# Patient Record
Sex: FEMALE | Race: OTHER | NOT HISPANIC OR LATINO | URBAN - METROPOLITAN AREA
[De-identification: names, ages, dates, MRNs, and addresses within clinical notes are randomized per-mention and may not be internally consistent; named-entity substitution may affect disease eponyms.]

---

## 2017-03-08 ENCOUNTER — OUTPATIENT (OUTPATIENT)
Dept: OUTPATIENT SERVICES | Facility: HOSPITAL | Age: 72
LOS: 1 days | End: 2017-03-08
Payer: MEDICARE

## 2017-03-08 VITALS
DIASTOLIC BLOOD PRESSURE: 80 MMHG | WEIGHT: 179.9 LBS | HEIGHT: 60 IN | RESPIRATION RATE: 16 BRPM | HEART RATE: 72 BPM | SYSTOLIC BLOOD PRESSURE: 140 MMHG

## 2017-03-08 DIAGNOSIS — M17.11 UNILATERAL PRIMARY OSTEOARTHRITIS, RIGHT KNEE: ICD-10-CM

## 2017-03-08 DIAGNOSIS — Z01.818 ENCOUNTER FOR OTHER PREPROCEDURAL EXAMINATION: ICD-10-CM

## 2017-03-08 DIAGNOSIS — M19.90 UNSPECIFIED OSTEOARTHRITIS, UNSPECIFIED SITE: ICD-10-CM

## 2017-03-08 DIAGNOSIS — Z12.11 ENCOUNTER FOR SCREENING FOR MALIGNANT NEOPLASM OF COLON: Chronic | ICD-10-CM

## 2017-03-08 DIAGNOSIS — D17.9 BENIGN LIPOMATOUS NEOPLASM, UNSPECIFIED: Chronic | ICD-10-CM

## 2017-03-08 LAB
ANION GAP SERPL CALC-SCNC: 11 MMOL/L — SIGNIFICANT CHANGE UP (ref 5–17)
APTT BLD: 33.2 SEC — SIGNIFICANT CHANGE UP (ref 27.5–37.4)
BUN SERPL-MCNC: 31 MG/DL — HIGH (ref 7–18)
CALCIUM SERPL-MCNC: 9.1 MG/DL — SIGNIFICANT CHANGE UP (ref 8.4–10.5)
CHLORIDE SERPL-SCNC: 104 MMOL/L — SIGNIFICANT CHANGE UP (ref 96–108)
CO2 SERPL-SCNC: 24 MMOL/L — SIGNIFICANT CHANGE UP (ref 22–31)
CREAT SERPL-MCNC: 1.28 MG/DL — SIGNIFICANT CHANGE UP (ref 0.5–1.3)
GLUCOSE SERPL-MCNC: 122 MG/DL — HIGH (ref 70–99)
HCT VFR BLD CALC: 35.9 % — SIGNIFICANT CHANGE UP (ref 34.5–45)
HGB BLD-MCNC: 11.6 G/DL — SIGNIFICANT CHANGE UP (ref 11.5–15.5)
INR BLD: 1.17 RATIO — HIGH (ref 0.88–1.16)
MCHC RBC-ENTMCNC: 26.8 PG — LOW (ref 27–34)
MCHC RBC-ENTMCNC: 32.4 GM/DL — SIGNIFICANT CHANGE UP (ref 32–36)
MCV RBC AUTO: 82.6 FL — SIGNIFICANT CHANGE UP (ref 80–100)
PLATELET # BLD AUTO: 335 K/UL — SIGNIFICANT CHANGE UP (ref 150–400)
POTASSIUM SERPL-MCNC: 4.3 MMOL/L — SIGNIFICANT CHANGE UP (ref 3.5–5.3)
POTASSIUM SERPL-SCNC: 4.3 MMOL/L — SIGNIFICANT CHANGE UP (ref 3.5–5.3)
PROTHROM AB SERPL-ACNC: 13.1 SEC — SIGNIFICANT CHANGE UP (ref 10–13.1)
RBC # BLD: 4.35 M/UL — SIGNIFICANT CHANGE UP (ref 3.8–5.2)
RBC # FLD: 12.6 % — SIGNIFICANT CHANGE UP (ref 10.3–14.5)
SODIUM SERPL-SCNC: 139 MMOL/L — SIGNIFICANT CHANGE UP (ref 135–145)
WBC # BLD: 12 K/UL — HIGH (ref 3.8–10.5)
WBC # FLD AUTO: 12 K/UL — HIGH (ref 3.8–10.5)

## 2017-03-08 PROCEDURE — 85027 COMPLETE CBC AUTOMATED: CPT

## 2017-03-08 PROCEDURE — 86901 BLOOD TYPING SEROLOGIC RH(D): CPT

## 2017-03-08 PROCEDURE — 87641 MR-STAPH DNA AMP PROBE: CPT

## 2017-03-08 PROCEDURE — 85610 PROTHROMBIN TIME: CPT

## 2017-03-08 PROCEDURE — 85730 THROMBOPLASTIN TIME PARTIAL: CPT

## 2017-03-08 PROCEDURE — 87640 STAPH A DNA AMP PROBE: CPT

## 2017-03-08 PROCEDURE — G0463: CPT

## 2017-03-08 PROCEDURE — 80048 BASIC METABOLIC PNL TOTAL CA: CPT

## 2017-03-08 PROCEDURE — 86900 BLOOD TYPING SEROLOGIC ABO: CPT

## 2017-03-08 PROCEDURE — 86850 RBC ANTIBODY SCREEN: CPT

## 2017-03-08 NOTE — H&P PST ADULT - NSANTHOSAYNRD_GEN_A_CORE
No. GINNA screening performed.  STOP BANG Legend: 0-2 = LOW Risk; 3-4 = INTERMEDIATE Risk; 5-8 = HIGH Risk

## 2017-03-08 NOTE — H&P PST ADULT - FAMILY HISTORY
Mother  Still living? Unknown  Family history of diabetes mellitus, Age at diagnosis: Age Unknown  Family history of hypertension, Age at diagnosis: Age Unknown     Sibling  Still living? Unknown  Family history of diabetes mellitus, Age at diagnosis: Age Unknown  Family history of hypertension, Age at diagnosis: Age Unknown     Grandparent  Still living? Unknown  Family history of blindness, Age at diagnosis: Age Unknown

## 2017-03-08 NOTE — H&P PST ADULT - PMH
Arthritis    DM (diabetes mellitus)    GERD (gastroesophageal reflux disease)    HTN (hypertension)    Hyperlipemia    Hypothyroid

## 2017-03-09 LAB
MRSA PCR RESULT.: SIGNIFICANT CHANGE UP
S AUREUS DNA NOSE QL NAA+PROBE: SIGNIFICANT CHANGE UP

## 2017-03-17 RX ORDER — ASPIRIN/CALCIUM CARB/MAGNESIUM 324 MG
1 TABLET ORAL
Qty: 0 | Refills: 0 | COMMUNITY

## 2017-03-17 RX ORDER — SODIUM CHLORIDE 9 MG/ML
3 INJECTION INTRAMUSCULAR; INTRAVENOUS; SUBCUTANEOUS EVERY 8 HOURS
Qty: 0 | Refills: 0 | Status: DISCONTINUED | OUTPATIENT
Start: 2017-03-20 | End: 2017-03-20

## 2017-03-17 RX ORDER — ACETAMINOPHEN 500 MG
0 TABLET ORAL
Qty: 0 | Refills: 0 | COMMUNITY

## 2017-03-19 ENCOUNTER — TRANSCRIPTION ENCOUNTER (OUTPATIENT)
Age: 72
End: 2017-03-19

## 2017-03-20 ENCOUNTER — INPATIENT (INPATIENT)
Facility: HOSPITAL | Age: 72
LOS: 3 days | Discharge: NURSING, REHAB OR LONG-TERM CARE | DRG: 470 | End: 2017-03-24
Attending: ORTHOPAEDIC SURGERY | Admitting: ORTHOPAEDIC SURGERY
Payer: MEDICARE

## 2017-03-20 VITALS
RESPIRATION RATE: 14 BRPM | SYSTOLIC BLOOD PRESSURE: 123 MMHG | DIASTOLIC BLOOD PRESSURE: 59 MMHG | HEART RATE: 83 BPM | TEMPERATURE: 98 F | WEIGHT: 179.9 LBS | OXYGEN SATURATION: 97 %

## 2017-03-20 DIAGNOSIS — D17.9 BENIGN LIPOMATOUS NEOPLASM, UNSPECIFIED: Chronic | ICD-10-CM

## 2017-03-20 DIAGNOSIS — Z12.11 ENCOUNTER FOR SCREENING FOR MALIGNANT NEOPLASM OF COLON: Chronic | ICD-10-CM

## 2017-03-20 DIAGNOSIS — M17.11 UNILATERAL PRIMARY OSTEOARTHRITIS, RIGHT KNEE: ICD-10-CM

## 2017-03-20 LAB
ANION GAP SERPL CALC-SCNC: 10 MMOL/L — SIGNIFICANT CHANGE UP (ref 5–17)
BUN SERPL-MCNC: 25 MG/DL — HIGH (ref 7–18)
CALCIUM SERPL-MCNC: 8 MG/DL — LOW (ref 8.4–10.5)
CHLORIDE SERPL-SCNC: 107 MMOL/L — SIGNIFICANT CHANGE UP (ref 96–108)
CO2 SERPL-SCNC: 23 MMOL/L — SIGNIFICANT CHANGE UP (ref 22–31)
CREAT SERPL-MCNC: 1.01 MG/DL — SIGNIFICANT CHANGE UP (ref 0.5–1.3)
GLUCOSE SERPL-MCNC: 145 MG/DL — HIGH (ref 70–99)
HCT VFR BLD CALC: 28.1 % — LOW (ref 34.5–45)
HGB BLD-MCNC: 9.2 G/DL — LOW (ref 11.5–15.5)
MCHC RBC-ENTMCNC: 26.8 PG — LOW (ref 27–34)
MCHC RBC-ENTMCNC: 32.8 GM/DL — SIGNIFICANT CHANGE UP (ref 32–36)
MCV RBC AUTO: 82 FL — SIGNIFICANT CHANGE UP (ref 80–100)
PLATELET # BLD AUTO: 268 K/UL — SIGNIFICANT CHANGE UP (ref 150–400)
POTASSIUM SERPL-MCNC: 4.4 MMOL/L — SIGNIFICANT CHANGE UP (ref 3.5–5.3)
POTASSIUM SERPL-SCNC: 4.4 MMOL/L — SIGNIFICANT CHANGE UP (ref 3.5–5.3)
RBC # BLD: 3.42 M/UL — LOW (ref 3.8–5.2)
RBC # FLD: 12.5 % — SIGNIFICANT CHANGE UP (ref 10.3–14.5)
SODIUM SERPL-SCNC: 140 MMOL/L — SIGNIFICANT CHANGE UP (ref 135–145)
WBC # BLD: 13.2 K/UL — HIGH (ref 3.8–10.5)
WBC # FLD AUTO: 13.2 K/UL — HIGH (ref 3.8–10.5)

## 2017-03-20 PROCEDURE — 88311 DECALCIFY TISSUE: CPT | Mod: 26

## 2017-03-20 PROCEDURE — 73560 X-RAY EXAM OF KNEE 1 OR 2: CPT | Mod: 26,RT

## 2017-03-20 PROCEDURE — 73562 X-RAY EXAM OF KNEE 3: CPT | Mod: 26,RT

## 2017-03-20 PROCEDURE — 27447 TOTAL KNEE ARTHROPLASTY: CPT | Mod: AS,RT

## 2017-03-20 PROCEDURE — 88305 TISSUE EXAM BY PATHOLOGIST: CPT | Mod: 26

## 2017-03-20 RX ORDER — DEXTROSE 50 % IN WATER 50 %
25 SYRINGE (ML) INTRAVENOUS ONCE
Qty: 0 | Refills: 0 | Status: DISCONTINUED | OUTPATIENT
Start: 2017-03-20 | End: 2017-03-24

## 2017-03-20 RX ORDER — HYDROMORPHONE HYDROCHLORIDE 2 MG/ML
1 INJECTION INTRAMUSCULAR; INTRAVENOUS; SUBCUTANEOUS
Qty: 0 | Refills: 0 | Status: DISCONTINUED | OUTPATIENT
Start: 2017-03-20 | End: 2017-03-20

## 2017-03-20 RX ORDER — FERROUS SULFATE 325(65) MG
325 TABLET ORAL
Qty: 0 | Refills: 0 | Status: DISCONTINUED | OUTPATIENT
Start: 2017-03-20 | End: 2017-03-24

## 2017-03-20 RX ORDER — ENOXAPARIN SODIUM 100 MG/ML
30 INJECTION SUBCUTANEOUS
Qty: 0 | Refills: 0 | Status: DISCONTINUED | OUTPATIENT
Start: 2017-03-20 | End: 2017-03-24

## 2017-03-20 RX ORDER — GLUCAGON INJECTION, SOLUTION 0.5 MG/.1ML
1 INJECTION, SOLUTION SUBCUTANEOUS ONCE
Qty: 0 | Refills: 0 | Status: DISCONTINUED | OUTPATIENT
Start: 2017-03-20 | End: 2017-03-24

## 2017-03-20 RX ORDER — DOCUSATE SODIUM 100 MG
100 CAPSULE ORAL THREE TIMES A DAY
Qty: 0 | Refills: 0 | Status: DISCONTINUED | OUTPATIENT
Start: 2017-03-20 | End: 2017-03-24

## 2017-03-20 RX ORDER — ACETAMINOPHEN 500 MG
650 TABLET ORAL EVERY 6 HOURS
Qty: 0 | Refills: 0 | Status: DISCONTINUED | OUTPATIENT
Start: 2017-03-20 | End: 2017-03-24

## 2017-03-20 RX ORDER — DEXTROSE 50 % IN WATER 50 %
1 SYRINGE (ML) INTRAVENOUS ONCE
Qty: 0 | Refills: 0 | Status: DISCONTINUED | OUTPATIENT
Start: 2017-03-20 | End: 2017-03-24

## 2017-03-20 RX ORDER — SENNA PLUS 8.6 MG/1
2 TABLET ORAL AT BEDTIME
Qty: 0 | Refills: 0 | Status: DISCONTINUED | OUTPATIENT
Start: 2017-03-20 | End: 2017-03-23

## 2017-03-20 RX ORDER — LEVOTHYROXINE SODIUM 125 MCG
75 TABLET ORAL DAILY
Qty: 0 | Refills: 0 | Status: DISCONTINUED | OUTPATIENT
Start: 2017-03-20 | End: 2017-03-24

## 2017-03-20 RX ORDER — DEXTROSE 50 % IN WATER 50 %
12.5 SYRINGE (ML) INTRAVENOUS ONCE
Qty: 0 | Refills: 0 | Status: DISCONTINUED | OUTPATIENT
Start: 2017-03-20 | End: 2017-03-24

## 2017-03-20 RX ORDER — FENTANYL CITRATE 50 UG/ML
50 INJECTION INTRAVENOUS
Qty: 0 | Refills: 0 | Status: DISCONTINUED | OUTPATIENT
Start: 2017-03-20 | End: 2017-03-21

## 2017-03-20 RX ORDER — SODIUM CHLORIDE 9 MG/ML
1000 INJECTION, SOLUTION INTRAVENOUS
Qty: 0 | Refills: 0 | Status: DISCONTINUED | OUTPATIENT
Start: 2017-03-20 | End: 2017-03-20

## 2017-03-20 RX ORDER — MAGNESIUM HYDROXIDE 400 MG/1
30 TABLET, CHEWABLE ORAL DAILY
Qty: 0 | Refills: 0 | Status: DISCONTINUED | OUTPATIENT
Start: 2017-03-20 | End: 2017-03-24

## 2017-03-20 RX ORDER — CEFAZOLIN SODIUM 1 G
2000 VIAL (EA) INJECTION EVERY 8 HOURS
Qty: 0 | Refills: 0 | Status: COMPLETED | OUTPATIENT
Start: 2017-03-20 | End: 2017-03-21

## 2017-03-20 RX ORDER — FOLIC ACID 0.8 MG
1 TABLET ORAL DAILY
Qty: 0 | Refills: 0 | Status: DISCONTINUED | OUTPATIENT
Start: 2017-03-20 | End: 2017-03-24

## 2017-03-20 RX ORDER — LISINOPRIL 2.5 MG/1
10 TABLET ORAL DAILY
Qty: 0 | Refills: 0 | Status: DISCONTINUED | OUTPATIENT
Start: 2017-03-20 | End: 2017-03-24

## 2017-03-20 RX ORDER — INSULIN LISPRO 100/ML
VIAL (ML) SUBCUTANEOUS
Qty: 0 | Refills: 0 | Status: DISCONTINUED | OUTPATIENT
Start: 2017-03-20 | End: 2017-03-24

## 2017-03-20 RX ORDER — ONDANSETRON 8 MG/1
4 TABLET, FILM COATED ORAL EVERY 6 HOURS
Qty: 0 | Refills: 0 | Status: DISCONTINUED | OUTPATIENT
Start: 2017-03-20 | End: 2017-03-24

## 2017-03-20 RX ORDER — AMLODIPINE BESYLATE 2.5 MG/1
10 TABLET ORAL DAILY
Qty: 0 | Refills: 0 | Status: DISCONTINUED | OUTPATIENT
Start: 2017-03-20 | End: 2017-03-24

## 2017-03-20 RX ORDER — BUPIVACAINE 13.3 MG/ML
20 INJECTION, SUSPENSION, LIPOSOMAL INFILTRATION ONCE
Qty: 0 | Refills: 0 | Status: DISCONTINUED | OUTPATIENT
Start: 2017-03-20 | End: 2017-03-20

## 2017-03-20 RX ORDER — SIMVASTATIN 20 MG/1
20 TABLET, FILM COATED ORAL AT BEDTIME
Qty: 0 | Refills: 0 | Status: DISCONTINUED | OUTPATIENT
Start: 2017-03-20 | End: 2017-03-24

## 2017-03-20 RX ORDER — ASCORBIC ACID 60 MG
500 TABLET,CHEWABLE ORAL
Qty: 0 | Refills: 0 | Status: DISCONTINUED | OUTPATIENT
Start: 2017-03-20 | End: 2017-03-24

## 2017-03-20 RX ORDER — SODIUM CHLORIDE 9 MG/ML
1000 INJECTION, SOLUTION INTRAVENOUS
Qty: 0 | Refills: 0 | Status: DISCONTINUED | OUTPATIENT
Start: 2017-03-20 | End: 2017-03-24

## 2017-03-20 RX ORDER — POLYETHYLENE GLYCOL 3350 17 G/17G
17 POWDER, FOR SOLUTION ORAL DAILY
Qty: 0 | Refills: 0 | Status: DISCONTINUED | OUTPATIENT
Start: 2017-03-20 | End: 2017-03-24

## 2017-03-20 RX ORDER — ASPIRIN/CALCIUM CARB/MAGNESIUM 324 MG
325 TABLET ORAL
Qty: 0 | Refills: 0 | Status: DISCONTINUED | OUTPATIENT
Start: 2017-03-20 | End: 2017-03-20

## 2017-03-20 RX ADMIN — Medication 100 MILLIGRAM(S): at 19:31

## 2017-03-20 RX ADMIN — HYDROMORPHONE HYDROCHLORIDE 1 MILLIGRAM(S): 2 INJECTION INTRAMUSCULAR; INTRAVENOUS; SUBCUTANEOUS at 15:50

## 2017-03-20 RX ADMIN — SODIUM CHLORIDE 3 MILLILITER(S): 9 INJECTION INTRAMUSCULAR; INTRAVENOUS; SUBCUTANEOUS at 09:47

## 2017-03-20 RX ADMIN — HYDROMORPHONE HYDROCHLORIDE 1 MILLIGRAM(S): 2 INJECTION INTRAMUSCULAR; INTRAVENOUS; SUBCUTANEOUS at 15:05

## 2017-03-20 RX ADMIN — SODIUM CHLORIDE 125 MILLILITER(S): 9 INJECTION, SOLUTION INTRAVENOUS at 16:12

## 2017-03-20 RX ADMIN — HYDROMORPHONE HYDROCHLORIDE 1 MILLIGRAM(S): 2 INJECTION INTRAMUSCULAR; INTRAVENOUS; SUBCUTANEOUS at 15:20

## 2017-03-20 RX ADMIN — HYDROMORPHONE HYDROCHLORIDE 1 MILLIGRAM(S): 2 INJECTION INTRAMUSCULAR; INTRAVENOUS; SUBCUTANEOUS at 15:35

## 2017-03-20 RX ADMIN — SODIUM CHLORIDE 125 MILLILITER(S): 9 INJECTION, SOLUTION INTRAVENOUS at 19:28

## 2017-03-20 NOTE — PATIENT PROFILE ADULT. - REASON FOR ADMISSION
pain in my right knee which goes down leg into foot and then I can't walk right total knee replacement

## 2017-03-21 PROCEDURE — 99231 SBSQ HOSP IP/OBS SF/LOW 25: CPT

## 2017-03-21 RX ORDER — OXYCODONE HYDROCHLORIDE 5 MG/1
5 TABLET ORAL EVERY 4 HOURS
Qty: 0 | Refills: 0 | Status: DISCONTINUED | OUTPATIENT
Start: 2017-03-21 | End: 2017-03-24

## 2017-03-21 RX ORDER — OXYCODONE HYDROCHLORIDE 5 MG/1
10 TABLET ORAL EVERY 4 HOURS
Qty: 0 | Refills: 0 | Status: DISCONTINUED | OUTPATIENT
Start: 2017-03-21 | End: 2017-03-24

## 2017-03-21 RX ORDER — ACETAMINOPHEN 500 MG
1000 TABLET ORAL ONCE
Qty: 0 | Refills: 0 | Status: COMPLETED | OUTPATIENT
Start: 2017-03-21 | End: 2017-03-21

## 2017-03-21 RX ADMIN — Medication 1 MILLIGRAM(S): at 11:48

## 2017-03-21 RX ADMIN — Medication 500 MILLIGRAM(S): at 17:08

## 2017-03-21 RX ADMIN — SIMVASTATIN 20 MILLIGRAM(S): 20 TABLET, FILM COATED ORAL at 21:23

## 2017-03-21 RX ADMIN — Medication 500 MILLIGRAM(S): at 06:21

## 2017-03-21 RX ADMIN — Medication 100 MILLIGRAM(S): at 06:22

## 2017-03-21 RX ADMIN — Medication 100 MILLIGRAM(S): at 02:35

## 2017-03-21 RX ADMIN — Medication 1: at 17:08

## 2017-03-21 RX ADMIN — ENOXAPARIN SODIUM 30 MILLIGRAM(S): 100 INJECTION SUBCUTANEOUS at 17:08

## 2017-03-21 RX ADMIN — Medication 325 MILLIGRAM(S): at 11:48

## 2017-03-21 RX ADMIN — Medication 1 TABLET(S): at 11:48

## 2017-03-21 RX ADMIN — LISINOPRIL 10 MILLIGRAM(S): 2.5 TABLET ORAL at 06:21

## 2017-03-21 RX ADMIN — ENOXAPARIN SODIUM 30 MILLIGRAM(S): 100 INJECTION SUBCUTANEOUS at 06:22

## 2017-03-21 RX ADMIN — Medication 2: at 11:48

## 2017-03-21 RX ADMIN — Medication 1000 MILLIGRAM(S): at 12:28

## 2017-03-21 RX ADMIN — Medication 75 MICROGRAM(S): at 06:21

## 2017-03-21 RX ADMIN — OXYCODONE HYDROCHLORIDE 10 MILLIGRAM(S): 5 TABLET ORAL at 20:10

## 2017-03-21 RX ADMIN — OXYCODONE HYDROCHLORIDE 10 MILLIGRAM(S): 5 TABLET ORAL at 17:41

## 2017-03-21 RX ADMIN — AMLODIPINE BESYLATE 10 MILLIGRAM(S): 2.5 TABLET ORAL at 06:22

## 2017-03-21 RX ADMIN — POLYETHYLENE GLYCOL 3350 17 GRAM(S): 17 POWDER, FOR SOLUTION ORAL at 11:48

## 2017-03-21 RX ADMIN — Medication 325 MILLIGRAM(S): at 17:08

## 2017-03-21 RX ADMIN — Medication 400 MILLIGRAM(S): at 11:48

## 2017-03-21 RX ADMIN — Medication 325 MILLIGRAM(S): at 07:51

## 2017-03-21 NOTE — PHYSICAL THERAPY INITIAL EVALUATION ADULT - ACTIVE RANGE OF MOTION EXAMINATION, REHAB EVAL
Left LE Active ROM was WFL (within functional limits)/Right ankle-WFL/bilateral upper extremity Active ROM was WFL (within functional limits)

## 2017-03-22 LAB — SURGICAL PATHOLOGY FINAL REPORT - CH: SIGNIFICANT CHANGE UP

## 2017-03-22 RX ADMIN — Medication 100 MILLIGRAM(S): at 06:13

## 2017-03-22 RX ADMIN — Medication 75 MICROGRAM(S): at 06:12

## 2017-03-22 RX ADMIN — Medication 2: at 08:32

## 2017-03-22 RX ADMIN — Medication 1 TABLET(S): at 11:55

## 2017-03-22 RX ADMIN — Medication 1 MILLIGRAM(S): at 11:55

## 2017-03-22 RX ADMIN — OXYCODONE HYDROCHLORIDE 10 MILLIGRAM(S): 5 TABLET ORAL at 21:47

## 2017-03-22 RX ADMIN — ENOXAPARIN SODIUM 30 MILLIGRAM(S): 100 INJECTION SUBCUTANEOUS at 06:12

## 2017-03-22 RX ADMIN — OXYCODONE HYDROCHLORIDE 10 MILLIGRAM(S): 5 TABLET ORAL at 22:20

## 2017-03-22 RX ADMIN — SIMVASTATIN 20 MILLIGRAM(S): 20 TABLET, FILM COATED ORAL at 21:46

## 2017-03-22 RX ADMIN — Medication 100 MILLIGRAM(S): at 21:46

## 2017-03-22 RX ADMIN — Medication 500 MILLIGRAM(S): at 06:13

## 2017-03-22 RX ADMIN — ENOXAPARIN SODIUM 30 MILLIGRAM(S): 100 INJECTION SUBCUTANEOUS at 17:37

## 2017-03-22 RX ADMIN — Medication 1: at 11:55

## 2017-03-22 RX ADMIN — Medication 650 MILLIGRAM(S): at 22:10

## 2017-03-22 RX ADMIN — Medication 325 MILLIGRAM(S): at 08:31

## 2017-03-22 RX ADMIN — ONDANSETRON 4 MILLIGRAM(S): 8 TABLET, FILM COATED ORAL at 14:08

## 2017-03-22 RX ADMIN — Medication 325 MILLIGRAM(S): at 11:55

## 2017-03-22 RX ADMIN — AMLODIPINE BESYLATE 10 MILLIGRAM(S): 2.5 TABLET ORAL at 06:12

## 2017-03-22 RX ADMIN — LISINOPRIL 10 MILLIGRAM(S): 2.5 TABLET ORAL at 06:13

## 2017-03-22 RX ADMIN — Medication 500 MILLIGRAM(S): at 17:36

## 2017-03-22 RX ADMIN — Medication 325 MILLIGRAM(S): at 17:36

## 2017-03-23 RX ORDER — LACTULOSE 10 G/15ML
20 SOLUTION ORAL ONCE
Qty: 0 | Refills: 0 | Status: COMPLETED | OUTPATIENT
Start: 2017-03-23 | End: 2017-03-23

## 2017-03-23 RX ORDER — PANTOPRAZOLE SODIUM 20 MG/1
40 TABLET, DELAYED RELEASE ORAL
Qty: 0 | Refills: 0 | Status: DISCONTINUED | OUTPATIENT
Start: 2017-03-23 | End: 2017-03-24

## 2017-03-23 RX ORDER — MAGNESIUM HYDROXIDE 400 MG/1
30 TABLET, CHEWABLE ORAL ONCE
Qty: 0 | Refills: 0 | Status: COMPLETED | OUTPATIENT
Start: 2017-03-23 | End: 2017-03-23

## 2017-03-23 RX ORDER — SENNA PLUS 8.6 MG/1
2 TABLET ORAL AT BEDTIME
Qty: 0 | Refills: 0 | Status: DISCONTINUED | OUTPATIENT
Start: 2017-03-23 | End: 2017-03-24

## 2017-03-23 RX ADMIN — OXYCODONE HYDROCHLORIDE 10 MILLIGRAM(S): 5 TABLET ORAL at 09:28

## 2017-03-23 RX ADMIN — POLYETHYLENE GLYCOL 3350 17 GRAM(S): 17 POWDER, FOR SOLUTION ORAL at 11:21

## 2017-03-23 RX ADMIN — Medication 2: at 11:21

## 2017-03-23 RX ADMIN — Medication 100 MILLIGRAM(S): at 05:58

## 2017-03-23 RX ADMIN — Medication 325 MILLIGRAM(S): at 11:20

## 2017-03-23 RX ADMIN — Medication 75 MICROGRAM(S): at 05:58

## 2017-03-23 RX ADMIN — Medication 100 MILLIGRAM(S): at 14:43

## 2017-03-23 RX ADMIN — Medication 500 MILLIGRAM(S): at 05:58

## 2017-03-23 RX ADMIN — MAGNESIUM HYDROXIDE 30 MILLILITER(S): 400 TABLET, CHEWABLE ORAL at 09:29

## 2017-03-23 RX ADMIN — SENNA PLUS 2 TABLET(S): 8.6 TABLET ORAL at 23:01

## 2017-03-23 RX ADMIN — Medication 500 MILLIGRAM(S): at 16:45

## 2017-03-23 RX ADMIN — Medication 1: at 16:44

## 2017-03-23 RX ADMIN — ONDANSETRON 4 MILLIGRAM(S): 8 TABLET, FILM COATED ORAL at 07:51

## 2017-03-23 RX ADMIN — OXYCODONE HYDROCHLORIDE 10 MILLIGRAM(S): 5 TABLET ORAL at 14:42

## 2017-03-23 RX ADMIN — Medication 1 TABLET(S): at 11:20

## 2017-03-23 RX ADMIN — Medication 100 MILLIGRAM(S): at 22:10

## 2017-03-23 RX ADMIN — SIMVASTATIN 20 MILLIGRAM(S): 20 TABLET, FILM COATED ORAL at 23:01

## 2017-03-23 RX ADMIN — LISINOPRIL 10 MILLIGRAM(S): 2.5 TABLET ORAL at 05:58

## 2017-03-23 RX ADMIN — Medication 1 MILLIGRAM(S): at 11:20

## 2017-03-23 RX ADMIN — Medication 325 MILLIGRAM(S): at 16:45

## 2017-03-23 RX ADMIN — OXYCODONE HYDROCHLORIDE 10 MILLIGRAM(S): 5 TABLET ORAL at 15:33

## 2017-03-23 RX ADMIN — AMLODIPINE BESYLATE 10 MILLIGRAM(S): 2.5 TABLET ORAL at 05:59

## 2017-03-23 RX ADMIN — OXYCODONE HYDROCHLORIDE 10 MILLIGRAM(S): 5 TABLET ORAL at 10:28

## 2017-03-23 RX ADMIN — Medication 10 MILLIGRAM(S): at 11:19

## 2017-03-23 RX ADMIN — ENOXAPARIN SODIUM 30 MILLIGRAM(S): 100 INJECTION SUBCUTANEOUS at 16:45

## 2017-03-23 RX ADMIN — ENOXAPARIN SODIUM 30 MILLIGRAM(S): 100 INJECTION SUBCUTANEOUS at 05:59

## 2017-03-24 VITALS
DIASTOLIC BLOOD PRESSURE: 47 MMHG | SYSTOLIC BLOOD PRESSURE: 124 MMHG | HEART RATE: 85 BPM | OXYGEN SATURATION: 97 % | TEMPERATURE: 98 F | RESPIRATION RATE: 16 BRPM

## 2017-03-24 PROCEDURE — 88311 DECALCIFY TISSUE: CPT

## 2017-03-24 PROCEDURE — 86900 BLOOD TYPING SEROLOGIC ABO: CPT

## 2017-03-24 PROCEDURE — C1889: CPT

## 2017-03-24 PROCEDURE — C1713: CPT

## 2017-03-24 PROCEDURE — G8980: CPT

## 2017-03-24 PROCEDURE — 97116 GAIT TRAINING THERAPY: CPT

## 2017-03-24 PROCEDURE — 83036 HEMOGLOBIN GLYCOSYLATED A1C: CPT

## 2017-03-24 PROCEDURE — 73562 X-RAY EXAM OF KNEE 3: CPT

## 2017-03-24 PROCEDURE — P9040: CPT

## 2017-03-24 PROCEDURE — 85027 COMPLETE CBC AUTOMATED: CPT

## 2017-03-24 PROCEDURE — 80048 BASIC METABOLIC PNL TOTAL CA: CPT

## 2017-03-24 PROCEDURE — G8978: CPT

## 2017-03-24 PROCEDURE — 97110 THERAPEUTIC EXERCISES: CPT

## 2017-03-24 PROCEDURE — 86901 BLOOD TYPING SEROLOGIC RH(D): CPT

## 2017-03-24 PROCEDURE — C1776: CPT

## 2017-03-24 PROCEDURE — 36430 TRANSFUSION BLD/BLD COMPNT: CPT

## 2017-03-24 PROCEDURE — 97530 THERAPEUTIC ACTIVITIES: CPT

## 2017-03-24 PROCEDURE — 86850 RBC ANTIBODY SCREEN: CPT

## 2017-03-24 PROCEDURE — 97162 PT EVAL MOD COMPLEX 30 MIN: CPT

## 2017-03-24 PROCEDURE — 86920 COMPATIBILITY TEST SPIN: CPT

## 2017-03-24 PROCEDURE — 88305 TISSUE EXAM BY PATHOLOGIST: CPT

## 2017-03-24 PROCEDURE — G8979: CPT

## 2017-03-24 RX ORDER — ENOXAPARIN SODIUM 100 MG/ML
40 INJECTION SUBCUTANEOUS
Qty: 10 | Refills: 0 | OUTPATIENT
Start: 2017-03-24 | End: 2017-04-03

## 2017-03-24 RX ORDER — ACETAMINOPHEN WITH CODEINE 300MG-30MG
1 TABLET ORAL
Qty: 0 | Refills: 0 | COMMUNITY

## 2017-03-24 RX ORDER — OXYCODONE HYDROCHLORIDE 5 MG/1
1 TABLET ORAL
Qty: 30 | Refills: 0 | OUTPATIENT
Start: 2017-03-24

## 2017-03-24 RX ORDER — DOCUSATE SODIUM 100 MG
1 CAPSULE ORAL
Qty: 30 | Refills: 0 | OUTPATIENT
Start: 2017-03-24

## 2017-03-24 RX ORDER — ASPIRIN/CALCIUM CARB/MAGNESIUM 324 MG
1 TABLET ORAL
Qty: 0 | Refills: 0 | COMMUNITY

## 2017-03-24 RX ORDER — FOLIC ACID 0.8 MG
1 TABLET ORAL
Qty: 30 | Refills: 0 | OUTPATIENT
Start: 2017-03-24

## 2017-03-24 RX ADMIN — AMLODIPINE BESYLATE 10 MILLIGRAM(S): 2.5 TABLET ORAL at 06:29

## 2017-03-24 RX ADMIN — Medication 100 MILLIGRAM(S): at 06:29

## 2017-03-24 RX ADMIN — Medication 1: at 11:48

## 2017-03-24 RX ADMIN — LISINOPRIL 10 MILLIGRAM(S): 2.5 TABLET ORAL at 06:29

## 2017-03-24 RX ADMIN — ENOXAPARIN SODIUM 30 MILLIGRAM(S): 100 INJECTION SUBCUTANEOUS at 06:30

## 2017-03-24 RX ADMIN — PANTOPRAZOLE SODIUM 40 MILLIGRAM(S): 20 TABLET, DELAYED RELEASE ORAL at 06:29

## 2017-03-24 RX ADMIN — Medication 75 MICROGRAM(S): at 06:29

## 2017-03-24 RX ADMIN — Medication 325 MILLIGRAM(S): at 08:06

## 2017-03-24 RX ADMIN — Medication 1: at 08:07

## 2017-03-24 RX ADMIN — Medication 500 MILLIGRAM(S): at 06:29

## 2017-03-24 NOTE — DISCHARGE NOTE ADULT - PATIENT PORTAL LINK FT
“You can access the Follow[facility] Patient Portal, offered by [facility], by registering with the following website: [email or web address]”

## 2017-03-24 NOTE — DISCHARGE NOTE ADULT - MEDICATION SUMMARY - MEDICATIONS TO STOP TAKING
I will STOP taking the medications listed below when I get home from the hospital:    Tylenol with Codeine #3 oral tablet  -- 1 tab(s) by mouth every 6 hours, As Needed

## 2017-03-24 NOTE — DISCHARGE NOTE ADULT - MEDICATION SUMMARY - MEDICATIONS TO TAKE
I will START or STAY ON the medications listed below when I get home from the hospital:    acetaminophen-oxyCODONE 325 mg-5 mg oral tablet  -- 1 tab(s) by mouth every 6 hours MDD:6  -- Caution federal law prohibits the transfer of this drug to any person other  than the person for whom it was prescribed.  May cause drowsiness.  Alcohol may intensify this effect.  Use care when operating dangerous machinery.  This prescription cannot be refilled.  This product contains acetaminophen.  Do not use  with any other product containing acetaminophen to prevent possible liver damage.  Using more of this medication than prescribed may cause serious breathing problems.    -- Indication: For Primary osteoarthritis of right knee    lisinopril 10 mg oral tablet  -- 1 tab(s) by mouth once a day  -- Indication: For Primary osteoarthritis of right knee    Lovenox 40 mg/0.4 mL injectable solution  -- 40 milligram(s) injectable once a day  -- It is very important that you take or use this exactly as directed.  Do not skip doses or discontinue unless directed by your doctor.    -- Indication: For Primary osteoarthritis of right knee    gabapentin 300 mg oral capsule  --  by mouth   -- Indication: For Primary osteoarthritis of right knee    metFORMIN 500 mg oral tablet  -- 1 tab(s) by mouth 2 times a day  -- Indication: For Primary osteoarthritis of right knee    simvastatin 20 mg oral tablet  -- 1 tab(s) by mouth once a day (at bedtime)  -- Indication: For Primary osteoarthritis of right knee    amLODIPine 10 mg oral tablet  -- 1 tab(s) by mouth once a day  -- Indication: For Primary osteoarthritis of right knee    hydroCHLOROthiazide 25 mg oral tablet  -- 1 tab(s) by mouth once a day  -- Indication: For Primary osteoarthritis of right knee    docusate sodium 100 mg oral capsule  -- 1 cap(s) by mouth 3 times a day  -- Indication: For Primary osteoarthritis of right knee    omeprazole 40 mg oral delayed release capsule  -- 1 cap(s) by mouth once a day  -- Indication: For Primary osteoarthritis of right knee    levothyroxine 75 mcg (0.075 mg) oral capsule  -- 1 cap(s) by mouth once a day  -- Indication: For Primary osteoarthritis of right knee    folic acid 1 mg oral tablet  -- 1 tab(s) by mouth once a day  -- Indication: For Primary osteoarthritis of right knee

## 2017-03-28 DIAGNOSIS — Z79.82 LONG TERM (CURRENT) USE OF ASPIRIN: ICD-10-CM

## 2017-03-28 DIAGNOSIS — Z82.49 FAMILY HISTORY OF ISCHEMIC HEART DISEASE AND OTHER DISEASES OF THE CIRCULATORY SYSTEM: ICD-10-CM

## 2017-03-28 DIAGNOSIS — Z83.3 FAMILY HISTORY OF DIABETES MELLITUS: ICD-10-CM

## 2017-03-28 DIAGNOSIS — I10 ESSENTIAL (PRIMARY) HYPERTENSION: ICD-10-CM

## 2017-03-28 DIAGNOSIS — K21.9 GASTRO-ESOPHAGEAL REFLUX DISEASE WITHOUT ESOPHAGITIS: ICD-10-CM

## 2017-03-28 DIAGNOSIS — E11.9 TYPE 2 DIABETES MELLITUS WITHOUT COMPLICATIONS: ICD-10-CM

## 2017-03-28 DIAGNOSIS — M21.161 VARUS DEFORMITY, NOT ELSEWHERE CLASSIFIED, RIGHT KNEE: ICD-10-CM

## 2017-03-28 DIAGNOSIS — E78.5 HYPERLIPIDEMIA, UNSPECIFIED: ICD-10-CM

## 2017-03-28 DIAGNOSIS — M17.11 UNILATERAL PRIMARY OSTEOARTHRITIS, RIGHT KNEE: ICD-10-CM

## 2017-03-28 DIAGNOSIS — D64.9 ANEMIA, UNSPECIFIED: ICD-10-CM

## 2017-03-28 DIAGNOSIS — Z79.84 LONG TERM (CURRENT) USE OF ORAL HYPOGLYCEMIC DRUGS: ICD-10-CM

## 2017-03-28 DIAGNOSIS — E03.9 HYPOTHYROIDISM, UNSPECIFIED: ICD-10-CM

## 2017-04-02 DIAGNOSIS — D62 ACUTE POSTHEMORRHAGIC ANEMIA: ICD-10-CM

## 2017-07-05 ENCOUNTER — OUTPATIENT (OUTPATIENT)
Dept: OUTPATIENT SERVICES | Facility: HOSPITAL | Age: 72
LOS: 1 days | End: 2017-07-05
Payer: MEDICARE

## 2017-07-05 VITALS
OXYGEN SATURATION: 98 % | RESPIRATION RATE: 16 BRPM | DIASTOLIC BLOOD PRESSURE: 80 MMHG | WEIGHT: 188.05 LBS | TEMPERATURE: 98 F | HEIGHT: 60 IN | SYSTOLIC BLOOD PRESSURE: 120 MMHG | HEART RATE: 66 BPM

## 2017-07-05 DIAGNOSIS — M17.12 UNILATERAL PRIMARY OSTEOARTHRITIS, LEFT KNEE: ICD-10-CM

## 2017-07-05 DIAGNOSIS — D17.9 BENIGN LIPOMATOUS NEOPLASM, UNSPECIFIED: Chronic | ICD-10-CM

## 2017-07-05 DIAGNOSIS — Z12.11 ENCOUNTER FOR SCREENING FOR MALIGNANT NEOPLASM OF COLON: Chronic | ICD-10-CM

## 2017-07-05 DIAGNOSIS — Z01.818 ENCOUNTER FOR OTHER PREPROCEDURAL EXAMINATION: ICD-10-CM

## 2017-07-05 DIAGNOSIS — Z96.651 PRESENCE OF RIGHT ARTIFICIAL KNEE JOINT: Chronic | ICD-10-CM

## 2017-07-05 LAB
APPEARANCE UR: CLEAR — SIGNIFICANT CHANGE UP
BILIRUB UR-MCNC: NEGATIVE — SIGNIFICANT CHANGE UP
COLOR SPEC: YELLOW — SIGNIFICANT CHANGE UP
DIFF PNL FLD: NEGATIVE — SIGNIFICANT CHANGE UP
GLUCOSE UR QL: NEGATIVE — SIGNIFICANT CHANGE UP
KETONES UR-MCNC: NEGATIVE — SIGNIFICANT CHANGE UP
LEUKOCYTE ESTERASE UR-ACNC: ABNORMAL
NITRITE UR-MCNC: NEGATIVE — SIGNIFICANT CHANGE UP
PH UR: 5 — SIGNIFICANT CHANGE UP (ref 5–8)
PROT UR-MCNC: NEGATIVE — SIGNIFICANT CHANGE UP
SP GR SPEC: 1.02 — SIGNIFICANT CHANGE UP (ref 1.01–1.02)
UROBILINOGEN FLD QL: NEGATIVE — SIGNIFICANT CHANGE UP

## 2017-07-05 PROCEDURE — 81001 URINALYSIS AUTO W/SCOPE: CPT

## 2017-07-05 PROCEDURE — 87640 STAPH A DNA AMP PROBE: CPT

## 2017-07-05 PROCEDURE — G0463: CPT

## 2017-07-05 RX ORDER — GABAPENTIN 400 MG/1
0 CAPSULE ORAL
Qty: 0 | Refills: 0 | COMMUNITY

## 2017-07-05 RX ORDER — SODIUM CHLORIDE 9 MG/ML
3 INJECTION INTRAMUSCULAR; INTRAVENOUS; SUBCUTANEOUS EVERY 8 HOURS
Qty: 0 | Refills: 0 | Status: DISCONTINUED | OUTPATIENT
Start: 2017-07-17 | End: 2017-07-20

## 2017-07-05 RX ORDER — LISINOPRIL 2.5 MG/1
1 TABLET ORAL
Qty: 0 | Refills: 0 | COMMUNITY

## 2017-07-05 RX ORDER — SIMVASTATIN 20 MG/1
1 TABLET, FILM COATED ORAL
Qty: 0 | Refills: 0 | COMMUNITY

## 2017-07-05 NOTE — H&P PST ADULT - PSH
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

## 2017-07-05 NOTE — H&P PST ADULT - MUSCULOSKELETAL
details… No joint pain, swelling or deformity; no limitation of movement decreased ROM due to pain/no joint warmth/no joint swelling/no joint erythema/left knee/no calf tenderness detailed exam

## 2017-07-05 NOTE — H&P PST ADULT - PMH
Arthritis    DM (diabetes mellitus)    GERD (gastroesophageal reflux disease)    HTN (hypertension)    Hyperlipemia    Hypothyroid DM (diabetes mellitus)    GERD (gastroesophageal reflux disease)    Glaucoma  bilateral eyes  HTN (hypertension)    Hyperlipemia    Hypothyroid    Obesity (BMI 30-39.9)    Primary osteoarthritis of left knee

## 2017-07-05 NOTE — H&P PST ADULT - HISTORY OF PRESENT ILLNESS
OA with pain and trouble walking.  Now for right total knee replacement 71 years old female presented with left knee pain , limited ROM  x 5 years, getting worse. Diagnosed with left knee unilateral primary OA and is scheduled for left total knee replacement on 7/17/17.

## 2017-07-05 NOTE — H&P PST ADULT - NSANTHOSAYNRD_GEN_A_CORE
No. GINNA screening performed.  STOP BANG Legend: 0-2 = LOW Risk; 3-4 = INTERMEDIATE Risk; 5-8 = HIGH Risk pt advised to f/u with pulmonology to asses for GINNA/No. GINNA screening performed.  STOP BANG Legend: 0-2 = LOW Risk; 3-4 = INTERMEDIATE Risk; 5-8 = HIGH Risk

## 2017-07-05 NOTE — H&P PST ADULT - PROBLEM SELECTOR PLAN 1
for right total knee replacement Patient  is scheduled for left total knee replacement on 7/17/17. Patient is at moderate risk for this surgery.

## 2017-07-05 NOTE — H&P PST ADULT - ASSESSMENT
72 y/o female with arthritis of right knee and now for right total knee replacement 71 years old female presented  left knee unilateral primary OA .

## 2017-07-06 LAB
MRSA PCR RESULT.: SIGNIFICANT CHANGE UP
S AUREUS DNA NOSE QL NAA+PROBE: SIGNIFICANT CHANGE UP

## 2017-07-16 ENCOUNTER — TRANSCRIPTION ENCOUNTER (OUTPATIENT)
Age: 72
End: 2017-07-16

## 2017-07-17 ENCOUNTER — INPATIENT (INPATIENT)
Facility: HOSPITAL | Age: 72
LOS: 2 days | Discharge: HOME | DRG: 470 | End: 2017-07-20
Attending: ORTHOPAEDIC SURGERY | Admitting: ORTHOPAEDIC SURGERY
Payer: MEDICARE

## 2017-07-17 VITALS
SYSTOLIC BLOOD PRESSURE: 134 MMHG | DIASTOLIC BLOOD PRESSURE: 56 MMHG | HEIGHT: 60 IN | OXYGEN SATURATION: 98 % | HEART RATE: 80 BPM | WEIGHT: 188.05 LBS | RESPIRATION RATE: 16 BRPM | TEMPERATURE: 98 F

## 2017-07-17 DIAGNOSIS — Z96.651 PRESENCE OF RIGHT ARTIFICIAL KNEE JOINT: Chronic | ICD-10-CM

## 2017-07-17 DIAGNOSIS — D17.9 BENIGN LIPOMATOUS NEOPLASM, UNSPECIFIED: Chronic | ICD-10-CM

## 2017-07-17 DIAGNOSIS — Z12.11 ENCOUNTER FOR SCREENING FOR MALIGNANT NEOPLASM OF COLON: Chronic | ICD-10-CM

## 2017-07-17 DIAGNOSIS — M17.12 UNILATERAL PRIMARY OSTEOARTHRITIS, LEFT KNEE: ICD-10-CM

## 2017-07-17 LAB
ANION GAP SERPL CALC-SCNC: 9 MMOL/L — SIGNIFICANT CHANGE UP (ref 5–17)
BUN SERPL-MCNC: 20 MG/DL — HIGH (ref 7–18)
CALCIUM SERPL-MCNC: 8.2 MG/DL — LOW (ref 8.4–10.5)
CHLORIDE SERPL-SCNC: 108 MMOL/L — SIGNIFICANT CHANGE UP (ref 96–108)
CO2 SERPL-SCNC: 25 MMOL/L — SIGNIFICANT CHANGE UP (ref 22–31)
CREAT SERPL-MCNC: 1.1 MG/DL — SIGNIFICANT CHANGE UP (ref 0.5–1.3)
GLUCOSE SERPL-MCNC: 142 MG/DL — HIGH (ref 70–99)
HCT VFR BLD CALC: 28.5 % — LOW (ref 34.5–45)
HGB BLD-MCNC: 9.3 G/DL — LOW (ref 11.5–15.5)
MCHC RBC-ENTMCNC: 26.6 PG — LOW (ref 27–34)
MCHC RBC-ENTMCNC: 32.8 GM/DL — SIGNIFICANT CHANGE UP (ref 32–36)
MCV RBC AUTO: 81 FL — SIGNIFICANT CHANGE UP (ref 80–100)
PLATELET # BLD AUTO: 266 K/UL — SIGNIFICANT CHANGE UP (ref 150–400)
POTASSIUM SERPL-MCNC: 4.1 MMOL/L — SIGNIFICANT CHANGE UP (ref 3.5–5.3)
POTASSIUM SERPL-SCNC: 4.1 MMOL/L — SIGNIFICANT CHANGE UP (ref 3.5–5.3)
RBC # BLD: 3.52 M/UL — LOW (ref 3.8–5.2)
RBC # FLD: 12.8 % — SIGNIFICANT CHANGE UP (ref 10.3–14.5)
SODIUM SERPL-SCNC: 142 MMOL/L — SIGNIFICANT CHANGE UP (ref 135–145)
WBC # BLD: 11.1 K/UL — HIGH (ref 3.8–10.5)
WBC # FLD AUTO: 11.1 K/UL — HIGH (ref 3.8–10.5)

## 2017-07-17 PROCEDURE — 73562 X-RAY EXAM OF KNEE 3: CPT | Mod: 26,LT

## 2017-07-17 PROCEDURE — 88311 DECALCIFY TISSUE: CPT | Mod: 26

## 2017-07-17 PROCEDURE — 88305 TISSUE EXAM BY PATHOLOGIST: CPT | Mod: 26

## 2017-07-17 RX ORDER — OMEPRAZOLE 10 MG/1
1 CAPSULE, DELAYED RELEASE ORAL
Qty: 0 | Refills: 0 | COMMUNITY

## 2017-07-17 RX ORDER — INSULIN LISPRO 100/ML
VIAL (ML) SUBCUTANEOUS
Qty: 0 | Refills: 0 | Status: DISCONTINUED | OUTPATIENT
Start: 2017-07-17 | End: 2017-07-20

## 2017-07-17 RX ORDER — ENOXAPARIN SODIUM 100 MG/ML
30 INJECTION SUBCUTANEOUS EVERY 12 HOURS
Qty: 0 | Refills: 0 | Status: DISCONTINUED | OUTPATIENT
Start: 2017-07-17 | End: 2017-07-18

## 2017-07-17 RX ORDER — HYDROMORPHONE HYDROCHLORIDE 2 MG/ML
0.5 INJECTION INTRAMUSCULAR; INTRAVENOUS; SUBCUTANEOUS
Qty: 0 | Refills: 0 | Status: DISCONTINUED | OUTPATIENT
Start: 2017-07-17 | End: 2017-07-17

## 2017-07-17 RX ORDER — BUPIVACAINE 13.3 MG/ML
20 INJECTION, SUSPENSION, LIPOSOMAL INFILTRATION ONCE
Qty: 0 | Refills: 0 | Status: COMPLETED | OUTPATIENT
Start: 2017-07-17 | End: 2017-07-17

## 2017-07-17 RX ORDER — HYDROCHLOROTHIAZIDE 25 MG
12.5 TABLET ORAL DAILY
Qty: 0 | Refills: 0 | Status: DISCONTINUED | OUTPATIENT
Start: 2017-07-17 | End: 2017-07-18

## 2017-07-17 RX ORDER — LEVOTHYROXINE SODIUM 125 MCG
75 TABLET ORAL DAILY
Qty: 0 | Refills: 0 | Status: DISCONTINUED | OUTPATIENT
Start: 2017-07-17 | End: 2017-07-20

## 2017-07-17 RX ORDER — CEFAZOLIN SODIUM 1 G
2000 VIAL (EA) INJECTION EVERY 8 HOURS
Qty: 0 | Refills: 0 | Status: COMPLETED | OUTPATIENT
Start: 2017-07-17 | End: 2017-07-18

## 2017-07-17 RX ORDER — FERROUS SULFATE 325(65) MG
325 TABLET ORAL
Qty: 0 | Refills: 0 | Status: DISCONTINUED | OUTPATIENT
Start: 2017-07-17 | End: 2017-07-20

## 2017-07-17 RX ORDER — AMLODIPINE BESYLATE 2.5 MG/1
1 TABLET ORAL
Qty: 0 | Refills: 0 | COMMUNITY

## 2017-07-17 RX ORDER — SIMVASTATIN 20 MG/1
20 TABLET, FILM COATED ORAL AT BEDTIME
Qty: 0 | Refills: 0 | Status: DISCONTINUED | OUTPATIENT
Start: 2017-07-17 | End: 2017-07-20

## 2017-07-17 RX ORDER — SODIUM CHLORIDE 9 MG/ML
500 INJECTION INTRAMUSCULAR; INTRAVENOUS; SUBCUTANEOUS
Qty: 0 | Refills: 0 | Status: DISCONTINUED | OUTPATIENT
Start: 2017-07-17 | End: 2017-07-18

## 2017-07-17 RX ORDER — AMLODIPINE BESYLATE 2.5 MG/1
10 TABLET ORAL DAILY
Qty: 0 | Refills: 0 | Status: DISCONTINUED | OUTPATIENT
Start: 2017-07-17 | End: 2017-07-20

## 2017-07-17 RX ORDER — ASCORBIC ACID 60 MG
500 TABLET,CHEWABLE ORAL
Qty: 0 | Refills: 0 | Status: DISCONTINUED | OUTPATIENT
Start: 2017-07-17 | End: 2017-07-20

## 2017-07-17 RX ORDER — ONDANSETRON 8 MG/1
4 TABLET, FILM COATED ORAL EVERY 6 HOURS
Qty: 0 | Refills: 0 | Status: DISCONTINUED | OUTPATIENT
Start: 2017-07-17 | End: 2017-07-20

## 2017-07-17 RX ORDER — OXYCODONE AND ACETAMINOPHEN 5; 325 MG/1; MG/1
1 TABLET ORAL EVERY 4 HOURS
Qty: 0 | Refills: 0 | Status: DISCONTINUED | OUTPATIENT
Start: 2017-07-17 | End: 2017-07-18

## 2017-07-17 RX ORDER — LISINOPRIL 2.5 MG/1
10 TABLET ORAL DAILY
Qty: 0 | Refills: 0 | Status: DISCONTINUED | OUTPATIENT
Start: 2017-07-17 | End: 2017-07-20

## 2017-07-17 RX ORDER — FOLIC ACID 0.8 MG
1 TABLET ORAL DAILY
Qty: 0 | Refills: 0 | Status: DISCONTINUED | OUTPATIENT
Start: 2017-07-17 | End: 2017-07-20

## 2017-07-17 RX ORDER — DEXTROSE 50 % IN WATER 50 %
1 SYRINGE (ML) INTRAVENOUS ONCE
Qty: 0 | Refills: 0 | Status: DISCONTINUED | OUTPATIENT
Start: 2017-07-17 | End: 2017-07-20

## 2017-07-17 RX ORDER — ACETAMINOPHEN 500 MG
650 TABLET ORAL EVERY 6 HOURS
Qty: 0 | Refills: 0 | Status: DISCONTINUED | OUTPATIENT
Start: 2017-07-17 | End: 2017-07-20

## 2017-07-17 RX ORDER — GLUCAGON INJECTION, SOLUTION 0.5 MG/.1ML
1 INJECTION, SOLUTION SUBCUTANEOUS ONCE
Qty: 0 | Refills: 0 | Status: DISCONTINUED | OUTPATIENT
Start: 2017-07-17 | End: 2017-07-20

## 2017-07-17 RX ORDER — SODIUM CHLORIDE 9 MG/ML
1000 INJECTION, SOLUTION INTRAVENOUS
Qty: 0 | Refills: 0 | Status: DISCONTINUED | OUTPATIENT
Start: 2017-07-17 | End: 2017-07-20

## 2017-07-17 RX ORDER — DOCUSATE SODIUM 100 MG
100 CAPSULE ORAL THREE TIMES A DAY
Qty: 0 | Refills: 0 | Status: DISCONTINUED | OUTPATIENT
Start: 2017-07-17 | End: 2017-07-20

## 2017-07-17 RX ADMIN — HYDROMORPHONE HYDROCHLORIDE 0.5 MILLIGRAM(S): 2 INJECTION INTRAMUSCULAR; INTRAVENOUS; SUBCUTANEOUS at 12:41

## 2017-07-17 RX ADMIN — OXYCODONE AND ACETAMINOPHEN 1 TABLET(S): 5; 325 TABLET ORAL at 15:21

## 2017-07-17 RX ADMIN — HYDROMORPHONE HYDROCHLORIDE 0.5 MILLIGRAM(S): 2 INJECTION INTRAMUSCULAR; INTRAVENOUS; SUBCUTANEOUS at 13:30

## 2017-07-17 RX ADMIN — HYDROMORPHONE HYDROCHLORIDE 0.5 MILLIGRAM(S): 2 INJECTION INTRAMUSCULAR; INTRAVENOUS; SUBCUTANEOUS at 13:06

## 2017-07-17 RX ADMIN — SIMVASTATIN 20 MILLIGRAM(S): 20 TABLET, FILM COATED ORAL at 21:27

## 2017-07-17 RX ADMIN — Medication 100 MILLIGRAM(S): at 21:27

## 2017-07-17 RX ADMIN — SODIUM CHLORIDE 3 MILLILITER(S): 9 INJECTION INTRAMUSCULAR; INTRAVENOUS; SUBCUTANEOUS at 08:24

## 2017-07-17 RX ADMIN — OXYCODONE AND ACETAMINOPHEN 1 TABLET(S): 5; 325 TABLET ORAL at 16:21

## 2017-07-17 RX ADMIN — Medication 1 MILLIGRAM(S): at 18:29

## 2017-07-17 RX ADMIN — SODIUM CHLORIDE 3 MILLILITER(S): 9 INJECTION INTRAMUSCULAR; INTRAVENOUS; SUBCUTANEOUS at 21:26

## 2017-07-17 RX ADMIN — Medication 325 MILLIGRAM(S): at 18:29

## 2017-07-17 RX ADMIN — SODIUM CHLORIDE 500 MILLILITER(S): 9 INJECTION INTRAMUSCULAR; INTRAVENOUS; SUBCUTANEOUS at 17:20

## 2017-07-17 RX ADMIN — BUPIVACAINE 20 MILLILITER(S): 13.3 INJECTION, SUSPENSION, LIPOSOMAL INFILTRATION at 10:15

## 2017-07-17 RX ADMIN — Medication 100 MILLIGRAM(S): at 18:29

## 2017-07-17 RX ADMIN — Medication 500 MILLIGRAM(S): at 18:29

## 2017-07-17 NOTE — PATIENT PROFILE ADULT. - PSH
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

## 2017-07-17 NOTE — PATIENT PROFILE ADULT. - PMH
DM (diabetes mellitus)    GERD (gastroesophageal reflux disease)    Glaucoma  bilateral eyes  HTN (hypertension)    Hyperlipemia    Hypothyroid    Obesity (BMI 30-39.9)    Primary osteoarthritis of left knee

## 2017-07-17 NOTE — PHYSICAL THERAPY INITIAL EVALUATION ADULT - GENERAL OBSERVATIONS, REHAB EVAL
Pt. found lying supine in bed; NAD. Pt. connected to IV's, fuchs catheter, athrombic pumps and she uses O2 via nasal cannula at 2L.

## 2017-07-17 NOTE — PHYSICAL THERAPY INITIAL EVALUATION ADULT - ACTIVE RANGE OF MOTION EXAMINATION, REHAB EVAL
deficits as listed below/Left knee flexion 0-45; limitation due to pain and weakness./no Active ROM deficits were identified

## 2017-07-17 NOTE — PHYSICAL THERAPY INITIAL EVALUATION ADULT - PASSIVE RANGE OF MOTION EXAMINATION, REHAB EVAL
Left knee flexion 0-80 degrees; right knee flex 0-95 degrees/no Passive ROM deficits were identified/deficits as listed below

## 2017-07-17 NOTE — PHYSICAL THERAPY INITIAL EVALUATION ADULT - MANUAL MUSCLE TESTING RESULTS, REHAB EVAL
except for left LE unable to assess well due to pain (grossly 3-/5)/no strength deficits were identified

## 2017-07-17 NOTE — PHYSICAL THERAPY INITIAL EVALUATION ADULT - CRITERIA FOR SKILLED THERAPEUTIC INTERVENTIONS
impairments found/predicted duration of therapy intervention/rehab potential/risk reduction/prevention/therapy frequency/anticipated discharge recommendation/functional limitations in following categories

## 2017-07-18 DIAGNOSIS — G89.18 OTHER ACUTE POSTPROCEDURAL PAIN: ICD-10-CM

## 2017-07-18 DIAGNOSIS — M25.562 PAIN IN LEFT KNEE: ICD-10-CM

## 2017-07-18 DIAGNOSIS — Z96.652 PRESENCE OF LEFT ARTIFICIAL KNEE JOINT: ICD-10-CM

## 2017-07-18 LAB
ANION GAP SERPL CALC-SCNC: 14 MMOL/L — SIGNIFICANT CHANGE UP (ref 5–17)
BUN SERPL-MCNC: 17 MG/DL — SIGNIFICANT CHANGE UP (ref 7–18)
CALCIUM SERPL-MCNC: 7.8 MG/DL — LOW (ref 8.4–10.5)
CHLORIDE SERPL-SCNC: 103 MMOL/L — SIGNIFICANT CHANGE UP (ref 96–108)
CO2 SERPL-SCNC: 21 MMOL/L — LOW (ref 22–31)
CREAT SERPL-MCNC: 1.35 MG/DL — HIGH (ref 0.5–1.3)
GLUCOSE SERPL-MCNC: 237 MG/DL — HIGH (ref 70–99)
HCT VFR BLD CALC: 25.5 % — LOW (ref 34.5–45)
HGB BLD-MCNC: 8.3 G/DL — LOW (ref 11.5–15.5)
MCHC RBC-ENTMCNC: 26.5 PG — LOW (ref 27–34)
MCHC RBC-ENTMCNC: 32.6 GM/DL — SIGNIFICANT CHANGE UP (ref 32–36)
MCV RBC AUTO: 81.2 FL — SIGNIFICANT CHANGE UP (ref 80–100)
PLATELET # BLD AUTO: 218 K/UL — SIGNIFICANT CHANGE UP (ref 150–400)
POTASSIUM SERPL-MCNC: 5.3 MMOL/L — SIGNIFICANT CHANGE UP (ref 3.5–5.3)
POTASSIUM SERPL-SCNC: 5.3 MMOL/L — SIGNIFICANT CHANGE UP (ref 3.5–5.3)
RBC # BLD: 3.14 M/UL — LOW (ref 3.8–5.2)
RBC # FLD: 13.2 % — SIGNIFICANT CHANGE UP (ref 10.3–14.5)
SODIUM SERPL-SCNC: 138 MMOL/L — SIGNIFICANT CHANGE UP (ref 135–145)
WBC # BLD: 9.3 K/UL — SIGNIFICANT CHANGE UP (ref 3.8–10.5)
WBC # FLD AUTO: 9.3 K/UL — SIGNIFICANT CHANGE UP (ref 3.8–10.5)

## 2017-07-18 PROCEDURE — 99233 SBSQ HOSP IP/OBS HIGH 50: CPT

## 2017-07-18 RX ORDER — ACETAMINOPHEN 500 MG
1000 TABLET ORAL ONCE
Qty: 0 | Refills: 0 | Status: DISCONTINUED | OUTPATIENT
Start: 2017-07-18 | End: 2017-07-20

## 2017-07-18 RX ORDER — ACETAMINOPHEN 500 MG
1000 TABLET ORAL ONCE
Qty: 0 | Refills: 0 | Status: COMPLETED | OUTPATIENT
Start: 2017-07-18 | End: 2017-07-18

## 2017-07-18 RX ORDER — TRAMADOL HYDROCHLORIDE 50 MG/1
50 TABLET ORAL EVERY 6 HOURS
Qty: 0 | Refills: 0 | Status: DISCONTINUED | OUTPATIENT
Start: 2017-07-18 | End: 2017-07-18

## 2017-07-18 RX ORDER — SODIUM CHLORIDE 9 MG/ML
1000 INJECTION, SOLUTION INTRAVENOUS
Qty: 0 | Refills: 0 | Status: DISCONTINUED | OUTPATIENT
Start: 2017-07-18 | End: 2017-07-20

## 2017-07-18 RX ORDER — HYDROMORPHONE HYDROCHLORIDE 2 MG/ML
0.5 INJECTION INTRAMUSCULAR; INTRAVENOUS; SUBCUTANEOUS EVERY 4 HOURS
Qty: 0 | Refills: 0 | Status: DISCONTINUED | OUTPATIENT
Start: 2017-07-18 | End: 2017-07-19

## 2017-07-18 RX ORDER — OXYCODONE AND ACETAMINOPHEN 5; 325 MG/1; MG/1
2 TABLET ORAL EVERY 4 HOURS
Qty: 0 | Refills: 0 | Status: DISCONTINUED | OUTPATIENT
Start: 2017-07-18 | End: 2017-07-20

## 2017-07-18 RX ORDER — ENOXAPARIN SODIUM 100 MG/ML
40 INJECTION SUBCUTANEOUS DAILY
Qty: 0 | Refills: 0 | Status: DISCONTINUED | OUTPATIENT
Start: 2017-07-18 | End: 2017-07-20

## 2017-07-18 RX ADMIN — Medication 325 MILLIGRAM(S): at 17:41

## 2017-07-18 RX ADMIN — TRAMADOL HYDROCHLORIDE 50 MILLIGRAM(S): 50 TABLET ORAL at 12:10

## 2017-07-18 RX ADMIN — Medication 4: at 12:10

## 2017-07-18 RX ADMIN — SIMVASTATIN 20 MILLIGRAM(S): 20 TABLET, FILM COATED ORAL at 21:19

## 2017-07-18 RX ADMIN — Medication 500 MILLIGRAM(S): at 05:57

## 2017-07-18 RX ADMIN — Medication 1 TABLET(S): at 12:11

## 2017-07-18 RX ADMIN — Medication 500 MILLIGRAM(S): at 17:41

## 2017-07-18 RX ADMIN — Medication 75 MICROGRAM(S): at 05:58

## 2017-07-18 RX ADMIN — SODIUM CHLORIDE 75 MILLILITER(S): 9 INJECTION, SOLUTION INTRAVENOUS at 17:41

## 2017-07-18 RX ADMIN — Medication 1000 MILLIGRAM(S): at 04:15

## 2017-07-18 RX ADMIN — SODIUM CHLORIDE 3 MILLILITER(S): 9 INJECTION INTRAMUSCULAR; INTRAVENOUS; SUBCUTANEOUS at 21:22

## 2017-07-18 RX ADMIN — Medication 325 MILLIGRAM(S): at 12:11

## 2017-07-18 RX ADMIN — Medication 100 MILLIGRAM(S): at 21:19

## 2017-07-18 RX ADMIN — Medication 100 MILLIGRAM(S): at 05:58

## 2017-07-18 RX ADMIN — OXYCODONE AND ACETAMINOPHEN 2 TABLET(S): 5; 325 TABLET ORAL at 14:50

## 2017-07-18 RX ADMIN — Medication 6: at 08:00

## 2017-07-18 RX ADMIN — OXYCODONE AND ACETAMINOPHEN 1 TABLET(S): 5; 325 TABLET ORAL at 09:27

## 2017-07-18 RX ADMIN — OXYCODONE AND ACETAMINOPHEN 2 TABLET(S): 5; 325 TABLET ORAL at 18:27

## 2017-07-18 RX ADMIN — OXYCODONE AND ACETAMINOPHEN 2 TABLET(S): 5; 325 TABLET ORAL at 19:13

## 2017-07-18 RX ADMIN — OXYCODONE AND ACETAMINOPHEN 2 TABLET(S): 5; 325 TABLET ORAL at 13:50

## 2017-07-18 RX ADMIN — Medication 2: at 17:41

## 2017-07-18 RX ADMIN — Medication 100 MILLIGRAM(S): at 01:26

## 2017-07-18 RX ADMIN — Medication 100 MILLIGRAM(S): at 13:49

## 2017-07-18 RX ADMIN — TRAMADOL HYDROCHLORIDE 50 MILLIGRAM(S): 50 TABLET ORAL at 13:10

## 2017-07-18 RX ADMIN — ENOXAPARIN SODIUM 30 MILLIGRAM(S): 100 INJECTION SUBCUTANEOUS at 00:01

## 2017-07-18 RX ADMIN — Medication 1 MILLIGRAM(S): at 13:49

## 2017-07-18 RX ADMIN — ENOXAPARIN SODIUM 40 MILLIGRAM(S): 100 INJECTION SUBCUTANEOUS at 12:10

## 2017-07-18 RX ADMIN — Medication 400 MILLIGRAM(S): at 04:02

## 2017-07-18 RX ADMIN — SODIUM CHLORIDE 3 MILLILITER(S): 9 INJECTION INTRAMUSCULAR; INTRAVENOUS; SUBCUTANEOUS at 05:50

## 2017-07-18 RX ADMIN — Medication 325 MILLIGRAM(S): at 08:00

## 2017-07-18 RX ADMIN — OXYCODONE AND ACETAMINOPHEN 1 TABLET(S): 5; 325 TABLET ORAL at 10:15

## 2017-07-18 RX ADMIN — SODIUM CHLORIDE 3 MILLILITER(S): 9 INJECTION INTRAMUSCULAR; INTRAVENOUS; SUBCUTANEOUS at 15:04

## 2017-07-18 NOTE — PROGRESS NOTE ADULT - SUBJECTIVE AND OBJECTIVE BOX
Chief Complaint: left knee pain    HPI:   71y Female s/p left knee replacement, POD#1.  Pt lying in bed, NAD.  Pt complaining of severe pain - left knee.  Pain on minimal rom.  No nausea or vomiting.  No chest pain or sob.  Pt became hypotensive yesterday after receiving pain meds.  Today, bp is better.        PAIN SCORE:     6/10    SCALE USED: (1-10 VNRS)    Allergies    No Known Allergies    Intolerances      MEDICATIONS  (STANDING):  sodium chloride 0.9% lock flush 3 milliLiter(s) IV Push every 8 hours  docusate sodium 100 milliGRAM(s) Oral three times a day  ferrous    sulfate 325 milliGRAM(s) Oral three times a day with meals  folic acid 1 milliGRAM(s) Oral daily  multivitamin 1 Tablet(s) Oral daily  ascorbic acid 500 milliGRAM(s) Oral two times a day  amLODIPine   Tablet 10 milliGRAM(s) Oral daily  levothyroxine 75 MICROGram(s) Oral daily  simvastatin 20 milliGRAM(s) Oral at bedtime  lisinopril 10 milliGRAM(s) Oral daily  insulin lispro (HumaLOG) corrective regimen sliding scale   SubCutaneous three times a day before meals  dextrose 5%. 1000 milliLiter(s) (50 mL/Hr) IV Continuous <Continuous>  sodium chloride 0.9%. 500 milliLiter(s) (500 mL/Hr) IV Continuous <Continuous>  enoxaparin Injectable 40 milliGRAM(s) SubCutaneous daily    MEDICATIONS  (PRN):  acetaminophen   Tablet 650 milliGRAM(s) Oral every 6 hours PRN For Temp over 38.3 C (100.94 F)  aluminum hydroxide/magnesium hydroxide/simethicone Suspension 30 milliLiter(s) Oral four times a day PRN Indigestion  ondansetron Injectable 4 milliGRAM(s) IV Push every 6 hours PRN Nausea and/or Vomiting  oxyCODONE    5 mG/acetaminophen 325 mG 1 Tablet(s) Oral every 4 hours PRN Moderate Pain (4 - 6)  dextrose Gel 1 Dose(s) Oral once PRN Blood Glucose LESS THAN 70 milliGRAM(s)/deciliter  glucagon  Injectable 1 milliGRAM(s) IntraMuscular once PRN Glucose LESS THAN 70 milligrams/deciliter  traMADol 50 milliGRAM(s) Oral every 6 hours PRN breakthrough pain  HYDROmorphone  Injectable 0.5 milliGRAM(s) IV Push every 4 hours PRN Severe Pain (7 - 10)      PHYSICAL EXAM:    Vital Signs Last 24 Hrs  T(C): 36.8 (18 Jul 2017 11:00), Max: 37.7 (17 Jul 2017 23:46)  T(F): 98.2 (18 Jul 2017 11:00), Max: 99.8 (17 Jul 2017 23:46)  HR: 94 (18 Jul 2017 11:00) (94 - 108)  BP: 145/53 (18 Jul 2017 11:00) (73/38 - 145/56)  BP(mean): 73 (17 Jul 2017 14:00) (73 - 75)  RR: 16 (18 Jul 2017 11:00) (14 - 17)  SpO2: 98% (18 Jul 2017 11:00) (97% - 100%)             LABS:                          8.3    9.3   )-----------( 218      ( 18 Jul 2017 07:32 )             25.5     07-18    138  |  103  |  17  ----------------------------<  237<H>  5.3   |  21<L>  |  1.35<H>    Ca    7.8<L>      18 Jul 2017 07:32            Drug Screen:        RADIOLOGY:

## 2017-07-18 NOTE — PROGRESS NOTE ADULT - SUBJECTIVE AND OBJECTIVE BOX
71yFemale    Diagnosis:  S/p LEFT Total Knee Replacement POD#1    Patient was seen and evaluated at bedside. Patient with no acute complaints.   Pain is  well controlled.   Pain is 2/10 on a pain scale.     Denies CP/SOB, dyspnea, paresthesias, N/V/D, palpitations.     Vital Signs Last 24 Hrs  T(C): 36.8 (18 Jul 2017 09:25), Max: 37.7 (17 Jul 2017 23:46)  T(F): 98.3 (18 Jul 2017 09:25), Max: 99.8 (17 Jul 2017 23:46)  HR: 105 (18 Jul 2017 09:25) (97 - 108)  BP: 145/56 (18 Jul 2017 09:25) (73/38 - 145/56)  BP(mean): 73 (17 Jul 2017 14:00) (73 - 85)  RR: 16 (18 Jul 2017 09:25) (13 - 20)  SpO2: 99% (18 Jul 2017 09:25) (97% - 100%)  I&O's Detail    17 Jul 2017 07:01  -  18 Jul 2017 07:00  --------------------------------------------------------  IN:    Lactated Ringers IV Bolus: 2400 mL  Total IN: 2400 mL    OUT:    Estimated Blood Loss: 250 mL    Indwelling Catheter - Urethral: 1600 mL  Total OUT: 1850 mL    Total NET: 550 mL      18 Jul 2017 07:01  -  18 Jul 2017 10:47  --------------------------------------------------------  IN:  Total IN: 0 mL    OUT:    Voided: 250 mL  Total OUT: 250 mL    Total NET: -250 mL          Physical Exam:    General: AAOx3, NAD, resting comfortably in bed.    LEFT KNEE:  Dressing is C/D/I. Skin is pink and warm. Staples intact. No erythema. SILT.  Wound with no drainage, healing well.   Lower extremity:  No calf tenderness, calves are soft. 2+pulses. NVI. 5/5 Strength of EHL/TA/gastrocnemius B/L.  Good capillary refill. SILT.                          8.3    9.3   )-----------( 218      ( 18 Jul 2017 07:32 )             25.5     07-18    138  |  103  |  17  ----------------------------<  237<H>  5.3   |  21<L>  |  1.35<H>    Ca    7.8<L>      18 Jul 2017 07:32        Impression:  71yFemale S/p LEFT Total Knee Replacement POD#1 with Acute blood loss Anemia.  Plan:  -  Pain management  -  Dvt prophylaxis with Lovenox 40mg SC daily.  -  Daily Physical Therapy:  WBAT of the LEFT lower extremity with standard walker  -  Discharge planning: Home Vs. Rehab pending Physical therapy eval.  -  Continue with Post-op Antibiotics x 24hrs  -  Discontinue Condon catheter now  -  Case d/w DR. Shi  -  Dressing change   -  Incentive spirometry encouraged.   -  Acute blood loss anemia-  Transfuse 1 unit pRBC today  -  Acute Renal failure-  Dr Callejas was called by Dr. Shi for medical evaluation. Likely due to hypovolemia. Pt will receive 1unit pRBC.

## 2017-07-18 NOTE — PROGRESS NOTE ADULT - SUBJECTIVE AND OBJECTIVE BOX
Patient is a 71y old  Female who presents with a chief complaint of "Pain in my left knee" /S/P left TKR POD#1      INTERVAL HPI/OVERNIGHT EVENTS:  T(C): 36.8 (07-18-17 @ 09:25), Max: 37.7 (07-17-17 @ 23:46)  HR: 105 (07-18-17 @ 09:25) (97 - 108)  BP: 145/56 (07-18-17 @ 09:25) (73/38 - 145/56)  RR: 16 (07-18-17 @ 09:25) (13 - 20)  SpO2: 99% (07-18-17 @ 09:25) (97% - 100%)  Wt(kg): --    LABS:                        8.3    9.3   )-----------( 218      ( 18 Jul 2017 07:32 )             25.5     07-18    138  |  103  |  17  ----------------------------<  237<H>  5.3   |  21<L>  |  1.35<H>    Ca    7.8<L>      18 Jul 2017 07:32          CAPILLARY BLOOD GLUCOSE  261 (18 Jul 2017 07:56)  275 (17 Jul 2017 22:11)  139 (17 Jul 2017 15:34)  128 (17 Jul 2017 12:43)            RADIOLOGY & ADDITIONAL TESTS:  < from: Xray Knee 3 Views, Left (07.17.17 @ 14:09) >    INTERPRETATION:  Left knee x-rays    Indication: Postoperative x-ray for left knee replacement.    AP and lateral views of the left knee are acquired without a previous   examination for comparison.    Impression: Unremarkable left knee replacement.    No evidence for an acute fracture, or dislocation.    Appropriate postoperative soft tissue air and anterior skin staples   adjacent to the left knee.    < end of copied text >    Consultant(s) Notes Reviewed:  [x ] YES  [ ] NO    PHYSICAL EXAM:  GENERAL: well built, well nourished  HEAD:  Atraumatic, Normocephalic  EYES: EOMI, PERRLA, conjunctiva and sclera clear  ENT: No tonsillar erythema, exudates, or enlargement; Moist mucous membranes, Good dentition, No lesions  NECK: Supple, No JVD, Normal thyroid, no enlarged nodes  NERVOUS SYSTEM:  Alert & Oriented X3, Good concentration; Motor Strength 5/5 B/L upper and lower extremities; DTRs 2+ intact and symmetric, sensory intact  CHEST/LUNG: B/L good air entry; No rales, rhonchi, or wheezing  HEART: S1S2 normal, no S3, Regular rate and rhythm; 2/6  murmurs  ABDOMEN: Soft, Nontender, Nondistended; Bowel sounds present  EXTREMITIES:  2+ Peripheral Pulses, No clubbing, cyanosis, left knee swelling  LYMPH: No lymphadenopathy noted  SKIN: No rashes or lesions    Care Discussed with Consultants/Other Providers [ x] YES  [ ] NO

## 2017-07-18 NOTE — PROGRESS NOTE ADULT - SUBJECTIVE AND OBJECTIVE BOX
71yFemale    Diagnosis:  S/p L_Total Knee Replacement POD#1    Patient was seen and evaluated at bedside. Patient with no acute complaints.   Pain is  well controlled. Denies CP/SOB, dyspnea, paresthesias, N/V/D, palpitations.     Vital Signs Last 24 Hrs  T(C): 37 (18 Jul 2017 05:54), Max: 37.7 (17 Jul 2017 23:46)  T(F): 98.6 (18 Jul 2017 05:54), Max: 99.8 (17 Jul 2017 23:46)  HR: 103 (18 Jul 2017 05:54) (97 - 108)  BP: 127/44 (18 Jul 2017 05:54) (73/38 - 127/68)  BP(mean): 73 (17 Jul 2017 14:00) (73 - 85)  RR: 16 (18 Jul 2017 05:54) (13 - 20)  SpO2: 98% (18 Jul 2017 05:54) (97% - 100%)  I&O's Detail    17 Jul 2017 07:01  -  18 Jul 2017 07:00  --------------------------------------------------------  IN:    Lactated Ringers IV Bolus: 2400 mL  Total IN: 2400 mL    OUT:    Estimated Blood Loss: 250 mL    Indwelling Catheter - Urethral: 1600 mL  Total OUT: 1850 mL    Total NET: 550 mL      18 Jul 2017 07:01  -  18 Jul 2017 09:18  --------------------------------------------------------  IN:  Total IN: 0 mL    OUT:    Voided: 250 mL  Total OUT: 250 mL    Total NET: -250 mL          Physical Exam:    General: AAOx3, NAD, resting comfortably in bed.    Left KNEE:  Dressing is C/D/I.    Lower extremity:  No calf tenderness, calves are soft. 2+pulses. NVI. 5/5 Strength of EHL/TA/gastrocnemius B/L.  Good capillary refill.                           8.3    9.3   )-----------( 218      ( 18 Jul 2017 07:32 )             25.5     07-18    138  |  103  |  17  ----------------------------<  237<H>  5.3   |  21<L>  |  1.35<H>    Ca    7.8<L>      18 Jul 2017 07:32        Impression:  71yFemale S/p L Total Knee Replacement POD#1  Plan:  -  Pain management  -  Dvt prophylaxis  -  Daily Physical Theapy:  WBAT -  Continue with heel bump when laying in bed  -  Discharge planning: Home Vs. Rehab pending Physical therapy eval.  -  Continue with Post-op Antibiotics x 24hrs  -  Discontinue Condon catheter today  - Medical Consult Dr Callejas

## 2017-07-18 NOTE — PROGRESS NOTE ADULT - ASSESSMENT
71 yr old female, H/O HTN/DM/obesity/HLD/anemia/sciatica/OA knee, S/P left TKR POD#1, on pain management DVT prophylasix, mild elevated BUN/Cr, on IV hydration, anemia on ferrous sulfate suppliment, monitor labs, PT evaluation. Fall precaution.

## 2017-07-19 DIAGNOSIS — E03.9 HYPOTHYROIDISM, UNSPECIFIED: ICD-10-CM

## 2017-07-19 DIAGNOSIS — E78.5 HYPERLIPIDEMIA, UNSPECIFIED: ICD-10-CM

## 2017-07-19 DIAGNOSIS — E11.9 TYPE 2 DIABETES MELLITUS WITHOUT COMPLICATIONS: ICD-10-CM

## 2017-07-19 DIAGNOSIS — I10 ESSENTIAL (PRIMARY) HYPERTENSION: ICD-10-CM

## 2017-07-19 LAB
ANION GAP SERPL CALC-SCNC: 8 MMOL/L — SIGNIFICANT CHANGE UP (ref 5–17)
BUN SERPL-MCNC: 10 MG/DL — SIGNIFICANT CHANGE UP (ref 7–18)
CALCIUM SERPL-MCNC: 8.3 MG/DL — LOW (ref 8.4–10.5)
CHLORIDE SERPL-SCNC: 101 MMOL/L — SIGNIFICANT CHANGE UP (ref 96–108)
CO2 SERPL-SCNC: 26 MMOL/L — SIGNIFICANT CHANGE UP (ref 22–31)
CREAT SERPL-MCNC: 1.02 MG/DL — SIGNIFICANT CHANGE UP (ref 0.5–1.3)
GLUCOSE SERPL-MCNC: 169 MG/DL — HIGH (ref 70–99)
HCT VFR BLD CALC: 28.5 % — LOW (ref 34.5–45)
HGB BLD-MCNC: 9.4 G/DL — LOW (ref 11.5–15.5)
MCHC RBC-ENTMCNC: 26.9 PG — LOW (ref 27–34)
MCHC RBC-ENTMCNC: 33.1 GM/DL — SIGNIFICANT CHANGE UP (ref 32–36)
MCV RBC AUTO: 81.4 FL — SIGNIFICANT CHANGE UP (ref 80–100)
PLATELET # BLD AUTO: 205 K/UL — SIGNIFICANT CHANGE UP (ref 150–400)
POTASSIUM SERPL-MCNC: 4.7 MMOL/L — SIGNIFICANT CHANGE UP (ref 3.5–5.3)
POTASSIUM SERPL-SCNC: 4.7 MMOL/L — SIGNIFICANT CHANGE UP (ref 3.5–5.3)
RBC # BLD: 3.5 M/UL — LOW (ref 3.8–5.2)
RBC # FLD: 12.5 % — SIGNIFICANT CHANGE UP (ref 10.3–14.5)
SODIUM SERPL-SCNC: 135 MMOL/L — SIGNIFICANT CHANGE UP (ref 135–145)
WBC # BLD: 9.2 K/UL — SIGNIFICANT CHANGE UP (ref 3.8–10.5)
WBC # FLD AUTO: 9.2 K/UL — SIGNIFICANT CHANGE UP (ref 3.8–10.5)

## 2017-07-19 PROCEDURE — 99233 SBSQ HOSP IP/OBS HIGH 50: CPT

## 2017-07-19 RX ADMIN — Medication 1 TABLET(S): at 11:54

## 2017-07-19 RX ADMIN — OXYCODONE AND ACETAMINOPHEN 2 TABLET(S): 5; 325 TABLET ORAL at 06:10

## 2017-07-19 RX ADMIN — OXYCODONE AND ACETAMINOPHEN 2 TABLET(S): 5; 325 TABLET ORAL at 21:21

## 2017-07-19 RX ADMIN — Medication 1 MILLIGRAM(S): at 11:54

## 2017-07-19 RX ADMIN — OXYCODONE AND ACETAMINOPHEN 2 TABLET(S): 5; 325 TABLET ORAL at 10:23

## 2017-07-19 RX ADMIN — Medication 500 MILLIGRAM(S): at 05:16

## 2017-07-19 RX ADMIN — OXYCODONE AND ACETAMINOPHEN 2 TABLET(S): 5; 325 TABLET ORAL at 05:20

## 2017-07-19 RX ADMIN — Medication 500 MILLIGRAM(S): at 17:09

## 2017-07-19 RX ADMIN — Medication 100 MILLIGRAM(S): at 11:58

## 2017-07-19 RX ADMIN — SODIUM CHLORIDE 3 MILLILITER(S): 9 INJECTION INTRAMUSCULAR; INTRAVENOUS; SUBCUTANEOUS at 11:59

## 2017-07-19 RX ADMIN — ENOXAPARIN SODIUM 40 MILLIGRAM(S): 100 INJECTION SUBCUTANEOUS at 11:54

## 2017-07-19 RX ADMIN — Medication 75 MICROGRAM(S): at 05:16

## 2017-07-19 RX ADMIN — AMLODIPINE BESYLATE 10 MILLIGRAM(S): 2.5 TABLET ORAL at 05:15

## 2017-07-19 RX ADMIN — Medication 100 MILLIGRAM(S): at 21:21

## 2017-07-19 RX ADMIN — Medication 325 MILLIGRAM(S): at 11:54

## 2017-07-19 RX ADMIN — Medication 2: at 17:09

## 2017-07-19 RX ADMIN — Medication 325 MILLIGRAM(S): at 17:09

## 2017-07-19 RX ADMIN — LISINOPRIL 10 MILLIGRAM(S): 2.5 TABLET ORAL at 05:16

## 2017-07-19 RX ADMIN — SODIUM CHLORIDE 3 MILLILITER(S): 9 INJECTION INTRAMUSCULAR; INTRAVENOUS; SUBCUTANEOUS at 21:22

## 2017-07-19 RX ADMIN — Medication 2: at 11:54

## 2017-07-19 RX ADMIN — Medication 2: at 08:23

## 2017-07-19 RX ADMIN — SIMVASTATIN 20 MILLIGRAM(S): 20 TABLET, FILM COATED ORAL at 21:21

## 2017-07-19 RX ADMIN — Medication 325 MILLIGRAM(S): at 08:23

## 2017-07-19 RX ADMIN — Medication 100 MILLIGRAM(S): at 05:16

## 2017-07-19 RX ADMIN — SODIUM CHLORIDE 3 MILLILITER(S): 9 INJECTION INTRAMUSCULAR; INTRAVENOUS; SUBCUTANEOUS at 05:14

## 2017-07-19 RX ADMIN — OXYCODONE AND ACETAMINOPHEN 2 TABLET(S): 5; 325 TABLET ORAL at 22:09

## 2017-07-19 NOTE — PROGRESS NOTE ADULT - SUBJECTIVE AND OBJECTIVE BOX
71yFemale    Diagnosis:  S/p L Total Knee Replacement POD# 2    Patient was seen and evaluated at bedside. Patient with no acute complaints.   Pain is  well controlled.    Denies CP/SOB, dyspnea, paresthesias, N/V/D, palpitations.     Vital Signs Last 24 Hrs  T(C): 37.3 (19 Jul 2017 05:18), Max: 37.3 (19 Jul 2017 05:18)  T(F): 99.2 (19 Jul 2017 05:18), Max: 99.2 (19 Jul 2017 05:18)  HR: 103 (19 Jul 2017 05:18) (90 - 105)  BP: 150/61 (19 Jul 2017 05:18) (135/47 - 164/49)  BP(mean): --  RR: 17 (19 Jul 2017 05:18) (16 - 17)  SpO2: 98% (19 Jul 2017 05:18) (94% - 100%)  I&O's Detail    18 Jul 2017 07:01  -  19 Jul 2017 07:00  --------------------------------------------------------  IN:    Packed Red Blood Cells: 350 mL    sodium chloride 0.45%.: 225 mL  Total IN: 575 mL    OUT:    Voided: 250 mL  Total OUT: 250 mL    Total NET: 325 mL      Physical Exam:    General: AAOx3, NAD, resting comfortably in bed.    L KNEE:  Dressing is C/D/I. Skin is pink and warm. Dressing changed today, Staples intact. No erythema. SILT.  Wound with no drainage, healing well.   Lower extremity:  No calf tenderness, calves are soft. 2+pulses. NVI. 5/5 Strength of EHL/TA/gastrocnemius B/L.  Good capillary refill.                           8.3    9.3   )-----------( 218      ( 18 Jul 2017 07:32 )             25.5     07-18    138  |  103  |  17  ----------------------------<  237<H>  5.3   |  21<L>  |  1.35<H>    Ca    7.8<L>      18 Jul 2017 07:32        Impression:  71yFemale S/p L Total Knee Replacement POD# 2  Plan:  -  Pain management  -  Dvt prophylaxis  -  Daily Physical Therapy:  WBAT  to LLE  -  Discharge planning: Home Vs. Rehab pending Physical therapy eval.  -  Heel bump to LLE  -  Incentive Spirometer  -  Dressing changed today  -  F/u AM labs

## 2017-07-19 NOTE — PROGRESS NOTE ADULT - ASSESSMENT
71 yr old female, S/P TKR POD#2, H/H stable, BUN/CR improved after IV hydration, vital stable, HCTZ discontinued, continue DVT prophylaxis/pain management, ferrous sulfate supplement, PT, fall precaution, discharge planning to rehab whence cleared by orthopedic.

## 2017-07-19 NOTE — PROGRESS NOTE ADULT - SUBJECTIVE AND OBJECTIVE BOX
Patient is a 71y old  Female who presents with a chief complaint of "Pain in my left knee" /S/P left TKR POD#2      INTERVAL HPI/OVERNIGHT EVENTS:  T(C): 37.3 (07-19-17 @ 05:18), Max: 37.3 (07-19-17 @ 05:18)  HR: 102 (07-19-17 @ 10:15) (90 - 103)  BP: 135/55 (07-19-17 @ 10:15) (135/47 - 164/49)  RR: 17 (07-19-17 @ 05:18) (16 - 17)  SpO2: 99% (07-19-17 @ 10:15) (94% - 100%)  Wt(kg): --    LABS:                        9.4    9.2   )-----------( 205      ( 19 Jul 2017 07:35 )             28.5     07-19    135  |  101  |  10  ----------------------------<  169<H>  4.7   |  26  |  1.02    Ca    8.3<L>      19 Jul 2017 07:35          CAPILLARY BLOOD GLUCOSE  166 (19 Jul 2017 11:51)  183 (19 Jul 2017 07:33)  180 (18 Jul 2017 21:36)            RADIOLOGY & ADDITIONAL TESTS:    Consultant(s) Notes Reviewed:  [x ] YES  [ ] NO    PHYSICAL EXAM:  GENERAL: well built, well nourished  HEAD:  Atraumatic, Normocephalic  EYES: EOMI, PERRLA, conjunctiva and sclera clear  ENT: No tonsillar erythema, exudates, or enlargement; Moist mucous membranes, Good dentition, No lesions  NECK: Supple, No JVD, Normal thyroid, no enlarged nodes  NERVOUS SYSTEM:  Alert & Oriented X3, Good concentration; Motor Strength 5/5 B/L upper and lower extremities; DTRs 2+ intact and symmetric, sensory intact  CHEST/LUNG: B/L good air entry; No rales, rhonchi, or wheezing  HEART: S1S2 normal, no S3, Regular rate and rhythm; No murmurs, rubs, or gallops  ABDOMEN: Soft, Nontender, Nondistended; Bowel sounds present  EXTREMITIES:  leg mild swelling  LYMPH: No lymphadenopathy noted  SKIN: No rashes or lesions    Care Discussed with Consultants/Other Providers [ x] YES  [ ] NO

## 2017-07-19 NOTE — PROGRESS NOTE ADULT - SUBJECTIVE AND OBJECTIVE BOX
Chief Complaint: left knee pain    HPI:   71y Female s/p left knee replacement, POD#2.  Pt complaining of left knee pain. Pain increases on exertion.  No nausea or vomiting. No chest pain or sob.        PAIN SCORE:   9/10    SCALE USED: (1-10 VNRS)    Allergies    No Known Allergies    Intolerances      MEDICATIONS  (STANDING):  sodium chloride 0.9% lock flush 3 milliLiter(s) IV Push every 8 hours  docusate sodium 100 milliGRAM(s) Oral three times a day  ferrous    sulfate 325 milliGRAM(s) Oral three times a day with meals  folic acid 1 milliGRAM(s) Oral daily  multivitamin 1 Tablet(s) Oral daily  ascorbic acid 500 milliGRAM(s) Oral two times a day  amLODIPine   Tablet 10 milliGRAM(s) Oral daily  levothyroxine 75 MICROGram(s) Oral daily  simvastatin 20 milliGRAM(s) Oral at bedtime  lisinopril 10 milliGRAM(s) Oral daily  insulin lispro (HumaLOG) corrective regimen sliding scale   SubCutaneous three times a day before meals  dextrose 5%. 1000 milliLiter(s) (50 mL/Hr) IV Continuous <Continuous>  enoxaparin Injectable 40 milliGRAM(s) SubCutaneous daily  sodium chloride 0.45%. 1000 milliLiter(s) (75 mL/Hr) IV Continuous <Continuous>    MEDICATIONS  (PRN):  acetaminophen   Tablet 650 milliGRAM(s) Oral every 6 hours PRN For Temp over 38.3 C (100.94 F)  aluminum hydroxide/magnesium hydroxide/simethicone Suspension 30 milliLiter(s) Oral four times a day PRN Indigestion  ondansetron Injectable 4 milliGRAM(s) IV Push every 6 hours PRN Nausea and/or Vomiting  dextrose Gel 1 Dose(s) Oral once PRN Blood Glucose LESS THAN 70 milliGRAM(s)/deciliter  glucagon  Injectable 1 milliGRAM(s) IntraMuscular once PRN Glucose LESS THAN 70 milligrams/deciliter  oxyCODONE    5 mG/acetaminophen 325 mG 2 Tablet(s) Oral every 4 hours PRN Moderate Pain (4 - 6)  acetaminophen  IVPB. 1000 milliGRAM(s) IV Intermittent once PRN Mild Pain (1 - 3)      PHYSICAL EXAM:    Vital Signs Last 24 Hrs  T(C): 37.3 (19 Jul 2017 05:18), Max: 37.3 (19 Jul 2017 05:18)  T(F): 99.2 (19 Jul 2017 05:18), Max: 99.2 (19 Jul 2017 05:18)  HR: 102 (19 Jul 2017 10:15) (90 - 103)  BP: 135/55 (19 Jul 2017 10:15) (135/47 - 164/49)  BP(mean): --  RR: 17 (19 Jul 2017 05:18) (16 - 17)  SpO2: 99% (19 Jul 2017 10:15) (94% - 100%)             LABS:                          9.4    9.2   )-----------( 205      ( 19 Jul 2017 07:35 )             28.5     07-19    135  |  101  |  10  ----------------------------<  169<H>  4.7   |  26  |  1.02    Ca    8.3<L>      19 Jul 2017 07:35            Drug Screen:        RADIOLOGY:

## 2017-07-20 ENCOUNTER — TRANSCRIPTION ENCOUNTER (OUTPATIENT)
Age: 72
End: 2017-07-20

## 2017-07-20 VITALS
RESPIRATION RATE: 16 BRPM | DIASTOLIC BLOOD PRESSURE: 48 MMHG | OXYGEN SATURATION: 96 % | HEART RATE: 88 BPM | TEMPERATURE: 98 F | SYSTOLIC BLOOD PRESSURE: 147 MMHG

## 2017-07-20 LAB
ANION GAP SERPL CALC-SCNC: 10 MMOL/L — SIGNIFICANT CHANGE UP (ref 5–17)
APPEARANCE UR: CLEAR — SIGNIFICANT CHANGE UP
BACTERIA # UR AUTO: ABNORMAL /HPF
BILIRUB UR-MCNC: NEGATIVE — SIGNIFICANT CHANGE UP
BUN SERPL-MCNC: 9 MG/DL — SIGNIFICANT CHANGE UP (ref 7–18)
CALCIUM SERPL-MCNC: 8.5 MG/DL — SIGNIFICANT CHANGE UP (ref 8.4–10.5)
CHLORIDE SERPL-SCNC: 102 MMOL/L — SIGNIFICANT CHANGE UP (ref 96–108)
CO2 SERPL-SCNC: 26 MMOL/L — SIGNIFICANT CHANGE UP (ref 22–31)
COLOR SPEC: YELLOW — SIGNIFICANT CHANGE UP
CREAT SERPL-MCNC: 1.05 MG/DL — SIGNIFICANT CHANGE UP (ref 0.5–1.3)
DIFF PNL FLD: NEGATIVE — SIGNIFICANT CHANGE UP
EPI CELLS # UR: ABNORMAL (ref 0–10)
GLUCOSE SERPL-MCNC: 161 MG/DL — HIGH (ref 70–99)
GLUCOSE UR QL: NEGATIVE — SIGNIFICANT CHANGE UP
HCT VFR BLD CALC: 29.5 % — LOW (ref 34.5–45)
HGB BLD-MCNC: 9.5 G/DL — LOW (ref 11.5–15.5)
KETONES UR-MCNC: NEGATIVE — SIGNIFICANT CHANGE UP
LEUKOCYTE ESTERASE UR-ACNC: NEGATIVE — SIGNIFICANT CHANGE UP
MCHC RBC-ENTMCNC: 27.3 PG — SIGNIFICANT CHANGE UP (ref 27–34)
MCHC RBC-ENTMCNC: 32.2 GM/DL — SIGNIFICANT CHANGE UP (ref 32–36)
MCV RBC AUTO: 84.7 FL — SIGNIFICANT CHANGE UP (ref 80–100)
NITRITE UR-MCNC: NEGATIVE — SIGNIFICANT CHANGE UP
PH UR: 7 — SIGNIFICANT CHANGE UP (ref 5–8)
PLATELET # BLD AUTO: 217 K/UL — SIGNIFICANT CHANGE UP (ref 150–400)
POTASSIUM SERPL-MCNC: 4.2 MMOL/L — SIGNIFICANT CHANGE UP (ref 3.5–5.3)
POTASSIUM SERPL-SCNC: 4.2 MMOL/L — SIGNIFICANT CHANGE UP (ref 3.5–5.3)
PROT UR-MCNC: 30 MG/DL
RBC # BLD: 3.48 M/UL — LOW (ref 3.8–5.2)
RBC # FLD: 13 % — SIGNIFICANT CHANGE UP (ref 10.3–14.5)
RBC CASTS # UR COMP ASSIST: NEGATIVE /HPF — SIGNIFICANT CHANGE UP (ref 0–2)
SODIUM SERPL-SCNC: 138 MMOL/L — SIGNIFICANT CHANGE UP (ref 135–145)
SP GR SPEC: 1 — LOW (ref 1.01–1.02)
SURGICAL PATHOLOGY FINAL REPORT - CH: SIGNIFICANT CHANGE UP
UROBILINOGEN FLD QL: NEGATIVE — SIGNIFICANT CHANGE UP
WBC # BLD: 9.1 K/UL — SIGNIFICANT CHANGE UP (ref 3.8–10.5)
WBC # FLD AUTO: 9.1 K/UL — SIGNIFICANT CHANGE UP (ref 3.8–10.5)
WBC UR QL: SIGNIFICANT CHANGE UP /HPF (ref 0–5)

## 2017-07-20 PROCEDURE — 99233 SBSQ HOSP IP/OBS HIGH 50: CPT

## 2017-07-20 PROCEDURE — 97530 THERAPEUTIC ACTIVITIES: CPT

## 2017-07-20 PROCEDURE — 86850 RBC ANTIBODY SCREEN: CPT

## 2017-07-20 PROCEDURE — 86920 COMPATIBILITY TEST SPIN: CPT

## 2017-07-20 PROCEDURE — 86900 BLOOD TYPING SEROLOGIC ABO: CPT

## 2017-07-20 PROCEDURE — 88311 DECALCIFY TISSUE: CPT

## 2017-07-20 PROCEDURE — 97110 THERAPEUTIC EXERCISES: CPT

## 2017-07-20 PROCEDURE — P9040: CPT

## 2017-07-20 PROCEDURE — C1889: CPT

## 2017-07-20 PROCEDURE — 88305 TISSUE EXAM BY PATHOLOGIST: CPT

## 2017-07-20 PROCEDURE — 86901 BLOOD TYPING SEROLOGIC RH(D): CPT

## 2017-07-20 PROCEDURE — 80048 BASIC METABOLIC PNL TOTAL CA: CPT

## 2017-07-20 PROCEDURE — 36430 TRANSFUSION BLD/BLD COMPNT: CPT

## 2017-07-20 PROCEDURE — C1713: CPT

## 2017-07-20 PROCEDURE — 97116 GAIT TRAINING THERAPY: CPT

## 2017-07-20 PROCEDURE — 73562 X-RAY EXAM OF KNEE 3: CPT

## 2017-07-20 PROCEDURE — 85027 COMPLETE CBC AUTOMATED: CPT

## 2017-07-20 PROCEDURE — 81001 URINALYSIS AUTO W/SCOPE: CPT

## 2017-07-20 PROCEDURE — C1776: CPT

## 2017-07-20 RX ORDER — DOCUSATE SODIUM 100 MG
1 CAPSULE ORAL
Qty: 0 | Refills: 0 | COMMUNITY
Start: 2017-07-20

## 2017-07-20 RX ORDER — SENNA PLUS 8.6 MG/1
2 TABLET ORAL AT BEDTIME
Qty: 0 | Refills: 0 | Status: DISCONTINUED | OUTPATIENT
Start: 2017-07-20 | End: 2017-07-20

## 2017-07-20 RX ORDER — INSULIN LISPRO 100/ML
0 VIAL (ML) SUBCUTANEOUS
Qty: 0 | Refills: 0 | DISCHARGE
Start: 2017-07-20

## 2017-07-20 RX ORDER — FERROUS SULFATE 325(65) MG
1 TABLET ORAL
Qty: 0 | Refills: 0 | COMMUNITY
Start: 2017-07-20

## 2017-07-20 RX ORDER — ENOXAPARIN SODIUM 100 MG/ML
30 INJECTION SUBCUTANEOUS
Qty: 0 | Refills: 0 | DISCHARGE
Start: 2017-07-20

## 2017-07-20 RX ORDER — LEVOTHYROXINE SODIUM 125 MCG
1 TABLET ORAL
Qty: 0 | Refills: 0 | COMMUNITY

## 2017-07-20 RX ORDER — OXYCODONE AND ACETAMINOPHEN 5; 325 MG/1; MG/1
2 TABLET ORAL
Qty: 0 | Refills: 0 | DISCHARGE
Start: 2017-07-20

## 2017-07-20 RX ORDER — ENOXAPARIN SODIUM 100 MG/ML
30 INJECTION SUBCUTANEOUS EVERY 12 HOURS
Qty: 0 | Refills: 0 | Status: DISCONTINUED | OUTPATIENT
Start: 2017-07-20 | End: 2017-07-20

## 2017-07-20 RX ORDER — LEVOTHYROXINE SODIUM 125 MCG
1 TABLET ORAL
Qty: 0 | Refills: 0 | COMMUNITY
Start: 2017-07-20

## 2017-07-20 RX ORDER — GABAPENTIN 400 MG/1
2 CAPSULE ORAL
Qty: 0 | Refills: 0 | COMMUNITY

## 2017-07-20 RX ORDER — METFORMIN HYDROCHLORIDE 850 MG/1
1 TABLET ORAL
Qty: 0 | Refills: 0 | COMMUNITY

## 2017-07-20 RX ADMIN — Medication 325 MILLIGRAM(S): at 09:53

## 2017-07-20 RX ADMIN — OXYCODONE AND ACETAMINOPHEN 2 TABLET(S): 5; 325 TABLET ORAL at 05:36

## 2017-07-20 RX ADMIN — Medication 1 MILLIGRAM(S): at 12:42

## 2017-07-20 RX ADMIN — OXYCODONE AND ACETAMINOPHEN 2 TABLET(S): 5; 325 TABLET ORAL at 09:57

## 2017-07-20 RX ADMIN — Medication 100 MILLIGRAM(S): at 05:36

## 2017-07-20 RX ADMIN — Medication 75 MICROGRAM(S): at 05:36

## 2017-07-20 RX ADMIN — LISINOPRIL 10 MILLIGRAM(S): 2.5 TABLET ORAL at 05:36

## 2017-07-20 RX ADMIN — Medication 100 MILLIGRAM(S): at 12:42

## 2017-07-20 RX ADMIN — Medication 325 MILLIGRAM(S): at 12:42

## 2017-07-20 RX ADMIN — Medication 2: at 07:30

## 2017-07-20 RX ADMIN — AMLODIPINE BESYLATE 10 MILLIGRAM(S): 2.5 TABLET ORAL at 05:36

## 2017-07-20 RX ADMIN — OXYCODONE AND ACETAMINOPHEN 2 TABLET(S): 5; 325 TABLET ORAL at 10:57

## 2017-07-20 RX ADMIN — Medication 1 TABLET(S): at 12:42

## 2017-07-20 RX ADMIN — SODIUM CHLORIDE 3 MILLILITER(S): 9 INJECTION INTRAMUSCULAR; INTRAVENOUS; SUBCUTANEOUS at 05:33

## 2017-07-20 RX ADMIN — Medication 500 MILLIGRAM(S): at 05:36

## 2017-07-20 RX ADMIN — ENOXAPARIN SODIUM 30 MILLIGRAM(S): 100 INJECTION SUBCUTANEOUS at 08:00

## 2017-07-20 RX ADMIN — SODIUM CHLORIDE 3 MILLILITER(S): 9 INJECTION INTRAMUSCULAR; INTRAVENOUS; SUBCUTANEOUS at 12:42

## 2017-07-20 RX ADMIN — OXYCODONE AND ACETAMINOPHEN 2 TABLET(S): 5; 325 TABLET ORAL at 06:39

## 2017-07-20 NOTE — DISCHARGE NOTE ADULT - CARE PROVIDER_API CALL
Josh Shi (DO), Orthopaedic Surgery  [address] [place] [address]  Phone: [phone]  Fax: [phone]

## 2017-07-20 NOTE — DISCHARGE NOTE ADULT - CARE PLAN
Principal Discharge DX:	Primary osteoarthritis of left knee  Goal:	IMPROVE ROM  Instructions for follow-up, activity and diet:	Keep wound/bandage clean, dry and intact. Return to ER if Fever of 101 F or greater develops  Secondary Diagnosis:	Hyperlipemia  Secondary Diagnosis:	HTN (hypertension)  Secondary Diagnosis:	Obesity (BMI 30-39.9)  Secondary Diagnosis:	DM (diabetes mellitus)  Secondary Diagnosis:	Glaucoma

## 2017-07-20 NOTE — PROGRESS NOTE ADULT - PROBLEM SELECTOR PROBLEM 2
DM (diabetes mellitus)
Primary osteoarthritis of left knee

## 2017-07-20 NOTE — PROGRESS NOTE ADULT - SUBJECTIVE AND OBJECTIVE BOX
Patient is a 71y old  Female who presents with a chief complaint of "Pain in my left knee"/POD#3      INTERVAL HPI/OVERNIGHT EVENTS:  T(C): 36.9 (17 @ 06:44), Max: 38 (17 @ 21:52)  HR: 92 (17 @ 06:44) (92 - 95)  BP: 136/48 (17 @ 06:44) (136/48 - 154/56)  RR: 17 (17 @ 06:44) (17 - 18)  SpO2: 97% (17 @ 06:44) (97% - 98%)  Wt(kg): --    LABS:                        9.5    9.1   )-----------( 217      ( 2017 07:38 )             29.5     07-    138  |  102  |  9   ----------------------------<  161<H>  4.2   |  26  |  1.05    Ca    8.5      2017 07:38        Urinalysis Basic - ( 2017 09:00 )    Color: Yellow / Appearance: Clear / S.005 / pH: x  Gluc: x / Ketone: Negative  / Bili: Negative / Urobili: Negative   Blood: x / Protein: 30 mg/dL / Nitrite: Negative   Leuk Esterase: Negative / RBC: Negative /HPF / WBC 0-2 /HPF   Sq Epi: x / Non Sq Epi: x / Bacteria: Trace /HPF      CAPILLARY BLOOD GLUCOSE  169 (2017 07:53)  146 (2017 21:52)  158 (2017 15:58)  166 (2017 11:51)            RADIOLOGY & ADDITIONAL TESTS:    Consultant(s) Notes Reviewed:  [x ] YES  [ ] NO    PHYSICAL EXAM:  GENERAL: well built, well nourished  HEAD:  Atraumatic, Normocephalic  EYES: EOMI, PERRLA, conjunctiva and sclera clear  ENT: No tonsillar erythema, exudates, or enlargement; Moist mucous membranes, Good dentition, No lesions  NECK: Supple, No JVD, Normal thyroid, no enlarged nodes  NERVOUS SYSTEM:  Alert & Oriented X3, Good concentration; Motor Strength 5/5 B/L upper and lower extremities; DTRs 2+ intact and symmetric, sensory intact  CHEST/LUNG: B/L good air entry; No rales, rhonchi, or wheezing  HEART: S1S2 normal, no S3, Regular rate and rhythm; No murmurs, rubs, or gallops  ABDOMEN: Soft, Nontender, Nondistended; Bowel sounds present  EXTREMITIES:  2+ Peripheral Pulses, No clubbing, cyanosis, left knee surgical site healing well, no sign of infection, mild swelling  LYMPH: No lymphadenopathy noted  SKIN: No rashes or lesions    Care Discussed with Consultants/Other Providers [ x] YES  [ ] NO

## 2017-07-20 NOTE — PROGRESS NOTE ADULT - PROBLEM SELECTOR PLAN 1
Patient  is scheduled for left total knee replacement on 7/17/17. Patient is at moderate risk for this surgery.
S/p L Total Knee Replacement POD# 2  Dressing changed today  Daily PT-WBAT to LLE
- iv acetaminophen prn  - percocet - increase to 2 tabs po q 4 hours  - d/c tramadol  - stool softeners  - oob  - ice to knee
- no nsaids today due to fernanda  - iv acetaminophen prn  - percocet - increase to 2 tabs po q 4 hours  - d/c tramadol  - stool softeners  - oob
- iv acetaminophen prn  - percocet - increase to 2 tabs po q 4 hours  - d/c tramadol  - stool softeners  - oob  - ice to knee  - instructed to take pain meds prior to PT - at least 30 minutes prior

## 2017-07-20 NOTE — DISCHARGE NOTE ADULT - MEDICATION SUMMARY - MEDICATIONS TO TAKE
I will START or STAY ON the medications listed below when I get home from the hospital:    acetaminophen-oxycodone 325 mg-5 mg oral tablet  -- 2 tab(s) by mouth every 6 hours, As Needed -Moderate Pain (4 - 6) - 6)  -- Indication: For PAIN    enoxaparin 30 mg/0.3 mL injectable solution  -- 30 milligram(s) subcutaneous every 12 hours  -- Indication: For DVT PPX    insulin lispro 100 units/mL subcutaneous solution  --  subcutaneous 3 times a day (before meals); 2 Unit(s) if Glucose 151 - 200  4 Unit(s) if Glucose 201 - 250  6 Unit(s) if Glucose 251 - 300  8 Unit(s) if Glucose 301 - 350  10 Unit(s) if Glucose 351 - 400  12 Unit(s) if Glucose Greater Than 400  -- Indication: For AS PER MD    simvastatin 20 mg oral tablet  -- 1 tab(s) by mouth 3 times a week  -- Indication: For HLD    amLODIPine 10 mg oral tablet  -- 1 tab(s) by mouth once a day  -- Indication: For HTN (hypertension)    FeroSul 325 mg (65 mg elemental iron) oral tablet  -- 1 tab(s) by mouth 2 times a day  -- Indication: For ANEMIA    docusate sodium 100 mg oral capsule  -- 1 cap(s) by mouth 3 times a day  -- Indication: For CONSTIPATION    omeprazole 40 mg oral delayed release capsule  -- 1 cap(s) by mouth once a day  -- Indication: For AS PER MD    levothyroxine 75 mcg (0.075 mg) oral tablet  -- 1 tab(s) by mouth once a day  -- Indication: For AS PER MD

## 2017-07-20 NOTE — PROGRESS NOTE ADULT - PROBLEM SELECTOR PROBLEM 1
Primary osteoarthritis of left knee
Status post left knee replacement
Acute post-operative pain

## 2017-07-20 NOTE — DISCHARGE NOTE ADULT - CONDITIONS AT DISCHARGE
Pt AOx3 breathing unlabored no c/o resp. distress chest pain or SOB. Pt S/L Left TKR Left Knee noted with Mepilex dressing CDI Cap refill less than 3 seconds pulses present in all extremities Pt with positive sensation and mobility no neurovascular deficit noted. Pt OOB ambulating with walker participating in PT. Abdomen soft non tender non distended BS present in all 4 quadrants Pt tolerating diet denies any N/V. Pt for DC to [facility] today verbalizes understanding and agreement with DC Vital signs stable no distress noted. All needs of pt met.

## 2017-07-20 NOTE — PROGRESS NOTE ADULT - SUBJECTIVE AND OBJECTIVE BOX
Chief Complaint: left knee pain    HPI:   71y Female s/p left knee replacement.  Pt is oob and in chair but relunctant to ambulate due to pain. Pt encouraged to participate with PT and instructed to take pain meds at least 30 minutes prior to PT.  No nausea or vomiting.  No chest pain or sob.        PAIN SCORE:  5/10      SCALE USED: (1-10 VNRS)    Allergies    No Known Allergies    Intolerances      MEDICATIONS  (STANDING):  sodium chloride 0.9% lock flush 3 milliLiter(s) IV Push every 8 hours  docusate sodium 100 milliGRAM(s) Oral three times a day  ferrous    sulfate 325 milliGRAM(s) Oral three times a day with meals  folic acid 1 milliGRAM(s) Oral daily  multivitamin 1 Tablet(s) Oral daily  ascorbic acid 500 milliGRAM(s) Oral two times a day  amLODIPine   Tablet 10 milliGRAM(s) Oral daily  levothyroxine 75 MICROGram(s) Oral daily  simvastatin 20 milliGRAM(s) Oral at bedtime  lisinopril 10 milliGRAM(s) Oral daily  insulin lispro (HumaLOG) corrective regimen sliding scale   SubCutaneous three times a day before meals  dextrose 5%. 1000 milliLiter(s) (50 mL/Hr) IV Continuous <Continuous>  sodium chloride 0.45%. 1000 milliLiter(s) (75 mL/Hr) IV Continuous <Continuous>  senna 2 Tablet(s) Oral at bedtime  enoxaparin Injectable 30 milliGRAM(s) SubCutaneous every 12 hours    MEDICATIONS  (PRN):  acetaminophen   Tablet 650 milliGRAM(s) Oral every 6 hours PRN For Temp over 38.3 C (100.94 F)  aluminum hydroxide/magnesium hydroxide/simethicone Suspension 30 milliLiter(s) Oral four times a day PRN Indigestion  ondansetron Injectable 4 milliGRAM(s) IV Push every 6 hours PRN Nausea and/or Vomiting  dextrose Gel 1 Dose(s) Oral once PRN Blood Glucose LESS THAN 70 milliGRAM(s)/deciliter  glucagon  Injectable 1 milliGRAM(s) IntraMuscular once PRN Glucose LESS THAN 70 milligrams/deciliter  oxyCODONE    5 mG/acetaminophen 325 mG 2 Tablet(s) Oral every 4 hours PRN Moderate Pain (4 - 6)  acetaminophen  IVPB. 1000 milliGRAM(s) IV Intermittent once PRN Mild Pain (1 - 3)      PHYSICAL EXAM:    Vital Signs Last 24 Hrs  T(C): 36.9 (2017 06:44), Max: 38 (2017 21:52)  T(F): 98.4 (2017 06:44), Max: 100.4 (2017 21:52)  HR: 88 (2017 11:22) (88 - 95)  BP: 130/52 (2017 11:22) (130/52 - 154/56)  BP(mean): --  RR: 17 (2017 06:44) (17 - 18)  SpO2: 98% (2017 11:22) (97% - 98%)             LABS:                          9.5    9.1   )-----------( 217      ( 2017 07:38 )             29.5     07-20    138  |  102  |  9   ----------------------------<  161<H>  4.2   |  26  |  1.05    Ca    8.5      2017 07:38        Urinalysis Basic - ( 2017 09:00 )    Color: Yellow / Appearance: Clear / S.005 / pH: x  Gluc: x / Ketone: Negative  / Bili: Negative / Urobili: Negative   Blood: x / Protein: 30 mg/dL / Nitrite: Negative   Leuk Esterase: Negative / RBC: Negative /HPF / WBC 0-2 /HPF   Sq Epi: x / Non Sq Epi: x / Bacteria: Trace /HPF        Drug Screen:        RADIOLOGY:

## 2017-07-20 NOTE — DISCHARGE NOTE ADULT - MEDICATION SUMMARY - MEDICATIONS TO STOP TAKING
I will STOP taking the medications listed below when I get home from the hospital:    metFORMIN 500 mg oral tablet  -- 1 tab(s) by mouth 2 times a day    gabapentin 300 mg oral capsule  -- 2 tab(s) by mouth 2 times a day

## 2017-07-20 NOTE — PROGRESS NOTE ADULT - ASSESSMENT
71 yr old female, S/P TKR POD#2, H/H stable, BUN/CR improved after IV hydration, vital stable, HCTZ discontinued, continue DVT prophylaxis/pain management, ferrous sulfate supplement, PT, fall precaution, discharge planning to rehab whence cleared by orthopedic. Fall precaution.

## 2017-07-20 NOTE — PROGRESS NOTE ADULT - SUBJECTIVE AND OBJECTIVE BOX
71yFemale    Diagnosis:  S/p L Total Knee Replacement POD#3    Patient was seen and evaluated at bedside. Patient with no acute complaints.   Pain is  well controlled.  Denies CP/SOB, dyspnea, paresthesias, N/V/D, palpitations. Voiding regularly     Vital Signs Last 24 Hrs  T(C): 36.9 (20 Jul 2017 06:44), Max: 38 (19 Jul 2017 21:52)  T(F): 98.4 (20 Jul 2017 06:44), Max: 100.4 (19 Jul 2017 21:52)  HR: 92 (20 Jul 2017 06:44) (92 - 102)  BP: 136/48 (20 Jul 2017 06:44) (135/55 - 154/56)  BP(mean): --  RR: 17 (20 Jul 2017 06:44) (17 - 18)  SpO2: 97% (20 Jul 2017 06:44) (97% - 99%)  I&O's Detail      Physical Exam:    General: AAOx3, NAD, resting comfortably in bed.    L KNEE:  Dressing is C/D/I. Skin is pink and warm. Staples intact. No erythema. SILT.  Wound with no drainage, healing well.   Lower extremity:  No calf tenderness, calves are soft b/l. 2+pulses. NVI. 5/5 Strength of EHL/TA/gastrocnemius B/L.  Good capillary refill.                   Labs: Pending results        Impression:  71yFemale S/p L Total Knee Replacement POD# 3  Plan:  -  Pain management  -  Dvt prophylaxis  -  Daily Physical Theapy:  WBAT + Walker   -  Discharge planning: Rehab ([facility])  -  Encourage Spirometry  -  F/u labs  -  Dressing change  -  Case d/w Dr. Shi 71yFemale    Diagnosis:  S/p L Total Knee Replacement POD#3    Patient was seen and evaluated at bedside. Patient with no acute complaints.   Pain is  well controlled.  Denies CP/SOB, dyspnea, paresthesias, N/V/D, palpitations. Voiding regularly     Vital Signs Last 24 Hrs  T(C): 36.9 (20 Jul 2017 06:44), Max: 38 (19 Jul 2017 21:52)  T(F): 98.4 (20 Jul 2017 06:44), Max: 100.4 (19 Jul 2017 21:52)  HR: 92 (20 Jul 2017 06:44) (92 - 102)  BP: 136/48 (20 Jul 2017 06:44) (135/55 - 154/56)  BP(mean): --  RR: 17 (20 Jul 2017 06:44) (17 - 18)  SpO2: 97% (20 Jul 2017 06:44) (97% - 99%)  I&O's Detail      Physical Exam:    General: AAOx3, NAD, resting comfortably in bed.    L KNEE:  Dressing is C/D/I. Skin is pink and warm. Staples intact. No erythema. SILT.  Wound with no drainage, healing well.   Lower extremity:  No calf tenderness, calves are soft b/l. 2+pulses. NVI. 5/5 Strength of EHL/TA/gastrocnemius B/L.  Good capillary refill.                                      9.5    9.1   )-----------( 217      ( 20 Jul 2017 07:38 )             29.5       07-20    138  |  102  |  x   ----------------------------<  x   4.2   |  x   |  x     Ca    8.3<L>      19 Jul 2017 07:35        Impression:  71yFemale S/p L Total Knee Replacement POD# 3  Plan:  -  Pain management  -  Dvt prophylaxis  -  Daily Physical Theapy:  WBAT + Walker   -  Discharge planning: Rehab ([facility])  -  Encourage Spirometry  -  F/u labs  -  Dressing change  -  Case d/w Dr. Shi

## 2017-07-20 NOTE — PROGRESS NOTE ADULT - PROBLEM SELECTOR PROBLEM 4
HTN (hypertension)
Status post left knee replacement

## 2017-07-24 DIAGNOSIS — E66.9 OBESITY, UNSPECIFIED: ICD-10-CM

## 2017-07-24 DIAGNOSIS — E78.5 HYPERLIPIDEMIA, UNSPECIFIED: ICD-10-CM

## 2017-07-24 DIAGNOSIS — M24.562 CONTRACTURE, LEFT KNEE: ICD-10-CM

## 2017-07-24 DIAGNOSIS — D62 ACUTE POSTHEMORRHAGIC ANEMIA: ICD-10-CM

## 2017-07-24 DIAGNOSIS — E11.9 TYPE 2 DIABETES MELLITUS WITHOUT COMPLICATIONS: ICD-10-CM

## 2017-07-24 DIAGNOSIS — H40.9 UNSPECIFIED GLAUCOMA: ICD-10-CM

## 2017-07-24 DIAGNOSIS — N17.9 ACUTE KIDNEY FAILURE, UNSPECIFIED: ICD-10-CM

## 2017-07-24 DIAGNOSIS — K21.9 GASTRO-ESOPHAGEAL REFLUX DISEASE WITHOUT ESOPHAGITIS: ICD-10-CM

## 2017-07-24 DIAGNOSIS — D64.9 ANEMIA, UNSPECIFIED: ICD-10-CM

## 2017-07-24 DIAGNOSIS — M21.162 VARUS DEFORMITY, NOT ELSEWHERE CLASSIFIED, LEFT KNEE: ICD-10-CM

## 2017-07-24 DIAGNOSIS — M17.12 UNILATERAL PRIMARY OSTEOARTHRITIS, LEFT KNEE: ICD-10-CM

## 2017-07-24 DIAGNOSIS — I10 ESSENTIAL (PRIMARY) HYPERTENSION: ICD-10-CM

## 2017-07-24 DIAGNOSIS — Z96.651 PRESENCE OF RIGHT ARTIFICIAL KNEE JOINT: ICD-10-CM

## 2017-07-24 DIAGNOSIS — E03.9 HYPOTHYROIDISM, UNSPECIFIED: ICD-10-CM

## 2018-07-16 PROBLEM — M19.90 UNSPECIFIED OSTEOARTHRITIS, UNSPECIFIED SITE: Chronic | Status: INACTIVE | Noted: 2017-03-17 | Resolved: 2017-07-05

## 2018-11-21 NOTE — H&P PST ADULT - HEIGHT IN INCHES
MRN: [id number]  Name: DEDE ROCHA  Address: [address] [place] [address]     We missed you! I have attempted to reach you numerous times to no avail.  Dr. Torres requested I follow up with you.    We are concerned because your health is important to us. Please call Donna Escobar RN Cardiology [phone] at your earliest convenience.    Sincerely,     Signatures   Electronically signed by : RAI ESCOBAR R.N.; Jan 12 2018  9:14AM CST (Author)    
0

## 2021-01-12 NOTE — PATIENT PROFILE ADULT. - ABILITY TO HEAR (WITH HEARING AID OR HEARING APPLIANCE IF NORMALLY USED):
I'll send pepcid. She should stick to water and avoid acidic foods such as citrus and tomato. I can also send Zofran. Adequate: hears normal conversation without difficulty

## 2021-08-23 NOTE — H&P PST ADULT - RESPIRATORY AND THORAX
Action 2: Continue Detail Level: Zone Increase Regimen: Increase moisturizing skin at least once daily. Recommend to use cerave or eucerin cream. Initiate Treatment: ciclopirox 8 % topical solution Qd Initiate Treatment: clindamycin phosphate 1 % topical solution BID\\nQuantity: 1.0 Bottle  Days Supply: 30\\nSig: Apply to affected areas on armpits and groin BID  flare ups , QD forprevention.\\n\\nbenzoyl peroxide 10 % topical cleanser QD\\nQuantity: 1.0 Bottle  Days Supply: 30\\nSig: Apply to body QD. negative

## 2021-10-14 NOTE — PROGRESS NOTE ADULT - PROVIDER SPECIALTY LIST ADULT
Internal Medicine Ketoconazole Pregnancy And Lactation Text: This medication is Pregnancy Category C and it isn't know if it is safe during pregnancy. It is also excreted in breast milk and breast feeding isn't recommended.

## 2021-12-09 NOTE — PATIENT PROFILE ADULT. - HEALTHCARE QUESTIONS, PROFILE
-- DO NOT REPLY / DO NOT REPLY ALL --  -- Message is from the [facility]--    General Patient Message      Reason for Call: Patient calling regarding her labs results. Stated that she just needed the chicken pocks antibody test and her blood levels were tested. Please give a call back to discuss labs at  [phone]  Caller Information       Type Contact Phone    12/09/2021 11:47 AM CST Phone (Incoming) Sally Allison (Self) [phone] (M)          Alternative phone number: none    Turnaround time given to caller:   \"This message will be sent to [state Provider's name]. The clinical team will fulfill your request as soon as they review your message.\"    
I did not order her titers since no one told me.  I will add it to her labs from yesterday.  Advise pt.    
Patient notified.   
none

## 2022-09-03 NOTE — PHYSICAL THERAPY INITIAL EVALUATION ADULT - SHORT TERM MEMORY, REHAB EVAL
Dr. Loren Reilly - Just an update FYI. Son on [place] of Patient called office. Patient's date of birth and full name both confirmed. He returned a call. See lab results. RN advised that this call was made because we were unsure if patient got the message. He verbalizes understanding of Dr. Alley Azul message under labs, that it will be sent to Dr. Dinorah Gastelum. He also adds that they spoke with Dr. Frieda Esteves. And Dr. Frieda Esteves will talk to Dr. Dinorah Gastelum about labs. Per Dr. Frieda Esteves --- Hold OTC Prevagin, reduce furosemide to twice weekly (from 3 times weekly) and retest labs in 2 weeks, and if Dr. Dinorah Gastelum advises otherwise, patient to follow Dr. Brodie Singh guidance.
Noted. Agree.
intact
1 person assist

## 2022-10-26 NOTE — PATIENT PROFILE ADULT. - NS PRO PT REFERRAL QUES 2 YN
Plan: This patient has been treated today with image-guided superficial radiation therapy for non-melanoma skin cancer. Written informed consent has been previously obtained from this patient for this treatment. This consent is documented in the patient's chart. The patient gave verbal consent to continue treatment today. The patient was treated with a specific radiation dose and setup as prescribed by the provider listed on this visit note.  A Radiation Therapist performed administration of radiation under the supervision of a provider. The treatment parameters and cumulative dose are indicated above. Prior to administering the radiation, the patient underwent a verification therapeutic radiology simulation-aided field setting defining relevant normal and abnormal target anatomy and acquiring images with a separate and distinct diagnostic high-frequency ultrasound to delineate tissues and determine whether to proceed with delivery of therapeutic, in addition to retrieve data necessary to develop an optimal radiation treatment process for the patient. The field placement simulation documents any change seen in overall tumor volume documented in the patient’s record, allows the clinician to indicate any needed changes in the treatment plan and/or prescription, provides diagnostic evaluation as the basis for performing the therapeutic procedure, and clearly identifies the information needed to decide to proceed with the therapeutic procedure. This process includes verification of the treatment port(s) and proper treatment positioning. All treatment ports were photographed within electronic medical records. The patient's lead blocking along with gross tumor volume and margin was confirmed. Considering superficial radiotherapy is clinical in setup, this requires the physician and radiation therapist to clarify the location interest being treated against initial images, ultrasound, pathology, and patient anatomy. Care was taken to ensure bullock treated were geometrically accurate and properly positioned using therapeutic radiology simulation-aided field setting verification per fraction. This process is also utilized to determine if any prescription or setup changes are necessary. These steps are therefore medically necessary to ensure safe and effective administration of radiation. Ongoing therapeutic radiology simulation-aided field setting verification is ordered throughout the course of therapy.  \\n\\nA high-frequency ultrasound image was acquired today for a two-dimensional evaluation of the tumor volume, depth, width, breadth, review of prior response to treatment, provide geometric accuracy of field placement, and determine whether to proceed with therapeutic delivery.\\n\\nHigh frequency ultrasound depth is 1.45 mm, which is 0.18 mm in difference from previous imaging.\\n\\nThe field placement and ultrasound imaging, per fraction, is separate and distinct from the initial simulation and is an important task in providing safe administration of superficial radiation therapy. Physician evaluation of the ultrasound information will be ongoing throughout the course of treatment and is deemed medically necessary to ensure the efficacy of treatment, whether to proceed with therapeutic delivery, and determine any necessary changes. Today's images were evaluated for determination of continuation of treatment with the current plan or with necessary changes as appropriate. According to the review of verification therapeutic radiology simulation-aided field setting and imaging, no change/or change is required. Additionally, the use of ultrasound visualization and targeted assessment allows the patient to be able to see his or her cancer(s) progress, encouraging the patient to complete and maintain compliance through the full course of prescribed radiotherapy. Per Dr. Boston, continued ultrasound guidance and therapeutic radiology simulation-aided field setting verification per fraction is required for field placement, measurement of tumor depth, tissues evaluation, progress, acute effect monitoring, and determination for therapeutic treatment delivery is appropriate.    \\n no

## 2023-02-16 NOTE — PHYSICAL THERAPY INITIAL EVALUATION ADULT - TRANSFER SKILLS, REHAB EVAL
Consent: Written consent obtained. Risks include but not limited to bruising, beading, irregular texture, ulceration, infection, allergic reaction, scar formation, incomplete augmentation, temporary nature, procedural pain. independent/needs device

## 2023-08-04 NOTE — PATIENT PROFILE ADULT. - OCCUPATION
Patient's home medications are locked in POD C.    Care Manager/Social Work Discharge Arrangements: You are being discharged to [facility] - [address] [place] [address] Rd[address] [place] [address] - [phone] skilled nursing facility for rehabilitation.     
retired

## 2023-11-12 ENCOUNTER — INPATIENT (INPATIENT)
Facility: HOSPITAL | Age: 78
LOS: 4 days | Discharge: HOME | DRG: 218 | End: 2023-11-17
Attending: INTERNAL MEDICINE | Admitting: THORACIC SURGERY (CARDIOTHORACIC VASCULAR SURGERY)
Payer: MEDICARE

## 2023-11-12 VITALS
OXYGEN SATURATION: 95 % | SYSTOLIC BLOOD PRESSURE: 162 MMHG | DIASTOLIC BLOOD PRESSURE: 70 MMHG | HEART RATE: 82 BPM | RESPIRATION RATE: 18 BRPM

## 2023-11-12 DIAGNOSIS — D17.9 BENIGN LIPOMATOUS NEOPLASM, UNSPECIFIED: Chronic | ICD-10-CM

## 2023-11-12 DIAGNOSIS — Z96.651 PRESENCE OF RIGHT ARTIFICIAL KNEE JOINT: Chronic | ICD-10-CM

## 2023-11-12 DIAGNOSIS — Z12.11 ENCOUNTER FOR SCREENING FOR MALIGNANT NEOPLASM OF COLON: Chronic | ICD-10-CM

## 2023-11-12 DIAGNOSIS — Z98.890 OTHER SPECIFIED POSTPROCEDURAL STATES: Chronic | ICD-10-CM

## 2023-11-12 DIAGNOSIS — Z90.49 ACQUIRED ABSENCE OF OTHER SPECIFIED PARTS OF DIGESTIVE TRACT: Chronic | ICD-10-CM

## 2023-11-12 DIAGNOSIS — Z96.653 PRESENCE OF ARTIFICIAL KNEE JOINT, BILATERAL: Chronic | ICD-10-CM

## 2023-11-12 PROBLEM — K21.9 GASTRO-ESOPHAGEAL REFLUX DISEASE WITHOUT ESOPHAGITIS: Chronic | Status: ACTIVE | Noted: 2017-03-17

## 2023-11-12 PROBLEM — M17.12 UNILATERAL PRIMARY OSTEOARTHRITIS, LEFT KNEE: Chronic | Status: ACTIVE | Noted: 2017-07-05

## 2023-11-12 PROBLEM — E66.9 OBESITY, UNSPECIFIED: Chronic | Status: ACTIVE | Noted: 2017-07-05

## 2023-11-12 PROBLEM — I10 ESSENTIAL (PRIMARY) HYPERTENSION: Chronic | Status: ACTIVE | Noted: 2017-03-17

## 2023-11-12 PROBLEM — H40.9 UNSPECIFIED GLAUCOMA: Chronic | Status: ACTIVE | Noted: 2017-07-05

## 2023-11-12 PROBLEM — E11.9 TYPE 2 DIABETES MELLITUS WITHOUT COMPLICATIONS: Chronic | Status: ACTIVE | Noted: 2017-03-17

## 2023-11-12 PROBLEM — E78.5 HYPERLIPIDEMIA, UNSPECIFIED: Chronic | Status: ACTIVE | Noted: 2017-03-17

## 2023-11-12 PROBLEM — E03.9 HYPOTHYROIDISM, UNSPECIFIED: Chronic | Status: ACTIVE | Noted: 2017-03-17

## 2023-11-12 LAB
A1C WITH ESTIMATED AVERAGE GLUCOSE RESULT: 6.9 % — HIGH (ref 4–5.6)
A1C WITH ESTIMATED AVERAGE GLUCOSE RESULT: 6.9 % — HIGH (ref 4–5.6)
ALBUMIN SERPL ELPH-MCNC: 3.9 G/DL — SIGNIFICANT CHANGE UP (ref 3.3–5)
ALBUMIN SERPL ELPH-MCNC: 3.9 G/DL — SIGNIFICANT CHANGE UP (ref 3.3–5)
ALP SERPL-CCNC: 166 U/L — HIGH (ref 40–120)
ALP SERPL-CCNC: 166 U/L — HIGH (ref 40–120)
ALT FLD-CCNC: 405 U/L — HIGH (ref 10–45)
ALT FLD-CCNC: 405 U/L — HIGH (ref 10–45)
ANION GAP SERPL CALC-SCNC: 14 MMOL/L — SIGNIFICANT CHANGE UP (ref 5–17)
ANION GAP SERPL CALC-SCNC: 14 MMOL/L — SIGNIFICANT CHANGE UP (ref 5–17)
APPEARANCE UR: CLEAR — SIGNIFICANT CHANGE UP
APPEARANCE UR: CLEAR — SIGNIFICANT CHANGE UP
APTT BLD: 35.9 SEC — HIGH (ref 24.5–35.6)
APTT BLD: 35.9 SEC — HIGH (ref 24.5–35.6)
AST SERPL-CCNC: 174 U/L — HIGH (ref 10–40)
AST SERPL-CCNC: 174 U/L — HIGH (ref 10–40)
BACTERIA # UR AUTO: NEGATIVE /HPF — SIGNIFICANT CHANGE UP
BACTERIA # UR AUTO: NEGATIVE /HPF — SIGNIFICANT CHANGE UP
BASOPHILS # BLD AUTO: 0.07 K/UL — SIGNIFICANT CHANGE UP (ref 0–0.2)
BASOPHILS # BLD AUTO: 0.07 K/UL — SIGNIFICANT CHANGE UP (ref 0–0.2)
BASOPHILS NFR BLD AUTO: 0.7 % — SIGNIFICANT CHANGE UP (ref 0–2)
BASOPHILS NFR BLD AUTO: 0.7 % — SIGNIFICANT CHANGE UP (ref 0–2)
BILIRUB SERPL-MCNC: 0.9 MG/DL — SIGNIFICANT CHANGE UP (ref 0.2–1.2)
BILIRUB SERPL-MCNC: 0.9 MG/DL — SIGNIFICANT CHANGE UP (ref 0.2–1.2)
BILIRUB UR-MCNC: NEGATIVE — SIGNIFICANT CHANGE UP
BILIRUB UR-MCNC: NEGATIVE — SIGNIFICANT CHANGE UP
BLD GP AB SCN SERPL QL: NEGATIVE — SIGNIFICANT CHANGE UP
BLD GP AB SCN SERPL QL: NEGATIVE — SIGNIFICANT CHANGE UP
BUN SERPL-MCNC: 25 MG/DL — HIGH (ref 7–23)
BUN SERPL-MCNC: 25 MG/DL — HIGH (ref 7–23)
CALCIUM SERPL-MCNC: 9.9 MG/DL — SIGNIFICANT CHANGE UP (ref 8.4–10.5)
CALCIUM SERPL-MCNC: 9.9 MG/DL — SIGNIFICANT CHANGE UP (ref 8.4–10.5)
CHLORIDE SERPL-SCNC: 98 MMOL/L — SIGNIFICANT CHANGE UP (ref 96–108)
CHLORIDE SERPL-SCNC: 98 MMOL/L — SIGNIFICANT CHANGE UP (ref 96–108)
CHOLEST SERPL-MCNC: 159 MG/DL — SIGNIFICANT CHANGE UP
CHOLEST SERPL-MCNC: 159 MG/DL — SIGNIFICANT CHANGE UP
CK MB CFR SERPL CALC: 2 NG/ML — SIGNIFICANT CHANGE UP (ref 0–6.7)
CK MB CFR SERPL CALC: 2 NG/ML — SIGNIFICANT CHANGE UP (ref 0–6.7)
CK SERPL-CCNC: 79 U/L — SIGNIFICANT CHANGE UP (ref 25–170)
CK SERPL-CCNC: 79 U/L — SIGNIFICANT CHANGE UP (ref 25–170)
CO2 SERPL-SCNC: 24 MMOL/L — SIGNIFICANT CHANGE UP (ref 22–31)
CO2 SERPL-SCNC: 24 MMOL/L — SIGNIFICANT CHANGE UP (ref 22–31)
COLOR SPEC: YELLOW — SIGNIFICANT CHANGE UP
COLOR SPEC: YELLOW — SIGNIFICANT CHANGE UP
CREAT SERPL-MCNC: 1.38 MG/DL — HIGH (ref 0.5–1.3)
CREAT SERPL-MCNC: 1.38 MG/DL — HIGH (ref 0.5–1.3)
DIFF PNL FLD: NEGATIVE — SIGNIFICANT CHANGE UP
DIFF PNL FLD: NEGATIVE — SIGNIFICANT CHANGE UP
EGFR: 40 ML/MIN/1.73M2 — LOW
EGFR: 40 ML/MIN/1.73M2 — LOW
EOSINOPHIL # BLD AUTO: 0.58 K/UL — HIGH (ref 0–0.5)
EOSINOPHIL # BLD AUTO: 0.58 K/UL — HIGH (ref 0–0.5)
EOSINOPHIL NFR BLD AUTO: 5.5 % — SIGNIFICANT CHANGE UP (ref 0–6)
EOSINOPHIL NFR BLD AUTO: 5.5 % — SIGNIFICANT CHANGE UP (ref 0–6)
ESTIMATED AVERAGE GLUCOSE: 151 MG/DL — HIGH (ref 68–114)
ESTIMATED AVERAGE GLUCOSE: 151 MG/DL — HIGH (ref 68–114)
GLUCOSE BLDC GLUCOMTR-MCNC: 161 MG/DL — HIGH (ref 70–99)
GLUCOSE BLDC GLUCOMTR-MCNC: 161 MG/DL — HIGH (ref 70–99)
GLUCOSE BLDC GLUCOMTR-MCNC: 170 MG/DL — HIGH (ref 70–99)
GLUCOSE BLDC GLUCOMTR-MCNC: 170 MG/DL — HIGH (ref 70–99)
GLUCOSE BLDC GLUCOMTR-MCNC: 171 MG/DL — HIGH (ref 70–99)
GLUCOSE BLDC GLUCOMTR-MCNC: 171 MG/DL — HIGH (ref 70–99)
GLUCOSE SERPL-MCNC: 169 MG/DL — HIGH (ref 70–99)
GLUCOSE SERPL-MCNC: 169 MG/DL — HIGH (ref 70–99)
GLUCOSE UR QL: NEGATIVE MG/DL — SIGNIFICANT CHANGE UP
GLUCOSE UR QL: NEGATIVE MG/DL — SIGNIFICANT CHANGE UP
HCT VFR BLD CALC: 30.4 % — LOW (ref 34.5–45)
HCT VFR BLD CALC: 30.4 % — LOW (ref 34.5–45)
HDLC SERPL-MCNC: 57 MG/DL — SIGNIFICANT CHANGE UP
HDLC SERPL-MCNC: 57 MG/DL — SIGNIFICANT CHANGE UP
HGB BLD-MCNC: 9.4 G/DL — LOW (ref 11.5–15.5)
HGB BLD-MCNC: 9.4 G/DL — LOW (ref 11.5–15.5)
IMM GRANULOCYTES NFR BLD AUTO: 1.2 % — HIGH (ref 0–0.9)
IMM GRANULOCYTES NFR BLD AUTO: 1.2 % — HIGH (ref 0–0.9)
INR BLD: 1.05 — SIGNIFICANT CHANGE UP (ref 0.85–1.18)
INR BLD: 1.05 — SIGNIFICANT CHANGE UP (ref 0.85–1.18)
KETONES UR-MCNC: NEGATIVE MG/DL — SIGNIFICANT CHANGE UP
KETONES UR-MCNC: NEGATIVE MG/DL — SIGNIFICANT CHANGE UP
LEUKOCYTE ESTERASE UR-ACNC: ABNORMAL
LEUKOCYTE ESTERASE UR-ACNC: ABNORMAL
LIPID PNL WITH DIRECT LDL SERPL: 81 MG/DL — SIGNIFICANT CHANGE UP
LIPID PNL WITH DIRECT LDL SERPL: 81 MG/DL — SIGNIFICANT CHANGE UP
LYMPHOCYTES # BLD AUTO: 3.14 K/UL — SIGNIFICANT CHANGE UP (ref 1–3.3)
LYMPHOCYTES # BLD AUTO: 3.14 K/UL — SIGNIFICANT CHANGE UP (ref 1–3.3)
LYMPHOCYTES # BLD AUTO: 30 % — SIGNIFICANT CHANGE UP (ref 13–44)
LYMPHOCYTES # BLD AUTO: 30 % — SIGNIFICANT CHANGE UP (ref 13–44)
MCHC RBC-ENTMCNC: 26.1 PG — LOW (ref 27–34)
MCHC RBC-ENTMCNC: 26.1 PG — LOW (ref 27–34)
MCHC RBC-ENTMCNC: 30.9 GM/DL — LOW (ref 32–36)
MCHC RBC-ENTMCNC: 30.9 GM/DL — LOW (ref 32–36)
MCV RBC AUTO: 84.4 FL — SIGNIFICANT CHANGE UP (ref 80–100)
MCV RBC AUTO: 84.4 FL — SIGNIFICANT CHANGE UP (ref 80–100)
MONOCYTES # BLD AUTO: 0.91 K/UL — HIGH (ref 0–0.9)
MONOCYTES # BLD AUTO: 0.91 K/UL — HIGH (ref 0–0.9)
MONOCYTES NFR BLD AUTO: 8.7 % — SIGNIFICANT CHANGE UP (ref 2–14)
MONOCYTES NFR BLD AUTO: 8.7 % — SIGNIFICANT CHANGE UP (ref 2–14)
NEUTROPHILS # BLD AUTO: 5.63 K/UL — SIGNIFICANT CHANGE UP (ref 1.8–7.4)
NEUTROPHILS # BLD AUTO: 5.63 K/UL — SIGNIFICANT CHANGE UP (ref 1.8–7.4)
NEUTROPHILS NFR BLD AUTO: 53.9 % — SIGNIFICANT CHANGE UP (ref 43–77)
NEUTROPHILS NFR BLD AUTO: 53.9 % — SIGNIFICANT CHANGE UP (ref 43–77)
NITRITE UR-MCNC: NEGATIVE — SIGNIFICANT CHANGE UP
NITRITE UR-MCNC: NEGATIVE — SIGNIFICANT CHANGE UP
NON HDL CHOLESTEROL: 102 MG/DL — SIGNIFICANT CHANGE UP
NON HDL CHOLESTEROL: 102 MG/DL — SIGNIFICANT CHANGE UP
NRBC # BLD: 0 /100 WBCS — SIGNIFICANT CHANGE UP (ref 0–0)
NRBC # BLD: 0 /100 WBCS — SIGNIFICANT CHANGE UP (ref 0–0)
NT-PROBNP SERPL-SCNC: 2963 PG/ML — HIGH (ref 0–300)
NT-PROBNP SERPL-SCNC: 2963 PG/ML — HIGH (ref 0–300)
PA ADP PRP-ACNC: 328 PRU — SIGNIFICANT CHANGE UP (ref 194–418)
PA ADP PRP-ACNC: 328 PRU — SIGNIFICANT CHANGE UP (ref 194–418)
PH UR: 6 — SIGNIFICANT CHANGE UP (ref 5–8)
PH UR: 6 — SIGNIFICANT CHANGE UP (ref 5–8)
PLATELET # BLD AUTO: 306 K/UL — SIGNIFICANT CHANGE UP (ref 150–400)
PLATELET # BLD AUTO: 306 K/UL — SIGNIFICANT CHANGE UP (ref 150–400)
POTASSIUM SERPL-MCNC: 4.8 MMOL/L — SIGNIFICANT CHANGE UP (ref 3.5–5.3)
POTASSIUM SERPL-MCNC: 4.8 MMOL/L — SIGNIFICANT CHANGE UP (ref 3.5–5.3)
POTASSIUM SERPL-SCNC: 4.8 MMOL/L — SIGNIFICANT CHANGE UP (ref 3.5–5.3)
POTASSIUM SERPL-SCNC: 4.8 MMOL/L — SIGNIFICANT CHANGE UP (ref 3.5–5.3)
PROT SERPL-MCNC: 7.4 G/DL — SIGNIFICANT CHANGE UP (ref 6–8.3)
PROT SERPL-MCNC: 7.4 G/DL — SIGNIFICANT CHANGE UP (ref 6–8.3)
PROT UR-MCNC: NEGATIVE MG/DL — SIGNIFICANT CHANGE UP
PROT UR-MCNC: NEGATIVE MG/DL — SIGNIFICANT CHANGE UP
PROTHROM AB SERPL-ACNC: 12 SEC — SIGNIFICANT CHANGE UP (ref 9.5–13)
PROTHROM AB SERPL-ACNC: 12 SEC — SIGNIFICANT CHANGE UP (ref 9.5–13)
RBC # BLD: 3.6 M/UL — LOW (ref 3.8–5.2)
RBC # BLD: 3.6 M/UL — LOW (ref 3.8–5.2)
RBC # FLD: 15.8 % — HIGH (ref 10.3–14.5)
RBC # FLD: 15.8 % — HIGH (ref 10.3–14.5)
RBC CASTS # UR COMP ASSIST: 0 /HPF — SIGNIFICANT CHANGE UP (ref 0–4)
RBC CASTS # UR COMP ASSIST: 0 /HPF — SIGNIFICANT CHANGE UP (ref 0–4)
RH IG SCN BLD-IMP: NEGATIVE — SIGNIFICANT CHANGE UP
RH IG SCN BLD-IMP: NEGATIVE — SIGNIFICANT CHANGE UP
SODIUM SERPL-SCNC: 136 MMOL/L — SIGNIFICANT CHANGE UP (ref 135–145)
SODIUM SERPL-SCNC: 136 MMOL/L — SIGNIFICANT CHANGE UP (ref 135–145)
SP GR SPEC: 1.01 — SIGNIFICANT CHANGE UP (ref 1–1.03)
SP GR SPEC: 1.01 — SIGNIFICANT CHANGE UP (ref 1–1.03)
SQUAMOUS # UR AUTO: 0 /HPF — SIGNIFICANT CHANGE UP (ref 0–5)
SQUAMOUS # UR AUTO: 0 /HPF — SIGNIFICANT CHANGE UP (ref 0–5)
TRIGL SERPL-MCNC: 105 MG/DL — SIGNIFICANT CHANGE UP
TRIGL SERPL-MCNC: 105 MG/DL — SIGNIFICANT CHANGE UP
TROPONIN T, HIGH SENSITIVITY RESULT: 76 NG/L — CRITICAL HIGH (ref 0–51)
TROPONIN T, HIGH SENSITIVITY RESULT: 76 NG/L — CRITICAL HIGH (ref 0–51)
TSH SERPL-MCNC: 2.15 UIU/ML — SIGNIFICANT CHANGE UP (ref 0.27–4.2)
TSH SERPL-MCNC: 2.15 UIU/ML — SIGNIFICANT CHANGE UP (ref 0.27–4.2)
UROBILINOGEN FLD QL: 0.2 MG/DL — SIGNIFICANT CHANGE UP (ref 0.2–1)
UROBILINOGEN FLD QL: 0.2 MG/DL — SIGNIFICANT CHANGE UP (ref 0.2–1)
WBC # BLD: 10.46 K/UL — SIGNIFICANT CHANGE UP (ref 3.8–10.5)
WBC # BLD: 10.46 K/UL — SIGNIFICANT CHANGE UP (ref 3.8–10.5)
WBC # FLD AUTO: 10.46 K/UL — SIGNIFICANT CHANGE UP (ref 3.8–10.5)
WBC # FLD AUTO: 10.46 K/UL — SIGNIFICANT CHANGE UP (ref 3.8–10.5)
WBC UR QL: 2 /HPF — SIGNIFICANT CHANGE UP (ref 0–5)
WBC UR QL: 2 /HPF — SIGNIFICANT CHANGE UP (ref 0–5)

## 2023-11-12 PROCEDURE — 93010 ELECTROCARDIOGRAM REPORT: CPT

## 2023-11-12 PROCEDURE — 71250 CT THORAX DX C-: CPT | Mod: 26

## 2023-11-12 PROCEDURE — 93880 EXTRACRANIAL BILAT STUDY: CPT | Mod: 26

## 2023-11-12 RX ORDER — LEVOTHYROXINE SODIUM 125 MCG
75 TABLET ORAL DAILY
Refills: 0 | Status: DISCONTINUED | OUTPATIENT
Start: 2023-11-13 | End: 2023-11-17

## 2023-11-12 RX ORDER — ACETAMINOPHEN 500 MG
650 TABLET ORAL EVERY 6 HOURS
Refills: 0 | Status: DISCONTINUED | OUTPATIENT
Start: 2023-11-12 | End: 2023-11-17

## 2023-11-12 RX ORDER — PANTOPRAZOLE SODIUM 20 MG/1
40 TABLET, DELAYED RELEASE ORAL
Refills: 0 | Status: DISCONTINUED | OUTPATIENT
Start: 2023-11-12 | End: 2023-11-17

## 2023-11-12 RX ORDER — SODIUM CHLORIDE 9 MG/ML
1000 INJECTION, SOLUTION INTRAVENOUS
Refills: 0 | Status: DISCONTINUED | OUTPATIENT
Start: 2023-11-12 | End: 2023-11-17

## 2023-11-12 RX ORDER — METOPROLOL TARTRATE 50 MG
12.5 TABLET ORAL EVERY 12 HOURS
Refills: 0 | Status: DISCONTINUED | OUTPATIENT
Start: 2023-11-12 | End: 2023-11-15

## 2023-11-12 RX ORDER — DEXTROSE 50 % IN WATER 50 %
12.5 SYRINGE (ML) INTRAVENOUS ONCE
Refills: 0 | Status: DISCONTINUED | OUTPATIENT
Start: 2023-11-12 | End: 2023-11-17

## 2023-11-12 RX ORDER — GLUCAGON INJECTION, SOLUTION 0.5 MG/.1ML
1 INJECTION, SOLUTION SUBCUTANEOUS ONCE
Refills: 0 | Status: DISCONTINUED | OUTPATIENT
Start: 2023-11-12 | End: 2023-11-17

## 2023-11-12 RX ORDER — SODIUM CHLORIDE 9 MG/ML
3 INJECTION INTRAMUSCULAR; INTRAVENOUS; SUBCUTANEOUS EVERY 8 HOURS
Refills: 0 | Status: DISCONTINUED | OUTPATIENT
Start: 2023-11-12 | End: 2023-11-17

## 2023-11-12 RX ORDER — INSULIN LISPRO 100/ML
VIAL (ML) SUBCUTANEOUS
Refills: 0 | Status: DISCONTINUED | OUTPATIENT
Start: 2023-11-12 | End: 2023-11-17

## 2023-11-12 RX ORDER — DEXTROSE 50 % IN WATER 50 %
25 SYRINGE (ML) INTRAVENOUS ONCE
Refills: 0 | Status: DISCONTINUED | OUTPATIENT
Start: 2023-11-12 | End: 2023-11-17

## 2023-11-12 RX ORDER — HEPARIN SODIUM 5000 [USP'U]/ML
900 INJECTION INTRAVENOUS; SUBCUTANEOUS
Qty: 25000 | Refills: 0 | Status: DISCONTINUED | OUTPATIENT
Start: 2023-11-12 | End: 2023-11-12

## 2023-11-12 RX ORDER — ASPIRIN/CALCIUM CARB/MAGNESIUM 324 MG
1 TABLET ORAL
Refills: 0 | DISCHARGE

## 2023-11-12 RX ORDER — GABAPENTIN 400 MG/1
2 CAPSULE ORAL
Refills: 0 | DISCHARGE

## 2023-11-12 RX ORDER — ATORVASTATIN CALCIUM 80 MG/1
10 TABLET, FILM COATED ORAL AT BEDTIME
Refills: 0 | Status: DISCONTINUED | OUTPATIENT
Start: 2023-11-12 | End: 2023-11-14

## 2023-11-12 RX ORDER — DEXTROSE 50 % IN WATER 50 %
15 SYRINGE (ML) INTRAVENOUS ONCE
Refills: 0 | Status: DISCONTINUED | OUTPATIENT
Start: 2023-11-12 | End: 2023-11-17

## 2023-11-12 RX ORDER — MONTELUKAST 4 MG/1
1 TABLET, CHEWABLE ORAL
Refills: 0 | DISCHARGE

## 2023-11-12 RX ORDER — MONTELUKAST 4 MG/1
10 TABLET, CHEWABLE ORAL DAILY
Refills: 0 | Status: DISCONTINUED | OUTPATIENT
Start: 2023-11-13 | End: 2023-11-17

## 2023-11-12 RX ORDER — GLIMEPIRIDE 1 MG
1 TABLET ORAL
Refills: 0 | DISCHARGE

## 2023-11-12 RX ORDER — ISOSORBIDE MONONITRATE 60 MG/1
30 TABLET, EXTENDED RELEASE ORAL DAILY
Refills: 0 | Status: DISCONTINUED | OUTPATIENT
Start: 2023-11-12 | End: 2023-11-15

## 2023-11-12 RX ORDER — HEPARIN SODIUM 5000 [USP'U]/ML
1000 INJECTION INTRAVENOUS; SUBCUTANEOUS
Qty: 25000 | Refills: 0 | Status: DISCONTINUED | OUTPATIENT
Start: 2023-11-12 | End: 2023-11-13

## 2023-11-12 RX ORDER — METFORMIN HYDROCHLORIDE 850 MG/1
1 TABLET ORAL
Refills: 0 | DISCHARGE

## 2023-11-12 RX ORDER — SIMVASTATIN 20 MG/1
1 TABLET, FILM COATED ORAL
Qty: 0 | Refills: 0 | DISCHARGE

## 2023-11-12 RX ORDER — ERGOCALCIFEROL 1.25 MG/1
1 CAPSULE ORAL
Refills: 0 | DISCHARGE

## 2023-11-12 RX ORDER — ASPIRIN/CALCIUM CARB/MAGNESIUM 324 MG
81 TABLET ORAL DAILY
Refills: 0 | Status: DISCONTINUED | OUTPATIENT
Start: 2023-11-13 | End: 2023-11-17

## 2023-11-12 RX ADMIN — HEPARIN SODIUM 10 UNIT(S)/HR: 5000 INJECTION INTRAVENOUS; SUBCUTANEOUS at 20:00

## 2023-11-12 RX ADMIN — ISOSORBIDE MONONITRATE 30 MILLIGRAM(S): 60 TABLET, EXTENDED RELEASE ORAL at 19:38

## 2023-11-12 RX ADMIN — Medication 12.5 MILLIGRAM(S): at 18:49

## 2023-11-12 RX ADMIN — Medication 2: at 18:52

## 2023-11-12 RX ADMIN — ATORVASTATIN CALCIUM 10 MILLIGRAM(S): 80 TABLET, FILM COATED ORAL at 22:56

## 2023-11-12 RX ADMIN — SODIUM CHLORIDE 3 MILLILITER(S): 9 INJECTION INTRAMUSCULAR; INTRAVENOUS; SUBCUTANEOUS at 21:37

## 2023-11-12 NOTE — H&P ADULT - NSHPLABSRESULTS_GEN_ALL_CORE
VITALS:    LABS:      RADIOLOGY: VITALS:  Vital Signs Last 24 Hrs  T(C): 36.1 (12 Nov 2023 16:56), Max: 36.1 (12 Nov 2023 16:56)  T(F): 96.9 (12 Nov 2023 16:56), Max: 96.9 (12 Nov 2023 16:56)  HR: --  BP: --  BP(mean): --  RR: --  SpO2: --    LABS:                         9.4    10.46 )-----------( 306      ( 12 Nov 2023 16:22 )             30.4       RADIOLOGY: Pending CXR, carotids and TTE    Cardiac [place] [facility] [place]23: LM: vessel is large and severely calcified; Distal LM lesion 80% stenosed and severely calcified; Ost LAD lesion 85% stenosed; 2nd diag lesion 75% stenosed;  LCx severely calcified; RCA large and severely calcified; Mid RCA lesion 85% stenosed. EF 65%.

## 2023-11-12 NOTE — H&P ADULT - NSHPPHYSICALEXAM_GEN_ALL_CORE
PHYSICAL EXAM:  GENERAL: NAD, lying in bed comfortably  HEAD:  Atraumatic, Normocephalic  EYES: EOMI, PERRLA, conjunctiva and sclera clear  ENT: Moist mucous membranes  NECK: Supple, No JVD  CHEST/LUNG: Clear to auscultation bilaterally; No rales, rhonchi, wheezing, or rubs. Unlabored respirations  HEART: Regular rate and rhythm; No murmurs, rubs, or gallops  ABDOMEN: Bowel sounds present; Soft, Nontender, Nondistended. No hepatomegally  EXTREMITIES:  2+ Peripheral Pulses, brisk capillary refill. No clubbing, cyanosis, or edema  NERVOUS SYSTEM:  Alert & Oriented X3, speech clear. No deficits   MSK: FROM all 4 extremities, full and equal strength  SKIN: No rashes or lesions PHYSICAL EXAM:  GENERAL: NAD, lying in bed comfortably  HEAD:  Atraumatic, Normocephalic  EYES: EOMI, PERRLA, conjunctiva and sclera clear  ENT: Moist mucous membranes  NECK: Supple, No JVD  CHEST/LUNG: CTAB  HEART: RRR  ABDOMEN: Soft, Nontender, Nondistended. No hepatomegally  EXTREMITIES:  2+ Peripheral Pulses, brisk capillary refill. No clubbing, cyanosis, or edema  NERVOUS SYSTEM:  Alert & Oriented X3, speech clear. No deficits   MSK: FROM all 4 extremities, full and equal strength  SKIN: No rashes or lesions

## 2023-11-12 NOTE — H&P ADULT - NSICDXFAMILYHX_GEN_ALL_CORE_FT
FAMILY HISTORY:  Mother  Still living? Unknown  Family history of diabetes mellitus, Age at diagnosis: Age Unknown  Family history of hypertension, Age at diagnosis: Age Unknown    Sibling  Still living? Unknown  Family history of diabetes mellitus, Age at diagnosis: Age Unknown  Family history of hypertension, Age at diagnosis: Age Unknown    Grandparent  Still living? Unknown  Family history of blindness, Age at diagnosis: Age Unknown

## 2023-11-12 NOTE — PATIENT PROFILE ADULT - FALL HARM RISK - HARM RISK INTERVENTIONS

## 2023-11-12 NOTE — H&P ADULT - ASSESSMENT
Plan:    Neurovascular:   -Pain well controlled with current regimen. PRN's:    Cardiovascular:   -Hemodynamically stable.   -Monitor: BP, HR, tele    Respiratory:   -Oxygenating well on room air  -Encourage continued use of IS 10x/hr and frequent ambulation  -CXR:    GI:  -GI PPX:  -PO Diet  -Bowel Regimen:     Renal / :  -Continue to monitor renal function: BUN/Cr  -Monitor I/O's daily     Endocrine:    -No hx of DM or thyroid dx  -A1c:  -TSH:    Hematologic:  -CBC: H/H-  -Coagulation Panel.    ID:  -Temperature:   -CBC: WBC-  -Continue to observe for SIRS/Sepsis Syndrome.    Prophylaxis:  -DVT prophylaxis with 5000 SubQ Heparin q8h.  -Continue with SCD's b/l while patient is at rest     Disposition:  -Discharge home once patient is medically ready   76 YO Female w/ PMHx of HTN, HLD, DMII, asthma (no previous hospitalizations or intubations), GERD, hypothyroidism, glaucoma, gallstone pancreatitis who was originally BIBEMS to [facility] with c/o chest pain and SOB. Per patient she has had "fluttering" and palpitations with radiation to her L shoulder for the past 3 weeks, but two nights ago, she experienced SOB and called EMS. She was ruled in for NSTEMI and underwent cardiac cath which revealed 3v CAD. Patient transferred to [facility] under the care of Dr. Gallegos for CABG work-up.    Plan:    Neurovascular:   -Pain well controlled with current regimen. PRN's: Tylenol    Cardiovascular:   -3v CAD  -Cont ASA, BB, statin and imdur  -Cont heparin gtt  -OR next week; F/u PST  -P2Y12 328  -Hx of HTN  -Cont BB and Imdur  -Home meds: Lisinopril/HCTZ held  -Hx of HLD  -Cont statin  -Hemodynamically stable.   -Monitor: BP, HR, tele    Respiratory:   -Hx of asthma  -Cont Montelukast  -Hold Albuterol  -Oxygenating well on room air  -Encourage continued use of IS 10x/hr and frequent ambulation  -CXR: pending    GI:  -GI PPX: Protonix  -PO Diet  -Bowel Regimen: none    Renal / :  -Continue to monitor renal function: BUN/Cr: pending  -Monitor I/O's daily     Endocrine:    -Hx of DMII  -A1c: pending  -Cont ISS  -Home meds: Metformin 875mg BID and Glimepiride  -Hx of hypothyroidism  -Cont Synthroid 75mcg  -TSH: pending    Hematologic:  -CBC: H/H- 9.4/30  -Coagulation Panel.    ID:  -Temperature: Afebrile  -CBC: WBC- 10.46  -Continue to observe for SIRS/Sepsis Syndrome.    Prophylaxis:  -Cont heparin gtt; Trend PTT  -Continue with SCD's b/l while patient is at rest     Disposition:  -OR next week

## 2023-11-12 NOTE — H&P ADULT - HISTORY OF PRESENT ILLNESS
78 YO Female 76 YO Female w/ PMHx of HTN, HLD, DMII, asthma (no previous hospitalizations or intubations), GERD, hypothyroidism, glaucoma, gallstone pancreatitis who was originally BIBEMS to [facility] with c/o chest pain and SOB. Per patient she has had "fluttering" and palpitations with radiation to her L shoulder for the past 3 weeks, but two nights ago, she experienced SOB and called EMS. She was ruled in for NSTEMI and underwent cardiac cath which revealed 3v CAD. Patient transferred to [facility] under the care of Dr. Gallegos for CABG work-up. At present, patient states that she feels well, offers no complaints. Denies fever, chills, chest pain, SOB, palpitations, N/V/D. hx of GIB, dizziness or lightheadedness, hx of MI or stroke.

## 2023-11-12 NOTE — H&P ADULT - NSICDXPASTMEDICALHX_GEN_ALL_CORE_FT
PAST MEDICAL HISTORY:  CAD (coronary artery disease)     DM (diabetes mellitus)     GERD (gastroesophageal reflux disease)     Glaucoma bilateral eyes    HTN (hypertension)     Hyperlipemia     Hypothyroid     Obesity (BMI 30-39.9)     Primary osteoarthritis of left knee

## 2023-11-12 NOTE — H&P ADULT - NSHPSOCIALHISTORY_GEN_ALL_CORE
Denies hx of smoking, ETOH use or illicit drug use  Used to be HHA, now on disability   Ambulates with cane and rollator'  Lives with daughter, son-in-law and grandson

## 2023-11-12 NOTE — H&P ADULT - NSICDXPASTSURGICALHX_GEN_ALL_CORE_FT
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

## 2023-11-13 LAB
A1C WITH ESTIMATED AVERAGE GLUCOSE RESULT: 6.9 % — HIGH (ref 4–5.6)
A1C WITH ESTIMATED AVERAGE GLUCOSE RESULT: 6.9 % — HIGH (ref 4–5.6)
ALBUMIN SERPL ELPH-MCNC: 3.5 G/DL — SIGNIFICANT CHANGE UP (ref 3.3–5)
ALBUMIN SERPL ELPH-MCNC: 3.5 G/DL — SIGNIFICANT CHANGE UP (ref 3.3–5)
ALP SERPL-CCNC: 131 U/L — HIGH (ref 40–120)
ALP SERPL-CCNC: 131 U/L — HIGH (ref 40–120)
ALT FLD-CCNC: 258 U/L — HIGH (ref 10–45)
ALT FLD-CCNC: 258 U/L — HIGH (ref 10–45)
AMYLASE P1 CFR SERPL: 61 U/L — SIGNIFICANT CHANGE UP (ref 25–125)
AMYLASE P1 CFR SERPL: 61 U/L — SIGNIFICANT CHANGE UP (ref 25–125)
ANION GAP SERPL CALC-SCNC: 11 MMOL/L — SIGNIFICANT CHANGE UP (ref 5–17)
ANION GAP SERPL CALC-SCNC: 11 MMOL/L — SIGNIFICANT CHANGE UP (ref 5–17)
APTT BLD: 34.4 SEC — SIGNIFICANT CHANGE UP (ref 24.5–35.6)
APTT BLD: 34.4 SEC — SIGNIFICANT CHANGE UP (ref 24.5–35.6)
APTT BLD: 63.2 SEC — HIGH (ref 24.5–35.6)
APTT BLD: 63.2 SEC — HIGH (ref 24.5–35.6)
APTT BLD: 73.2 SEC — HIGH (ref 24.5–35.6)
APTT BLD: 73.2 SEC — HIGH (ref 24.5–35.6)
APTT BLD: 83.2 SEC — HIGH (ref 24.5–35.6)
APTT BLD: 83.2 SEC — HIGH (ref 24.5–35.6)
APTT BLD: 85.5 SEC — HIGH (ref 24.5–35.6)
APTT BLD: 85.5 SEC — HIGH (ref 24.5–35.6)
AST SERPL-CCNC: 86 U/L — HIGH (ref 10–40)
AST SERPL-CCNC: 86 U/L — HIGH (ref 10–40)
BASOPHILS # BLD AUTO: 0.05 K/UL — SIGNIFICANT CHANGE UP (ref 0–0.2)
BASOPHILS # BLD AUTO: 0.05 K/UL — SIGNIFICANT CHANGE UP (ref 0–0.2)
BASOPHILS NFR BLD AUTO: 0.4 % — SIGNIFICANT CHANGE UP (ref 0–2)
BASOPHILS NFR BLD AUTO: 0.4 % — SIGNIFICANT CHANGE UP (ref 0–2)
BILIRUB SERPL-MCNC: 0.5 MG/DL — SIGNIFICANT CHANGE UP (ref 0.2–1.2)
BILIRUB SERPL-MCNC: 0.5 MG/DL — SIGNIFICANT CHANGE UP (ref 0.2–1.2)
BUN SERPL-MCNC: 27 MG/DL — HIGH (ref 7–23)
BUN SERPL-MCNC: 27 MG/DL — HIGH (ref 7–23)
CALCIUM SERPL-MCNC: 9.2 MG/DL — SIGNIFICANT CHANGE UP (ref 8.4–10.5)
CALCIUM SERPL-MCNC: 9.2 MG/DL — SIGNIFICANT CHANGE UP (ref 8.4–10.5)
CHLORIDE SERPL-SCNC: 99 MMOL/L — SIGNIFICANT CHANGE UP (ref 96–108)
CHLORIDE SERPL-SCNC: 99 MMOL/L — SIGNIFICANT CHANGE UP (ref 96–108)
CO2 SERPL-SCNC: 26 MMOL/L — SIGNIFICANT CHANGE UP (ref 22–31)
CO2 SERPL-SCNC: 26 MMOL/L — SIGNIFICANT CHANGE UP (ref 22–31)
CREAT SERPL-MCNC: 1.63 MG/DL — HIGH (ref 0.5–1.3)
CREAT SERPL-MCNC: 1.63 MG/DL — HIGH (ref 0.5–1.3)
EGFR: 32 ML/MIN/1.73M2 — LOW
EGFR: 32 ML/MIN/1.73M2 — LOW
EOSINOPHIL # BLD AUTO: 0.84 K/UL — HIGH (ref 0–0.5)
EOSINOPHIL # BLD AUTO: 0.84 K/UL — HIGH (ref 0–0.5)
EOSINOPHIL NFR BLD AUTO: 6.6 % — HIGH (ref 0–6)
EOSINOPHIL NFR BLD AUTO: 6.6 % — HIGH (ref 0–6)
ESTIMATED AVERAGE GLUCOSE: 151 MG/DL — HIGH (ref 68–114)
ESTIMATED AVERAGE GLUCOSE: 151 MG/DL — HIGH (ref 68–114)
GLUCOSE BLDC GLUCOMTR-MCNC: 164 MG/DL — HIGH (ref 70–99)
GLUCOSE BLDC GLUCOMTR-MCNC: 164 MG/DL — HIGH (ref 70–99)
GLUCOSE BLDC GLUCOMTR-MCNC: 169 MG/DL — HIGH (ref 70–99)
GLUCOSE BLDC GLUCOMTR-MCNC: 169 MG/DL — HIGH (ref 70–99)
GLUCOSE BLDC GLUCOMTR-MCNC: 189 MG/DL — HIGH (ref 70–99)
GLUCOSE BLDC GLUCOMTR-MCNC: 189 MG/DL — HIGH (ref 70–99)
GLUCOSE BLDC GLUCOMTR-MCNC: 200 MG/DL — HIGH (ref 70–99)
GLUCOSE BLDC GLUCOMTR-MCNC: 200 MG/DL — HIGH (ref 70–99)
GLUCOSE SERPL-MCNC: 169 MG/DL — HIGH (ref 70–99)
GLUCOSE SERPL-MCNC: 169 MG/DL — HIGH (ref 70–99)
HCT VFR BLD CALC: 26.6 % — LOW (ref 34.5–45)
HCT VFR BLD CALC: 26.6 % — LOW (ref 34.5–45)
HCV AB S/CO SERPL IA: 0.04 S/CO — SIGNIFICANT CHANGE UP
HCV AB S/CO SERPL IA: 0.04 S/CO — SIGNIFICANT CHANGE UP
HCV AB SERPL-IMP: SIGNIFICANT CHANGE UP
HCV AB SERPL-IMP: SIGNIFICANT CHANGE UP
HGB BLD-MCNC: 8.4 G/DL — LOW (ref 11.5–15.5)
HGB BLD-MCNC: 8.4 G/DL — LOW (ref 11.5–15.5)
IMM GRANULOCYTES NFR BLD AUTO: 1 % — HIGH (ref 0–0.9)
IMM GRANULOCYTES NFR BLD AUTO: 1 % — HIGH (ref 0–0.9)
INR BLD: 1.14 — SIGNIFICANT CHANGE UP (ref 0.85–1.18)
INR BLD: 1.14 — SIGNIFICANT CHANGE UP (ref 0.85–1.18)
LIDOCAIN IGE QN: 19 U/L — SIGNIFICANT CHANGE UP (ref 7–60)
LIDOCAIN IGE QN: 19 U/L — SIGNIFICANT CHANGE UP (ref 7–60)
LYMPHOCYTES # BLD AUTO: 3.95 K/UL — HIGH (ref 1–3.3)
LYMPHOCYTES # BLD AUTO: 3.95 K/UL — HIGH (ref 1–3.3)
LYMPHOCYTES # BLD AUTO: 31.2 % — SIGNIFICANT CHANGE UP (ref 13–44)
LYMPHOCYTES # BLD AUTO: 31.2 % — SIGNIFICANT CHANGE UP (ref 13–44)
MCHC RBC-ENTMCNC: 26.6 PG — LOW (ref 27–34)
MCHC RBC-ENTMCNC: 26.6 PG — LOW (ref 27–34)
MCHC RBC-ENTMCNC: 31.6 GM/DL — LOW (ref 32–36)
MCHC RBC-ENTMCNC: 31.6 GM/DL — LOW (ref 32–36)
MCV RBC AUTO: 84.2 FL — SIGNIFICANT CHANGE UP (ref 80–100)
MCV RBC AUTO: 84.2 FL — SIGNIFICANT CHANGE UP (ref 80–100)
MONOCYTES # BLD AUTO: 0.92 K/UL — HIGH (ref 0–0.9)
MONOCYTES # BLD AUTO: 0.92 K/UL — HIGH (ref 0–0.9)
MONOCYTES NFR BLD AUTO: 7.3 % — SIGNIFICANT CHANGE UP (ref 2–14)
MONOCYTES NFR BLD AUTO: 7.3 % — SIGNIFICANT CHANGE UP (ref 2–14)
NEUTROPHILS # BLD AUTO: 6.76 K/UL — SIGNIFICANT CHANGE UP (ref 1.8–7.4)
NEUTROPHILS # BLD AUTO: 6.76 K/UL — SIGNIFICANT CHANGE UP (ref 1.8–7.4)
NEUTROPHILS NFR BLD AUTO: 53.5 % — SIGNIFICANT CHANGE UP (ref 43–77)
NEUTROPHILS NFR BLD AUTO: 53.5 % — SIGNIFICANT CHANGE UP (ref 43–77)
NRBC # BLD: 0 /100 WBCS — SIGNIFICANT CHANGE UP (ref 0–0)
NRBC # BLD: 0 /100 WBCS — SIGNIFICANT CHANGE UP (ref 0–0)
PLATELET # BLD AUTO: 271 K/UL — SIGNIFICANT CHANGE UP (ref 150–400)
PLATELET # BLD AUTO: 271 K/UL — SIGNIFICANT CHANGE UP (ref 150–400)
POTASSIUM SERPL-MCNC: 5.1 MMOL/L — SIGNIFICANT CHANGE UP (ref 3.5–5.3)
POTASSIUM SERPL-MCNC: 5.1 MMOL/L — SIGNIFICANT CHANGE UP (ref 3.5–5.3)
POTASSIUM SERPL-SCNC: 5.1 MMOL/L — SIGNIFICANT CHANGE UP (ref 3.5–5.3)
POTASSIUM SERPL-SCNC: 5.1 MMOL/L — SIGNIFICANT CHANGE UP (ref 3.5–5.3)
PROT SERPL-MCNC: 6.2 G/DL — SIGNIFICANT CHANGE UP (ref 6–8.3)
PROT SERPL-MCNC: 6.2 G/DL — SIGNIFICANT CHANGE UP (ref 6–8.3)
PROTHROM AB SERPL-ACNC: 13 SEC — SIGNIFICANT CHANGE UP (ref 9.5–13)
PROTHROM AB SERPL-ACNC: 13 SEC — SIGNIFICANT CHANGE UP (ref 9.5–13)
RBC # BLD: 3.16 M/UL — LOW (ref 3.8–5.2)
RBC # BLD: 3.16 M/UL — LOW (ref 3.8–5.2)
RBC # FLD: 15.4 % — HIGH (ref 10.3–14.5)
RBC # FLD: 15.4 % — HIGH (ref 10.3–14.5)
SODIUM SERPL-SCNC: 136 MMOL/L — SIGNIFICANT CHANGE UP (ref 135–145)
SODIUM SERPL-SCNC: 136 MMOL/L — SIGNIFICANT CHANGE UP (ref 135–145)
T3 SERPL-MCNC: 108 NG/DL — SIGNIFICANT CHANGE UP (ref 80–200)
T3 SERPL-MCNC: 108 NG/DL — SIGNIFICANT CHANGE UP (ref 80–200)
T4 AB SER-ACNC: 8.93 UG/DL — SIGNIFICANT CHANGE UP (ref 4.5–11.7)
T4 AB SER-ACNC: 8.93 UG/DL — SIGNIFICANT CHANGE UP (ref 4.5–11.7)
TROPONIN T, HIGH SENSITIVITY RESULT: 78 NG/L — CRITICAL HIGH (ref 0–51)
TROPONIN T, HIGH SENSITIVITY RESULT: 78 NG/L — CRITICAL HIGH (ref 0–51)
TROPONIN T, HIGH SENSITIVITY RESULT: 86 NG/L — CRITICAL HIGH (ref 0–51)
TROPONIN T, HIGH SENSITIVITY RESULT: 86 NG/L — CRITICAL HIGH (ref 0–51)
TROPONIN T, HIGH SENSITIVITY RESULT: 87 NG/L — CRITICAL HIGH (ref 0–51)
TROPONIN T, HIGH SENSITIVITY RESULT: 87 NG/L — CRITICAL HIGH (ref 0–51)
WBC # BLD: 12.65 K/UL — HIGH (ref 3.8–10.5)
WBC # BLD: 12.65 K/UL — HIGH (ref 3.8–10.5)
WBC # FLD AUTO: 12.65 K/UL — HIGH (ref 3.8–10.5)
WBC # FLD AUTO: 12.65 K/UL — HIGH (ref 3.8–10.5)

## 2023-11-13 PROCEDURE — 93306 TTE W/DOPPLER COMPLETE: CPT | Mod: 26

## 2023-11-13 PROCEDURE — 99233 SBSQ HOSP IP/OBS HIGH 50: CPT

## 2023-11-13 PROCEDURE — 94010 BREATHING CAPACITY TEST: CPT | Mod: 26

## 2023-11-13 PROCEDURE — 71046 X-RAY EXAM CHEST 2 VIEWS: CPT | Mod: 26

## 2023-11-13 RX ORDER — SENNA PLUS 8.6 MG/1
2 TABLET ORAL AT BEDTIME
Refills: 0 | Status: DISCONTINUED | OUTPATIENT
Start: 2023-11-13 | End: 2023-11-17

## 2023-11-13 RX ORDER — POLYETHYLENE GLYCOL 3350 17 G/17G
17 POWDER, FOR SOLUTION ORAL DAILY
Refills: 0 | Status: DISCONTINUED | OUTPATIENT
Start: 2023-11-13 | End: 2023-11-17

## 2023-11-13 RX ORDER — HEPARIN SODIUM 5000 [USP'U]/ML
900 INJECTION INTRAVENOUS; SUBCUTANEOUS
Qty: 25000 | Refills: 0 | Status: DISCONTINUED | OUTPATIENT
Start: 2023-11-13 | End: 2023-11-14

## 2023-11-13 RX ORDER — CLOPIDOGREL BISULFATE 75 MG/1
300 TABLET, FILM COATED ORAL ONCE
Refills: 0 | Status: COMPLETED | OUTPATIENT
Start: 2023-11-13 | End: 2023-11-13

## 2023-11-13 RX ORDER — CLOPIDOGREL BISULFATE 75 MG/1
75 TABLET, FILM COATED ORAL DAILY
Refills: 0 | Status: DISCONTINUED | OUTPATIENT
Start: 2023-11-14 | End: 2023-11-14

## 2023-11-13 RX ORDER — SODIUM CHLORIDE 9 MG/ML
1000 INJECTION INTRAMUSCULAR; INTRAVENOUS; SUBCUTANEOUS
Refills: 0 | Status: COMPLETED | OUTPATIENT
Start: 2023-11-13 | End: 2023-11-13

## 2023-11-13 RX ADMIN — ISOSORBIDE MONONITRATE 30 MILLIGRAM(S): 60 TABLET, EXTENDED RELEASE ORAL at 13:40

## 2023-11-13 RX ADMIN — ATORVASTATIN CALCIUM 10 MILLIGRAM(S): 80 TABLET, FILM COATED ORAL at 22:09

## 2023-11-13 RX ADMIN — Medication 650 MILLIGRAM(S): at 10:34

## 2023-11-13 RX ADMIN — Medication 2: at 17:20

## 2023-11-13 RX ADMIN — Medication 2: at 11:25

## 2023-11-13 RX ADMIN — SODIUM CHLORIDE 50 MILLILITER(S): 9 INJECTION INTRAMUSCULAR; INTRAVENOUS; SUBCUTANEOUS at 09:44

## 2023-11-13 RX ADMIN — SODIUM CHLORIDE 3 MILLILITER(S): 9 INJECTION INTRAMUSCULAR; INTRAVENOUS; SUBCUTANEOUS at 06:37

## 2023-11-13 RX ADMIN — HEPARIN SODIUM 8 UNIT(S)/HR: 5000 INJECTION INTRAVENOUS; SUBCUTANEOUS at 18:09

## 2023-11-13 RX ADMIN — CLOPIDOGREL BISULFATE 300 MILLIGRAM(S): 75 TABLET, FILM COATED ORAL at 14:48

## 2023-11-13 RX ADMIN — Medication 2: at 22:09

## 2023-11-13 RX ADMIN — Medication 650 MILLIGRAM(S): at 09:38

## 2023-11-13 RX ADMIN — PANTOPRAZOLE SODIUM 40 MILLIGRAM(S): 20 TABLET, DELAYED RELEASE ORAL at 06:37

## 2023-11-13 RX ADMIN — Medication 2: at 06:49

## 2023-11-13 RX ADMIN — Medication 12.5 MILLIGRAM(S): at 06:36

## 2023-11-13 RX ADMIN — Medication 75 MICROGRAM(S): at 06:36

## 2023-11-13 RX ADMIN — HEPARIN SODIUM 9 UNIT(S)/HR: 5000 INJECTION INTRAVENOUS; SUBCUTANEOUS at 07:11

## 2023-11-13 RX ADMIN — Medication 12.5 MILLIGRAM(S): at 17:44

## 2023-11-13 RX ADMIN — SODIUM CHLORIDE 3 MILLILITER(S): 9 INJECTION INTRAMUSCULAR; INTRAVENOUS; SUBCUTANEOUS at 13:51

## 2023-11-13 RX ADMIN — MONTELUKAST 10 MILLIGRAM(S): 4 TABLET, CHEWABLE ORAL at 11:25

## 2023-11-13 RX ADMIN — SODIUM CHLORIDE 3 MILLILITER(S): 9 INJECTION INTRAMUSCULAR; INTRAVENOUS; SUBCUTANEOUS at 22:13

## 2023-11-13 RX ADMIN — Medication 81 MILLIGRAM(S): at 11:24

## 2023-11-13 NOTE — PROGRESS NOTE ADULT - ASSESSMENT
76 YO Female w/ PMHx of HTN, HLD, DMII, asthma (no previous hospitalizations or intubations), GERD, hypothyroidism, glaucoma, gallstone pancreatitis who was originally BIBEMS to [facility] with c/o chest pain and SOB. She was ruled in for NSTEMI and underwent cardiac cath which revealed 3v CAD. Patient transferred to [facility] for surgical evaluation.     Plan:    Neurovascular:   -Pain well controlled with current regimen. PRN's: Tylenol    Cardiovascular: 3v CAD  -Cont ASA, BB, statin and imdur  -Cont heparin gtt  - Plan for PCI with Dr. Partida tomorrow   - 300mg plavix today; then 75mg daily  - NPO after midnight   -Hx of HTN  -Cont BB and Imdur  -Home meds: Lisinopril/HCTZ held  -Hx of HLD  -Cont statin  -Hemodynamically stable.   -Monitor: BP, HR, tele    Respiratory: Hx of asthma  -Cont Montelukast  -Oxygenating well on room air  -Encourage continued use of IS 10x/hr and frequent ambulation    GI:  -GI PPX: Protonix  -PO Diet: DASH/TLC    Renal / :  -Continue to monitor renal function: BUN/Cr:  27 & 1.63  - Hydration: NS 50cc/hr x 1 L  -Monitor I/O's daily     Endocrine:  Hx of DMII  -A1c: 6.9  -Cont ISS  -Home meds: Metformin 875mg BID and Glimepiride  -Hx of hypothyroidism  -Cont Synthroid 75mcg  -TSH: 2.150    Hematologic: stable  -CBC: H/H- 8.4 & 26.6    ID:  -Temperature: Afebrile  -CBC: WBC- 12.65  -Continue to observe for SIRS/Sepsis Syndrome.    Prophylaxis:  -Cont heparin gtt; Trend PTT  -Continue with SCD's b/l while patient is at rest     Disposition:  - PCI tomorrow

## 2023-11-13 NOTE — PROGRESS NOTE ADULT - SUBJECTIVE AND OBJECTIVE BOX
Patient discussed on morning rounds with Dr. Gallegos    SUBJECTIVE ASSESSMENT:  77y Female assessed at bedside this AM. Denies cp/sob/borrero/n/v/fever/chills.         Vital Signs Last 24 Hrs  T(C): 36.7 (13 Nov 2023 09:16), Max: 37.2 (13 Nov 2023 01:22)  T(F): 98 (13 Nov 2023 09:16), Max: 99 (13 Nov 2023 01:22)  HR: 70 (13 Nov 2023 13:37) (68 - 88)  BP: 131/56 (13 Nov 2023 13:37) (117/57 - 162/70)  BP(mean): 81 (13 Nov 2023 13:37) (81 - 103)  RR: 18 (13 Nov 2023 13:37) (16 - 20)  SpO2: 94% (13 Nov 2023 13:37) (93% - 95%)    Parameters below as of 13 Nov 2023 13:37  Patient On (Oxygen Delivery Method): room air      I&O's Detail    12 Nov 2023 07:01  -  13 Nov 2023 07:00  --------------------------------------------------------  IN:    Heparin: 9 mL    Heparin: 100 mL    Oral Fluid: 220 mL  Total IN: 329 mL    OUT:    Voided (mL): 701 mL  Total OUT: 701 mL    Total NET: -372 mL      13 Nov 2023 07:01  -  13 Nov 2023 13:38  --------------------------------------------------------  IN:    Heparin: 36 mL    Sodium Chloride 0.9% Bolus: 100 mL  Total IN: 136 mL    OUT:  Total OUT: 0 mL    Total NET: 136 mL      PHYSICAL EXAM:  Appearance: No acute distress.  Neurologic: AAOx3, no AMS or focal deficits.  Responds appropriately to verbal and physical stimuli; exhibits purposeful movement in all extremities.  HEENT:   MMM, PERRLA  Neck: Supple   Cardiovascular: RRR, S1 S2. No m/r/g.  Respiratory: No acute respiratory distress. CTA b/l, no w/r/r.   Gastrointestinal:  Soft, non-tender, non-distended, + BS.	  Skin: No rashes. No ecchymoses. No cyanosis.  Extremities: Exhibits normal range of motion, no clubbing, cyanosis or edema.  Vascular: Peripheral pulses palpable 2+ bilaterally.      LABS:                        8.4    12.65 )-----------( 271      ( 13 Nov 2023 05:30 )             26.6       COUMADIN: No    PT/INR - ( 13 Nov 2023 05:30 )   PT: 13.0 sec;   INR: 1.14          PTT - ( 13 Nov 2023 12:00 )  PTT:83.2 sec    11-13    136  |  99  |  27<H>  ----------------------------<  169<H>  5.1   |  26  |  1.63<H>    Ca    9.2      13 Nov 2023 05:30    TPro  6.2  /  Alb  3.5  /  TBili  0.5  /  DBili  x   /  AST  86<H>  /  ALT  258<H>  /  AlkPhos  131<H>  11-13      Urinalysis Basic - ( 13 Nov 2023 05:30 )    Color: x / Appearance: x / SG: x / pH: x  Gluc: 169 mg/dL / Ketone: x  / Bili: x / Urobili: x   Blood: x / Protein: x / Nitrite: x   Leuk Esterase: x / RBC: x / WBC x   Sq Epi: x / Non Sq Epi: x / Bacteria: x        MEDICATIONS  (STANDING):  aspirin  chewable 81 milliGRAM(s) Oral daily  atorvastatin 10 milliGRAM(s) Oral at bedtime  clopidogrel Tablet 300 milliGRAM(s) Oral once  dextrose 5%. 1000 milliLiter(s) (100 mL/Hr) IV Continuous <Continuous>  dextrose 5%. 1000 milliLiter(s) (50 mL/Hr) IV Continuous <Continuous>  dextrose 50% Injectable 25 Gram(s) IV Push once  dextrose 50% Injectable 25 Gram(s) IV Push once  dextrose 50% Injectable 12.5 Gram(s) IV Push once  glucagon  Injectable 1 milliGRAM(s) IntraMuscular once  heparin  Infusion 900 Unit(s)/Hr (8 mL/Hr) IV Continuous <Continuous>  insulin lispro (ADMELOG) corrective regimen sliding scale   SubCutaneous Before meals and at bedtime  isosorbide   mononitrate ER Tablet (IMDUR) 30 milliGRAM(s) Oral daily  levothyroxine 75 MICROGram(s) Oral daily  metoprolol tartrate 12.5 milliGRAM(s) Oral every 12 hours  montelukast 10 milliGRAM(s) Oral daily  pantoprazole    Tablet 40 milliGRAM(s) Oral before breakfast  polyethylene glycol 3350 17 Gram(s) Oral daily  senna 2 Tablet(s) Oral at bedtime  sodium chloride 0.9% lock flush 3 milliLiter(s) IV Push every 8 hours    MEDICATIONS  (PRN):  acetaminophen     Tablet .. 650 milliGRAM(s) Oral every 6 hours PRN Temp greater or equal to 38C (100.4F), Mild Pain (1 - 3)  dextrose Oral Gel 15 Gram(s) Oral once PRN Blood Glucose LESS THAN 70 milliGRAM(s)/deciliter        RADIOLOGY & ADDITIONAL TESTS:  < from: Xray Chest 2 Views PA/Lat (11.13.23 @ 08:58) >  IMPRESSION:    Heart size, mediastinal and hilar contours are within normal limits. The   lung zones are clear. No acute soft tissue or osseous abnormalities.    < end of copied text >  < from: CT Chest No Cont (11.12.23 @ 18:28) >    IMPRESSION:  No acute pathology.      < end of copied text >  < from: US Duplex Carotid Arteries Complete, Bilateral (11.12.23 @ 18:04) >  IMPRESSION: Mild bilateral plaque. No hemodynamically significant   stenosis of either internal carotid artery.  Elevated velocities at the proximal right common carotid artery may be   related to inflow disease.    < end of copied text >

## 2023-11-14 DIAGNOSIS — D64.9 ANEMIA, UNSPECIFIED: ICD-10-CM

## 2023-11-14 DIAGNOSIS — E11.9 TYPE 2 DIABETES MELLITUS WITHOUT COMPLICATIONS: ICD-10-CM

## 2023-11-14 DIAGNOSIS — N18.30 CHRONIC KIDNEY DISEASE, STAGE 3 UNSPECIFIED: ICD-10-CM

## 2023-11-14 DIAGNOSIS — E78.5 HYPERLIPIDEMIA, UNSPECIFIED: ICD-10-CM

## 2023-11-14 DIAGNOSIS — D72.829 ELEVATED WHITE BLOOD CELL COUNT, UNSPECIFIED: ICD-10-CM

## 2023-11-14 DIAGNOSIS — R74.01 ELEVATION OF LEVELS OF LIVER TRANSAMINASE LEVELS: ICD-10-CM

## 2023-11-14 DIAGNOSIS — I21.4 NON-ST ELEVATION (NSTEMI) MYOCARDIAL INFARCTION: ICD-10-CM

## 2023-11-14 DIAGNOSIS — I10 ESSENTIAL (PRIMARY) HYPERTENSION: ICD-10-CM

## 2023-11-14 LAB
ALBUMIN SERPL ELPH-MCNC: 3 G/DL — LOW (ref 3.3–5)
ALBUMIN SERPL ELPH-MCNC: 3 G/DL — LOW (ref 3.3–5)
ALBUMIN SERPL ELPH-MCNC: 3.3 G/DL — SIGNIFICANT CHANGE UP (ref 3.3–5)
ALBUMIN SERPL ELPH-MCNC: 3.3 G/DL — SIGNIFICANT CHANGE UP (ref 3.3–5)
ALP SERPL-CCNC: 109 U/L — SIGNIFICANT CHANGE UP (ref 40–120)
ALP SERPL-CCNC: 109 U/L — SIGNIFICANT CHANGE UP (ref 40–120)
ALP SERPL-CCNC: 112 U/L — SIGNIFICANT CHANGE UP (ref 40–120)
ALP SERPL-CCNC: 112 U/L — SIGNIFICANT CHANGE UP (ref 40–120)
ALT FLD-CCNC: 139 U/L — HIGH (ref 10–45)
ALT FLD-CCNC: 139 U/L — HIGH (ref 10–45)
ALT FLD-CCNC: 164 U/L — HIGH (ref 10–45)
ALT FLD-CCNC: 164 U/L — HIGH (ref 10–45)
ANION GAP SERPL CALC-SCNC: 12 MMOL/L — SIGNIFICANT CHANGE UP (ref 5–17)
APTT BLD: 66.7 SEC — HIGH (ref 24.5–35.6)
APTT BLD: 66.7 SEC — HIGH (ref 24.5–35.6)
APTT BLD: >200 SEC — CRITICAL HIGH (ref 24.5–35.6)
APTT BLD: >200 SEC — CRITICAL HIGH (ref 24.5–35.6)
AST SERPL-CCNC: 37 U/L — SIGNIFICANT CHANGE UP (ref 10–40)
AST SERPL-CCNC: 37 U/L — SIGNIFICANT CHANGE UP (ref 10–40)
AST SERPL-CCNC: 38 U/L — SIGNIFICANT CHANGE UP (ref 10–40)
AST SERPL-CCNC: 38 U/L — SIGNIFICANT CHANGE UP (ref 10–40)
BASOPHILS # BLD AUTO: 0.03 K/UL — SIGNIFICANT CHANGE UP (ref 0–0.2)
BASOPHILS # BLD AUTO: 0.03 K/UL — SIGNIFICANT CHANGE UP (ref 0–0.2)
BASOPHILS NFR BLD AUTO: 0.2 % — SIGNIFICANT CHANGE UP (ref 0–2)
BASOPHILS NFR BLD AUTO: 0.2 % — SIGNIFICANT CHANGE UP (ref 0–2)
BILIRUB DIRECT SERPL-MCNC: <0.2 MG/DL — SIGNIFICANT CHANGE UP (ref 0–0.3)
BILIRUB DIRECT SERPL-MCNC: <0.2 MG/DL — SIGNIFICANT CHANGE UP (ref 0–0.3)
BILIRUB INDIRECT FLD-MCNC: SIGNIFICANT CHANGE UP MG/DL (ref 0.2–1)
BILIRUB INDIRECT FLD-MCNC: SIGNIFICANT CHANGE UP MG/DL (ref 0.2–1)
BILIRUB SERPL-MCNC: 0.3 MG/DL — SIGNIFICANT CHANGE UP (ref 0.2–1.2)
BILIRUB SERPL-MCNC: 0.3 MG/DL — SIGNIFICANT CHANGE UP (ref 0.2–1.2)
BILIRUB SERPL-MCNC: 0.6 MG/DL — SIGNIFICANT CHANGE UP (ref 0.2–1.2)
BILIRUB SERPL-MCNC: 0.6 MG/DL — SIGNIFICANT CHANGE UP (ref 0.2–1.2)
BUN SERPL-MCNC: 23 MG/DL — SIGNIFICANT CHANGE UP (ref 7–23)
BUN SERPL-MCNC: 23 MG/DL — SIGNIFICANT CHANGE UP (ref 7–23)
BUN SERPL-MCNC: 28 MG/DL — HIGH (ref 7–23)
BUN SERPL-MCNC: 28 MG/DL — HIGH (ref 7–23)
CALCIUM SERPL-MCNC: 8.8 MG/DL — SIGNIFICANT CHANGE UP (ref 8.4–10.5)
CALCIUM SERPL-MCNC: 8.8 MG/DL — SIGNIFICANT CHANGE UP (ref 8.4–10.5)
CALCIUM SERPL-MCNC: 8.9 MG/DL — SIGNIFICANT CHANGE UP (ref 8.4–10.5)
CALCIUM SERPL-MCNC: 8.9 MG/DL — SIGNIFICANT CHANGE UP (ref 8.4–10.5)
CHLORIDE SERPL-SCNC: 101 MMOL/L — SIGNIFICANT CHANGE UP (ref 96–108)
CHLORIDE SERPL-SCNC: 101 MMOL/L — SIGNIFICANT CHANGE UP (ref 96–108)
CHLORIDE SERPL-SCNC: 102 MMOL/L — SIGNIFICANT CHANGE UP (ref 96–108)
CHLORIDE SERPL-SCNC: 102 MMOL/L — SIGNIFICANT CHANGE UP (ref 96–108)
CO2 SERPL-SCNC: 21 MMOL/L — LOW (ref 22–31)
CO2 SERPL-SCNC: 21 MMOL/L — LOW (ref 22–31)
CO2 SERPL-SCNC: 22 MMOL/L — SIGNIFICANT CHANGE UP (ref 22–31)
CO2 SERPL-SCNC: 22 MMOL/L — SIGNIFICANT CHANGE UP (ref 22–31)
COHGB MFR BLDA: 2 % — SIGNIFICANT CHANGE UP
COHGB MFR BLDA: 2 % — SIGNIFICANT CHANGE UP
COHGB MFR BLDV: 2.3 % — SIGNIFICANT CHANGE UP
COHGB MFR BLDV: 2.3 % — SIGNIFICANT CHANGE UP
CREAT SERPL-MCNC: 1.39 MG/DL — HIGH (ref 0.5–1.3)
CREAT SERPL-MCNC: 1.39 MG/DL — HIGH (ref 0.5–1.3)
CREAT SERPL-MCNC: 1.52 MG/DL — HIGH (ref 0.5–1.3)
CREAT SERPL-MCNC: 1.52 MG/DL — HIGH (ref 0.5–1.3)
EGFR: 35 ML/MIN/1.73M2 — LOW
EGFR: 35 ML/MIN/1.73M2 — LOW
EGFR: 39 ML/MIN/1.73M2 — LOW
EGFR: 39 ML/MIN/1.73M2 — LOW
EOSINOPHIL # BLD AUTO: 1.02 K/UL — HIGH (ref 0–0.5)
EOSINOPHIL # BLD AUTO: 1.02 K/UL — HIGH (ref 0–0.5)
EOSINOPHIL NFR BLD AUTO: 7.8 % — HIGH (ref 0–6)
EOSINOPHIL NFR BLD AUTO: 7.8 % — HIGH (ref 0–6)
GLUCOSE BLDC GLUCOMTR-MCNC: 152 MG/DL — HIGH (ref 70–99)
GLUCOSE BLDC GLUCOMTR-MCNC: 152 MG/DL — HIGH (ref 70–99)
GLUCOSE BLDC GLUCOMTR-MCNC: 189 MG/DL — HIGH (ref 70–99)
GLUCOSE BLDC GLUCOMTR-MCNC: 189 MG/DL — HIGH (ref 70–99)
GLUCOSE BLDC GLUCOMTR-MCNC: 190 MG/DL — HIGH (ref 70–99)
GLUCOSE BLDC GLUCOMTR-MCNC: 190 MG/DL — HIGH (ref 70–99)
GLUCOSE SERPL-MCNC: 177 MG/DL — HIGH (ref 70–99)
GLUCOSE SERPL-MCNC: 177 MG/DL — HIGH (ref 70–99)
GLUCOSE SERPL-MCNC: 180 MG/DL — HIGH (ref 70–99)
GLUCOSE SERPL-MCNC: 180 MG/DL — HIGH (ref 70–99)
HCT VFR BLD CALC: 26.2 % — LOW (ref 34.5–45)
HCT VFR BLD CALC: 26.2 % — LOW (ref 34.5–45)
HCT VFR BLD CALC: 30.1 % — LOW (ref 34.5–45)
HCT VFR BLD CALC: 30.1 % — LOW (ref 34.5–45)
HGB BLD CALC-MCNC: 9.7 G/DL — LOW (ref 11.7–16.1)
HGB BLD CALC-MCNC: 9.7 G/DL — LOW (ref 11.7–16.1)
HGB BLD-MCNC: 8.2 G/DL — LOW (ref 11.5–15.5)
HGB BLD-MCNC: 8.2 G/DL — LOW (ref 11.5–15.5)
HGB BLD-MCNC: 9.5 G/DL — LOW (ref 11.5–15.5)
HGB BLD-MCNC: 9.5 G/DL — LOW (ref 11.5–15.5)
HGB BLDA-MCNC: 9.7 G/DL — LOW (ref 11.7–16.1)
HGB BLDA-MCNC: 9.7 G/DL — LOW (ref 11.7–16.1)
IMM GRANULOCYTES NFR BLD AUTO: 1.2 % — HIGH (ref 0–0.9)
IMM GRANULOCYTES NFR BLD AUTO: 1.2 % — HIGH (ref 0–0.9)
INR BLD: 1.06 — SIGNIFICANT CHANGE UP (ref 0.85–1.18)
INR BLD: 1.06 — SIGNIFICANT CHANGE UP (ref 0.85–1.18)
INR BLD: 1.17 — SIGNIFICANT CHANGE UP (ref 0.85–1.18)
INR BLD: 1.17 — SIGNIFICANT CHANGE UP (ref 0.85–1.18)
ISTAT ACTK (ACTIVATED CLOTTING TIME KAOLIN): 239 SEC — HIGH (ref 74–137)
ISTAT ACTK (ACTIVATED CLOTTING TIME KAOLIN): 244 SEC — HIGH (ref 74–137)
ISTAT ACTK (ACTIVATED CLOTTING TIME KAOLIN): 244 SEC — HIGH (ref 74–137)
ISTAT ACTK (ACTIVATED CLOTTING TIME KAOLIN): 249 SEC — HIGH (ref 74–137)
ISTAT ACTK (ACTIVATED CLOTTING TIME KAOLIN): 249 SEC — HIGH (ref 74–137)
LACTATE SERPL-SCNC: 1.2 MMOL/L — SIGNIFICANT CHANGE UP (ref 0.5–2)
LACTATE SERPL-SCNC: 1.2 MMOL/L — SIGNIFICANT CHANGE UP (ref 0.5–2)
LYMPHOCYTES # BLD AUTO: 23.8 % — SIGNIFICANT CHANGE UP (ref 13–44)
LYMPHOCYTES # BLD AUTO: 23.8 % — SIGNIFICANT CHANGE UP (ref 13–44)
LYMPHOCYTES # BLD AUTO: 3.1 K/UL — SIGNIFICANT CHANGE UP (ref 1–3.3)
LYMPHOCYTES # BLD AUTO: 3.1 K/UL — SIGNIFICANT CHANGE UP (ref 1–3.3)
MAGNESIUM SERPL-MCNC: 1.7 MG/DL — SIGNIFICANT CHANGE UP (ref 1.6–2.6)
MAGNESIUM SERPL-MCNC: 1.7 MG/DL — SIGNIFICANT CHANGE UP (ref 1.6–2.6)
MCHC RBC-ENTMCNC: 26.5 PG — LOW (ref 27–34)
MCHC RBC-ENTMCNC: 31.3 GM/DL — LOW (ref 32–36)
MCHC RBC-ENTMCNC: 31.3 GM/DL — LOW (ref 32–36)
MCHC RBC-ENTMCNC: 31.6 GM/DL — LOW (ref 32–36)
MCHC RBC-ENTMCNC: 31.6 GM/DL — LOW (ref 32–36)
MCV RBC AUTO: 84.1 FL — SIGNIFICANT CHANGE UP (ref 80–100)
MCV RBC AUTO: 84.1 FL — SIGNIFICANT CHANGE UP (ref 80–100)
MCV RBC AUTO: 84.8 FL — SIGNIFICANT CHANGE UP (ref 80–100)
MCV RBC AUTO: 84.8 FL — SIGNIFICANT CHANGE UP (ref 80–100)
METHGB MFR BLDA: 0.5 % — SIGNIFICANT CHANGE UP
METHGB MFR BLDA: 0.5 % — SIGNIFICANT CHANGE UP
METHGB MFR BLDV: 0 % — SIGNIFICANT CHANGE UP
METHGB MFR BLDV: 0 % — SIGNIFICANT CHANGE UP
MONOCYTES # BLD AUTO: 0.81 K/UL — SIGNIFICANT CHANGE UP (ref 0–0.9)
MONOCYTES # BLD AUTO: 0.81 K/UL — SIGNIFICANT CHANGE UP (ref 0–0.9)
MONOCYTES NFR BLD AUTO: 6.2 % — SIGNIFICANT CHANGE UP (ref 2–14)
MONOCYTES NFR BLD AUTO: 6.2 % — SIGNIFICANT CHANGE UP (ref 2–14)
NEUTROPHILS # BLD AUTO: 7.93 K/UL — HIGH (ref 1.8–7.4)
NEUTROPHILS # BLD AUTO: 7.93 K/UL — HIGH (ref 1.8–7.4)
NEUTROPHILS NFR BLD AUTO: 60.8 % — SIGNIFICANT CHANGE UP (ref 43–77)
NEUTROPHILS NFR BLD AUTO: 60.8 % — SIGNIFICANT CHANGE UP (ref 43–77)
NRBC # BLD: 0 /100 WBCS — SIGNIFICANT CHANGE UP (ref 0–0)
OXYHGB MFR BLDA: 93.1 % — SIGNIFICANT CHANGE UP (ref 90–95)
OXYHGB MFR BLDA: 93.1 % — SIGNIFICANT CHANGE UP (ref 90–95)
PHOSPHATE SERPL-MCNC: 4.2 MG/DL — SIGNIFICANT CHANGE UP (ref 2.5–4.5)
PHOSPHATE SERPL-MCNC: 4.2 MG/DL — SIGNIFICANT CHANGE UP (ref 2.5–4.5)
PLATELET # BLD AUTO: 261 K/UL — SIGNIFICANT CHANGE UP (ref 150–400)
PLATELET # BLD AUTO: 261 K/UL — SIGNIFICANT CHANGE UP (ref 150–400)
PLATELET # BLD AUTO: 263 K/UL — SIGNIFICANT CHANGE UP (ref 150–400)
PLATELET # BLD AUTO: 263 K/UL — SIGNIFICANT CHANGE UP (ref 150–400)
POTASSIUM SERPL-MCNC: 4.6 MMOL/L — SIGNIFICANT CHANGE UP (ref 3.5–5.3)
POTASSIUM SERPL-MCNC: 4.6 MMOL/L — SIGNIFICANT CHANGE UP (ref 3.5–5.3)
POTASSIUM SERPL-MCNC: 4.8 MMOL/L — SIGNIFICANT CHANGE UP (ref 3.5–5.3)
POTASSIUM SERPL-MCNC: 4.8 MMOL/L — SIGNIFICANT CHANGE UP (ref 3.5–5.3)
POTASSIUM SERPL-SCNC: 4.6 MMOL/L — SIGNIFICANT CHANGE UP (ref 3.5–5.3)
POTASSIUM SERPL-SCNC: 4.6 MMOL/L — SIGNIFICANT CHANGE UP (ref 3.5–5.3)
POTASSIUM SERPL-SCNC: 4.8 MMOL/L — SIGNIFICANT CHANGE UP (ref 3.5–5.3)
POTASSIUM SERPL-SCNC: 4.8 MMOL/L — SIGNIFICANT CHANGE UP (ref 3.5–5.3)
PROT SERPL-MCNC: 6 G/DL — SIGNIFICANT CHANGE UP (ref 6–8.3)
PROT SERPL-MCNC: 6 G/DL — SIGNIFICANT CHANGE UP (ref 6–8.3)
PROT SERPL-MCNC: 6.1 G/DL — SIGNIFICANT CHANGE UP (ref 6–8.3)
PROT SERPL-MCNC: 6.1 G/DL — SIGNIFICANT CHANGE UP (ref 6–8.3)
PROTHROM AB SERPL-ACNC: 12.1 SEC — SIGNIFICANT CHANGE UP (ref 9.5–13)
PROTHROM AB SERPL-ACNC: 12.1 SEC — SIGNIFICANT CHANGE UP (ref 9.5–13)
PROTHROM AB SERPL-ACNC: 13.3 SEC — HIGH (ref 9.5–13)
PROTHROM AB SERPL-ACNC: 13.3 SEC — HIGH (ref 9.5–13)
RBC # BLD: 3.09 M/UL — LOW (ref 3.8–5.2)
RBC # BLD: 3.09 M/UL — LOW (ref 3.8–5.2)
RBC # BLD: 3.58 M/UL — LOW (ref 3.8–5.2)
RBC # BLD: 3.58 M/UL — LOW (ref 3.8–5.2)
RBC # FLD: 15.6 % — HIGH (ref 10.3–14.5)
RBC # FLD: 15.6 % — HIGH (ref 10.3–14.5)
RBC # FLD: 15.9 % — HIGH (ref 10.3–14.5)
RBC # FLD: 15.9 % — HIGH (ref 10.3–14.5)
SAO2 % BLDA: 95.5 % — SIGNIFICANT CHANGE UP (ref 94–98)
SAO2 % BLDA: 95.5 % — SIGNIFICANT CHANGE UP (ref 94–98)
SAO2 % BLDV: 71 % — SIGNIFICANT CHANGE UP (ref 67–88)
SAO2 % BLDV: 71 % — SIGNIFICANT CHANGE UP (ref 67–88)
SODIUM SERPL-SCNC: 135 MMOL/L — SIGNIFICANT CHANGE UP (ref 135–145)
WBC # BLD: 11.67 K/UL — HIGH (ref 3.8–10.5)
WBC # BLD: 11.67 K/UL — HIGH (ref 3.8–10.5)
WBC # BLD: 13.05 K/UL — HIGH (ref 3.8–10.5)
WBC # BLD: 13.05 K/UL — HIGH (ref 3.8–10.5)
WBC # FLD AUTO: 11.67 K/UL — HIGH (ref 3.8–10.5)
WBC # FLD AUTO: 11.67 K/UL — HIGH (ref 3.8–10.5)
WBC # FLD AUTO: 13.05 K/UL — HIGH (ref 3.8–10.5)
WBC # FLD AUTO: 13.05 K/UL — HIGH (ref 3.8–10.5)

## 2023-11-14 PROCEDURE — 92928 PRQ TCAT PLMT NTRAC ST 1 LES: CPT | Mod: LM

## 2023-11-14 PROCEDURE — 93010 ELECTROCARDIOGRAM REPORT: CPT

## 2023-11-14 PROCEDURE — 93456 R HRT CORONARY ARTERY ANGIO: CPT | Mod: 26,59

## 2023-11-14 PROCEDURE — 99152 MOD SED SAME PHYS/QHP 5/>YRS: CPT

## 2023-11-14 PROCEDURE — 92920 PRQ TRLUML C ANGIOP 1ART&/BR: CPT | Mod: LD

## 2023-11-14 PROCEDURE — 99232 SBSQ HOSP IP/OBS MODERATE 35: CPT

## 2023-11-14 PROCEDURE — 92978 ENDOLUMINL IVUS OCT C 1ST: CPT | Mod: 26,LM

## 2023-11-14 PROCEDURE — 0715T: CPT

## 2023-11-14 PROCEDURE — 33390 VALVULOPLASTY AORTIC VALVE: CPT

## 2023-11-14 RX ORDER — ATORVASTATIN CALCIUM 80 MG/1
80 TABLET, FILM COATED ORAL AT BEDTIME
Refills: 0 | Status: DISCONTINUED | OUTPATIENT
Start: 2023-11-14 | End: 2023-11-17

## 2023-11-14 RX ORDER — CLOPIDOGREL BISULFATE 75 MG/1
300 TABLET, FILM COATED ORAL ONCE
Refills: 0 | Status: COMPLETED | OUTPATIENT
Start: 2023-11-14 | End: 2023-11-14

## 2023-11-14 RX ORDER — CLOPIDOGREL BISULFATE 75 MG/1
75 TABLET, FILM COATED ORAL DAILY
Refills: 0 | Status: DISCONTINUED | OUTPATIENT
Start: 2023-11-15 | End: 2023-11-17

## 2023-11-14 RX ORDER — HEPARIN SODIUM 5000 [USP'U]/ML
5000 INJECTION INTRAVENOUS; SUBCUTANEOUS EVERY 8 HOURS
Refills: 0 | Status: DISCONTINUED | OUTPATIENT
Start: 2023-11-15 | End: 2023-11-17

## 2023-11-14 RX ORDER — SODIUM CHLORIDE 9 MG/ML
500 INJECTION INTRAMUSCULAR; INTRAVENOUS; SUBCUTANEOUS
Refills: 0 | Status: DISCONTINUED | OUTPATIENT
Start: 2023-11-14 | End: 2023-11-14

## 2023-11-14 RX ORDER — MAGNESIUM SULFATE 500 MG/ML
2 VIAL (ML) INJECTION ONCE
Refills: 0 | Status: COMPLETED | OUTPATIENT
Start: 2023-11-14 | End: 2023-11-14

## 2023-11-14 RX ORDER — SODIUM CHLORIDE 9 MG/ML
250 INJECTION INTRAMUSCULAR; INTRAVENOUS; SUBCUTANEOUS ONCE
Refills: 0 | Status: COMPLETED | OUTPATIENT
Start: 2023-11-14 | End: 2023-11-14

## 2023-11-14 RX ADMIN — Medication 12.5 MILLIGRAM(S): at 05:51

## 2023-11-14 RX ADMIN — PANTOPRAZOLE SODIUM 40 MILLIGRAM(S): 20 TABLET, DELAYED RELEASE ORAL at 07:00

## 2023-11-14 RX ADMIN — Medication 650 MILLIGRAM(S): at 21:03

## 2023-11-14 RX ADMIN — Medication 81 MILLIGRAM(S): at 11:43

## 2023-11-14 RX ADMIN — SODIUM CHLORIDE 250 MILLILITER(S): 9 INJECTION INTRAMUSCULAR; INTRAVENOUS; SUBCUTANEOUS at 12:25

## 2023-11-14 RX ADMIN — CLOPIDOGREL BISULFATE 300 MILLIGRAM(S): 75 TABLET, FILM COATED ORAL at 12:06

## 2023-11-14 RX ADMIN — Medication 75 MICROGRAM(S): at 05:52

## 2023-11-14 RX ADMIN — ISOSORBIDE MONONITRATE 30 MILLIGRAM(S): 60 TABLET, EXTENDED RELEASE ORAL at 11:43

## 2023-11-14 RX ADMIN — MONTELUKAST 10 MILLIGRAM(S): 4 TABLET, CHEWABLE ORAL at 11:43

## 2023-11-14 RX ADMIN — SODIUM CHLORIDE 3 MILLILITER(S): 9 INJECTION INTRAMUSCULAR; INTRAVENOUS; SUBCUTANEOUS at 06:49

## 2023-11-14 RX ADMIN — Medication 12.5 MILLIGRAM(S): at 21:05

## 2023-11-14 RX ADMIN — Medication 2: at 22:21

## 2023-11-14 RX ADMIN — Medication 650 MILLIGRAM(S): at 22:03

## 2023-11-14 RX ADMIN — Medication 2: at 12:06

## 2023-11-14 RX ADMIN — SODIUM CHLORIDE 3 MILLILITER(S): 9 INJECTION INTRAMUSCULAR; INTRAVENOUS; SUBCUTANEOUS at 21:19

## 2023-11-14 RX ADMIN — ATORVASTATIN CALCIUM 80 MILLIGRAM(S): 80 TABLET, FILM COATED ORAL at 21:05

## 2023-11-14 RX ADMIN — POLYETHYLENE GLYCOL 3350 17 GRAM(S): 17 POWDER, FOR SOLUTION ORAL at 11:43

## 2023-11-14 RX ADMIN — SODIUM CHLORIDE 3 MILLILITER(S): 9 INJECTION INTRAMUSCULAR; INTRAVENOUS; SUBCUTANEOUS at 12:19

## 2023-11-14 RX ADMIN — Medication 25 GRAM(S): at 20:40

## 2023-11-14 NOTE — PROGRESS NOTE ADULT - SUBJECTIVE AND OBJECTIVE BOX
TRANSFER TO CARDIOLOGY SERVICE    Patient discussed on morning rounds with Dr. Gallegos    Operation / Date: PCI today    SUBJECTIVE ASSESSMENT: Patient seen and examined at bedside. Patient states that she feels fine today, offers no complaints. Denies chills, chest pain, SOB, palpitations, N/V.     Vital Signs Last 24 Hrs  T(C): 36.9 (14 Nov 2023 09:05), Max: 37.1 (14 Nov 2023 05:01)  T(F): 98.4 (14 Nov 2023 09:05), Max: 98.7 (14 Nov 2023 05:01)  HR: 68 (14 Nov 2023 12:00) (68 - 96)  BP: 127/69 (14 Nov 2023 12:00) (110/50 - 151/67)  BP(mean): 88 (14 Nov 2023 12:00) (72 - 121)  RR: 18 (14 Nov 2023 12:00) (18 - 21)  SpO2: 95% (14 Nov 2023 12:00) (93% - 96%)    Parameters below as of 14 Nov 2023 12:00  Patient On (Oxygen Delivery Method): room air    I&O's Detail    13 Nov 2023 07:01  -  14 Nov 2023 07:00  --------------------------------------------------------  IN:    Heparin: 206 mL    Oral Fluid: 940 mL    Sodium Chloride 0.9% Bolus: 1000 mL  Total IN: 2146 mL    OUT:    Voided (mL): 750 mL  Total OUT: 750 mL    Total NET: 1396 mL    14 Nov 2023 07:01  -  14 Nov 2023 12:19  --------------------------------------------------------  IN:    Heparin: 45 mL  Total IN: 45 mL    OUT:    Voided (mL): 200 mL  Total OUT: 200 mL    Total NET: -155 mL    CHEST TUBE:  None  MARCELO DRAIN:  None  EPICARDIAL WIRES: None  TIE DOWNS: None  LYLE: None    PHYSICAL EXAM:  GENERAL: NAD, lying in bed comfortably  HEAD:  Atraumatic, Normocephalic  EYES: EOMI, PERRLA, conjunctiva and sclera clear  ENT: Moist mucous membranes  NECK: Supple, No JVD  CHEST/LUNG: CTAB  HEART: RRR  ABDOMEN: Soft, Nontender, Nondistended. No hepatomegally  EXTREMITIES:  2+ Peripheral Pulses, brisk capillary refill. No clubbing, cyanosis, or edema  NERVOUS SYSTEM:  Alert & Oriented X3, speech clear. No deficits     LABS:                        8.2    11.67 )-----------( 261      ( 14 Nov 2023 06:56 )             26.2     PT/INR - ( 14 Nov 2023 06:56 )   PT: 12.1 sec;   INR: 1.06       PTT - ( 14 Nov 2023 06:56 )  PTT:66.7 sec    11-14    135  |  102  |  28<H>  ----------------------------<  177<H>  4.6   |  21<L>  |  1.52<H>    Ca    8.9      14 Nov 2023 06:56    TPro  6.1  /  Alb  3.0<L>  /  TBili  0.3  /  DBili  <0.2  /  AST  38  /  ALT  164<H>  /  AlkPhos  112  11-14    Urinalysis Basic - ( 14 Nov 2023 06:56 )    Color: x / Appearance: x / SG: x / pH: x  Gluc: 177 mg/dL / Ketone: x  / Bili: x / Urobili: x   Blood: x / Protein: x / Nitrite: x   Leuk Esterase: x / RBC: x / WBC x   Sq Epi: x / Non Sq Epi: x / Bacteria: x    MEDICATIONS  (STANDING):  aspirin  chewable 81 milliGRAM(s) Oral daily  atorvastatin 10 milliGRAM(s) Oral at bedtime  dextrose 5%. 1000 milliLiter(s) (50 mL/Hr) IV Continuous <Continuous>  dextrose 5%. 1000 milliLiter(s) (100 mL/Hr) IV Continuous <Continuous>  dextrose 50% Injectable 25 Gram(s) IV Push once  dextrose 50% Injectable 12.5 Gram(s) IV Push once  dextrose 50% Injectable 25 Gram(s) IV Push once  glucagon  Injectable 1 milliGRAM(s) IntraMuscular once  heparin  Infusion 900 Unit(s)/Hr (9 mL/Hr) IV Continuous <Continuous>  insulin lispro (ADMELOG) corrective regimen sliding scale   SubCutaneous Before meals and at bedtime  isosorbide   mononitrate ER Tablet (IMDUR) 30 milliGRAM(s) Oral daily  levothyroxine 75 MICROGram(s) Oral daily  metoprolol tartrate 12.5 milliGRAM(s) Oral every 12 hours  montelukast 10 milliGRAM(s) Oral daily  pantoprazole    Tablet 40 milliGRAM(s) Oral before breakfast  polyethylene glycol 3350 17 Gram(s) Oral daily  senna 2 Tablet(s) Oral at bedtime  sodium chloride 0.9% Bolus 250 milliLiter(s) IV Bolus once  sodium chloride 0.9% lock flush 3 milliLiter(s) IV Push every 8 hours  sodium chloride 0.9%. 500 milliLiter(s) (75 mL/Hr) IV Continuous <Continuous>    MEDICATIONS  (PRN):  acetaminophen     Tablet .. 650 milliGRAM(s) Oral every 6 hours PRN Temp greater or equal to 38C (100.4F), Mild Pain (1 - 3)  dextrose Oral Gel 15 Gram(s) Oral once PRN Blood Glucose LESS THAN 70 milliGRAM(s)/deciliter    RADIOLOGY & ADDITIONAL TESTS:  < from: Xray Chest 2 Views PA/Lat (11.13.23 @ 08:58) >  FINDINGS/  IMPRESSION:    Heart size, mediastinal and hilar contours are within normal limits. The   lung zones are clear. No acute soft tissue or osseous abnormalities.

## 2023-11-14 NOTE — PROGRESS NOTE ADULT - PROBLEM SELECTOR PLAN 2
BP currently stable.   -Patient on Lisinopril/HCTZ at home. Consider restarting Lisinopril 24-48 hrs post cath if Cr stable

## 2023-11-14 NOTE — PROGRESS NOTE ADULT - PROBLEM SELECTOR PLAN 8
Likely reactive secondary to NSTEMI   -WBC peaked at 12.65 and trended down to 11.67. Patient afebrile and denies infectious symptoms.    -Covid and flu negative 11/12/23.   -Urine Cx 11/11/23 negative   -Continue to monitor temp and WBC    FULL CODE: DVT Prophylaxis  DVT Prophylaxis: Heparin drip , Hold Sub Q Heparin paula cath/post cath  Dispo: Pending clinical progression

## 2023-11-14 NOTE — PROGRESS NOTE ADULT - ASSESSMENT
76 YO Female w/ PMHx of HTN, HLD, DMII, asthma (no previous hospitalizations or intubations), GERD, hypothyroidism, glaucoma, gallstone pancreatitis who was originally BIBEMS to [facility] with c/o chest pain and SOB. Per patient she has had "fluttering" and palpitations with radiation to her L shoulder for the past 3 weeks, but two nights ago, she experienced SOB and called EMS. She was ruled in for NSTEMI and underwent cardiac cath which revealed 3v CAD. Patient transferred to [facility] under the care of Dr. Gallegos for CABG work-up. Patient deemed poor surgical candidate for CABG and is now planned for PCI with cardiology today.     Plan:    Neurovascular:   -Pain well controlled with current regimen. PRN's: Tylenol    Cardiovascular:   -3v CAD  -Cont ASA, plavix, BB, statin and imdur  -Cont heparin gtt  -PCI today with cardiology  -Hx of HTN  -Cont BB and Imdur  -Home meds: Lisinopril/HCTZ held  -Hx of HLD  -Cont statin  -Hemodynamically stable.   -Monitor: BP, HR, tele    Respiratory:   -Hx of asthma  -Cont Montelukast  -Hold Albuterol  -Oxygenating well on room air  -Encourage continued use of IS 10x/hr and frequent ambulation  -CXR: stable    GI:  -GI PPX: Protonix  -PO Diet  -Bowel Regimen: Miralax, senna    Renal / :  -Continue to monitor renal function: BUN/Cr: 28/1.52  -Monitor I/O's daily     Endocrine:    -Hx of DMII  -A1c: 6.9  -Cont ISS  -Home meds: Metformin 875mg BID and Glimepiride  -Hx of hypothyroidism  -Cont Synthroid 75mcg  -TSH: 2.15    Hematologic:  -CBC: H/H- 8.2/26  -Getting 1u PRBC now per cards recs  -Coagulation Panel.    ID:  -Temperature: Afebrile  -CBC: WBC- 11.67  -Continue to observe for SIRS/Sepsis Syndrome.    Prophylaxis:  -Cont heparin gtt; Trend PTT  -Continue with SCD's b/l while patient is at rest     Disposition:  -PCI today

## 2023-11-14 NOTE — PROGRESS NOTE ADULT - PROBLEM SELECTOR PLAN 1
Patient C/P free and HD stable   Presented to [facility] with NSTEMI Cardiac Cath 11/11/23 at [facility] revealed 3V CAD: dLM 80% (severely calcified), oLAD 85% (severely calcified), D2 75%, oLCx 85% (severely calcified), mRCA 75% (severely calcified).   -s/p PCI    -Continue Aspirin , Plavix, Metoprolol 12.5 mg PO q 12 hrs. Continue Lipitor 10 mg for now given Transaminitis. Increase to 80 mg once LFTs normalize    -Echo 11/13/23 revealed normal EF, no wall motion abnormalities, Grade I Diastolic Dysfunction, mild to moderate TR, Pulmonary HTN PASP 44 mm Hg.

## 2023-11-14 NOTE — PROGRESS NOTE ADULT - ASSESSMENT
77 year old F with PMHx HTN, Hyperlipidemia, DM Type II, CKD stage III (Cr 1.35 on admission, Baseline Cr 1-1.14 based on prior labs 7/2017), Anemia of Chronic Disease/Iron Deficiency Anemia (Baseline Hgb 8.3-9.5 based on prior labs 7/2017), Asthma (no previous hospitalizations or intubations), GERD, hypothyroidism, glaucoma, gallstone pancreatitis who presented to [facility] 11/11/23 c/o C/P and SOB R/I NSTEMI found to have 3V CAD by diagnostic cardiac cath and transferred to [facility] for CABG workup. During pre-op work-up patient noted to have severe calcifications of the aorta on CT scan and therefore is not a surgical candidate and is for Impella assisted complex PCI with Rotoblator. Patient is now s/p PCI and transferred to [facility] for further management.

## 2023-11-14 NOTE — PROGRESS NOTE ADULT - SUBJECTIVE AND OBJECTIVE BOX
Interventional Cardiology PA Precath Note    HPI: 77 year old F with PMHx HTN, Hyperlipidemia, DM Type II, CKD stage III (Cr 1.35 on admission, Baseline Cr 1-1.14 based on prior labs 7/2017), Anemia of Chronic Disease/Iron Deficiency Anemia (Baseline Hgb 8.3-9.5 based on prior labs 7/2017), Asthma (no previous hospitalizations or intubations), GERD, hypothyroidism, glaucoma, gallstone pancreatitis who was originally BIBEMS to [facility] with c/o chest pain and SOB. Per patient she has had "fluttering" and palpitations with radiation to her L shoulder for the past 3 weeks, but two nights ago, she experienced SOB and called EMS. 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 Patient transferred to [facility] on 11/12/23 for CABG work-up. Heparin drip continued for NSTEMI and Troponin peaked at 87 and trended down. Patient is now for complex PCI with rotoblator.      Anginal class 1, 2, 3, 4    Pre-op work-up:  Echo 11/13/23     1. Normal left and right ventricular size and systolic function. No wall motion abnormalities.     2. Grade I left ventricular diastolic dysfunction.     3. Mild mitral regurgitation.     4. Mild-to-moderate tricuspid regurgitation.     5. Pulmonary hypertension present, pulmonary artery systolic pressure is 44 mmHg.     6. No pericardial effusion.     7. No prior echo is available for comparison.     CXR 11/13/23: Clear lung zones.     Carotid US 11/12/23: Mild bilateral plaque. No hemodynamically significant stenosis of either internal carotid artery. Elevated velocities at the proximal right common carotid artery may be related to inflow disease.      CT Chest No Contrast 11/12/23: No acute pathology.      PMH:  As above   PSH:  History of back surgery    History of bilateral knee replacement    History of cholecystectomy.     ALLERGIES:    No Known Allergies    Shellfish/Contrast Dye Allergies?  No  SocHX: Denies EtoH/TOB/IVDU  FHx:   Mother :  Family history of diabetes mellitus, Age at diagnosis: Age Unknown   Family history of hypertension, Age at diagnosis: Age Unknown     Sibling: Family history of diabetes mellitus, Age at diagnosis: Age Unknown   Family history of hypertension, Age at diagnosis: Age Unknown     Grandparent :Family history of blindness, Age at diagnosis: Age Unknown.       MEDS:   acetaminophen     Tablet .. 650 milliGRAM(s) Oral every 6 hours PRN  aspirin  chewable 81 milliGRAM(s) Oral daily  atorvastatin 10 milliGRAM(s) Oral at bedtime  clopidogrel Tablet 75 milliGRAM(s) Oral daily  dextrose 5%. 1000 milliLiter(s) IV Continuous <Continuous>  dextrose 5%. 1000 milliLiter(s) IV Continuous <Continuous>  dextrose 50% Injectable 25 Gram(s) IV Push once  dextrose 50% Injectable 25 Gram(s) IV Push once  dextrose 50% Injectable 12.5 Gram(s) IV Push once  dextrose Oral Gel 15 Gram(s) Oral once PRN  glucagon  Injectable 1 milliGRAM(s) IntraMuscular once  heparin  Infusion 900 Unit(s)/Hr IV Continuous <Continuous>  insulin lispro (ADMELOG) corrective regimen sliding scale   SubCutaneous Before meals and at bedtime  isosorbide   mononitrate ER Tablet (IMDUR) 30 milliGRAM(s) Oral daily  levothyroxine 75 MICROGram(s) Oral daily  metoprolol tartrate 12.5 milliGRAM(s) Oral every 12 hours  montelukast 10 milliGRAM(s) Oral daily  pantoprazole    Tablet 40 milliGRAM(s) Oral before breakfast  polyethylene glycol 3350 17 Gram(s) Oral daily  senna 2 Tablet(s) Oral at bedtime  sodium chloride 0.9% lock flush 3 milliLiter(s) IV Push every 8 hours    T(C): 37.1 (11-14-23 @ 05:01), Max: 37.1 (11-14-23 @ 05:01)  HR: 96 (11-14-23 @ 05:10) (68 - 96)  BP: 141/64 (11-14-23 @ 05:10) (110/50 - 160/68)  RR: 18 (11-14-23 @ 05:10) (18 - 21)  SpO2: 94% (11-14-23 @ 05:10) (93% - 96%)  Wt(kg): --    HEENT: NCAT, EOMI, PERRLA  NECK: No JVD, No carotid bruits B/L, +2 Carotid pulses B/L  PULM:  CTA B/L No W/R/R  CARD: RRR, +S1, +S2, No M/R/G  ABD: ND, +BS, NT, no masses  EXT: Warm, pedal edema yes/no, pitting/non-pitting  RECTAL: Not indicated   NEURO: A & O x 3, no focal neurologic deficits    PULSES:	   B	       R	                FEM         	                                 DP/PT  Right		                                Yes/No     Bruit	    Left		                                        Yes/No     Bruit                8.2    11.67 )-----------( 261      ( 14 Nov 2023 06:56 )             26.2     11-14    135  |  102  |  28<H>  ----------------------------<  177<H>  4.6   |  21<L>  |  1.52<H>    Ca    8.9      14 Nov 2023 06:56    TPro  6.2  /  Alb  3.5  /  TBili  0.5  /  DBili  x   /  AST  86<H>  /  ALT  258<H>  /  AlkPhos  131<H>  11-13    CARDIAC MARKERS ( 12 Nov 2023 16:22 )  x     / x     / 79 U/L / x     / 2.0 ng/mL        Activated Partial Thromboplastin Time: 66.7 sec (11-14-23 @ 06:56)  Prothrombin Time, Plasma: 12.1 sec (11-14-23 @ 06:56)  INR: 1.06 (11-14-23 @ 06:56)  Activated Partial Thromboplastin Time: 34.4 sec (11-13-23 @ 22:32)  Activated Partial Thromboplastin Time: 63.2 sec (11-13-23 @ 18:59)  Activated Partial Thromboplastin Time: 83.2 sec (11-13-23 @ 12:00)  Prothrombin Time, Plasma: 13.0 sec (11-13-23 @ 05:30)  INR: 1.14 (11-13-23 @ 05:30)  Activated Partial Thromboplastin Time: 85.5 sec (11-13-23 @ 05:30)  Activated Partial Thromboplastin Time: 73.2 sec (11-12-23 @ 23:52)  Activated Partial Thromboplastin Time: 35.9 sec (11-12-23 @ 16:22)  Prothrombin Time, Plasma: 12.0 sec (11-12-23 @ 16:22)  INR: 1.05 (11-12-23 @ 16:22)    EKG:					  ASA _____	Mallampati class: _________	            Anginal Class: __N/A-NSTEMI_______    A/P:      Anemia:  Hgb/HCT 8.2/26.2 stable x 24 hrs. Hgb/HCT 9.4/30.4 on admission. Type and screen on file. Baseline Hgb 8.3-9.5 labs 7/2017      CKD Stage III :Cr 1.35 on admission uptrended to 1.63 now 1.52. Baseline Cr 1-1.14 based on prior labs 7/2017. Cr 1.35 on 7/18/17.     Sedation Plan:   [  ] None   [ X ] Moderate   [  ]  Deep    [  ]  General Anesthesia   Patient Is Suitable Candidate For Sedation?     [ X ] Yes   [  ] No   [  ] Not Applicable   Cath Order Entered: [x  ] Yes  DAPT LOAD Ordered: [  ] Yes  [ X ] No load 2/2 patient on maintenance ASA 81 mg PO Daily. Plavix 300 mg PO x 1 given on 11/13/23.   Pre-Cath fluids Ordered: [x  ] Yes  [  ] Not indicated 2/2 _________    Risks Benefits Annotation:  CARDIAC CATH: Risks & benefits of procedure and sedation and risks and benefits for the alternative therapy have been explained to the patient and/or HCP in layman’s terms including but not limited to: allergic reaction, bleeding, infection, arrhythmia, respiratory compromise, renal and vascular compromise, limb damage, MI, CVA, emergent CABG/Vascular Surgery and death. Informed consent obtained and in chart.     Interventional Cardiology PA Precath Note    HPI: 77 year old F with PMHx HTN, Hyperlipidemia, DM Type II, CKD stage III (Cr 1.35 on admission, Baseline Cr 1-1.14 based on prior labs 7/2017), Anemia of Chronic Disease/Iron Deficiency Anemia (Baseline Hgb 8.3-9.5 based on prior labs 7/2017), Asthma (no previous hospitalizations or intubations), GERD, hypothyroidism, glaucoma, gallstone pancreatitis who was originally BIBEMS to [facility] with c/o chest pain and SOB. Per patient she has had "fluttering" and palpitations with radiation to her L shoulder for the past 3 weeks, but two nights ago, she experienced SOB and called EMS. Patient R/I NSTEMI and cardiac cath at [facility] revealed 3V CAD. Patient transferred to [facility] on 11/12/23 for CABG work-up. Heparin drip continued for NSTEMI and Troponin peaked at 87 and trended down. Patient is now for complex PCI with rotoblator.      Anginal class 1, 2, 3, 4    Pre-op work-up:  Echo 11/13/23     1. Normal left and right ventricular size and systolic function. No wall motion abnormalities.     2. Grade I left ventricular diastolic dysfunction.     3. Mild mitral regurgitation.     4. Mild-to-moderate tricuspid regurgitation.     5. Pulmonary hypertension present, pulmonary artery systolic pressure is 44 mmHg.     6. No pericardial effusion.     7. No prior echo is available for comparison.     CXR 11/13/23: Clear lung zones.     Carotid US 11/12/23: Mild bilateral plaque. No hemodynamically significant stenosis of either internal carotid artery. Elevated velocities at the proximal right common carotid artery may be related to inflow disease.      CT Chest No Contrast 11/12/23: No acute pathology.      PMH:  As above   PSH:  History of back surgery    History of bilateral knee replacement    History of cholecystectomy.     ALLERGIES:    No Known Allergies    Shellfish/Contrast Dye Allergies?  No  SocHX: Denies EtoH/TOB/IVDU  FHx:   Mother :  Family history of diabetes mellitus, Age at diagnosis: Age Unknown   Family history of hypertension, Age at diagnosis: Age Unknown     Sibling: Family history of diabetes mellitus, Age at diagnosis: Age Unknown   Family history of hypertension, Age at diagnosis: Age Unknown     Grandparent :Family history of blindness, Age at diagnosis: Age Unknown.       MEDS:   acetaminophen     Tablet .. 650 milliGRAM(s) Oral every 6 hours PRN  aspirin  chewable 81 milliGRAM(s) Oral daily  atorvastatin 10 milliGRAM(s) Oral at bedtime  clopidogrel Tablet 75 milliGRAM(s) Oral daily  dextrose 5%. 1000 milliLiter(s) IV Continuous <Continuous>  dextrose 5%. 1000 milliLiter(s) IV Continuous <Continuous>  dextrose 50% Injectable 25 Gram(s) IV Push once  dextrose 50% Injectable 25 Gram(s) IV Push once  dextrose 50% Injectable 12.5 Gram(s) IV Push once  dextrose Oral Gel 15 Gram(s) Oral once PRN  glucagon  Injectable 1 milliGRAM(s) IntraMuscular once  heparin  Infusion 900 Unit(s)/Hr IV Continuous <Continuous>  insulin lispro (ADMELOG) corrective regimen sliding scale   SubCutaneous Before meals and at bedtime  isosorbide   mononitrate ER Tablet (IMDUR) 30 milliGRAM(s) Oral daily  levothyroxine 75 MICROGram(s) Oral daily  metoprolol tartrate 12.5 milliGRAM(s) Oral every 12 hours  montelukast 10 milliGRAM(s) Oral daily  pantoprazole    Tablet 40 milliGRAM(s) Oral before breakfast  polyethylene glycol 3350 17 Gram(s) Oral daily  senna 2 Tablet(s) Oral at bedtime  sodium chloride 0.9% lock flush 3 milliLiter(s) IV Push every 8 hours    T(C): 37.1 (11-14-23 @ 05:01), Max: 37.1 (11-14-23 @ 05:01)  HR: 96 (11-14-23 @ 05:10) (68 - 96)  BP: 141/64 (11-14-23 @ 05:10) (110/50 - 160/68)  RR: 18 (11-14-23 @ 05:10) (18 - 21)  SpO2: 94% (11-14-23 @ 05:10) (93% - 96%)  Wt(kg): --    HEENT: NCAT, EOMI, PERRLA  NECK: No JVD, No carotid bruits B/L, +2 Carotid pulses B/L  PULM:  CTA B/L No W/R/R  CARD: RRR, +S1, +S2, No M/R/G  ABD: ND, +BS, NT, no masses  EXT: Warm, pedal edema yes/no, pitting/non-pitting  RECTAL: Not indicated   NEURO: A & O x 3, no focal neurologic deficits    PULSES:	   B	       R	                FEM         	                                 DP/PT  Right		                                Yes/No     Bruit	    Left		                                        Yes/No     Bruit                8.2    11.67 )-----------( 261      ( 14 Nov 2023 06:56 )             26.2     11-14    135  |  102  |  28<H>  ----------------------------<  177<H>  4.6   |  21<L>  |  1.52<H>    Ca    8.9      14 Nov 2023 06:56    TPro  6.2  /  Alb  3.5  /  TBili  0.5  /  DBili  x   /  AST  86<H>  /  ALT  258<H>  /  AlkPhos  131<H>  11-13    CARDIAC MARKERS ( 12 Nov 2023 16:22 )  x     / x     / 79 U/L / x     / 2.0 ng/mL        Activated Partial Thromboplastin Time: 66.7 sec (11-14-23 @ 06:56)  Prothrombin Time, Plasma: 12.1 sec (11-14-23 @ 06:56)  INR: 1.06 (11-14-23 @ 06:56)  Activated Partial Thromboplastin Time: 34.4 sec (11-13-23 @ 22:32)  Activated Partial Thromboplastin Time: 63.2 sec (11-13-23 @ 18:59)  Activated Partial Thromboplastin Time: 83.2 sec (11-13-23 @ 12:00)  Prothrombin Time, Plasma: 13.0 sec (11-13-23 @ 05:30)  INR: 1.14 (11-13-23 @ 05:30)  Activated Partial Thromboplastin Time: 85.5 sec (11-13-23 @ 05:30)  Activated Partial Thromboplastin Time: 73.2 sec (11-12-23 @ 23:52)  Activated Partial Thromboplastin Time: 35.9 sec (11-12-23 @ 16:22)  Prothrombin Time, Plasma: 12.0 sec (11-12-23 @ 16:22)  INR: 1.05 (11-12-23 @ 16:22)    EKG:					  ASA _____	Mallampati class: _________	            Anginal Class: __N/A-NSTEMI_______    A/P:      Anemia:  Hgb/HCT 8.2/26.2 stable x 24 hrs. Hgb/HCT 9.4/30.4 on admission. Type and screen on file. Baseline Hgb 8.3-9.5 labs 7/2017      CKD Stage III :Cr 1.35 on admission uptrended to 1.63 now 1.52. Baseline Cr 1-1.14 based on prior labs 7/2017. Cr 1.35 on 7/18/17. Per Jaquan Bishop Cr 1.3-1.5 at [facility].     Sedation Plan:   [  ] None   [ X ] Moderate   [  ]  Deep    [  ]  General Anesthesia   Patient Is Suitable Candidate For Sedation?     [ X ] Yes   [  ] No   [  ] Not Applicable   Cath Order Entered: [x  ] Yes  DAPT LOAD Ordered: [  ] Yes  [ X ] No load 2/2 patient on maintenance ASA 81 mg PO Daily. Plavix 300 mg PO x 1 given on 11/13/23.   Pre-Cath fluids Ordered: [x  ] Yes  [  ] Not indicated 2/2 _________    Risks Benefits Annotation:  CARDIAC CATH: Risks & benefits of procedure and sedation and risks and benefits for the alternative therapy have been explained to the patient and/or HCP in layman’s terms including but not limited to: allergic reaction, bleeding, infection, arrhythmia, respiratory compromise, renal and vascular compromise, limb damage, MI, CVA, emergent CABG/Vascular Surgery and death. Informed consent obtained and in chart.     Interventional Cardiology PA Precath Note/ Transfer Note to [address] [facility] from [address] [place] [address] course: 77 year old F with PMHx HTN, Hyperlipidemia, DM Type II, CKD stage III (Cr 1.35 on admission, Baseline Cr 1-1.14 based on prior labs 2017), Anemia of Chronic Disease/Iron Deficiency Anemia (Baseline Hgb 8.3-9.5 based on prior labs 2017), Asthma (no previous hospitalizations or intubations), GERD, hypothyroidism, glaucoma, gallstone pancreatitis who was originally BIBEMS to [facility] with c/o chest pain and SOB. Per patient she has had "fluttering" and palpitations with radiation to her L shoulder for the past 3 weeks, but two nights ago, she experienced SOB and called EMS. Patient R/I NSTEMI and cardiac cath at [facility] revealed 3V CAD (see cath report below). Hospital course significant for Transaminitis for which Hepatitis Panel negative. Limited Abdominal U/S 23 revealed enlarged fatty liver and common bile duct dilatation (stable  postcholecystectomy compared to CT scan 23).   Patient transferred to [facility] on 23 for CABG work-up. Heparin drip continued for NSTEMI and Troponin peaked at 87 and trended down. During pre-op work-up patient noted to have severe aortic calcifications on CT scan and therefore is not a surgical candidate and is for Impella assisted complex PCI with Rotoblator.    Subjective: Patient seen and examined at bedside and has no complaints. She denies C/P, SOB, palpitations, dizziness, diaphoresis, N/V, syncope  ROS otherwise negative unless otherwise stated except per HPI and Subjective     Cardiac Cath 23 at [facility]: dLM 80% (severely calcified), oLAD 85% (severely calcified), D2 75%, oLCx 85% (severely calcified), mRCA 75% (severely calcified)    Pre-op work-up:  Echo 23     1. Normal left and right ventricular size and systolic function. No wall motion abnormalities.     2. Grade I left ventricular diastolic dysfunction.     3. Mild mitral regurgitation.     4. Mild-to-moderate tricuspid regurgitation.     5. Pulmonary hypertension present, pulmonary artery systolic pressure is 44 mmHg.     6. No pericardial effusion.     7. No prior echo is available for comparison.     CXR 23: Clear lung zones.     Carotid US 23: Mild bilateral plaque. No hemodynamically significant stenosis of either internal carotid artery. Elevated velocities at the proximal right common carotid artery may be related to inflow disease.      CT Chest No Contrast 23: No acute pathology.      PMH:  As above   PSH:  History of back surgery    History of bilateral knee replacement    History of cholecystectomy.     ALLERGIES:    No Known Allergies    Shellfish/Contrast Dye Allergies?  No  SocHX: Denies EtoH/TOB/IVDU  FHx:   Mother :  Family history of diabetes mellitus, Age at diagnosis: Age Unknown   Family history of hypertension, Age at diagnosis: Age Unknown     Sibling: Family history of diabetes mellitus, Age at diagnosis: Age Unknown   Family history of hypertension, Age at diagnosis: Age Unknown     Grandparent :Family history of blindness, Age at diagnosis: Age Unknown.       MEDS:   acetaminophen     Tablet .. 650 milliGRAM(s) Oral every 6 hours PRN  aspirin  chewable 81 milliGRAM(s) Oral daily  atorvastatin 10 milliGRAM(s) Oral at bedtime  clopidogrel Tablet 75 milliGRAM(s) Oral daily  dextrose 5%. 1000 milliLiter(s) IV Continuous <Continuous>  dextrose 5%. 1000 milliLiter(s) IV Continuous <Continuous>  dextrose 50% Injectable 25 Gram(s) IV Push once  dextrose 50% Injectable 25 Gram(s) IV Push once  dextrose 50% Injectable 12.5 Gram(s) IV Push once  dextrose Oral Gel 15 Gram(s) Oral once PRN  glucagon  Injectable 1 milliGRAM(s) IntraMuscular once  heparin  Infusion 900 Unit(s)/Hr IV Continuous <Continuous>  insulin lispro (ADMELOG) corrective regimen sliding scale   SubCutaneous Before meals and at bedtime  isosorbide   mononitrate ER Tablet (IMDUR) 30 milliGRAM(s) Oral daily  levothyroxine 75 MICROGram(s) Oral daily  metoprolol tartrate 12.5 milliGRAM(s) Oral every 12 hours  montelukast 10 milliGRAM(s) Oral daily  pantoprazole    Tablet 40 milliGRAM(s) Oral before breakfast  polyethylene glycol 3350 17 Gram(s) Oral daily  senna 2 Tablet(s) Oral at bedtime  sodium chloride 0.9% lock flush 3 milliLiter(s) IV Push every 8 hours    T(C): 37.1 (23 @ 05:01), Max: 37.1 (23 @ 05:01)  HR: 96 (23 @ 05:10) (68 - 96)  BP: 141/64 (23 @ 05:10) (110/50 - 160/68)  RR: 18 (23 @ 05:10) (18 - 21)  SpO2: 94% (23 @ 05:10) (93% - 96%)  Wt(kg): --    HEENT: NCAT, EOMI, PERRLA  NECK: Unable to assess JVD due to body habitus  PULM:  Faint bibasilar crackles. No rhonchi or wheezes. No respiratory distress or accessory muscle use.  CARD:+ S1 S2. RRR. 2/6 Holosystolic Murmur.   ABD: BS x 4. Soft NT/ND  EXT: No edema BLE. No calf tenderness BLE   RECTAL: Not indicated   NEURO: A & O x 3, no focal neurologic deficits    PULSES:	    Radial 2+  Femoral Pulses -Difficult to palpate given large panus-Right groin-Dressing C/D/I, Faint femoral pulse. Left groin-Faint Femoral Pulse-No bruits Bilaterally  DP-Faint  PT-Faint to 1 +             8.2    11.67 )-----------( 261      ( 2023 06:56 )             26.2         135  |  102  |  28<H>  ----------------------------<  177<H>  4.6   |  21<L>  |  1.52<H>    Ca    8.9      2023 06:56    TPro  6.2  /  Alb  3.5  /  TBili  0.5  /  DBili  x   /  AST  86<H>  /  ALT  258<H>  /  AlkPhos  131<H>  11-13    CARDIAC MARKERS ( 2023 16:22 )  x     / x     / 79 U/L / x     / 2.0 ng/mL    Activated Partial Thromboplastin Time: 66.7 sec (23 @ 06:56)  Prothrombin Time, Plasma: 12.1 sec (23 @ 06:56)  INR: 1.06 (23 @ 06:56)  Activated Partial Thromboplastin Time: 34.4 sec (23 @ 22:32)  Activated Partial Thromboplastin Time: 63.2 sec (23 @ 18:59)  Activated Partial Thromboplastin Time: 83.2 sec (23 @ 12:00)  Prothrombin Time, Plasma: 13.0 sec (23 @ 05:30)  INR: 1.14 (23 @ 05:30)  Activated Partial Thromboplastin Time: 85.5 sec (23 @ 05:30)  Activated Partial Thromboplastin Time: 73.2 sec (23 @ 23:52)  Activated Partial Thromboplastin Time: 35.9 sec (23 @ 16:22)  Prothrombin Time, Plasma: 12.0 sec (23 @ 16:22)  INR: 1.05 (23 @ 16:22)    EK23: NSR at 84 bpm with no ST changes. TWI III. 					  ASA III_____	Mallampati class: ___IIII______	            Anginal Class: __N/A-NSTEMI_______    A/P:  77 year old F with PMHx HTN, Hyperlipidemia, DM Type II, CKD stage III (Cr 1.35 on admission, Baseline Cr 1-1.14 based on prior labs 2017), Anemia of Chronic Disease/Iron Deficiency Anemia (Baseline Hgb 8.3-9.5 based on prior labs 2017), Asthma (no previous hospitalizations or intubations), GERD, hypothyroidism, glaucoma, gallstone pancreatitis who presented to [facility] 23 c/o C/P and SOB R/I NSTEMI found to have 3V by diagnostic cardiac cath and transferred to [facility] for CABG workup. During pre-op work-up patient noted to have severe aortic calcifications on CT scan and therefore is not a surgical candidate and is for Impella assisted complex PCI with Rotoblator.    Anemia:  Hgb/HCT 8.2/26.2 stable x 24 hrs. Hgb/HCT 9.4/30.4 on admission. Type and screen on file. Baseline Hgb 8.3-9.5 labs 2017. Hgb 8.5-9.2 at [facility] 23-23. Per Dr. Partida given complex PCI 1 unit pRBC to be given prior to procedure. Blood transfusion consent obtained and in chart. Active type and screen 23. 1 unit pRBC ordered.       CKD Stage III :Cr 1.35 on admission uptrended to 1.63 now 1.52. Baseline Cr 1-1.14 based on prior labs 2017. Cr 1.35 on 17. Cr 1.3-1.4 at [facility] 23-23. EF normal and patient euvolemic on exam. NS 250cc Bolus followed by 75 cc/hr x 2 hr. Monitor for fluid overload. Mild to moderate TR and mild pulm HTN on echo 23.    Sedation Plan:   [  ] None   [ X ] Moderate   [  ]  Deep    [  ]  General Anesthesia   Patient Is Suitable Candidate For Sedation?     [ X ] Yes   [  ] No   [  ] Not Applicable   Cath Order Entered: [x  ] Yes  DAPT LOAD Ordered: [  ] Yes  [ X ] No load 2/ patient on maintenance ASA 81 mg PO Daily. Plavix 300 mg PO x 1 given on 23. Plavix 300 mg PO x 1 given prior to procedure and patient to continue 75 mg daily thereafter.   Pre-Cath fluids Ordered: [x  ] Yes  [  ] Not indicated 2 _________    Risks Benefits Annotation:  CARDIAC CATH: Risks & benefits of procedure and sedation and risks and benefits for the alternative therapy have been explained to the patient and/or HCP in layman’s terms including but not limited to: allergic reaction, bleeding, infection, arrhythmia, respiratory compromise, renal and vascular compromise, limb damage, MI, CVA, emergent CABG/Vascular Surgery and death. Informed consent obtained and in chart.     Interventional Cardiology PA Precath Note/ Transfer Note to [address] [facility] from [address] [place] [address] course: 77 year old F with PMHx HTN, Hyperlipidemia, DM Type II, CKD stage III (Cr 1.35 on admission, Baseline Cr 1-1.14 based on prior labs 2017), Anemia of Chronic Disease/Iron Deficiency Anemia (Baseline Hgb 8.3-9.5 based on prior labs 2017), Asthma (no previous hospitalizations or intubations), GERD, hypothyroidism, glaucoma, gallstone pancreatitis who was originally BIBEMS to [facility] with c/o chest pain and SOB. Per patient she has had "fluttering" and palpitations with radiation to her L shoulder for the past 3 weeks, but two nights ago, she experienced SOB and called EMS. Patient R/I NSTEMI and cardiac cath at [facility] revealed 3V CAD (see cath report below). Hospital course significant for Transaminitis for which Hepatitis Panel negative. Limited Abdominal U/S 23 revealed enlarged fatty liver and common bile duct dilatation (stable  postcholecystectomy compared to CT scan 23).   Patient transferred to [facility] on 23 for CABG work-up. Heparin drip continued for NSTEMI and Troponin T Hs peaked at 87 and trended down. During pre-op work-up patient noted to have severe calcifications of the aorta on CT scan and therefore is not a surgical candidate and is for Impella assisted complex PCI with Rotoblator.    Subjective: Patient seen and examined at bedside and has no complaints. She denies C/P, SOB, palpitations, dizziness, diaphoresis, N/V, syncope  ROS otherwise negative unless otherwise stated except per HPI and Subjective     Cardiac Cath 23 at [facility]: dLM 80% (severely calcified), oLAD 85% (severely calcified), D2 75%, oLCx 85% (severely calcified), mRCA 75% (severely calcified)    Pre-op work-up:  Echo 23     1. Normal left and right ventricular size and systolic function. No wall motion abnormalities.     2. Grade I left ventricular diastolic dysfunction.     3. Mild mitral regurgitation.     4. Mild-to-moderate tricuspid regurgitation.     5. Pulmonary hypertension present, pulmonary artery systolic pressure is 44 mmHg.     6. No pericardial effusion.     7. No prior echo is available for comparison.     CXR 23: Clear lung zones.     Carotid US 23: Mild bilateral plaque. No hemodynamically significant stenosis of either internal carotid artery. Elevated velocities at the proximal right common carotid artery may be related to inflow disease.      CT Chest No Contrast 23: No acute pathology.      PMH:  As above   PSH:  History of back surgery    History of bilateral knee replacement    History of cholecystectomy.     ALLERGIES:    No Known Allergies    Shellfish/Contrast Dye Allergies?  No  SocHX: Denies EtoH/TOB/IVDU  FHx:   Mother :  Family history of diabetes mellitus, Age at diagnosis: Age Unknown   Family history of hypertension, Age at diagnosis: Age Unknown     Sibling: Family history of diabetes mellitus, Age at diagnosis: Age Unknown   Family history of hypertension, Age at diagnosis: Age Unknown     Grandparent :Family history of blindness, Age at diagnosis: Age Unknown.       MEDS:   acetaminophen     Tablet .. 650 milliGRAM(s) Oral every 6 hours PRN  aspirin  chewable 81 milliGRAM(s) Oral daily  atorvastatin 10 milliGRAM(s) Oral at bedtime  clopidogrel Tablet 75 milliGRAM(s) Oral daily  dextrose 5%. 1000 milliLiter(s) IV Continuous <Continuous>  dextrose 5%. 1000 milliLiter(s) IV Continuous <Continuous>  dextrose 50% Injectable 25 Gram(s) IV Push once  dextrose 50% Injectable 25 Gram(s) IV Push once  dextrose 50% Injectable 12.5 Gram(s) IV Push once  dextrose Oral Gel 15 Gram(s) Oral once PRN  glucagon  Injectable 1 milliGRAM(s) IntraMuscular once  heparin  Infusion 900 Unit(s)/Hr IV Continuous <Continuous>  insulin lispro (ADMELOG) corrective regimen sliding scale   SubCutaneous Before meals and at bedtime  isosorbide   mononitrate ER Tablet (IMDUR) 30 milliGRAM(s) Oral daily  levothyroxine 75 MICROGram(s) Oral daily  metoprolol tartrate 12.5 milliGRAM(s) Oral every 12 hours  montelukast 10 milliGRAM(s) Oral daily  pantoprazole    Tablet 40 milliGRAM(s) Oral before breakfast  polyethylene glycol 3350 17 Gram(s) Oral daily  senna 2 Tablet(s) Oral at bedtime  sodium chloride 0.9% lock flush 3 milliLiter(s) IV Push every 8 hours    T(C): 37.1 (23 @ 05:01), Max: 37.1 (23 @ 05:01)  HR: 96 (23 @ 05:10) (68 - 96)  BP: 141/64 (23 @ 05:10) (110/50 - 160/68)  RR: 18 (23 @ 05:10) (18 - 21)  SpO2: 94% (23 @ 05:10) (93% - 96%)  Wt(kg): --    HEENT: NCAT, EOMI, PERRLA  NECK: Unable to assess JVD due to body habitus  PULM:  Faint bibasilar crackles. No rhonchi or wheezes. No respiratory distress or accessory muscle use.  CARD:+ S1 S2. RRR. 2/6 Holosystolic Murmur.   ABD: BS x 4. Soft NT/ND  EXT: No edema BLE. No calf tenderness BLE   RECTAL: Not indicated   NEURO: A & O x 3, no focal neurologic deficits    PULSES:	    Radial 2+  Femoral Pulses -Difficult to palpate given large panus-Right groin-Dressing C/D/I, Faint femoral pulse. Left groin-Faint Femoral Pulse-No bruits Bilaterally  DP-Faint  PT-Faint to 1 +             8.2    11.67 )-----------( 261      ( 2023 06:56 )             26.2         135  |  102  |  28<H>  ----------------------------<  177<H>  4.6   |  21<L>  |  1.52<H>    Ca    8.9      2023 06:56    TPro  6.2  /  Alb  3.5  /  TBili  0.5  /  DBili  x   /  AST  86<H>  /  ALT  258<H>  /  AlkPhos  131<H>  11-13    CARDIAC MARKERS ( 2023 16:22 )  x     / x     / 79 U/L / x     / 2.0 ng/mL    Activated Partial Thromboplastin Time: 66.7 sec (23 @ 06:56)  Prothrombin Time, Plasma: 12.1 sec (23 @ 06:56)  INR: 1.06 (23 @ 06:56)  Activated Partial Thromboplastin Time: 34.4 sec (23 @ 22:32)  Activated Partial Thromboplastin Time: 63.2 sec (23 @ 18:59)  Activated Partial Thromboplastin Time: 83.2 sec (23 @ 12:00)  Prothrombin Time, Plasma: 13.0 sec (23 @ 05:30)  INR: 1.14 (23 @ 05:30)  Activated Partial Thromboplastin Time: 85.5 sec (23 @ 05:30)  Activated Partial Thromboplastin Time: 73.2 sec (23 @ 23:52)  Activated Partial Thromboplastin Time: 35.9 sec (23 @ 16:22)  Prothrombin Time, Plasma: 12.0 sec (23 @ 16:22)  INR: 1.05 (23 @ 16:22)    EK23: NSR at 84 bpm with no ST changes. TWI III. 					  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 During pre-op work-up patient noted to have severe calcifications of the aorta on CT scan and therefore is not a surgical candidate and is for Impella assisted complex PCI with Rotoblator.    Anemia:  Hgb/HCT 8.2/26.2 stable x 24 hrs. Hgb/HCT 9.4/30.4 on admission. Type and screen on file. Baseline Hgb 8.3-9.5 labs 2017. Hgb 8.5-9.2 at [facility] 23-23. Per Dr. Partida given complex PCI 1 unit pRBC to be given prior to procedure. Blood transfusion consent obtained and in chart. Active type and screen 23. 1 unit pRBC ordered.       CKD Stage III :Cr 1.35 on admission uptrended to 1.63 now 1.52. Baseline Cr 1-1.14 based on prior labs 2017. Cr 1.35 on 17. Cr 1.3-1.4 at [facility] 23-23. EF normal and patient euvolemic on exam. NS 250cc Bolus followed by 75 cc/hr x 2 hr. Monitor for fluid overload. Mild to moderate TR and mild pulm HTN on echo 23.    Sedation Plan:   [  ] None   [ X ] Moderate   [  ]  Deep    [  ]  General Anesthesia   Patient Is Suitable Candidate For Sedation?     [ X ] Yes   [  ] No   [  ] Not Applicable   Cath Order Entered: [x  ] Yes  DAPT LOAD Ordered: [  ] Yes  [ X ] No load 2/2 patient on maintenance ASA 81 mg PO Daily. Plavix 300 mg PO x 1 given on 23. Plavix 300 mg PO x 1 given prior to procedure to complete 600 mg load and patient to continue 75 mg daily thereafter.   Pre-Cath fluids Ordered: [x  ] Yes  [  ] Not indicated 2/ _________    Risks Benefits Annotation:  CARDIAC CATH: Risks & benefits of procedure and sedation and risks and benefits for the alternative therapy have been explained to the patient and/or HCP in layman’s terms including but not limited to: allergic reaction, bleeding, infection, arrhythmia, respiratory compromise, renal and vascular compromise, limb damage, MI, CVA, emergent CABG/Vascular Surgery and death. Informed consent obtained and in chart.

## 2023-11-14 NOTE — PROGRESS NOTE ADULT - PROBLEM SELECTOR PLAN 7
-  on admission to [facility] 11/11/23. ALT AST trending down AST 86  (on 11/13/23)   -Daily CMP. Avoid Hepatotoxic Agents  -Limited Abdominal U/S 11/11/23 revealed enlarged fatty liver and common bile duct dilatation (stable  postcholecystectomy compared to CT scan 4/8/23).

## 2023-11-14 NOTE — PROGRESS NOTE ADULT - ASSESSMENT
77F with PMHx HTN, HLD, DM Type II, CKD stage III (Cr 1.35 on admission, Baseline Cr 1-1.14 based on prior labs 7/2017), AOCD/AMANDA (Baseline Hgb 8.3-9.5 based on prior labs 7/2017), Asthma, GERD, hypothyroidism, glaucoma, gallstone pancreatitis presented to [facility] c/o C/P and SOB found to have NSTEMI with 3V CAD transferred to CTS for CABG workup but noted to have severe calcifications of the aorta on CT scan. S/p Impella assisted complex PCI with Rotoblator. Patient admitted to CCU for post-cath observation.     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 S/p Impella assisted complex PCI with Rotoblator.   Home meds: ASA 81, atorvastatin 10  - F/u cath report  - C/w ASA  - C/w plavix 75  - C/w hep gtt  - Increase atorvastatin 20    #HLD  Home med: atorvastatin 10  - Increase atorvastatin 20    GI  PO diet   GI PPX: Protonix   Bowel regimen: Miralax, senna     #Transaminitis  AST//405 on admission. Downtrending.   - F/u hep panel  - Monitor     RENAL/  #HTN  Home meds: HCTZ-Lisinopril 12.5-10  - C/w Imdur 30 QD  - C/w lopressor 12.5 BID    #KEN on possible CKD  Crt on admission 1.38 -> 1.63 -> 1.52  - F/u urine studies   - Monitor I/Os    ENDO  #Hypothyroidism   TSH nml 11/12  Home med: Levothyroxine 75 QD  - C/w home meds    #DM  A1C 6.9 11/13  - modISS  - Start basal insulin regimen    HEME  #Anemia likely AOCD vs AMANDA  - F/u iron studies  - F/u LDH, haptoglobin, retic count  - Transfuse if hgb <7  - Maintain active T&S    ID  FABIENNE    PROPHYLAXIS   F: None  E: K>4, Mg>2  N: DASH diet  D: Hep gtt  Gi: Protonix  Dispo: CCU     FULL CODE 77F with PMHx HTN, HLD, DM Type II, CKD stage III (Cr 1.35 on admission, Baseline Cr 1-1.14 based on prior labs 7/2017), AOCD/AMANDA (Baseline Hgb 8.3-9.5 based on prior labs 7/2017), Asthma, GERD, hypothyroidism, glaucoma, gallstone pancreatitis presented to [facility] c/o C/P and SOB found to have NSTEMI with 3V CAD transferred to CTS for CABG workup but noted to have severe calcifications of the aorta on CT scan. S/p Impella assisted complex PCI with Rotoblator. Patient admitted to CCU for post-cath observation.     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 S/p Impella assisted complex PCI with Rotoblator.   Home meds: ASA 81, atorvastatin 10  TTE 11/13: Normal L/RV size and systolic function w/o wall motion abnormalities, mild MR, mild-mod TR, pHTN w/ PASP 44mmHg, no pericard effusion  Carotid US: Mild b/l opaque w/o hemodynamic stenosis  - F/u cath report  - C/w ASA  - C/w plavix 75  - C/w hep gtt  - Increase atorvastatin 20    #HLD  Home med: atorvastatin 10  - Increase atorvastatin 20    GI  #GERD  -C/w Protonix   - Bowel regimen: Miralax, senna   - PO diet    #Transaminitis  AST//405 on admission. Downtrending.   - F/u hep panel  - Monitor     RENAL/  #HTN  Home meds: HCTZ-Lisinopril 12.5-10  - C/w Imdur 30 QD  - C/w lopressor 12.5 BID    #KEN on possible CKD  Crt on admission 1.38 -> 1.63 -> 1.52  - F/u urine studies   - Monitor I/Os    ENDO  #Hypothyroidism   TSH nml 11/12  Home med: Levothyroxine 75 QD  - C/w home meds    #DM  A1C 6.9 11/13  - modISS  - Start basal insulin regimen    HEME  #Anemia likely AOCD vs AMANDA  - F/u iron studies  - F/u LDH, haptoglobin, retic count  - Transfuse if hgb <7  - Maintain active T&S    ID  FABIENNE    PROPHYLAXIS   F: None  E: K>4, Mg>2  N: DASH diet  D: Hep gtt  Gi: Protonix  Dispo: CCU     FULL CODE 77F with PMHx HTN, HLD, DM Type II, CKD stage III (Cr 1.35 on admission, Baseline Cr 1-1.14 based on prior labs 7/2017), AOCD/AMANDA (Baseline Hgb 8.3-9.5 based on prior labs 7/2017), Asthma, GERD, hypothyroidism, glaucoma, gallstone pancreatitis presented to [facility] c/o C/P and SOB found to have NSTEMI with 3V CAD transferred to CTS for CABG workup but noted to have severe calcifications of the aorta on CT scan. S/p Impella assisted complex PCI with Rotoblator. Patient admitted to CCU for post-cath observation.     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 S/p Impella assisted complex PCI with Rotoblator.   Home meds: ASA 81, atorvastatin 10  TTE 11/13: Normal L/RV size and systolic function w/o wall motion abnormalities, mild MR, mild-mod TR, pHTN w/ PASP 44mmHg, no pericard effusion  Carotid US: Mild b/l opaque w/o hemodynamic stenosis  - F/u cath report ( got 3 stents, lt Main LAD, p Circ)   - C/w ASA  - C/w plavix 75  - Increase atorvastatin 80    #HLD  Home med: atorvastatin 10  - Increase atorvastatin 80    GI  #GERD  -C/w Protonix   - Bowel regimen: Miralax, senna   - PO diet    #Transaminitis  AST//405 on admission. Downtrending.   - F/u hep panel      RENAL/  #HTN  Home meds: HCTZ-Lisinopril 12.5-10  - C/w Imdur 30 QD  - C/w lopressor 12.5 BID    #KEN on possible CKD  Crt on admission 1.38 -> 1.63 -> 1.52  - trend serum Cr  - Monitor I/Os    ENDO  #Hypothyroidism   TSH nml 11/12  Home med: Levothyroxine 75 QD  - C/w home meds    #DM  A1C 6.9 11/13  - modISS  - Start basal insulin regimen    HEME  #Anemia likely AOCD vs AMANDA  - S/P 1 unit PRBC during the PCI procedure  - F/u iron studies  - F/u LDH, haptoglobin, retic count  - Transfuse if hgb <7  - Maintain active T&S    ID  FABIENNE    PROPHYLAXIS   F: None  E: K>4, Mg>2  N: DASH diet  D: Hep gtt  Gi: Protonix  Dispo: CCU     FULL CODE

## 2023-11-14 NOTE — PROGRESS NOTE ADULT - PROBLEM SELECTOR PLAN 6
Cr 1.35 on admission uptrended to 1.63 now 1.52. Baseline Cr 1-1.14 based on prior labs 7/2017. Cr 1.35 on 7/18/17. Cr 1.3-1.4 at [facility] 11/11/23-11/12/23   -Monitor renal function, electrolytes, urine output, volume status, and BP   -Renally dose meds. Avoid nephrotoxic agents   -Daily Phosphorous

## 2023-11-14 NOTE — PROGRESS NOTE ADULT - PROBLEM SELECTOR PLAN 5
Iron Deficiency and Anemia of Chronic Disease    -Hgb/HCT 8.2/26.2 stable x 24 hrs. Hgb/HCT 9.4/30.4 on admission. Type and screen on file.    -Baseline Hgb 8.3-9.5 labs 7/2017. Hgb 8.5-9.2 at [facility] 11/11/23-11/12/23. Per Dr. Partida given complex PCI 1 unit pRBC to be given prior to procedure. Blood transfusion consent obtained and in chart. Active type and screen 11/12/23. 1 unit pRBC ordered.      -Monitor for clinical signs and symptoms of bleeding. None currently   -Maintain active type and screen

## 2023-11-15 DIAGNOSIS — E03.9 HYPOTHYROIDISM, UNSPECIFIED: ICD-10-CM

## 2023-11-15 DIAGNOSIS — Z29.9 ENCOUNTER FOR PROPHYLACTIC MEASURES, UNSPECIFIED: ICD-10-CM

## 2023-11-15 DIAGNOSIS — I25.10 ATHEROSCLEROTIC HEART DISEASE OF NATIVE CORONARY ARTERY WITHOUT ANGINA PECTORIS: ICD-10-CM

## 2023-11-15 DIAGNOSIS — E87.5 HYPERKALEMIA: ICD-10-CM

## 2023-11-15 DIAGNOSIS — J45.909 UNSPECIFIED ASTHMA, UNCOMPLICATED: ICD-10-CM

## 2023-11-15 DIAGNOSIS — K21.9 GASTRO-ESOPHAGEAL REFLUX DISEASE WITHOUT ESOPHAGITIS: ICD-10-CM

## 2023-11-15 LAB
ALBUMIN SERPL ELPH-MCNC: 3.2 G/DL — LOW (ref 3.3–5)
ALBUMIN SERPL ELPH-MCNC: 3.2 G/DL — LOW (ref 3.3–5)
ALBUMIN SERPL ELPH-MCNC: SIGNIFICANT CHANGE UP (ref 3.3–5)
ALBUMIN SERPL ELPH-MCNC: SIGNIFICANT CHANGE UP (ref 3.3–5)
ALP SERPL-CCNC: 99 U/L — SIGNIFICANT CHANGE UP (ref 40–120)
ALP SERPL-CCNC: 99 U/L — SIGNIFICANT CHANGE UP (ref 40–120)
ALP SERPL-CCNC: SIGNIFICANT CHANGE UP (ref 40–120)
ALP SERPL-CCNC: SIGNIFICANT CHANGE UP (ref 40–120)
ALT FLD-CCNC: 110 U/L — HIGH (ref 10–45)
ALT FLD-CCNC: 110 U/L — HIGH (ref 10–45)
ALT FLD-CCNC: SIGNIFICANT CHANGE UP (ref 10–45)
ALT FLD-CCNC: SIGNIFICANT CHANGE UP (ref 10–45)
ANION GAP SERPL CALC-SCNC: 10 MMOL/L — SIGNIFICANT CHANGE UP (ref 5–17)
ANION GAP SERPL CALC-SCNC: 10 MMOL/L — SIGNIFICANT CHANGE UP (ref 5–17)
ANION GAP SERPL CALC-SCNC: 11 MMOL/L — SIGNIFICANT CHANGE UP (ref 5–17)
ANION GAP SERPL CALC-SCNC: 11 MMOL/L — SIGNIFICANT CHANGE UP (ref 5–17)
ANION GAP SERPL CALC-SCNC: SIGNIFICANT CHANGE UP MMOL/L (ref 5–17)
ANION GAP SERPL CALC-SCNC: SIGNIFICANT CHANGE UP MMOL/L (ref 5–17)
APPEARANCE UR: CLEAR — SIGNIFICANT CHANGE UP
APPEARANCE UR: CLEAR — SIGNIFICANT CHANGE UP
APTT BLD: 24.1 SEC — LOW (ref 24.5–35.6)
APTT BLD: 24.1 SEC — LOW (ref 24.5–35.6)
AST SERPL-CCNC: 30 U/L — SIGNIFICANT CHANGE UP (ref 10–40)
AST SERPL-CCNC: 30 U/L — SIGNIFICANT CHANGE UP (ref 10–40)
AST SERPL-CCNC: SIGNIFICANT CHANGE UP (ref 10–40)
AST SERPL-CCNC: SIGNIFICANT CHANGE UP (ref 10–40)
BACTERIA # UR AUTO: NEGATIVE /HPF — SIGNIFICANT CHANGE UP
BACTERIA # UR AUTO: NEGATIVE /HPF — SIGNIFICANT CHANGE UP
BASOPHILS # BLD AUTO: 0.03 K/UL — SIGNIFICANT CHANGE UP (ref 0–0.2)
BASOPHILS # BLD AUTO: 0.03 K/UL — SIGNIFICANT CHANGE UP (ref 0–0.2)
BASOPHILS NFR BLD AUTO: 0.3 % — SIGNIFICANT CHANGE UP (ref 0–2)
BASOPHILS NFR BLD AUTO: 0.3 % — SIGNIFICANT CHANGE UP (ref 0–2)
BILIRUB SERPL-MCNC: 0.4 MG/DL — SIGNIFICANT CHANGE UP (ref 0.2–1.2)
BILIRUB SERPL-MCNC: 0.4 MG/DL — SIGNIFICANT CHANGE UP (ref 0.2–1.2)
BILIRUB SERPL-MCNC: SIGNIFICANT CHANGE UP (ref 0.2–1.2)
BILIRUB SERPL-MCNC: SIGNIFICANT CHANGE UP (ref 0.2–1.2)
BILIRUB UR-MCNC: NEGATIVE — SIGNIFICANT CHANGE UP
BILIRUB UR-MCNC: NEGATIVE — SIGNIFICANT CHANGE UP
BLD GP AB SCN SERPL QL: NEGATIVE — SIGNIFICANT CHANGE UP
BLD GP AB SCN SERPL QL: NEGATIVE — SIGNIFICANT CHANGE UP
BUN SERPL-MCNC: 20 MG/DL — SIGNIFICANT CHANGE UP (ref 7–23)
BUN SERPL-MCNC: 20 MG/DL — SIGNIFICANT CHANGE UP (ref 7–23)
BUN SERPL-MCNC: 21 MG/DL — SIGNIFICANT CHANGE UP (ref 7–23)
BUN SERPL-MCNC: 21 MG/DL — SIGNIFICANT CHANGE UP (ref 7–23)
BUN SERPL-MCNC: 24 MG/DL — HIGH (ref 7–23)
BUN SERPL-MCNC: 24 MG/DL — HIGH (ref 7–23)
CALCIUM SERPL-MCNC: 8.8 MG/DL — SIGNIFICANT CHANGE UP (ref 8.4–10.5)
CALCIUM SERPL-MCNC: 8.8 MG/DL — SIGNIFICANT CHANGE UP (ref 8.4–10.5)
CALCIUM SERPL-MCNC: 8.9 MG/DL — SIGNIFICANT CHANGE UP (ref 8.4–10.5)
CALCIUM SERPL-MCNC: 8.9 MG/DL — SIGNIFICANT CHANGE UP (ref 8.4–10.5)
CALCIUM SERPL-MCNC: 9 MG/DL — SIGNIFICANT CHANGE UP (ref 8.4–10.5)
CALCIUM SERPL-MCNC: 9 MG/DL — SIGNIFICANT CHANGE UP (ref 8.4–10.5)
CAST: 0 /LPF — SIGNIFICANT CHANGE UP (ref 0–4)
CAST: 0 /LPF — SIGNIFICANT CHANGE UP (ref 0–4)
CHLORIDE SERPL-SCNC: 104 MMOL/L — SIGNIFICANT CHANGE UP (ref 96–108)
CHLORIDE SERPL-SCNC: SIGNIFICANT CHANGE UP (ref 96–108)
CHLORIDE SERPL-SCNC: SIGNIFICANT CHANGE UP (ref 96–108)
CO2 SERPL-SCNC: 21 MMOL/L — LOW (ref 22–31)
CO2 SERPL-SCNC: 21 MMOL/L — LOW (ref 22–31)
CO2 SERPL-SCNC: 23 MMOL/L — SIGNIFICANT CHANGE UP (ref 22–31)
CO2 SERPL-SCNC: 23 MMOL/L — SIGNIFICANT CHANGE UP (ref 22–31)
CO2 SERPL-SCNC: SIGNIFICANT CHANGE UP (ref 22–31)
CO2 SERPL-SCNC: SIGNIFICANT CHANGE UP (ref 22–31)
COLOR SPEC: YELLOW — SIGNIFICANT CHANGE UP
COLOR SPEC: YELLOW — SIGNIFICANT CHANGE UP
CREAT SERPL-MCNC: 1.45 MG/DL — HIGH (ref 0.5–1.3)
CREAT SERPL-MCNC: 1.45 MG/DL — HIGH (ref 0.5–1.3)
CREAT SERPL-MCNC: 1.74 MG/DL — HIGH (ref 0.5–1.3)
CREAT SERPL-MCNC: 1.74 MG/DL — HIGH (ref 0.5–1.3)
CREAT SERPL-MCNC: <0.06 MG/DL — LOW (ref 0.5–1.3)
CREAT SERPL-MCNC: <0.06 MG/DL — LOW (ref 0.5–1.3)
DIFF PNL FLD: NEGATIVE — SIGNIFICANT CHANGE UP
DIFF PNL FLD: NEGATIVE — SIGNIFICANT CHANGE UP
EGFR: 162 ML/MIN/1.73M2 — SIGNIFICANT CHANGE UP
EGFR: 162 ML/MIN/1.73M2 — SIGNIFICANT CHANGE UP
EGFR: 30 ML/MIN/1.73M2 — LOW
EGFR: 30 ML/MIN/1.73M2 — LOW
EGFR: 37 ML/MIN/1.73M2 — LOW
EGFR: 37 ML/MIN/1.73M2 — LOW
EOSINOPHIL # BLD AUTO: 0.95 K/UL — HIGH (ref 0–0.5)
EOSINOPHIL # BLD AUTO: 0.95 K/UL — HIGH (ref 0–0.5)
EOSINOPHIL NFR BLD AUTO: 8.6 % — HIGH (ref 0–6)
EOSINOPHIL NFR BLD AUTO: 8.6 % — HIGH (ref 0–6)
FERRITIN SERPL-MCNC: SIGNIFICANT CHANGE UP (ref 13–330)
FERRITIN SERPL-MCNC: SIGNIFICANT CHANGE UP (ref 13–330)
GLUCOSE BLDC GLUCOMTR-MCNC: 167 MG/DL — HIGH (ref 70–99)
GLUCOSE BLDC GLUCOMTR-MCNC: 167 MG/DL — HIGH (ref 70–99)
GLUCOSE BLDC GLUCOMTR-MCNC: 193 MG/DL — HIGH (ref 70–99)
GLUCOSE BLDC GLUCOMTR-MCNC: 193 MG/DL — HIGH (ref 70–99)
GLUCOSE BLDC GLUCOMTR-MCNC: 205 MG/DL — HIGH (ref 70–99)
GLUCOSE BLDC GLUCOMTR-MCNC: 205 MG/DL — HIGH (ref 70–99)
GLUCOSE BLDC GLUCOMTR-MCNC: 237 MG/DL — HIGH (ref 70–99)
GLUCOSE BLDC GLUCOMTR-MCNC: 237 MG/DL — HIGH (ref 70–99)
GLUCOSE SERPL-MCNC: 149 MG/DL — HIGH (ref 70–99)
GLUCOSE SERPL-MCNC: 149 MG/DL — HIGH (ref 70–99)
GLUCOSE SERPL-MCNC: 159 MG/DL — HIGH (ref 70–99)
GLUCOSE SERPL-MCNC: 159 MG/DL — HIGH (ref 70–99)
GLUCOSE SERPL-MCNC: 161 MG/DL — HIGH (ref 70–99)
GLUCOSE SERPL-MCNC: 161 MG/DL — HIGH (ref 70–99)
GLUCOSE UR QL: NEGATIVE MG/DL — SIGNIFICANT CHANGE UP
GLUCOSE UR QL: NEGATIVE MG/DL — SIGNIFICANT CHANGE UP
HAPTOGLOB SERPL-MCNC: SIGNIFICANT CHANGE UP (ref 34–200)
HAPTOGLOB SERPL-MCNC: SIGNIFICANT CHANGE UP (ref 34–200)
HAPTOGLOB SERPL-MCNC: SIGNIFICANT CHANGE UP MG/DL (ref 34–200)
HAPTOGLOB SERPL-MCNC: SIGNIFICANT CHANGE UP MG/DL (ref 34–200)
HAV IGM SER-ACNC: SIGNIFICANT CHANGE UP
HAV IGM SER-ACNC: SIGNIFICANT CHANGE UP
HBV CORE IGM SER-ACNC: SIGNIFICANT CHANGE UP
HBV CORE IGM SER-ACNC: SIGNIFICANT CHANGE UP
HBV SURFACE AG SER-ACNC: SIGNIFICANT CHANGE UP
HBV SURFACE AG SER-ACNC: SIGNIFICANT CHANGE UP
HCT VFR BLD CALC: 28.4 % — LOW (ref 34.5–45)
HCT VFR BLD CALC: 28.4 % — LOW (ref 34.5–45)
HCV AB S/CO SERPL IA: 0.04 S/CO — SIGNIFICANT CHANGE UP
HCV AB S/CO SERPL IA: 0.04 S/CO — SIGNIFICANT CHANGE UP
HCV AB SERPL-IMP: SIGNIFICANT CHANGE UP
HCV AB SERPL-IMP: SIGNIFICANT CHANGE UP
HGB BLD-MCNC: 8.9 G/DL — LOW (ref 11.5–15.5)
HGB BLD-MCNC: 8.9 G/DL — LOW (ref 11.5–15.5)
IMM GRANULOCYTES NFR BLD AUTO: 1.4 % — HIGH (ref 0–0.9)
IMM GRANULOCYTES NFR BLD AUTO: 1.4 % — HIGH (ref 0–0.9)
INR BLD: 1 — SIGNIFICANT CHANGE UP (ref 0.85–1.18)
INR BLD: 1 — SIGNIFICANT CHANGE UP (ref 0.85–1.18)
IRON SATN MFR SERPL: 12 % — LOW (ref 14–50)
IRON SATN MFR SERPL: 12 % — LOW (ref 14–50)
IRON SATN MFR SERPL: 46 UG/DL — SIGNIFICANT CHANGE UP (ref 30–160)
IRON SATN MFR SERPL: 46 UG/DL — SIGNIFICANT CHANGE UP (ref 30–160)
KETONES UR-MCNC: NEGATIVE MG/DL — SIGNIFICANT CHANGE UP
KETONES UR-MCNC: NEGATIVE MG/DL — SIGNIFICANT CHANGE UP
LDH SERPL L TO P-CCNC: 261 U/L — HIGH (ref 50–242)
LDH SERPL L TO P-CCNC: 261 U/L — HIGH (ref 50–242)
LDH SERPL L TO P-CCNC: SIGNIFICANT CHANGE UP (ref 50–242)
LDH SERPL L TO P-CCNC: SIGNIFICANT CHANGE UP (ref 50–242)
LEUKOCYTE ESTERASE UR-ACNC: ABNORMAL
LEUKOCYTE ESTERASE UR-ACNC: ABNORMAL
LYMPHOCYTES # BLD AUTO: 19.2 % — SIGNIFICANT CHANGE UP (ref 13–44)
LYMPHOCYTES # BLD AUTO: 19.2 % — SIGNIFICANT CHANGE UP (ref 13–44)
LYMPHOCYTES # BLD AUTO: 2.11 K/UL — SIGNIFICANT CHANGE UP (ref 1–3.3)
LYMPHOCYTES # BLD AUTO: 2.11 K/UL — SIGNIFICANT CHANGE UP (ref 1–3.3)
MAGNESIUM SERPL-MCNC: 2.4 MG/DL — SIGNIFICANT CHANGE UP (ref 1.6–2.6)
MAGNESIUM SERPL-MCNC: 2.4 MG/DL — SIGNIFICANT CHANGE UP (ref 1.6–2.6)
MAGNESIUM SERPL-MCNC: 2.5 MG/DL — SIGNIFICANT CHANGE UP (ref 1.6–2.6)
MAGNESIUM SERPL-MCNC: 2.5 MG/DL — SIGNIFICANT CHANGE UP (ref 1.6–2.6)
MCHC RBC-ENTMCNC: 25.9 PG — LOW (ref 27–34)
MCHC RBC-ENTMCNC: 25.9 PG — LOW (ref 27–34)
MCHC RBC-ENTMCNC: 31.3 GM/DL — LOW (ref 32–36)
MCHC RBC-ENTMCNC: 31.3 GM/DL — LOW (ref 32–36)
MCV RBC AUTO: 82.6 FL — SIGNIFICANT CHANGE UP (ref 80–100)
MCV RBC AUTO: 82.6 FL — SIGNIFICANT CHANGE UP (ref 80–100)
MONOCYTES # BLD AUTO: 1.02 K/UL — HIGH (ref 0–0.9)
MONOCYTES # BLD AUTO: 1.02 K/UL — HIGH (ref 0–0.9)
MONOCYTES NFR BLD AUTO: 9.3 % — SIGNIFICANT CHANGE UP (ref 2–14)
MONOCYTES NFR BLD AUTO: 9.3 % — SIGNIFICANT CHANGE UP (ref 2–14)
NEUTROPHILS # BLD AUTO: 6.75 K/UL — SIGNIFICANT CHANGE UP (ref 1.8–7.4)
NEUTROPHILS # BLD AUTO: 6.75 K/UL — SIGNIFICANT CHANGE UP (ref 1.8–7.4)
NEUTROPHILS NFR BLD AUTO: 61.2 % — SIGNIFICANT CHANGE UP (ref 43–77)
NEUTROPHILS NFR BLD AUTO: 61.2 % — SIGNIFICANT CHANGE UP (ref 43–77)
NITRITE UR-MCNC: NEGATIVE — SIGNIFICANT CHANGE UP
NITRITE UR-MCNC: NEGATIVE — SIGNIFICANT CHANGE UP
NRBC # BLD: 0 /100 WBCS — SIGNIFICANT CHANGE UP (ref 0–0)
NRBC # BLD: 0 /100 WBCS — SIGNIFICANT CHANGE UP (ref 0–0)
PH UR: 5.5 — SIGNIFICANT CHANGE UP (ref 5–8)
PH UR: 5.5 — SIGNIFICANT CHANGE UP (ref 5–8)
PHOSPHATE SERPL-MCNC: 4.4 MG/DL — SIGNIFICANT CHANGE UP (ref 2.5–4.5)
PHOSPHATE SERPL-MCNC: 4.4 MG/DL — SIGNIFICANT CHANGE UP (ref 2.5–4.5)
PHOSPHATE SERPL-MCNC: 4.5 MG/DL — SIGNIFICANT CHANGE UP (ref 2.5–4.5)
PHOSPHATE SERPL-MCNC: 4.5 MG/DL — SIGNIFICANT CHANGE UP (ref 2.5–4.5)
PLATELET # BLD AUTO: 251 K/UL — SIGNIFICANT CHANGE UP (ref 150–400)
PLATELET # BLD AUTO: 251 K/UL — SIGNIFICANT CHANGE UP (ref 150–400)
POTASSIUM SERPL-MCNC: 4.8 MMOL/L — SIGNIFICANT CHANGE UP (ref 3.5–5.3)
POTASSIUM SERPL-MCNC: 4.8 MMOL/L — SIGNIFICANT CHANGE UP (ref 3.5–5.3)
POTASSIUM SERPL-MCNC: 5.1 MMOL/L — SIGNIFICANT CHANGE UP (ref 3.5–5.3)
POTASSIUM SERPL-MCNC: 5.1 MMOL/L — SIGNIFICANT CHANGE UP (ref 3.5–5.3)
POTASSIUM SERPL-MCNC: SIGNIFICANT CHANGE UP (ref 3.5–5.3)
POTASSIUM SERPL-MCNC: SIGNIFICANT CHANGE UP (ref 3.5–5.3)
POTASSIUM SERPL-SCNC: 4.8 MMOL/L — SIGNIFICANT CHANGE UP (ref 3.5–5.3)
POTASSIUM SERPL-SCNC: 4.8 MMOL/L — SIGNIFICANT CHANGE UP (ref 3.5–5.3)
POTASSIUM SERPL-SCNC: 5.1 MMOL/L — SIGNIFICANT CHANGE UP (ref 3.5–5.3)
POTASSIUM SERPL-SCNC: 5.1 MMOL/L — SIGNIFICANT CHANGE UP (ref 3.5–5.3)
POTASSIUM SERPL-SCNC: SIGNIFICANT CHANGE UP (ref 3.5–5.3)
POTASSIUM SERPL-SCNC: SIGNIFICANT CHANGE UP (ref 3.5–5.3)
PROT SERPL-MCNC: 5.9 G/DL — LOW (ref 6–8.3)
PROT SERPL-MCNC: 5.9 G/DL — LOW (ref 6–8.3)
PROT SERPL-MCNC: SIGNIFICANT CHANGE UP (ref 6–8.3)
PROT SERPL-MCNC: SIGNIFICANT CHANGE UP (ref 6–8.3)
PROT UR-MCNC: SIGNIFICANT CHANGE UP MG/DL
PROT UR-MCNC: SIGNIFICANT CHANGE UP MG/DL
PROTHROM AB SERPL-ACNC: 11.4 SEC — SIGNIFICANT CHANGE UP (ref 9.5–13)
PROTHROM AB SERPL-ACNC: 11.4 SEC — SIGNIFICANT CHANGE UP (ref 9.5–13)
RBC # BLD: 3.44 M/UL — LOW (ref 3.8–5.2)
RBC # FLD: 16.2 % — HIGH (ref 10.3–14.5)
RBC # FLD: 16.2 % — HIGH (ref 10.3–14.5)
RBC CASTS # UR COMP ASSIST: 0 /HPF — SIGNIFICANT CHANGE UP (ref 0–4)
RBC CASTS # UR COMP ASSIST: 0 /HPF — SIGNIFICANT CHANGE UP (ref 0–4)
RETICS #: 132.4 K/UL — HIGH (ref 25–125)
RETICS #: 132.4 K/UL — HIGH (ref 25–125)
RETICS/RBC NFR: 3.9 % — HIGH (ref 0.5–2.5)
RETICS/RBC NFR: 3.9 % — HIGH (ref 0.5–2.5)
RH IG SCN BLD-IMP: NEGATIVE — SIGNIFICANT CHANGE UP
RH IG SCN BLD-IMP: NEGATIVE — SIGNIFICANT CHANGE UP
SODIUM SERPL-SCNC: 136 MMOL/L — SIGNIFICANT CHANGE UP (ref 135–145)
SODIUM SERPL-SCNC: 136 MMOL/L — SIGNIFICANT CHANGE UP (ref 135–145)
SODIUM SERPL-SCNC: 137 MMOL/L — SIGNIFICANT CHANGE UP (ref 135–145)
SODIUM SERPL-SCNC: 137 MMOL/L — SIGNIFICANT CHANGE UP (ref 135–145)
SODIUM SERPL-SCNC: SIGNIFICANT CHANGE UP (ref 135–145)
SODIUM SERPL-SCNC: SIGNIFICANT CHANGE UP (ref 135–145)
SP GR SPEC: >1.03 — HIGH (ref 1–1.03)
SP GR SPEC: >1.03 — HIGH (ref 1–1.03)
SQUAMOUS # UR AUTO: 3 /HPF — SIGNIFICANT CHANGE UP (ref 0–5)
SQUAMOUS # UR AUTO: 3 /HPF — SIGNIFICANT CHANGE UP (ref 0–5)
TIBC SERPL-MCNC: 385 UG/DL — SIGNIFICANT CHANGE UP (ref 220–430)
TIBC SERPL-MCNC: 385 UG/DL — SIGNIFICANT CHANGE UP (ref 220–430)
UIBC SERPL-MCNC: 339 UG/DL — SIGNIFICANT CHANGE UP (ref 110–370)
UIBC SERPL-MCNC: 339 UG/DL — SIGNIFICANT CHANGE UP (ref 110–370)
UROBILINOGEN FLD QL: 0.2 MG/DL — SIGNIFICANT CHANGE UP (ref 0.2–1)
UROBILINOGEN FLD QL: 0.2 MG/DL — SIGNIFICANT CHANGE UP (ref 0.2–1)
WBC # BLD: 11.01 K/UL — HIGH (ref 3.8–10.5)
WBC # BLD: 11.01 K/UL — HIGH (ref 3.8–10.5)
WBC # FLD AUTO: 11.01 K/UL — HIGH (ref 3.8–10.5)
WBC # FLD AUTO: 11.01 K/UL — HIGH (ref 3.8–10.5)
WBC UR QL: 1 /HPF — SIGNIFICANT CHANGE UP (ref 0–5)
WBC UR QL: 1 /HPF — SIGNIFICANT CHANGE UP (ref 0–5)

## 2023-11-15 PROCEDURE — 99291 CRITICAL CARE FIRST HOUR: CPT

## 2023-11-15 PROCEDURE — 71045 X-RAY EXAM CHEST 1 VIEW: CPT | Mod: 26

## 2023-11-15 RX ORDER — ISOSORBIDE MONONITRATE 60 MG/1
60 TABLET, EXTENDED RELEASE ORAL DAILY
Refills: 0 | Status: DISCONTINUED | OUTPATIENT
Start: 2023-11-15 | End: 2023-11-15

## 2023-11-15 RX ORDER — CHLORHEXIDINE GLUCONATE 213 G/1000ML
1 SOLUTION TOPICAL
Refills: 0 | Status: DISCONTINUED | OUTPATIENT
Start: 2023-11-15 | End: 2023-11-17

## 2023-11-15 RX ORDER — AMLODIPINE BESYLATE 2.5 MG/1
10 TABLET ORAL EVERY 24 HOURS
Refills: 0 | Status: DISCONTINUED | OUTPATIENT
Start: 2023-11-15 | End: 2023-11-17

## 2023-11-15 RX ORDER — ISOSORBIDE MONONITRATE 60 MG/1
60 TABLET, EXTENDED RELEASE ORAL DAILY
Refills: 0 | Status: DISCONTINUED | OUTPATIENT
Start: 2023-11-16 | End: 2023-11-17

## 2023-11-15 RX ORDER — AMLODIPINE BESYLATE 2.5 MG/1
10 TABLET ORAL EVERY 24 HOURS
Refills: 0 | Status: DISCONTINUED | OUTPATIENT
Start: 2023-11-15 | End: 2023-11-15

## 2023-11-15 RX ORDER — SODIUM ZIRCONIUM CYCLOSILICATE 10 G/10G
10 POWDER, FOR SUSPENSION ORAL ONCE
Refills: 0 | Status: COMPLETED | OUTPATIENT
Start: 2023-11-15 | End: 2023-11-15

## 2023-11-15 RX ORDER — METOPROLOL TARTRATE 50 MG
25 TABLET ORAL EVERY 24 HOURS
Refills: 0 | Status: DISCONTINUED | OUTPATIENT
Start: 2023-11-15 | End: 2023-11-17

## 2023-11-15 RX ADMIN — Medication 650 MILLIGRAM(S): at 05:42

## 2023-11-15 RX ADMIN — Medication 25 MILLIGRAM(S): at 11:31

## 2023-11-15 RX ADMIN — Medication 2: at 07:23

## 2023-11-15 RX ADMIN — HEPARIN SODIUM 5000 UNIT(S): 5000 INJECTION INTRAVENOUS; SUBCUTANEOUS at 21:28

## 2023-11-15 RX ADMIN — AMLODIPINE BESYLATE 10 MILLIGRAM(S): 2.5 TABLET ORAL at 16:58

## 2023-11-15 RX ADMIN — Medication 81 MILLIGRAM(S): at 11:31

## 2023-11-15 RX ADMIN — CLOPIDOGREL BISULFATE 75 MILLIGRAM(S): 75 TABLET, FILM COATED ORAL at 11:29

## 2023-11-15 RX ADMIN — SODIUM CHLORIDE 3 MILLILITER(S): 9 INJECTION INTRAMUSCULAR; INTRAVENOUS; SUBCUTANEOUS at 06:23

## 2023-11-15 RX ADMIN — Medication 4: at 11:31

## 2023-11-15 RX ADMIN — SODIUM ZIRCONIUM CYCLOSILICATE 10 GRAM(S): 10 POWDER, FOR SUSPENSION ORAL at 11:29

## 2023-11-15 RX ADMIN — Medication 650 MILLIGRAM(S): at 20:46

## 2023-11-15 RX ADMIN — Medication 650 MILLIGRAM(S): at 21:46

## 2023-11-15 RX ADMIN — Medication 650 MILLIGRAM(S): at 06:42

## 2023-11-15 RX ADMIN — SODIUM CHLORIDE 3 MILLILITER(S): 9 INJECTION INTRAMUSCULAR; INTRAVENOUS; SUBCUTANEOUS at 21:49

## 2023-11-15 RX ADMIN — Medication 75 MICROGRAM(S): at 06:17

## 2023-11-15 RX ADMIN — SODIUM CHLORIDE 3 MILLILITER(S): 9 INJECTION INTRAMUSCULAR; INTRAVENOUS; SUBCUTANEOUS at 14:09

## 2023-11-15 RX ADMIN — POLYETHYLENE GLYCOL 3350 17 GRAM(S): 17 POWDER, FOR SOLUTION ORAL at 11:31

## 2023-11-15 RX ADMIN — CHLORHEXIDINE GLUCONATE 1 APPLICATION(S): 213 SOLUTION TOPICAL at 05:42

## 2023-11-15 RX ADMIN — HEPARIN SODIUM 5000 UNIT(S): 5000 INJECTION INTRAVENOUS; SUBCUTANEOUS at 14:04

## 2023-11-15 RX ADMIN — Medication 2: at 17:10

## 2023-11-15 RX ADMIN — SENNA PLUS 2 TABLET(S): 8.6 TABLET ORAL at 21:27

## 2023-11-15 RX ADMIN — ISOSORBIDE MONONITRATE 30 MILLIGRAM(S): 60 TABLET, EXTENDED RELEASE ORAL at 11:31

## 2023-11-15 RX ADMIN — HEPARIN SODIUM 5000 UNIT(S): 5000 INJECTION INTRAVENOUS; SUBCUTANEOUS at 06:14

## 2023-11-15 RX ADMIN — ATORVASTATIN CALCIUM 80 MILLIGRAM(S): 80 TABLET, FILM COATED ORAL at 21:28

## 2023-11-15 RX ADMIN — PANTOPRAZOLE SODIUM 40 MILLIGRAM(S): 20 TABLET, DELAYED RELEASE ORAL at 06:17

## 2023-11-15 RX ADMIN — Medication 4: at 21:48

## 2023-11-15 NOTE — PROGRESS NOTE ADULT - SUBJECTIVE AND OBJECTIVE BOX
OVERNIGHT EVENTS:  SUBJECTIVE: Patient was seen and examined at bedside.      VITAL SIGNS:  T(F): 98.4 (11-15-23 @ 05:19)  HR: 69 (11-15-23 @ 07:00)  BP: 137/60 (11-15-23 @ 07:00)  RR: 16 (11-15-23 @ 07:00)  SpO2: 95% (11-15-23 @ 07:00)  Wt(kg): --    PHYSICAL EXAM  GENERAL: NAD, lying in bed comfortably  HEAD:  Atraumatic, normocephalic  EYES: EOMI, PERRLA, conjunctiva and sclera clear  ENT: Moist mucous membranes  NECK: Supple, no JVD  HEART: Regular rate and rhythm, no murmurs, rubs, or gallops  LUNGS: Unlabored respirations.  Clear to auscultation bilaterally, no crackles, wheezing, or rhonchi  ABDOMEN: Soft, nontender, nondistended, +BS  EXTREMITIES: 2+ peripheral pulses bilaterally. No clubbing, cyanosis, or edema  NERVOUS SYSTEM:  A&Ox3, no focal deficits   SKIN: No rashes or lesions    MEDICATIONS  (STANDING):  aspirin  chewable 81 milliGRAM(s) Oral daily  atorvastatin 80 milliGRAM(s) Oral at bedtime  chlorhexidine 2% Cloths 1 Application(s) Topical <User Schedule>  clopidogrel Tablet 75 milliGRAM(s) Oral daily  dextrose 5%. 1000 milliLiter(s) (50 mL/Hr) IV Continuous <Continuous>  dextrose 5%. 1000 milliLiter(s) (100 mL/Hr) IV Continuous <Continuous>  dextrose 50% Injectable 25 Gram(s) IV Push once  dextrose 50% Injectable 25 Gram(s) IV Push once  dextrose 50% Injectable 12.5 Gram(s) IV Push once  glucagon  Injectable 1 milliGRAM(s) IntraMuscular once  heparin   Injectable 5000 Unit(s) SubCutaneous every 8 hours  insulin lispro (ADMELOG) corrective regimen sliding scale   SubCutaneous Before meals and at bedtime  isosorbide   mononitrate ER Tablet (IMDUR) 30 milliGRAM(s) Oral daily  levothyroxine 75 MICROGram(s) Oral daily  metoprolol tartrate 12.5 milliGRAM(s) Oral every 12 hours  montelukast 10 milliGRAM(s) Oral daily  pantoprazole    Tablet 40 milliGRAM(s) Oral before breakfast  polyethylene glycol 3350 17 Gram(s) Oral daily  senna 2 Tablet(s) Oral at bedtime  sodium chloride 0.9% lock flush 3 milliLiter(s) IV Push every 8 hours    MEDICATIONS  (PRN):  acetaminophen     Tablet .. 650 milliGRAM(s) Oral every 6 hours PRN Temp greater or equal to 38C (100.4F), Mild Pain (1 - 3)  dextrose Oral Gel 15 Gram(s) Oral once PRN Blood Glucose LESS THAN 70 milliGRAM(s)/deciliter      Allergies    No Known Allergies    Intolerances        LABS:                        8.9    11.01 )-----------( 251      ( 15 Nov 2023 05:30 )             28.4     11-15    137  |  104  |  20  ----------------------------<  161<H>  5.1   |  23  |  1.45<H>    Ca    8.8      15 Nov 2023 06:50  Phos  4.4     11-15  Mg     2.4     11-15    TPro  5.9<L>  /  Alb  3.2<L>  /  TBili  0.4  /  DBili  x   /  AST  30  /  ALT  110<H>  /  AlkPhos  99  11-15    PT/INR - ( 15 Nov 2023 05:30 )   PT: 11.4 sec;   INR: 1.00          PTT - ( 15 Nov 2023 05:30 )  PTT:24.1 sec  Urinalysis Basic - ( 15 Nov 2023 06:50 )    Color: x / Appearance: x / SG: x / pH: x  Gluc: 161 mg/dL / Ketone: x  / Bili: x / Urobili: x   Blood: x / Protein: x / Nitrite: x   Leuk Esterase: x / RBC: x / WBC x   Sq Epi: x / Non Sq Epi: x / Bacteria: x          MICROBIOLOGY      RADIOLOGY & ADDITIONAL TESTS:  Reviewed *****TRANSFER FROM CCU TO CARDIAC TELE******  Hospital course: 77F with PMHx HTN, HLD, DM Type II, CKD stage III (Cr 1.35 on admission, Baseline Cr 1-1.14 based on prior labs 7/2017), AOCD/AMANDA (Baseline Hgb 8.3-9.5), Asthma, GERD, hypothyroidism, glaucoma, gallstone pancreatitis originally BIBEMS to [facility] with c/o chest pain and SOB found to have NSTEMI, cardiac cath revealed 3V CAD (dLM 80%, oLAD 85%, D2 75%, oLCX 85%, mRCA 75%). Hosp course c/b transaminitis, Hepatitis Panel negative, now downtrending. Limited Abdominal U/S 11/11/23 revealed enlarged fatty liver and common bile duct dilatation (stable  postcholecystectomy compared to CT scan 4/8/23). Transferred to [facility] on 11/12/23 for CABG work-up. Heparin drip continued for NSTEMI and Troponin T Hs peaked at 87 and trended down. During pre-op work-up patient noted to have severe calcifications of the aorta on CT scan and therefore is not a surgical candidate and is for Impella assisted complex PCI with Rotoblator. Patient admitted to CCU for post-cath observation. Found to be hyperkalemic, given lokelma x1. Resumed BP meds. Stable for step down to cardiac telemetry.     OVERNIGHT EVENTS: Removed R femoral venous sheath w. normal groin check.   SUBJECTIVE: Patient was seen and examined at bedside. Feeling well this AM, deniesCP, SOB, N/V, abd pain, headache, or lightheadedness. Reports some R groin pain. Tolerating PO.     VITAL SIGNS:  T(F): 98.4 (11-15-23 @ 05:19)  HR: 69 (11-15-23 @ 07:00)  BP: 137/60 (11-15-23 @ 07:00)  RR: 16 (11-15-23 @ 07:00)  SpO2: 95% (11-15-23 @ 07:00)  Wt(kg): --    PHYSICAL EXAM  GENERAL: NAD, lying in bed comfortably  HEAD:  Atraumatic, normocephalic  EYES: EOMI, conjunctiva and sclera clear  ENT: Moist mucous membranes  NECK: Supple, no JVD  HEART: Regular rate and rhythm, no murmurs, rubs, or gallops  LUNGS: Unlabored respirations.  Clear to auscultation bilaterally, no crackles, wheezing, or rhonchi  ABDOMEN: Soft, nontender, nondistended, +BS, R femoral sheath site c/d/i  EXTREMITIES: 2+ peripheral pulses bilaterally. No clubbing, cyanosis, or edema  NERVOUS SYSTEM:  A&Ox3, no focal deficits   SKIN: No rashes or lesions    MEDICATIONS  (STANDING):  aspirin  chewable 81 milliGRAM(s) Oral daily  atorvastatin 80 milliGRAM(s) Oral at bedtime  chlorhexidine 2% Cloths 1 Application(s) Topical <User Schedule>  clopidogrel Tablet 75 milliGRAM(s) Oral daily  dextrose 5%. 1000 milliLiter(s) (50 mL/Hr) IV Continuous <Continuous>  dextrose 5%. 1000 milliLiter(s) (100 mL/Hr) IV Continuous <Continuous>  dextrose 50% Injectable 25 Gram(s) IV Push once  dextrose 50% Injectable 25 Gram(s) IV Push once  dextrose 50% Injectable 12.5 Gram(s) IV Push once  glucagon  Injectable 1 milliGRAM(s) IntraMuscular once  heparin   Injectable 5000 Unit(s) SubCutaneous every 8 hours  insulin lispro (ADMELOG) corrective regimen sliding scale   SubCutaneous Before meals and at bedtime  isosorbide   mononitrate ER Tablet (IMDUR) 30 milliGRAM(s) Oral daily  levothyroxine 75 MICROGram(s) Oral daily  metoprolol tartrate 12.5 milliGRAM(s) Oral every 12 hours  montelukast 10 milliGRAM(s) Oral daily  pantoprazole    Tablet 40 milliGRAM(s) Oral before breakfast  polyethylene glycol 3350 17 Gram(s) Oral daily  senna 2 Tablet(s) Oral at bedtime  sodium chloride 0.9% lock flush 3 milliLiter(s) IV Push every 8 hours    MEDICATIONS  (PRN):  acetaminophen     Tablet .. 650 milliGRAM(s) Oral every 6 hours PRN Temp greater or equal to 38C (100.4F), Mild Pain (1 - 3)  dextrose Oral Gel 15 Gram(s) Oral once PRN Blood Glucose LESS THAN 70 milliGRAM(s)/deciliter      Allergies    No Known Allergies    Intolerances        LABS:                        8.9    11.01 )-----------( 251      ( 15 Nov 2023 05:30 )             28.4     11-15    137  |  104  |  20  ----------------------------<  161<H>  5.1   |  23  |  1.45<H>    Ca    8.8      15 Nov 2023 06:50  Phos  4.4     11-15  Mg     2.4     11-15    TPro  5.9<L>  /  Alb  3.2<L>  /  TBili  0.4  /  DBili  x   /  AST  30  /  ALT  110<H>  /  AlkPhos  99  11-15    PT/INR - ( 15 Nov 2023 05:30 )   PT: 11.4 sec;   INR: 1.00          PTT - ( 15 Nov 2023 05:30 )  PTT:24.1 sec  Urinalysis Basic - ( 15 Nov 2023 06:50 )    Color: x / Appearance: x / SG: x / pH: x  Gluc: 161 mg/dL / Ketone: x  / Bili: x / Urobili: x   Blood: x / Protein: x / Nitrite: x   Leuk Esterase: x / RBC: x / WBC x   Sq Epi: x / Non Sq Epi: x / Bacteria: x          MICROBIOLOGY      RADIOLOGY & ADDITIONAL TESTS:  Reviewed *****TRANSFER FROM CCU TO CARDIAC TELE******  Hospital course: 77F with PMHx HTN, HLD, DM Type II, CKD stage III (Cr 1.35 on admission, Baseline Cr 1-1.14 based on prior labs 7/2017), AOCD/AMANDA (Baseline Hgb 8.3-9.5), Asthma, GERD, hypothyroidism, glaucoma, gallstone pancreatitis originally BIBEMS to [facility] with c/o chest pain and SOB found to have NSTEMI, cardiac cath revealed 3V CAD (dLM 80%, oLAD 85%, D2 75%, oLCX 85%, mRCA 75%). Hosp course c/b transaminitis, Hepatitis Panel negative, now downtrending. Limited Abdominal U/S 11/11/23 revealed enlarged fatty liver and common bile duct dilatation (stable  postcholecystectomy compared to CT scan 4/8/23). Transferred to [facility] on 11/12/23 for CABG work-up. Heparin drip continued for NSTEMI and Troponin T Hs peaked at 87 and trended down. During pre-op work-up patient noted to have severe calcifications of the aorta on CT scan and therefore is not a surgical candidate and is for Impella assisted complex PCI with Rotoblator. Patient admitted to CCU for post-cath observation. Found to be hyperkalemic, given lokelma x1. Resumed BP meds. Stable for step down to cardiac telemetry.     OVERNIGHT EVENTS: Removed R femoral venous sheath w. normal groin check.   SUBJECTIVE: Patient was seen and examined at bedside. Feeling well this AM, deniesCP, SOB, N/V, abd pain, headache, or lightheadedness. Reports some R groin pain. Tolerating PO.     VITAL SIGNS:  T(F): 98.4 (11-15-23 @ 05:19)  HR: 69 (11-15-23 @ 07:00)  BP: 137/60 (11-15-23 @ 07:00)  RR: 16 (11-15-23 @ 07:00)  SpO2: 95% (11-15-23 @ 07:00)  Wt(kg): --    PHYSICAL EXAM  GENERAL: NAD, lying in bed comfortably  HEAD:  Atraumatic, normocephalic  EYES: EOMI, conjunctiva and sclera clear  ENT: Moist mucous membranes  NECK: Supple, no JVD  HEART: Regular rate and rhythm, no murmurs, rubs, or gallops  LUNGS: Unlabored respirations.  Clear to auscultation bilaterally, no crackles, wheezing, or rhonchi  ABDOMEN: Soft, nontender, nondistended, +BS, R femoral sheath site c/d/i  EXTREMITIES: 2+ peripheral pulses bilaterally. No clubbing, cyanosis, or edema  NERVOUS SYSTEM:  A&Ox3, no focal deficits   SKIN: No rashes or lesions    MEDICATIONS  (STANDING):  aspirin  chewable 81 milliGRAM(s) Oral daily  atorvastatin 80 milliGRAM(s) Oral at bedtime  chlorhexidine 2% Cloths 1 Application(s) Topical <User Schedule>  clopidogrel Tablet 75 milliGRAM(s) Oral daily  dextrose 5%. 1000 milliLiter(s) (50 mL/Hr) IV Continuous <Continuous>  dextrose 5%. 1000 milliLiter(s) (100 mL/Hr) IV Continuous <Continuous>  dextrose 50% Injectable 25 Gram(s) IV Push once  dextrose 50% Injectable 25 Gram(s) IV Push once  dextrose 50% Injectable 12.5 Gram(s) IV Push once  glucagon  Injectable 1 milliGRAM(s) IntraMuscular once  heparin   Injectable 5000 Unit(s) SubCutaneous every 8 hours  insulin lispro (ADMELOG) corrective regimen sliding scale   SubCutaneous Before meals and at bedtime  isosorbide   mononitrate ER Tablet (IMDUR) 30 milliGRAM(s) Oral daily  levothyroxine 75 MICROGram(s) Oral daily  metoprolol tartrate 12.5 milliGRAM(s) Oral every 12 hours  montelukast 10 milliGRAM(s) Oral daily  pantoprazole    Tablet 40 milliGRAM(s) Oral before breakfast  polyethylene glycol 3350 17 Gram(s) Oral daily  senna 2 Tablet(s) Oral at bedtime  sodium chloride 0.9% lock flush 3 milliLiter(s) IV Push every 8 hours    MEDICATIONS  (PRN):  acetaminophen     Tablet .. 650 milliGRAM(s) Oral every 6 hours PRN Temp greater or equal to 38C (100.4F), Mild Pain (1 - 3)  dextrose Oral Gel 15 Gram(s) Oral once PRN Blood Glucose LESS THAN 70 milliGRAM(s)/deciliter      Allergies    No Known Allergies    Intolerances        LABS:                        8.9    11.01 )-----------( 251      ( 15 Nov 2023 05:30 )             28.4     11-15    137  |  104  |  20  ----------------------------<  161<H>  5.1   |  23  |  1.45<H>    Ca    8.8      15 Nov 2023 06:50  Phos  4.4     11-15  Mg     2.4     11-15    TPro  5.9<L>  /  Alb  3.2<L>  /  TBili  0.4  /  DBili  x   /  AST  30  /  ALT  110<H>  /  AlkPhos  99  11-15    PT/INR - ( 15 Nov 2023 05:30 )   PT: 11.4 sec;   INR: 1.00          PTT - ( 15 Nov 2023 05:30 )  PTT:24.1 sec  Urinalysis Basic - ( 15 Nov 2023 06:50 )    Color: x / Appearance: x / SG: x / pH: x  Gluc: 161 mg/dL / Ketone: x  / Bili: x / Urobili: x   Blood: x / Protein: x / Nitrite: x   Leuk Esterase: x / RBC: x / WBC x   Sq Epi: x / Non Sq Epi: x / Bacteria: x          MICROBIOLOGY      RADIOLOGY & ADDITIONAL TESTS:  Reviewed

## 2023-11-15 NOTE — PROGRESS NOTE ADULT - ASSESSMENT
77F with PMHx HTN, HLD, DM Type II, CKD stage III (Cr 1.35 on admission, Baseline Cr 1-1.14 based on prior labs 7/2017), AOCD/AMANDA (Baseline Hgb 8.3-9.5 based on prior labs 7/2017), Asthma, GERD, hypothyroidism, glaucoma, gallstone pancreatitis presented to [facility] c/o C/P and SOB found to have NSTEMI with 3V CAD transferred to CTS for CABG workup but noted to have severe calcifications of the aorta on CT scan. S/p Impella assisted complex PCI with Rotoblator. Patient admitted to CCU for post-cath observation. Stable for step down to cardiac telemetry.     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 S/p Impella assisted complex PCI with Rotoblator.   Home meds: ASA 81, atorvastatin 10  TTE 11/13: Normal L/RV size and systolic function w/o wall motion abnormalities, mild MR, mild-mod TR, pHTN w/ PASP 44mmHg, no pericard effusion  Carotid US: Mild b/l opaque w/o hemodynamic stenosis  - F/u cath report ( got 3 stents, lt Main LAD, p Circ)   - C/w ASA  - C/w plavix 75  - c/w atorvastatin 80mg q24h    #HLD  Home med: atorvastatin 10  - c/w atorvastatin 80mg q24h    #HTN  Home meds: HCTZ-Lisinopril 12.5-10  - C/w Imdur 30 QD  - stop lopressor 12.5 BID, start toprol XL 25 mg q24h    GI  #GERD  -C/w Protonix   - Bowel regimen: Miralax, senna   - PO diet    #Transaminitis  AST//405 on admission. Downtrending.   - hep panel negative  - monitor CMP    RENAL/  #KEN on possible CKD  Crt on admission 1.38 -> 1.63 -> 1.52  - trend serum Cr  - Monitor I/Os  - renally dose meds, avoid nephrotoxic agents    #Hyperkalemia  Potassium to 5.1. Given lokelma x1. Likely 2/2 to KEN on CKD.   - monitor CMP    ENDO  #Hypothyroidism   TSH nml 11/12  Home med: Levothyroxine 75 QD  - C/w home meds    #DM  A1C 6.9 11/13  - modISS  - consider transition to basal bolus    HEME  #Anemia likely AOCD vs AMANDA  - S/P 1 unit PRBC during the PCI procedure  - F/u repeat iron studies  - F/u LDH, haptoglobin, retic count  - Transfuse if hgb <7  - Maintain active T&S    ID  #Leukocytosis  Pt with leukocytosis, likely reactive 2/2 procedure. Downtrending.   - trend CBC  - monitor for signs of infection    PROPHYLAXIS   F: None  E: K>4, Mg>2  N: DASH diet  D: Hep gtt  Gi: Protonix  Dispo: cardiac telemetry    FULL CODE     77F with PMHx HTN, HLD, DM Type II, CKD stage III (Cr 1.35 on admission, Baseline Cr 1-1.14 based on prior labs 7/2017), AOCD/AMANDA (Baseline Hgb 8.3-9.5 based on prior labs 7/2017), Asthma, GERD, hypothyroidism, glaucoma, gallstone pancreatitis presented to [facility] c/o C/P and SOB found to have NSTEMI with 3V CAD transferred to CTS for CABG workup but noted to have severe calcifications of the aorta on CT scan. S/p Impella assisted complex PCI with Rotoblator. Patient admitted to CCU for post-cath observation. Stable for step down to cardiac telemetry.     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 S/p Impella assisted complex PCI with Rotoblator.   Home meds: ASA 81, atorvastatin 10  TTE 11/13: Normal L/RV size and systolic function w/o wall motion abnormalities, mild MR, mild-mod TR, pHTN w/ PASP 44mmHg, no pericard effusion  Carotid US: Mild b/l opaque w/o hemodynamic stenosis  - F/u cath report ( got 3 stents, lt Main LAD, p Circ)   - C/w ASA  - C/w plavix 75  - c/w atorvastatin 80mg q24h    #HLD  Home med: atorvastatin 10  - c/w atorvastatin 80mg q24h    #HTN  Home meds: HCTZ-Lisinopril 12.5-10  - C/w Imdur 30 QD  - stop lopressor 12.5 BID, start toprol XL 25 mg q24h  - started on amlodipine 10 mg q24h for better BP control    GI  #GERD  -C/w Protonix   - Bowel regimen: Miralax, senna   - PO diet    #Transaminitis  AST//405 on admission. Downtrending.   - hep panel negative  - monitor CMP    RENAL/  #KEN on possible CKD  Crt on admission 1.38 -> 1.63 -> 1.52  - trend serum Cr  - Monitor I/Os  - renally dose meds, avoid nephrotoxic agents    #Hyperkalemia  Potassium to 5.1. Given lokelma x1. Likely 2/2 to KEN on CKD.   - monitor CMP    ENDO  #Hypothyroidism   TSH nml 11/12  Home med: Levothyroxine 75 QD  - C/w home meds    #DM  A1C 6.9 11/13  - modISS  - consider transition to basal bolus    HEME  #Anemia likely AOCD vs AMANDA  - S/P 1 unit PRBC during the PCI procedure  - F/u repeat iron studies  - F/u LDH, haptoglobin, retic count  - Transfuse if hgb <7  - Maintain active T&S    ID  #Leukocytosis  Pt with leukocytosis, likely reactive 2/2 procedure. Downtrending.   - trend CBC  - monitor for signs of infection    PROPHYLAXIS   F: None  E: K>4, Mg>2  N: DASH diet  D: Hep gtt  Gi: Protonix  Dispo: cardiac telemetry    FULL CODE

## 2023-11-15 NOTE — PROGRESS NOTE ADULT - PROBLEM SELECTOR PLAN 9
Pt with leukocytosis, likely reactive 2/2 procedure. Downtrending.   - trend CBC  - monitor for signs of infection

## 2023-11-15 NOTE — PROGRESS NOTE ADULT - ATTENDING COMMENTS
77F w/ pmh of HTN, HLD, DM, CKD initially presented to [facility] w/ NSTEMI s/p diagnostic LHC w/ 3VD, tx'd to [facility] for CTS eval, deemed poor candidate for CABG, subsequently underwent impella-assisted PCI, tx'd to CCU for further mgmt    -CAD - s/p diagnostic LHC at [facility] w/ 3VD, now s/p repeat LHC on 11/14 w/ impella assistance - received EMILIE to dLM(90%), EMILIE to oLAD 95% and EMILIE to oLCx (90%); currently CP free, HD/E stable; c/w ASA/Plavix statin ; Imdur 30 daily; Start Toprol 25 daily  -HTN - Start Toprol 25 daily, c/w Imdur 30 daily and restart home Norvasc 10 daily; currently holding home Lisinopril-HCTZ given CKD and recent contrast load  -DASH diet  -OOB to chair / PT eval  -Full Code  -Dispo: SD to Cardiac Tele    Romario Martinez MD  CCU Attending

## 2023-11-15 NOTE — PROGRESS NOTE ADULT - PROBLEM SELECTOR PLAN 4
Home meds: HCTZ-Lisinopril 12.5-10  - C/w Imdur 30 QD  - stop lopressor 12.5 BID, start toprol XL 25 mg q24h  - started on amlodipine 10 mg q24h for better BP control Home meds: HCTZ-Lisinopril 12.5-10  - Increase Imdur 30 mg to 60 mg QD  - stop lopressor 12.5 BID, start toprol XL 25 mg QD  - started on amlodipine 10 mg q24h for better BP control

## 2023-11-15 NOTE — PROGRESS NOTE ADULT - PROBLEM SELECTOR PLAN 1
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 S/p Impella assisted complex PCI with Rotoblator.   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 S/p Impella assisted complex PCI with Rotoblator.   Home meds: ASA 81, atorvastatin 10  TTE 11/13: Normal L/RV size and systolic function w/o wall motion abnormalities, mild MR, mild-mod TR, pHTN w/ PASP 44mmHg, no pericard effusion  Carotid US: Mild b/l opaque w/o hemodynamic stenosis  - F/u cath report ( got 3 stents, lt Main LAD, p Circ)   - C/w ASA 81 mg QD  - C/w plavix 75 mg QD  - c/w atorvastatin 80mg QHS

## 2023-11-15 NOTE — PROGRESS NOTE ADULT - PROBLEM SELECTOR PLAN 7
likely AOCD vs AMANDA  - S/P 1 unit PRBC during the PCI procedure  - F/u repeat iron studies  - F/u LDH, haptoglobin, retic count  - Transfuse if hgb <7  - Maintain active T&S

## 2023-11-15 NOTE — PROGRESS NOTE ADULT - NSPROGADDITIONALINFOA_GEN_ALL_CORE
PROPHYLAXIS   F: None  E: K>4, Mg>2  N: DASH diet  D: Hep gtt  Gi: Protonix  Dispo: cardiac telemetrygerd  FULL CODE PROPHYLAXIS   F: None  E: K>4, Mg>2  N: DASH diet  D: Hep gtt  Gi: Protonix  Dispo: cardiac telemetry  FULL CODE

## 2023-11-15 NOTE — PROGRESS NOTE ADULT - ASSESSMENT
77F with PMHx HTN, HLD, DM Type II, CKD stage III (Cr 1.35 on admission, Baseline Cr 1-1.14 based on prior labs 7/2017), AOCD/AMANDA (Baseline Hgb 8.3-9.5 based on prior labs 7/2017), Asthma, GERD, hypothyroidism, glaucoma, gallstone pancreatitis presented to [facility] c/o C/P and SOB found to have NSTEMI with 3V CAD transferred to CTS for CABG workup but noted to have severe calcifications of the aorta on CT scan. S/p Impella assisted complex PCI with Rotoblator. Patient admitted to CCU for post-cath observation. Stable for step down to cardiac telemetry.

## 2023-11-15 NOTE — PROGRESS NOTE ADULT - PROBLEM SELECTOR PLAN 8
Crt on admission 1.38 -> 1.63 -> 1.52  - trend serum Cr  - Monitor I/Os  - renally dose meds, avoid nephrotoxic agents

## 2023-11-15 NOTE — PROGRESS NOTE ADULT - SUBJECTIVE AND OBJECTIVE BOX
*****TRANSFER FROM CCU TO CARDIAC TELE******  Hospital course: 77F with PMHx HTN, HLD, DM Type II, CKD stage III (Cr 1.35 on admission, Baseline Cr 1-1.14 based on prior labs 7/2017), AOCD/AMANDA (Baseline Hgb 8.3-9.5), Asthma, GERD, hypothyroidism, glaucoma, gallstone pancreatitis originally BIBEMS to [facility] with c/o chest pain and SOB found to have NSTEMI, cardiac cath revealed 3V CAD (dLM 80%, oLAD 85%, D2 75%, oLCX 85%, mRCA 75%). Hosp course c/b transaminitis, Hepatitis Panel negative, now downtrending. Limited Abdominal U/S 11/11/23 revealed enlarged fatty liver and common bile duct dilatation (stable  postcholecystectomy compared to CT scan 4/8/23). Transferred to [facility] on 11/12/23 for CABG work-up. Heparin drip continued for NSTEMI and Troponin T Hs peaked at 87 and trended down. During pre-op work-up patient noted to have severe calcifications of the aorta on CT scan and therefore is not a surgical candidate and is for Impella assisted complex PCI with Rotoblator. Patient admitted to CCU for post-cath observation. Found to be hyperkalemic, given lokelma x1. Resumed BP meds. Stable for step down to cardiac telemetry.     OVERNIGHT EVENTS: Removed R femoral venous sheath w. normal groin check.   SUBJECTIVE: Patient was seen and examined at bedside. Feeling well during visit, denies CP, SOB, N/V, abd pain, headache, or lightheadedness. Tolerating PO.     VITAL SIGNS:  T(F): 98.4 (11-15-23 @ 05:19)  HR: 69 (11-15-23 @ 07:00)  BP: 137/60 (11-15-23 @ 07:00)  RR: 16 (11-15-23 @ 07:00)  SpO2: 95% (11-15-23 @ 07:00)  Wt(kg): --    PHYSICAL EXAM  GENERAL: NAD, lying in bed comfortably  HEAD:  Atraumatic, normocephalic  EYES: EOMI, conjunctiva and sclera clear  ENT: Moist mucous membranes  NECK: Supple, no JVD  HEART: Regular rate and rhythm, no murmurs, rubs, or gallops  LUNGS: Unlabored respirations.  Clear to auscultation bilaterally, no crackles, wheezing, or rhonchi  ABDOMEN: Soft, nontender, nondistended, +BS, R femoral sheath site c/d/i  EXTREMITIES: 2+ peripheral pulses bilaterally. No clubbing, cyanosis, or edema  NERVOUS SYSTEM:  A&Ox3, no focal deficits   SKIN: No rashes or lesions    MEDICATIONS  (STANDING):  amLODIPine   Tablet 10 milliGRAM(s) Oral every 24 hours  aspirin  chewable 81 milliGRAM(s) Oral daily  atorvastatin 80 milliGRAM(s) Oral at bedtime  chlorhexidine 2% Cloths 1 Application(s) Topical <User Schedule>  clopidogrel Tablet 75 milliGRAM(s) Oral daily  dextrose 5%. 1000 milliLiter(s) (100 mL/Hr) IV Continuous <Continuous>  dextrose 5%. 1000 milliLiter(s) (50 mL/Hr) IV Continuous <Continuous>  dextrose 50% Injectable 25 Gram(s) IV Push once  dextrose 50% Injectable 12.5 Gram(s) IV Push once  dextrose 50% Injectable 25 Gram(s) IV Push once  glucagon  Injectable 1 milliGRAM(s) IntraMuscular once  heparin   Injectable 5000 Unit(s) SubCutaneous every 8 hours  insulin lispro (ADMELOG) corrective regimen sliding scale   SubCutaneous Before meals and at bedtime  isosorbide   mononitrate ER Tablet (IMDUR) 30 milliGRAM(s) Oral daily  levothyroxine 75 MICROGram(s) Oral daily  metoprolol succinate ER 25 milliGRAM(s) Oral every 24 hours  montelukast 10 milliGRAM(s) Oral daily  pantoprazole    Tablet 40 milliGRAM(s) Oral before breakfast  polyethylene glycol 3350 17 Gram(s) Oral daily  senna 2 Tablet(s) Oral at bedtime  sodium chloride 0.9% lock flush 3 milliLiter(s) IV Push every 8 hours    MEDICATIONS  (PRN):  acetaminophen     Tablet .. 650 milliGRAM(s) Oral every 6 hours PRN Temp greater or equal to 38C (100.4F), Mild Pain (1 - 3)  dextrose Oral Gel 15 Gram(s) Oral once PRN Blood Glucose LESS THAN 70 milliGRAM(s)/deciliter        Allergies    No Known Allergies    Intolerances        LABS:                                   8.9    11.01 )-----------( 251      ( 15 Nov 2023 05:30 )             28.4     11-15    137  |  104  |  20  ----------------------------<  161<H>  5.1   |  23  |  1.45<H>    Ca    8.8      15 Nov 2023 06:50  Phos  4.4     11-15  Mg     2.4     11-15    TPro  5.9<L>  /  Alb  3.2<L>  /  TBili  0.4  /  DBili  x   /  AST  30  /  ALT  110<H>  /  AlkPhos  99  11-15            Urinalysis Basic - ( 15 Nov 2023 06:50 )    Color: x / Appearance: x / SG: x / pH: x  Gluc: 161 mg/dL / Ketone: x  / Bili: x / Urobili: x   Blood: x / Protein: x / Nitrite: x   Leuk Esterase: x / RBC: x / WBC x   Sq Epi: x / Non Sq Epi: x / Bacteria: x          MICROBIOLOGY      RADIOLOGY & ADDITIONAL TESTS:  Reviewed

## 2023-11-15 NOTE — PROGRESS NOTE ADULT - PROBLEM SELECTOR PLAN 5
Home med: atorvastatin 10  - c/w atorvastatin 80mg q24h Home med: atorvastatin 10  - c/w atorvastatin 80mg QHS

## 2023-11-15 NOTE — PROGRESS NOTE ADULT - PROBLEM SELECTOR PLAN 10
CT chest noncon 11/12/23 w/o acute pathology   - C/w montelukast  - Hold albuterol   - Incentive genoveva CT chest noncon 11/12/23 w/o acute pathology   - C/w montelukast  - Hold albuterol   - Incentive spirometry

## 2023-11-16 DIAGNOSIS — R50.9 FEVER, UNSPECIFIED: ICD-10-CM

## 2023-11-16 LAB
ALBUMIN SERPL ELPH-MCNC: 3.2 G/DL — LOW (ref 3.3–5)
ALBUMIN SERPL ELPH-MCNC: 3.2 G/DL — LOW (ref 3.3–5)
ALP SERPL-CCNC: 92 U/L — SIGNIFICANT CHANGE UP (ref 40–120)
ALP SERPL-CCNC: 92 U/L — SIGNIFICANT CHANGE UP (ref 40–120)
ALT FLD-CCNC: 76 U/L — HIGH (ref 10–45)
ALT FLD-CCNC: 76 U/L — HIGH (ref 10–45)
ANION GAP SERPL CALC-SCNC: 11 MMOL/L — SIGNIFICANT CHANGE UP (ref 5–17)
ANION GAP SERPL CALC-SCNC: 11 MMOL/L — SIGNIFICANT CHANGE UP (ref 5–17)
AST SERPL-CCNC: 21 U/L — SIGNIFICANT CHANGE UP (ref 10–40)
AST SERPL-CCNC: 21 U/L — SIGNIFICANT CHANGE UP (ref 10–40)
BASOPHILS # BLD AUTO: 0.03 K/UL — SIGNIFICANT CHANGE UP (ref 0–0.2)
BASOPHILS # BLD AUTO: 0.03 K/UL — SIGNIFICANT CHANGE UP (ref 0–0.2)
BASOPHILS NFR BLD AUTO: 0.2 % — SIGNIFICANT CHANGE UP (ref 0–2)
BASOPHILS NFR BLD AUTO: 0.2 % — SIGNIFICANT CHANGE UP (ref 0–2)
BILIRUB SERPL-MCNC: 0.4 MG/DL — SIGNIFICANT CHANGE UP (ref 0.2–1.2)
BILIRUB SERPL-MCNC: 0.4 MG/DL — SIGNIFICANT CHANGE UP (ref 0.2–1.2)
BLD GP AB SCN SERPL QL: NEGATIVE — SIGNIFICANT CHANGE UP
BLD GP AB SCN SERPL QL: NEGATIVE — SIGNIFICANT CHANGE UP
BUN SERPL-MCNC: 19 MG/DL — SIGNIFICANT CHANGE UP (ref 7–23)
BUN SERPL-MCNC: 19 MG/DL — SIGNIFICANT CHANGE UP (ref 7–23)
CALCIUM SERPL-MCNC: 8.8 MG/DL — SIGNIFICANT CHANGE UP (ref 8.4–10.5)
CALCIUM SERPL-MCNC: 8.8 MG/DL — SIGNIFICANT CHANGE UP (ref 8.4–10.5)
CHLORIDE SERPL-SCNC: 102 MMOL/L — SIGNIFICANT CHANGE UP (ref 96–108)
CHLORIDE SERPL-SCNC: 102 MMOL/L — SIGNIFICANT CHANGE UP (ref 96–108)
CO2 SERPL-SCNC: 21 MMOL/L — LOW (ref 22–31)
CO2 SERPL-SCNC: 21 MMOL/L — LOW (ref 22–31)
CREAT SERPL-MCNC: 1.55 MG/DL — HIGH (ref 0.5–1.3)
CREAT SERPL-MCNC: 1.55 MG/DL — HIGH (ref 0.5–1.3)
EGFR: 35 ML/MIN/1.73M2 — LOW
EGFR: 35 ML/MIN/1.73M2 — LOW
EOSINOPHIL # BLD AUTO: 1.25 K/UL — HIGH (ref 0–0.5)
EOSINOPHIL # BLD AUTO: 1.25 K/UL — HIGH (ref 0–0.5)
EOSINOPHIL NFR BLD AUTO: 10.2 % — HIGH (ref 0–6)
EOSINOPHIL NFR BLD AUTO: 10.2 % — HIGH (ref 0–6)
FERRITIN SERPL-MCNC: 57 NG/ML — SIGNIFICANT CHANGE UP (ref 13–330)
FERRITIN SERPL-MCNC: 57 NG/ML — SIGNIFICANT CHANGE UP (ref 13–330)
GLUCOSE BLDC GLUCOMTR-MCNC: 181 MG/DL — HIGH (ref 70–99)
GLUCOSE BLDC GLUCOMTR-MCNC: 181 MG/DL — HIGH (ref 70–99)
GLUCOSE BLDC GLUCOMTR-MCNC: 199 MG/DL — HIGH (ref 70–99)
GLUCOSE BLDC GLUCOMTR-MCNC: 199 MG/DL — HIGH (ref 70–99)
GLUCOSE BLDC GLUCOMTR-MCNC: 225 MG/DL — HIGH (ref 70–99)
GLUCOSE BLDC GLUCOMTR-MCNC: 225 MG/DL — HIGH (ref 70–99)
GLUCOSE SERPL-MCNC: 165 MG/DL — HIGH (ref 70–99)
GLUCOSE SERPL-MCNC: 165 MG/DL — HIGH (ref 70–99)
HAPTOGLOB SERPL-MCNC: 270 MG/DL — HIGH (ref 34–200)
HAPTOGLOB SERPL-MCNC: 270 MG/DL — HIGH (ref 34–200)
HCT VFR BLD CALC: 26.8 % — LOW (ref 34.5–45)
HCT VFR BLD CALC: 26.8 % — LOW (ref 34.5–45)
HGB BLD-MCNC: 8.4 G/DL — LOW (ref 11.5–15.5)
HGB BLD-MCNC: 8.4 G/DL — LOW (ref 11.5–15.5)
IMM GRANULOCYTES NFR BLD AUTO: 1.1 % — HIGH (ref 0–0.9)
IMM GRANULOCYTES NFR BLD AUTO: 1.1 % — HIGH (ref 0–0.9)
IRON SATN MFR SERPL: 32 UG/DL — SIGNIFICANT CHANGE UP (ref 30–160)
IRON SATN MFR SERPL: 32 UG/DL — SIGNIFICANT CHANGE UP (ref 30–160)
IRON SATN MFR SERPL: 9 % — LOW (ref 14–50)
IRON SATN MFR SERPL: 9 % — LOW (ref 14–50)
LDH SERPL L TO P-CCNC: 240 U/L — SIGNIFICANT CHANGE UP (ref 50–242)
LDH SERPL L TO P-CCNC: 240 U/L — SIGNIFICANT CHANGE UP (ref 50–242)
LYMPHOCYTES # BLD AUTO: 2.77 K/UL — SIGNIFICANT CHANGE UP (ref 1–3.3)
LYMPHOCYTES # BLD AUTO: 2.77 K/UL — SIGNIFICANT CHANGE UP (ref 1–3.3)
LYMPHOCYTES # BLD AUTO: 22.5 % — SIGNIFICANT CHANGE UP (ref 13–44)
LYMPHOCYTES # BLD AUTO: 22.5 % — SIGNIFICANT CHANGE UP (ref 13–44)
MAGNESIUM SERPL-MCNC: 2.1 MG/DL — SIGNIFICANT CHANGE UP (ref 1.6–2.6)
MAGNESIUM SERPL-MCNC: 2.1 MG/DL — SIGNIFICANT CHANGE UP (ref 1.6–2.6)
MCHC RBC-ENTMCNC: 26.5 PG — LOW (ref 27–34)
MCHC RBC-ENTMCNC: 26.5 PG — LOW (ref 27–34)
MCHC RBC-ENTMCNC: 31.3 GM/DL — LOW (ref 32–36)
MCHC RBC-ENTMCNC: 31.3 GM/DL — LOW (ref 32–36)
MCV RBC AUTO: 84.5 FL — SIGNIFICANT CHANGE UP (ref 80–100)
MCV RBC AUTO: 84.5 FL — SIGNIFICANT CHANGE UP (ref 80–100)
MONOCYTES # BLD AUTO: 1.09 K/UL — HIGH (ref 0–0.9)
MONOCYTES # BLD AUTO: 1.09 K/UL — HIGH (ref 0–0.9)
MONOCYTES NFR BLD AUTO: 8.9 % — SIGNIFICANT CHANGE UP (ref 2–14)
MONOCYTES NFR BLD AUTO: 8.9 % — SIGNIFICANT CHANGE UP (ref 2–14)
NEUTROPHILS # BLD AUTO: 7.03 K/UL — SIGNIFICANT CHANGE UP (ref 1.8–7.4)
NEUTROPHILS # BLD AUTO: 7.03 K/UL — SIGNIFICANT CHANGE UP (ref 1.8–7.4)
NEUTROPHILS NFR BLD AUTO: 57.1 % — SIGNIFICANT CHANGE UP (ref 43–77)
NEUTROPHILS NFR BLD AUTO: 57.1 % — SIGNIFICANT CHANGE UP (ref 43–77)
NRBC # BLD: 0 /100 WBCS — SIGNIFICANT CHANGE UP (ref 0–0)
NRBC # BLD: 0 /100 WBCS — SIGNIFICANT CHANGE UP (ref 0–0)
PHOSPHATE SERPL-MCNC: 4.3 MG/DL — SIGNIFICANT CHANGE UP (ref 2.5–4.5)
PHOSPHATE SERPL-MCNC: 4.3 MG/DL — SIGNIFICANT CHANGE UP (ref 2.5–4.5)
PLATELET # BLD AUTO: 240 K/UL — SIGNIFICANT CHANGE UP (ref 150–400)
PLATELET # BLD AUTO: 240 K/UL — SIGNIFICANT CHANGE UP (ref 150–400)
POTASSIUM SERPL-MCNC: 4.6 MMOL/L — SIGNIFICANT CHANGE UP (ref 3.5–5.3)
POTASSIUM SERPL-MCNC: 4.6 MMOL/L — SIGNIFICANT CHANGE UP (ref 3.5–5.3)
POTASSIUM SERPL-SCNC: 4.6 MMOL/L — SIGNIFICANT CHANGE UP (ref 3.5–5.3)
POTASSIUM SERPL-SCNC: 4.6 MMOL/L — SIGNIFICANT CHANGE UP (ref 3.5–5.3)
PROCALCITONIN SERPL-MCNC: 0.15 NG/ML — HIGH (ref 0.02–0.1)
PROCALCITONIN SERPL-MCNC: 0.15 NG/ML — HIGH (ref 0.02–0.1)
PROT SERPL-MCNC: 6.1 G/DL — SIGNIFICANT CHANGE UP (ref 6–8.3)
PROT SERPL-MCNC: 6.1 G/DL — SIGNIFICANT CHANGE UP (ref 6–8.3)
RAPID RVP RESULT: SIGNIFICANT CHANGE UP
RAPID RVP RESULT: SIGNIFICANT CHANGE UP
RBC # BLD: 3.17 M/UL — LOW (ref 3.8–5.2)
RBC # FLD: 16.3 % — HIGH (ref 10.3–14.5)
RBC # FLD: 16.3 % — HIGH (ref 10.3–14.5)
RETICS #: 132.2 K/UL — HIGH (ref 25–125)
RETICS #: 132.2 K/UL — HIGH (ref 25–125)
RETICS/RBC NFR: 4.2 % — HIGH (ref 0.5–2.5)
RETICS/RBC NFR: 4.2 % — HIGH (ref 0.5–2.5)
RH IG SCN BLD-IMP: NEGATIVE — SIGNIFICANT CHANGE UP
RH IG SCN BLD-IMP: NEGATIVE — SIGNIFICANT CHANGE UP
SARS-COV-2 RNA SPEC QL NAA+PROBE: SIGNIFICANT CHANGE UP
SARS-COV-2 RNA SPEC QL NAA+PROBE: SIGNIFICANT CHANGE UP
SODIUM SERPL-SCNC: 134 MMOL/L — LOW (ref 135–145)
SODIUM SERPL-SCNC: 134 MMOL/L — LOW (ref 135–145)
TIBC SERPL-MCNC: 352 UG/DL — SIGNIFICANT CHANGE UP (ref 220–430)
TIBC SERPL-MCNC: 352 UG/DL — SIGNIFICANT CHANGE UP (ref 220–430)
UIBC SERPL-MCNC: 320 UG/DL — SIGNIFICANT CHANGE UP (ref 110–370)
UIBC SERPL-MCNC: 320 UG/DL — SIGNIFICANT CHANGE UP (ref 110–370)
WBC # BLD: 12.3 K/UL — HIGH (ref 3.8–10.5)
WBC # BLD: 12.3 K/UL — HIGH (ref 3.8–10.5)
WBC # FLD AUTO: 12.3 K/UL — HIGH (ref 3.8–10.5)
WBC # FLD AUTO: 12.3 K/UL — HIGH (ref 3.8–10.5)

## 2023-11-16 PROCEDURE — 99233 SBSQ HOSP IP/OBS HIGH 50: CPT

## 2023-11-16 PROCEDURE — 99222 1ST HOSP IP/OBS MODERATE 55: CPT

## 2023-11-16 RX ADMIN — SODIUM CHLORIDE 3 MILLILITER(S): 9 INJECTION INTRAMUSCULAR; INTRAVENOUS; SUBCUTANEOUS at 13:57

## 2023-11-16 RX ADMIN — MONTELUKAST 10 MILLIGRAM(S): 4 TABLET, CHEWABLE ORAL at 00:35

## 2023-11-16 RX ADMIN — Medication 75 MICROGRAM(S): at 05:53

## 2023-11-16 RX ADMIN — Medication 2: at 06:36

## 2023-11-16 RX ADMIN — SENNA PLUS 2 TABLET(S): 8.6 TABLET ORAL at 21:42

## 2023-11-16 RX ADMIN — SODIUM CHLORIDE 3 MILLILITER(S): 9 INJECTION INTRAMUSCULAR; INTRAVENOUS; SUBCUTANEOUS at 21:53

## 2023-11-16 RX ADMIN — AMLODIPINE BESYLATE 10 MILLIGRAM(S): 2.5 TABLET ORAL at 15:56

## 2023-11-16 RX ADMIN — HEPARIN SODIUM 5000 UNIT(S): 5000 INJECTION INTRAVENOUS; SUBCUTANEOUS at 14:05

## 2023-11-16 RX ADMIN — SODIUM CHLORIDE 3 MILLILITER(S): 9 INJECTION INTRAMUSCULAR; INTRAVENOUS; SUBCUTANEOUS at 06:59

## 2023-11-16 RX ADMIN — Medication 2: at 16:21

## 2023-11-16 RX ADMIN — CHLORHEXIDINE GLUCONATE 1 APPLICATION(S): 213 SOLUTION TOPICAL at 05:53

## 2023-11-16 RX ADMIN — PANTOPRAZOLE SODIUM 40 MILLIGRAM(S): 20 TABLET, DELAYED RELEASE ORAL at 06:20

## 2023-11-16 RX ADMIN — HEPARIN SODIUM 5000 UNIT(S): 5000 INJECTION INTRAVENOUS; SUBCUTANEOUS at 21:41

## 2023-11-16 RX ADMIN — Medication 650 MILLIGRAM(S): at 15:55

## 2023-11-16 RX ADMIN — ATORVASTATIN CALCIUM 80 MILLIGRAM(S): 80 TABLET, FILM COATED ORAL at 21:42

## 2023-11-16 RX ADMIN — ISOSORBIDE MONONITRATE 60 MILLIGRAM(S): 60 TABLET, EXTENDED RELEASE ORAL at 12:11

## 2023-11-16 RX ADMIN — Medication 650 MILLIGRAM(S): at 17:44

## 2023-11-16 RX ADMIN — Medication 4: at 21:41

## 2023-11-16 RX ADMIN — HEPARIN SODIUM 5000 UNIT(S): 5000 INJECTION INTRAVENOUS; SUBCUTANEOUS at 05:53

## 2023-11-16 NOTE — PROGRESS NOTE ADULT - PROBLEM SELECTOR PLAN 11
-C/w Protonix   - Bowel regimen: Miralax, senna   - PO diet CT chest noncon 11/12/23 w/o acute pathology   - C/w montelukast  - Hold albuterol   - Incentive spirometry

## 2023-11-16 NOTE — PROGRESS NOTE ADULT - PROBLEM SELECTOR PLAN 8
Crt on admission 1.38 -> 1.63 -> 1.52  - trend serum Cr  - Monitor I/Os  - renally dose meds, avoid nephrotoxic agents likely AOCD vs AMANDA  - S/P 1 unit PRBC during the PCI procedure  - F/u repeat iron studies  - F/u LDH, haptoglobin, retic count  - Transfuse if hgb <7  - Maintain active T&S A1C 6.9 (11/13)  - modISS  - consider transition to basal bolus

## 2023-11-16 NOTE — CONSULT NOTE ADULT - ASSESSMENT
77F w/ HTN, HLD, DMII, asthma, hypothyroidism  who was originally BIBEMS to [facility] on 11/10 with c/o chest pain and SOB. ID consulted for fever. She was found to have NSTEMI and underwent cardiac cath 11/11/23 which revealed 3v CAD. Patient was then transferred to [facility] and is s/p impella assisted complex PCI with Rotoblator 11/14. Developed fever x 1 11/15. Has mild leukocytosis. Infectious w/u so far negative (UA, BCXs x 2, CXR). RVP/COVID pending collection. Patient is well appearing on exam and denies any localizing symptoms. Peripheral lines and femoral sheath site do not appear infected. Patient has been afebrile today without tylenol or antibiotics. This is likely post-op fever.     Suggest:  -f/u bcxs x 2 11/15 --ngtd   -Send RVP/COVID   -monitor off of antibiotics     Team 2 will follow you.    Case d/w primary team.  Final recommendation pending attending note.    Kathryn Quispe, Infectious Diseases PA  Please reach out for any questions 9 am-5pm. For evenings and weekends, please call the ID physician on call.  Work cell: [phone]

## 2023-11-16 NOTE — DIETITIAN INITIAL EVALUATION ADULT - LITERATURE/VIDEOS GIVEN
Discussed current diet regimen and importance of adequate intake in-house. Discussed ways to ensure optimal nutrition with focus on high-protein foods

## 2023-11-16 NOTE — PROGRESS NOTE ADULT - SUBJECTIVE AND OBJECTIVE BOX
OVERNIGHT EVENTS:     SUBJECTIVE: Patient was seen and examined at bedside. Feeling well during visit, denies CP, SOB, N/V, abd pain, headache, or lightheadedness. Tolerating PO.     Vital Signs Last 24 Hrs  T(C): 37 (16 Nov 2023 08:38), Max: 38.4 (15 Nov 2023 17:55)  T(F): 98.6 (16 Nov 2023 08:38), Max: 101.2 (15 Nov 2023 17:55)  HR: 77 (16 Nov 2023 09:00) (71 - 86)  BP: 140/64 (16 Nov 2023 09:00) (118/52 - 159/67)  BP(mean): 92 (16 Nov 2023 09:00) (75 - 97)  ABP: --  ABP(mean): --  RR: 20 (16 Nov 2023 09:00) (16 - 24)  SpO2: 97% (16 Nov 2023 09:00) (94% - 97%)    O2 Parameters below as of 16 Nov 2023 09:00  Patient On (Oxygen Delivery Method): room air            PHYSICAL EXAM  GENERAL: NAD, lying in bed comfortably  HEAD:  Atraumatic, normocephalic  EYES: EOMI, conjunctiva and sclera clear  ENT: Moist mucous membranes  NECK: Supple, no JVD  HEART: Regular rate and rhythm, no murmurs, rubs, or gallops  LUNGS: Unlabored respirations.  Clear to auscultation bilaterally, no crackles, wheezing, or rhonchi  ABDOMEN: Soft, nontender, nondistended, +BS, R femoral sheath site c/d/i  EXTREMITIES: 2+ peripheral pulses bilaterally. No clubbing, cyanosis, or edema  NERVOUS SYSTEM:  A&Ox3, no focal deficits   SKIN: No rashes or lesions    MEDICATIONS  (STANDING):  amLODIPine   Tablet 10 milliGRAM(s) Oral every 24 hours  aspirin  chewable 81 milliGRAM(s) Oral daily  atorvastatin 80 milliGRAM(s) Oral at bedtime  chlorhexidine 2% Cloths 1 Application(s) Topical <User Schedule>  clopidogrel Tablet 75 milliGRAM(s) Oral daily  dextrose 5%. 1000 milliLiter(s) (100 mL/Hr) IV Continuous <Continuous>  dextrose 5%. 1000 milliLiter(s) (50 mL/Hr) IV Continuous <Continuous>  dextrose 50% Injectable 25 Gram(s) IV Push once  dextrose 50% Injectable 12.5 Gram(s) IV Push once  dextrose 50% Injectable 25 Gram(s) IV Push once  glucagon  Injectable 1 milliGRAM(s) IntraMuscular once  heparin   Injectable 5000 Unit(s) SubCutaneous every 8 hours  insulin lispro (ADMELOG) corrective regimen sliding scale   SubCutaneous Before meals and at bedtime  isosorbide   mononitrate ER Tablet (IMDUR) 60 milliGRAM(s) Oral daily  levothyroxine 75 MICROGram(s) Oral daily  metoprolol succinate ER 25 milliGRAM(s) Oral every 24 hours  montelukast 10 milliGRAM(s) Oral daily  pantoprazole    Tablet 40 milliGRAM(s) Oral before breakfast  polyethylene glycol 3350 17 Gram(s) Oral daily  senna 2 Tablet(s) Oral at bedtime  sodium chloride 0.9% lock flush 3 milliLiter(s) IV Push every 8 hours    MEDICATIONS  (PRN):  acetaminophen     Tablet .. 650 milliGRAM(s) Oral every 6 hours PRN Temp greater or equal to 38C (100.4F), Mild Pain (1 - 3)  dextrose Oral Gel 15 Gram(s) Oral once PRN Blood Glucose LESS THAN 70 milliGRAM(s)/deciliter          Allergies    No Known Allergies    Intolerances        LABS:                                              8.4    12.30 )-----------( 240      ( 16 Nov 2023 05:30 )             26.8     11-16    134<L>  |  102  |  19  ----------------------------<  165<H>  4.6   |  21<L>  |  1.55<H>    Ca    8.8      16 Nov 2023 05:30  Phos  4.3     11-16  Mg     2.1     11-16    TPro  6.1  /  Alb  3.2<L>  /  TBili  0.4  /  DBili  x   /  AST  21  /  ALT  76<H>  /  AlkPhos  92  11-16              Urinalysis Basic - ( 15 Nov 2023 06:50 )    Color: x / Appearance: x / SG: x / pH: x  Gluc: 161 mg/dL / Ketone: x  / Bili: x / Urobili: x   Blood: x / Protein: x / Nitrite: x   Leuk Esterase: x / RBC: x / WBC x   Sq Epi: x / Non Sq Epi: x / Bacteria: x          MICROBIOLOGY      RADIOLOGY & ADDITIONAL TESTS:  Reviewed OVERNIGHT EVENTS: Yesterday evening, patient spiked a 101 F (rectal temp) with 2 reads. Infectious w/u sent which is negative to date (see further details as discussed below in plan).    SUBJECTIVE: Patient was seen and examined at bedside. She is feeling much better compared to prior days. She denies CP, SOB, N/V, abd pain, headache, or lightheadedness. Tolerating PO. Her most recent BM was yesterday morning/afternoon.    Vital Signs Last 24 Hrs  T(C): 37 (16 Nov 2023 08:38), Max: 38.4 (15 Nov 2023 17:55)  T(F): 98.6 (16 Nov 2023 08:38), Max: 101.2 (15 Nov 2023 17:55)  HR: 77 (16 Nov 2023 09:00) (71 - 86)  BP: 140/64 (16 Nov 2023 09:00) (118/52 - 159/67)  BP(mean): 92 (16 Nov 2023 09:00) (75 - 97)  ABP: --  ABP(mean): --  RR: 20 (16 Nov 2023 09:00) (16 - 24)  SpO2: 97% (16 Nov 2023 09:00) (94% - 97%)    O2 Parameters below as of 16 Nov 2023 09:00  Patient On (Oxygen Delivery Method): room air            PHYSICAL EXAM  GENERAL: NAD, resting comfortably in chair at bedside.  HEAD:  Atraumatic, normocephalic  NECK: Supple, no JVD  HEART: Regular rate and rhythm, no murmurs, rubs, or gallops  LUNGS: Unlabored respirations.  Clear to auscultation bilaterally, no crackles, wheezing, or rhonchi  ABDOMEN: Soft, nontender, nondistended, +BS, R femoral sheath site c/d/i  EXTREMITIES: 2+ peripheral pulses bilaterally. No clubbing, cyanosis, or edema  NERVOUS SYSTEM:  A&Ox3, no focal deficits   SKIN: No rashes or lesions    MEDICATIONS  (STANDING):  amLODIPine   Tablet 10 milliGRAM(s) Oral every 24 hours  aspirin  chewable 81 milliGRAM(s) Oral daily  atorvastatin 80 milliGRAM(s) Oral at bedtime  chlorhexidine 2% Cloths 1 Application(s) Topical <User Schedule>  clopidogrel Tablet 75 milliGRAM(s) Oral daily  dextrose 5%. 1000 milliLiter(s) (100 mL/Hr) IV Continuous <Continuous>  dextrose 5%. 1000 milliLiter(s) (50 mL/Hr) IV Continuous <Continuous>  dextrose 50% Injectable 25 Gram(s) IV Push once  dextrose 50% Injectable 12.5 Gram(s) IV Push once  dextrose 50% Injectable 25 Gram(s) IV Push once  glucagon  Injectable 1 milliGRAM(s) IntraMuscular once  heparin   Injectable 5000 Unit(s) SubCutaneous every 8 hours  insulin lispro (ADMELOG) corrective regimen sliding scale   SubCutaneous Before meals and at bedtime  isosorbide   mononitrate ER Tablet (IMDUR) 60 milliGRAM(s) Oral daily  levothyroxine 75 MICROGram(s) Oral daily  metoprolol succinate ER 25 milliGRAM(s) Oral every 24 hours  montelukast 10 milliGRAM(s) Oral daily  pantoprazole    Tablet 40 milliGRAM(s) Oral before breakfast  polyethylene glycol 3350 17 Gram(s) Oral daily  senna 2 Tablet(s) Oral at bedtime  sodium chloride 0.9% lock flush 3 milliLiter(s) IV Push every 8 hours    MEDICATIONS  (PRN):  acetaminophen     Tablet .. 650 milliGRAM(s) Oral every 6 hours PRN Temp greater or equal to 38C (100.4F), Mild Pain (1 - 3)  dextrose Oral Gel 15 Gram(s) Oral once PRN Blood Glucose LESS THAN 70 milliGRAM(s)/deciliter          Allergies    No Known Allergies    Intolerances        LABS:                                              8.4    12.30 )-----------( 240      ( 16 Nov 2023 05:30 )             26.8     11-16    134<L>  |  102  |  19  ----------------------------<  165<H>  4.6   |  21<L>  |  1.55<H>    Ca    8.8      16 Nov 2023 05:30  Phos  4.3     11-16  Mg     2.1     11-16    TPro  6.1  /  Alb  3.2<L>  /  TBili  0.4  /  DBili  x   /  AST  21  /  ALT  76<H>  /  AlkPhos  92  11-16              Urinalysis Basic - ( 15 Nov 2023 06:50 )    Color: x / Appearance: x / SG: x / pH: x  Gluc: 161 mg/dL / Ketone: x  / Bili: x / Urobili: x   Blood: x / Protein: x / Nitrite: x   Leuk Esterase: x / RBC: x / WBC x   Sq Epi: x / Non Sq Epi: x / Bacteria: x          MICROBIOLOGY      RADIOLOGY & ADDITIONAL TESTS:  Reviewed

## 2023-11-16 NOTE — PHYSICAL THERAPY INITIAL EVALUATION ADULT - ADDITIONAL COMMENTS
Patient lives in private home with 8/9 steps to enter, b/l railings. Tub with shower chair. R hand dominant. Per patient, she uses a cane within the home and a rollator for community ambulation. Family present at home for assist when needed.

## 2023-11-16 NOTE — CONSULT NOTE ADULT - SUBJECTIVE AND OBJECTIVE BOX
INFECTIOUS DISEASES INITIAL CONSULT NOTE    HPI:  77F w/ HTN, HLD, DMII, asthma, hypothyroidism  who was originally BIBEMS to [facility] on 11/10 with c/o chest pain and SOB. ID consulted for fever.  She was found to have NSTEMI and underwent cardiac cath 11/11/23 which revealed 3v CAD. Patient was then transferred to [facility] under the care of Dr. Gallegos for CABG work-up. She was found to not be a candidate for CABG as CT scan showed severe calcifications of aorta. She instead underwent impella assisted complex PCI with Rotoblator 11/14. She was upgraded to CCU for post cath observation- was stable so transferred to cardiac tele. Patient developed fever of 101.2 on 11/15. She is asympotmatic and states she did not feel fever (no chills, shakes or sweating). Infectious workup started: UA negative, Bcxs x 2 ngtd, CXR without obvious infiltrate. RVP/COVID pending collection. Labs notable for mild leukocytosis (w/o shift) with WBC 12 today from 11. She was not started on antibiotics. Has been afebrile today. She denies any localizing symptoms. Denies feeling ill prior to when she first presented (no recent fevers, chills, night sweats, unexpected weight loss) Denies known sick contact, recent travel or spending significant time outdoors.       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      History of cholecystectomy      History of back surgery      History of bilateral knee replacement            Review of Systems:   Constitutional, eyes, ENT, cardiovascular, respiratory, gastrointestinal, genitourinary, integumentary, neurological, psychiatric and heme/lymph are otherwise negative other than noted above       ANTIBIOTICS:  MEDICATIONS  (STANDING):  amLODIPine   Tablet 10 milliGRAM(s) Oral every 24 hours  aspirin  chewable 81 milliGRAM(s) Oral daily  atorvastatin 80 milliGRAM(s) Oral at bedtime  chlorhexidine 2% Cloths 1 Application(s) Topical <User Schedule>  clopidogrel Tablet 75 milliGRAM(s) Oral daily  dextrose 5%. 1000 milliLiter(s) (50 mL/Hr) IV Continuous <Continuous>  dextrose 5%. 1000 milliLiter(s) (100 mL/Hr) IV Continuous <Continuous>  dextrose 50% Injectable 25 Gram(s) IV Push once  dextrose 50% Injectable 12.5 Gram(s) IV Push once  dextrose 50% Injectable 25 Gram(s) IV Push once  glucagon  Injectable 1 milliGRAM(s) IntraMuscular once  heparin   Injectable 5000 Unit(s) SubCutaneous every 8 hours  insulin lispro (ADMELOG) corrective regimen sliding scale   SubCutaneous Before meals and at bedtime  isosorbide   mononitrate ER Tablet (IMDUR) 60 milliGRAM(s) Oral daily  levothyroxine 75 MICROGram(s) Oral daily  metoprolol succinate ER 25 milliGRAM(s) Oral every 24 hours  montelukast 10 milliGRAM(s) Oral daily  pantoprazole    Tablet 40 milliGRAM(s) Oral before breakfast  polyethylene glycol 3350 17 Gram(s) Oral daily  senna 2 Tablet(s) Oral at bedtime  sodium chloride 0.9% lock flush 3 milliLiter(s) IV Push every 8 hours    MEDICATIONS  (PRN):  acetaminophen     Tablet .. 650 milliGRAM(s) Oral every 6 hours PRN Temp greater or equal to 38C (100.4F), Mild Pain (1 - 3)  dextrose Oral Gel 15 Gram(s) Oral once PRN Blood Glucose LESS THAN 70 milliGRAM(s)/deciliter      Allergies    No Known Allergies    Intolerances        SOCIAL HISTORY:  -born in [facility]  -moved to  23 years ago   -lives in [place] with daughter, son in law and grandson  -retired A   -denies tobacco, alcohol or recreational drugs   -denies pets   -traveled to [place] in April this year for 10 days but otherwise no recent travel     FAMILY HISTORY:  Family history of diabetes mellitus (Mother, Sibling)    Family history of hypertension (Mother, Sibling)    Family history of blindness (Grandparent)     no FH leading to current infection    Vital Signs Last 24 Hrs  T(C): 37 (16 Nov 2023 08:38), Max: 38.4 (15 Nov 2023 17:55)  T(F): 98.6 (16 Nov 2023 08:38), Max: 101.2 (15 Nov 2023 17:55)  HR: 74 (16 Nov 2023 12:00) (71 - 86)  BP: 137/63 (16 Nov 2023 12:00) (118/52 - 143/65)  BP(mean): 90 (16 Nov 2023 12:00) (75 - 94)  RR: 20 (16 Nov 2023 12:00) (18 - 24)  SpO2: 98% (16 Nov 2023 12:00) (94% - 98%)    Parameters below as of 16 Nov 2023 12:00  Patient On (Oxygen Delivery Method): room air        11-15-23 @ 07:01  -  11-16-23 @ 07:00  --------------------------------------------------------  IN: 440 mL / OUT: 1200 mL / NET: -760 mL    11-16-23 @ 07:01  -  11-16-23 @ 14:07  --------------------------------------------------------  IN: 611 mL / OUT: 700 mL / NET: -89 mL        PHYSICAL EXAM:  Constitutional: alert, NAD  Eyes: the sclera and conjunctiva were normal. PERRLA  ENT: the ears and nose were normal in appearance.   Neck: the appearance of the neck was normal and the neck was supple.   Pulmonary: no respiratory distress and lungs were clear to auscultation bilaterally.   Heart: heart rate was normal and rhythm regular, normal S1 and S2  Vascular:. there was no peripheral edema  Abdomen: normal bowel sounds, soft, non-tender. R femoral sheath site without signs of infection   Neurological: no focal deficits.   Psychiatric: the affect was normal  Peripheral lines without signs of infection       LABS:                        8.4    12.30 )-----------( 240      ( 16 Nov 2023 05:30 )             26.8     11-16    134<L>  |  102  |  19  ----------------------------<  165<H>  4.6   |  21<L>  |  1.55<H>    Ca    8.8      16 Nov 2023 05:30  Phos  4.3     11-16  Mg     2.1     11-16    TPro  6.1  /  Alb  3.2<L>  /  TBili  0.4  /  DBili  x   /  AST  21  /  ALT  76<H>  /  AlkPhos  92  11-16    PT/INR - ( 15 Nov 2023 05:30 )   PT: 11.4 sec;   INR: 1.00          PTT - ( 15 Nov 2023 05:30 )  PTT:24.1 sec  Urinalysis Basic - ( 16 Nov 2023 05:30 )    Color: x / Appearance: x / SG: x / pH: x  Gluc: 165 mg/dL / Ketone: x  / Bili: x / Urobili: x   Blood: x / Protein: x / Nitrite: x   Leuk Esterase: x / RBC: x / WBC x   Sq Epi: x / Non Sq Epi: x / Bacteria: x        MICROBIOLOGY:  reviewed   RADIOLOGY & ADDITIONAL STUDIES:  reviewed

## 2023-11-16 NOTE — CONSULT NOTE ADULT - NS ATTEND AMEND GEN_ALL_CORE FT
77F h/o T2DM, HTN, HLD p/w acute onset of CP and SOB on 11/10 at [facility], found to have NSTEMI.  Patient underwent cath on 11/11 and was found to have 3v CAD.  She was transferred to [facility] on 11/12 for possible CABG. Work-up showed severe calcification of the aorta on CT scan and she was determined not a surgical candidate.  She underwent Impella assisted complex PCI with Rotoblator on 11/14. She was monitored at CCU then transferred to [facility].  On 11/15 patient spiked 101.2 fever. Patient didn't noticed that she was febrile, denied sore throat, HA, rhinorrhea, n/v/d, abdominal pain, CP, SOB, dysuria. Pan-cultured - BCx ngtd, UA neg, CXR clear. RVP neg. WBC 11 now to 12 without left shift. ID was consulted for fever work-up.  Given negative work-up, clinically well appaering, asymptomatic, I suspect this is post-procedural fever.  Monitor off abx.  f/u BCx.    Team 2 will follow you.  Case d/w primary team.    Anna Aguayo MD, MS  Infectious Disease attending  office phone [phone]  work cell [phone] (provider to provider call only)  For any questions during evening/weekend/holiday, please page ID on call

## 2023-11-16 NOTE — DIETITIAN NUTRITION RISK NOTIFICATION - TREATMENT: THE FOLLOWING DIET HAS BEEN RECOMMENDED
Diet, DASH/TLC:   Sodium & Cholesterol Restricted     Special Instructions for Nursing:  Sodium & Cholesterol Restricted (11-12-23 @ 15:41) [Active]

## 2023-11-16 NOTE — PROGRESS NOTE ADULT - PROBLEM SELECTOR PLAN 9
Pt with leukocytosis, likely reactive 2/2 procedure. Downtrending.   - trend CBC  - monitor for signs of infection Crt on admission 1.38 -> 1.63 -> 1.52  - trend serum Cr  - Monitor I/Os  - renally dose meds, avoid nephrotoxic agents likely AOCD vs AMANDA  - S/P 1 unit PRBC during the PCI procedure  - F/u repeat iron studies  - F/u LDH, haptoglobin, retic count  - Transfuse if hgb <7  - Maintain active T&S

## 2023-11-16 NOTE — PROGRESS NOTE ADULT - PROBLEM SELECTOR PLAN 1
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 S/p Impella assisted complex PCI with Rotoblator.   Home meds: ASA 81, atorvastatin 10  TTE 11/13: Normal L/RV size and systolic function w/o wall motion abnormalities, mild MR, mild-mod TR, pHTN w/ PASP 44mmHg, no pericard effusion  Carotid US: Mild b/l opaque w/o hemodynamic stenosis  - F/u cath report ( got 3 stents, lt Main LAD, p Circ)   - C/w ASA 81 mg QD  - C/w plavix 75 mg QD  - c/w atorvastatin 80mg QHS Rectal temp 101 F on 11/15. Infectious w/u sent, which is unclear in regards to possible infection vs. reactive inflammatory response 2/2 stress from recent complex PCI and hospitalization.  CXR (11/15): No lung consolidation or acute infiltrates. No pleural effusion or pneumothorax. Scattered increased lung markings unchanged.  Pro-calcitonin: 0.15 (high)  UA (11/15) negative.  WBC uptrending 11.01>12.30.    PLAN:  -Consult ID regarding prophylactically starting empiric antibx recs  -f/u RVP  -f/u Blood Cx Rectal temp 101 F on 11/15. Infectious w/u sent as discussed below. Etiology is unclear and includes possible infection vs. reactive inflammatory response 2/2 stress from recent complex PCI and hospitalization.  CXR (11/15): No lung consolidation or acute infiltrates. No pleural effusion or pneumothorax. Scattered increased lung markings unchanged.  Pro-calcitonin: 0.15 (high)  UA (11/15) negative.  WBC uptrending 11.01>12.30.    PLAN:  -Consult ID regarding prophylactically starting empiric antibx recs  -f/u RVP  -f/u Blood Cx

## 2023-11-16 NOTE — PROGRESS NOTE ADULT - PROBLEM SELECTOR PLAN 10
CT chest noncon 11/12/23 w/o acute pathology   - C/w montelukast  - Hold albuterol   - Incentive spirometry Pt with leukocytosis, likely reactive 2/2 procedure. Uptrending 11.01>12.30. Etiology includes reactive leukocytosis in the setting of recent PCI/hospitalization vs. infection (less likely, but cannot be excluded. See rationale as above in Fever of unknown origin)    PLAN:  - trend CBC  - monitor for signs of infection Crt on admission 1.38 -> 1.63 -> 1.52  - trend serum Cr  - Monitor I/Os  - renally dose meds, avoid nephrotoxic agents

## 2023-11-16 NOTE — PROGRESS NOTE ADULT - PROBLEM SELECTOR PLAN 6
A1C 6.9 11/13  - modISS  - consider transition to basal bolus Home med: atorvastatin 10  - c/w atorvastatin 80mg QHS Home meds: HCTZ-Lisinopril 12.5-10  - Continue Imdur 60 mg QD  - Continue toprol XL 25 mg QD  - Continue amlodipine 10 mg q24h for better BP control

## 2023-11-16 NOTE — PROGRESS NOTE ADULT - PROBLEM SELECTOR PLAN 12
TSH nml 11/12  Home med: Levothyroxine 75 QD  - C/w home meds -C/w Protonix   - Bowel regimen: Miralax, senna   - PO diet

## 2023-11-16 NOTE — DIETITIAN INITIAL EVALUATION ADULT - PERTINENT LABORATORY DATA
11-16    134<L>  |  102  |  19  ----------------------------<  165<H>  4.6   |  21<L>  |  1.55<H>    Ca    8.8      16 Nov 2023 05:30  Phos  4.3     11-16  Mg     2.1     11-16    TPro  6.1  /  Alb  3.2<L>  /  TBili  0.4  /  DBili  x   /  AST  21  /  ALT  76<H>  /  AlkPhos  92  11-16  POCT Blood Glucose.: 211 mg/dL (11-16-23 @ 11:08)  A1C with Estimated Average Glucose Result: 6.9 % (11-13-23 @ 05:30)  A1C with Estimated Average Glucose Result: 6.9 % (11-12-23 @ 16:22)

## 2023-11-16 NOTE — DIETITIAN INITIAL EVALUATION ADULT - OTHER INFO
78 YO Female PMHx of HTN, HLD, DMII, asthma (no previous hospitalizations or intubations), GERD, hypothyroidism, glaucoma, gallstone, CKD (baseline Cr 1.3), hx of CAD (w stenosed stents) who was [place] [facility] [place] cardiac cath which revealed 3v CAD. Patient transferred to [facility] under the care of Dr. Gallegos for CABG work-up, deemed high-risk and had high risk PCI.    Seen pt at bedside this am on 5LA. On assessment pt is awake and alert, very pleasant. Reports "fair" appetite at baseline. Denies ethnic/food/[employer] preferences at this time. NKFA. Reports no changes in appetite/intake since admit; sometimes "finds forcing herself to eat". Discussed current diet regimen and importance of adequate intake in-house. Discussed ways to ensure optimal nutrition with focus on high-protein foods. Pt is receptive. Reports unintentional wt gain of 7lbs 2x months despite no changes in po intake (?fluids). No overt fat/muscle wasting noted. Skin with surgical incision to R groin; no PUs documented; no edema noted; David scale 18. Denies nvdc; LBM 11/15. Bowel regimen ordered. However pt would like to d/c bowel regimen and consume prunes instead to ensure optimal BMs- preferences noted. Nutrition related labs reviewed; FSBG 211, 181, 205- insulin regimen ordered; low Na (134)- fluids per MD discretion. Denies pain/discomfort at time of assessment. Please view full recs below. RD to remain available

## 2023-11-16 NOTE — PHYSICAL THERAPY INITIAL EVALUATION ADULT - PERTINENT HX OF CURRENT PROBLEM, REHAB EVAL
Patient is 77F with PMHx HTN, HLD, DM Type II, CKD stage III (Cr 1.35 on admission, Baseline Cr 1-1.14 based on prior labs 7/2017), AOCD/AMANDA (Baseline Hgb 8.3-9.5 based on prior labs 7/2017), Asthma, GERD, hypothyroidism, glaucoma, gallstone pancreatitis presented to [facility] c/o C/P and SOB found to have NSTEMI with 3V CAD transferred to CTS for CABG workup but noted to have severe calcifications of the aorta on CT scan. S/p Impella assisted complex PCI with Rotoblator

## 2023-11-16 NOTE — PROGRESS NOTE ADULT - PROBLEM SELECTOR PLAN 4
Home meds: HCTZ-Lisinopril 12.5-10  - Increase Imdur 30 mg to 60 mg QD  - stop lopressor 12.5 BID, start toprol XL 25 mg QD  - started on amlodipine 10 mg q24h for better BP control Resolving.  Likely in the setting of NSTEMI.  AST//405 on admission. Downtrending.   - hep panel negative  - monitor CMP RESOLVED  Potassium to 5.1 11/16. Given lokelma x1. Likely 2/2 to KEN on CKD.   Repeat downtrending 4.8>4.6 this AM.  - monitor CMP

## 2023-11-16 NOTE — DIETITIAN INITIAL EVALUATION ADULT - PERTINENT MEDS FT
MEDICATIONS  (STANDING):  amLODIPine   Tablet 10 milliGRAM(s) Oral every 24 hours  aspirin  chewable 81 milliGRAM(s) Oral daily  atorvastatin 80 milliGRAM(s) Oral at bedtime  chlorhexidine 2% Cloths 1 Application(s) Topical <User Schedule>  clopidogrel Tablet 75 milliGRAM(s) Oral daily  dextrose 5%. 1000 milliLiter(s) (100 mL/Hr) IV Continuous <Continuous>  dextrose 5%. 1000 milliLiter(s) (50 mL/Hr) IV Continuous <Continuous>  dextrose 50% Injectable 25 Gram(s) IV Push once  dextrose 50% Injectable 12.5 Gram(s) IV Push once  dextrose 50% Injectable 25 Gram(s) IV Push once  glucagon  Injectable 1 milliGRAM(s) IntraMuscular once  heparin   Injectable 5000 Unit(s) SubCutaneous every 8 hours  insulin lispro (ADMELOG) corrective regimen sliding scale   SubCutaneous Before meals and at bedtime  isosorbide   mononitrate ER Tablet (IMDUR) 60 milliGRAM(s) Oral daily  levothyroxine 75 MICROGram(s) Oral daily  metoprolol succinate ER 25 milliGRAM(s) Oral every 24 hours  montelukast 10 milliGRAM(s) Oral daily  pantoprazole    Tablet 40 milliGRAM(s) Oral before breakfast  polyethylene glycol 3350 17 Gram(s) Oral daily  senna 2 Tablet(s) Oral at bedtime  sodium chloride 0.9% lock flush 3 milliLiter(s) IV Push every 8 hours    MEDICATIONS  (PRN):  acetaminophen     Tablet .. 650 milliGRAM(s) Oral every 6 hours PRN Temp greater or equal to 38C (100.4F), Mild Pain (1 - 3)  dextrose Oral Gel 15 Gram(s) Oral once PRN Blood Glucose LESS THAN 70 milliGRAM(s)/deciliter

## 2023-11-16 NOTE — DIETITIAN INITIAL EVALUATION ADULT - OTHER CALCULATIONS
Ideal body weight used to calculate energy needs due to pt's current body weight >120% ideal body weight. Adjusted for clinical course   *Fluids per MD discretion

## 2023-11-16 NOTE — PROGRESS NOTE ADULT - PROBLEM SELECTOR PLAN 7
likely AOCD vs AMANDA  - S/P 1 unit PRBC during the PCI procedure  - F/u repeat iron studies  - F/u LDH, haptoglobin, retic count  - Transfuse if hgb <7  - Maintain active T&S A1C 6.9 (11/13)  - modISS  - consider transition to basal bolus Home med: atorvastatin 10  - c/w atorvastatin 80mg QHS

## 2023-11-16 NOTE — PROGRESS NOTE ADULT - ASSESSMENT
77F with PMHx HTN, HLD, DM Type II, CKD stage III (Cr 1.35 on admission, Baseline Cr 1-1.14 based on prior labs 7/2017), AOCD/AMANDA (Baseline Hgb 8.3-9.5 based on prior labs 7/2017), Asthma, GERD, hypothyroidism, glaucoma, gallstone pancreatitis presented to [facility] c/o C/P and SOB found to have NSTEMI with 3V CAD transferred to CTS for CABG workup but noted to have severe calcifications of the aorta on CT scan. S/p Impella assisted complex PCI with Rotoblator. Patient admitted to CCU for post-cath observation. Stable for step down to cardiac telemetry. 77F with PMHx HTN, HLD, DM Type II, CKD stage III (Cr 1.35 on admission, Baseline Cr 1-1.14 based on prior labs 7/2017), AOCD/AMANDA (Baseline Hgb 8.3-9.5 based on prior labs 7/2017), Asthma, GERD, hypothyroidism, glaucoma, gallstone pancreatitis presented to [facility] c/o C/P and SOB found to have NSTEMI with 3V CAD transferred to CTS for CABG workup but noted to have severe calcifications of the aorta on CT scan. S/p Impella assisted complex PCI with Rotoblator. Patient admitted to CCU for post-cath observation, medically stable for transfer to cardiac telemetry 11/16.

## 2023-11-16 NOTE — PROGRESS NOTE ADULT - PROBLEM SELECTOR PLAN 2
Potassium to 5.1. Given lokelma x1. Likely 2/2 to KEN on CKD.   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 S/p Impella assisted complex PCI with Rotoblator.   Home meds: ASA 81, atorvastatin 10  TTE 11/13: Normal L/RV size and systolic function w/o wall motion abnormalities, mild MR, mild-mod TR, pHTN w/ PASP 44mmHg, no pericard effusion  Carotid US: Mild b/l opaque w/o hemodynamic stenosis  - cath report (got 3 stents, lt Main LAD, p Circ)    PLAN:   - C/w ASA 81 mg QD  - C/w plavix 75 mg QD  - c/w atorvastatin 80mg QHS

## 2023-11-16 NOTE — PROGRESS NOTE ADULT - PROBLEM SELECTOR PLAN 3
AST//405 on admission. Downtrending.   - hep panel negative  - monitor CMP RESOLVED  Potassium to 5.1 11/16. Given lokelma x1. Likely 2/2 to KEN on CKD.   Repeat downtrending 4.8>4.6 this AM.  - monitor CMP Pt with leukocytosis, likely reactive 2/2 procedure. Uptrending 11.01>12.30. Etiology includes reactive leukocytosis in the setting of recent PCI/hospitalization vs. infection (less likely, but cannot be excluded. See rationale as above in Fever of unknown origin)    PLAN:  - trend CBC  - monitor for signs of infection

## 2023-11-16 NOTE — PROGRESS NOTE ADULT - PROBLEM SELECTOR PLAN 5
Home med: atorvastatin 10  - c/w atorvastatin 80mg QHS Home meds: HCTZ-Lisinopril 12.5-10  - Continue Imdur 60 mg QD  - Continue toprol XL 25 mg QD  - Continue amlodipine 10 mg q24h for better BP control Resolving.  Likely in the setting of NSTEMI.  AST//405 on admission. Downtrending.   - hep panel negative  - monitor CMP

## 2023-11-16 NOTE — DIETITIAN INITIAL EVALUATION ADULT - ADD RECOMMEND
1. Continue with current diet order (DASH/TLC)  2. Monitor po intake/tolerance; honor pt food preferences as able   3. Monitor GI fxn, skin integrity, labs, wts   4. Fluids per MD discretion   5. pain/bowel regimen per team   6. RD to remain available

## 2023-11-17 ENCOUNTER — TRANSCRIPTION ENCOUNTER (OUTPATIENT)
Age: 78
End: 2023-11-17

## 2023-11-17 VITALS
SYSTOLIC BLOOD PRESSURE: 130 MMHG | OXYGEN SATURATION: 98 % | RESPIRATION RATE: 16 BRPM | DIASTOLIC BLOOD PRESSURE: 63 MMHG | HEART RATE: 67 BPM

## 2023-11-17 PROBLEM — Z00.00 ENCOUNTER FOR PREVENTIVE HEALTH EXAMINATION: Status: ACTIVE | Noted: 2023-11-17

## 2023-11-17 PROBLEM — I25.10 ATHEROSCLEROTIC HEART DISEASE OF NATIVE CORONARY ARTERY WITHOUT ANGINA PECTORIS: Chronic | Status: ACTIVE | Noted: 2023-11-12

## 2023-11-17 LAB
ANION GAP SERPL CALC-SCNC: 12 MMOL/L — SIGNIFICANT CHANGE UP (ref 5–17)
ANION GAP SERPL CALC-SCNC: 12 MMOL/L — SIGNIFICANT CHANGE UP (ref 5–17)
BASOPHILS # BLD AUTO: 0.04 K/UL — SIGNIFICANT CHANGE UP (ref 0–0.2)
BASOPHILS # BLD AUTO: 0.04 K/UL — SIGNIFICANT CHANGE UP (ref 0–0.2)
BASOPHILS NFR BLD AUTO: 0.4 % — SIGNIFICANT CHANGE UP (ref 0–2)
BASOPHILS NFR BLD AUTO: 0.4 % — SIGNIFICANT CHANGE UP (ref 0–2)
BUN SERPL-MCNC: 25 MG/DL — HIGH (ref 7–23)
BUN SERPL-MCNC: 25 MG/DL — HIGH (ref 7–23)
CALCIUM SERPL-MCNC: 8.6 MG/DL — SIGNIFICANT CHANGE UP (ref 8.4–10.5)
CALCIUM SERPL-MCNC: 8.6 MG/DL — SIGNIFICANT CHANGE UP (ref 8.4–10.5)
CHLORIDE SERPL-SCNC: 100 MMOL/L — SIGNIFICANT CHANGE UP (ref 96–108)
CHLORIDE SERPL-SCNC: 100 MMOL/L — SIGNIFICANT CHANGE UP (ref 96–108)
CO2 SERPL-SCNC: 21 MMOL/L — LOW (ref 22–31)
CO2 SERPL-SCNC: 21 MMOL/L — LOW (ref 22–31)
CREAT SERPL-MCNC: 1.58 MG/DL — HIGH (ref 0.5–1.3)
CREAT SERPL-MCNC: 1.58 MG/DL — HIGH (ref 0.5–1.3)
EGFR: 34 ML/MIN/1.73M2 — LOW
EGFR: 34 ML/MIN/1.73M2 — LOW
EOSINOPHIL # BLD AUTO: 1.28 K/UL — HIGH (ref 0–0.5)
EOSINOPHIL # BLD AUTO: 1.28 K/UL — HIGH (ref 0–0.5)
EOSINOPHIL NFR BLD AUTO: 11.7 % — HIGH (ref 0–6)
EOSINOPHIL NFR BLD AUTO: 11.7 % — HIGH (ref 0–6)
GLUCOSE BLDC GLUCOMTR-MCNC: 172 MG/DL — HIGH (ref 70–99)
GLUCOSE BLDC GLUCOMTR-MCNC: 172 MG/DL — HIGH (ref 70–99)
GLUCOSE BLDC GLUCOMTR-MCNC: 280 MG/DL — HIGH (ref 70–99)
GLUCOSE BLDC GLUCOMTR-MCNC: 280 MG/DL — HIGH (ref 70–99)
GLUCOSE SERPL-MCNC: 339 MG/DL — HIGH (ref 70–99)
GLUCOSE SERPL-MCNC: 339 MG/DL — HIGH (ref 70–99)
HCT VFR BLD CALC: 26 % — LOW (ref 34.5–45)
HCT VFR BLD CALC: 26 % — LOW (ref 34.5–45)
HGB BLD-MCNC: 7.9 G/DL — LOW (ref 11.5–15.5)
HGB BLD-MCNC: 7.9 G/DL — LOW (ref 11.5–15.5)
IMM GRANULOCYTES NFR BLD AUTO: 1.2 % — HIGH (ref 0–0.9)
IMM GRANULOCYTES NFR BLD AUTO: 1.2 % — HIGH (ref 0–0.9)
LYMPHOCYTES # BLD AUTO: 2.38 K/UL — SIGNIFICANT CHANGE UP (ref 1–3.3)
LYMPHOCYTES # BLD AUTO: 2.38 K/UL — SIGNIFICANT CHANGE UP (ref 1–3.3)
LYMPHOCYTES # BLD AUTO: 21.8 % — SIGNIFICANT CHANGE UP (ref 13–44)
LYMPHOCYTES # BLD AUTO: 21.8 % — SIGNIFICANT CHANGE UP (ref 13–44)
MAGNESIUM SERPL-MCNC: 1.8 MG/DL — SIGNIFICANT CHANGE UP (ref 1.6–2.6)
MAGNESIUM SERPL-MCNC: 1.8 MG/DL — SIGNIFICANT CHANGE UP (ref 1.6–2.6)
MCHC RBC-ENTMCNC: 26.2 PG — LOW (ref 27–34)
MCHC RBC-ENTMCNC: 26.2 PG — LOW (ref 27–34)
MCHC RBC-ENTMCNC: 30.4 GM/DL — LOW (ref 32–36)
MCHC RBC-ENTMCNC: 30.4 GM/DL — LOW (ref 32–36)
MCV RBC AUTO: 86.1 FL — SIGNIFICANT CHANGE UP (ref 80–100)
MCV RBC AUTO: 86.1 FL — SIGNIFICANT CHANGE UP (ref 80–100)
MONOCYTES # BLD AUTO: 0.82 K/UL — SIGNIFICANT CHANGE UP (ref 0–0.9)
MONOCYTES # BLD AUTO: 0.82 K/UL — SIGNIFICANT CHANGE UP (ref 0–0.9)
MONOCYTES NFR BLD AUTO: 7.5 % — SIGNIFICANT CHANGE UP (ref 2–14)
MONOCYTES NFR BLD AUTO: 7.5 % — SIGNIFICANT CHANGE UP (ref 2–14)
NEUTROPHILS # BLD AUTO: 6.28 K/UL — SIGNIFICANT CHANGE UP (ref 1.8–7.4)
NEUTROPHILS # BLD AUTO: 6.28 K/UL — SIGNIFICANT CHANGE UP (ref 1.8–7.4)
NEUTROPHILS NFR BLD AUTO: 57.4 % — SIGNIFICANT CHANGE UP (ref 43–77)
NEUTROPHILS NFR BLD AUTO: 57.4 % — SIGNIFICANT CHANGE UP (ref 43–77)
NRBC # BLD: 0 /100 WBCS — SIGNIFICANT CHANGE UP (ref 0–0)
NRBC # BLD: 0 /100 WBCS — SIGNIFICANT CHANGE UP (ref 0–0)
PHOSPHATE SERPL-MCNC: 3.9 MG/DL — SIGNIFICANT CHANGE UP (ref 2.5–4.5)
PHOSPHATE SERPL-MCNC: 3.9 MG/DL — SIGNIFICANT CHANGE UP (ref 2.5–4.5)
PLATELET # BLD AUTO: 265 K/UL — SIGNIFICANT CHANGE UP (ref 150–400)
PLATELET # BLD AUTO: 265 K/UL — SIGNIFICANT CHANGE UP (ref 150–400)
POTASSIUM SERPL-MCNC: 5.1 MMOL/L — SIGNIFICANT CHANGE UP (ref 3.5–5.3)
POTASSIUM SERPL-MCNC: 5.1 MMOL/L — SIGNIFICANT CHANGE UP (ref 3.5–5.3)
POTASSIUM SERPL-SCNC: 5.1 MMOL/L — SIGNIFICANT CHANGE UP (ref 3.5–5.3)
POTASSIUM SERPL-SCNC: 5.1 MMOL/L — SIGNIFICANT CHANGE UP (ref 3.5–5.3)
RBC # BLD: 3.02 M/UL — LOW (ref 3.8–5.2)
RBC # BLD: 3.02 M/UL — LOW (ref 3.8–5.2)
RBC # FLD: 15.6 % — HIGH (ref 10.3–14.5)
RBC # FLD: 15.6 % — HIGH (ref 10.3–14.5)
SODIUM SERPL-SCNC: 133 MMOL/L — LOW (ref 135–145)
SODIUM SERPL-SCNC: 133 MMOL/L — LOW (ref 135–145)
WBC # BLD: 10.93 K/UL — HIGH (ref 3.8–10.5)
WBC # BLD: 10.93 K/UL — HIGH (ref 3.8–10.5)
WBC # FLD AUTO: 10.93 K/UL — HIGH (ref 3.8–10.5)
WBC # FLD AUTO: 10.93 K/UL — HIGH (ref 3.8–10.5)

## 2023-11-17 PROCEDURE — C1760: CPT

## 2023-11-17 PROCEDURE — 82550 ASSAY OF CK (CPK): CPT

## 2023-11-17 PROCEDURE — C1761: CPT

## 2023-11-17 PROCEDURE — 85347 COAGULATION TIME ACTIVATED: CPT

## 2023-11-17 PROCEDURE — 71250 CT THORAX DX C-: CPT

## 2023-11-17 PROCEDURE — 94150 VITAL CAPACITY TEST: CPT

## 2023-11-17 PROCEDURE — 36430 TRANSFUSION BLD/BLD COMPNT: CPT

## 2023-11-17 PROCEDURE — C1725: CPT

## 2023-11-17 PROCEDURE — 84100 ASSAY OF PHOSPHORUS: CPT

## 2023-11-17 PROCEDURE — 71046 X-RAY EXAM CHEST 2 VIEWS: CPT

## 2023-11-17 PROCEDURE — 80076 HEPATIC FUNCTION PANEL: CPT

## 2023-11-17 PROCEDURE — 85045 AUTOMATED RETICULOCYTE COUNT: CPT

## 2023-11-17 PROCEDURE — 97161 PT EVAL LOW COMPLEX 20 MIN: CPT

## 2023-11-17 PROCEDURE — 85610 PROTHROMBIN TIME: CPT

## 2023-11-17 PROCEDURE — 83550 IRON BINDING TEST: CPT

## 2023-11-17 PROCEDURE — 93005 ELECTROCARDIOGRAM TRACING: CPT

## 2023-11-17 PROCEDURE — 80053 COMPREHEN METABOLIC PANEL: CPT

## 2023-11-17 PROCEDURE — 84145 PROCALCITONIN (PCT): CPT

## 2023-11-17 PROCEDURE — 84436 ASSAY OF TOTAL THYROXINE: CPT

## 2023-11-17 PROCEDURE — 83540 ASSAY OF IRON: CPT

## 2023-11-17 PROCEDURE — 82553 CREATINE MB FRACTION: CPT

## 2023-11-17 PROCEDURE — 93306 TTE W/DOPPLER COMPLETE: CPT

## 2023-11-17 PROCEDURE — C1874: CPT

## 2023-11-17 PROCEDURE — 81001 URINALYSIS AUTO W/SCOPE: CPT

## 2023-11-17 PROCEDURE — 83010 ASSAY OF HAPTOGLOBIN QUANT: CPT

## 2023-11-17 PROCEDURE — 82728 ASSAY OF FERRITIN: CPT

## 2023-11-17 PROCEDURE — 85730 THROMBOPLASTIN TIME PARTIAL: CPT

## 2023-11-17 PROCEDURE — C1769: CPT

## 2023-11-17 PROCEDURE — 83036 HEMOGLOBIN GLYCOSYLATED A1C: CPT

## 2023-11-17 PROCEDURE — 0225U NFCT DS DNA&RNA 21 SARSCOV2: CPT

## 2023-11-17 PROCEDURE — 36415 COLL VENOUS BLD VENIPUNCTURE: CPT

## 2023-11-17 PROCEDURE — 80048 BASIC METABOLIC PNL TOTAL CA: CPT

## 2023-11-17 PROCEDURE — 86923 COMPATIBILITY TEST ELECTRIC: CPT

## 2023-11-17 PROCEDURE — 83735 ASSAY OF MAGNESIUM: CPT

## 2023-11-17 PROCEDURE — 86900 BLOOD TYPING SEROLOGIC ABO: CPT

## 2023-11-17 PROCEDURE — 83605 ASSAY OF LACTIC ACID: CPT

## 2023-11-17 PROCEDURE — 85027 COMPLETE CBC AUTOMATED: CPT

## 2023-11-17 PROCEDURE — 85025 COMPLETE CBC W/AUTO DIFF WBC: CPT

## 2023-11-17 PROCEDURE — 84484 ASSAY OF TROPONIN QUANT: CPT

## 2023-11-17 PROCEDURE — 87040 BLOOD CULTURE FOR BACTERIA: CPT

## 2023-11-17 PROCEDURE — 84480 ASSAY TRIIODOTHYRONINE (T3): CPT

## 2023-11-17 PROCEDURE — C1887: CPT

## 2023-11-17 PROCEDURE — 82962 GLUCOSE BLOOD TEST: CPT

## 2023-11-17 PROCEDURE — 86901 BLOOD TYPING SEROLOGIC RH(D): CPT

## 2023-11-17 PROCEDURE — 86850 RBC ANTIBODY SCREEN: CPT

## 2023-11-17 PROCEDURE — C1753: CPT

## 2023-11-17 PROCEDURE — C1889: CPT

## 2023-11-17 PROCEDURE — 99232 SBSQ HOSP IP/OBS MODERATE 35: CPT

## 2023-11-17 PROCEDURE — 99239 HOSP IP/OBS DSCHRG MGMT >30: CPT

## 2023-11-17 PROCEDURE — 83880 ASSAY OF NATRIURETIC PEPTIDE: CPT

## 2023-11-17 PROCEDURE — C1894: CPT

## 2023-11-17 PROCEDURE — 85576 BLOOD PLATELET AGGREGATION: CPT

## 2023-11-17 PROCEDURE — 80061 LIPID PANEL: CPT

## 2023-11-17 PROCEDURE — 84443 ASSAY THYROID STIM HORMONE: CPT

## 2023-11-17 PROCEDURE — 82150 ASSAY OF AMYLASE: CPT

## 2023-11-17 PROCEDURE — 93880 EXTRACRANIAL BILAT STUDY: CPT

## 2023-11-17 PROCEDURE — P9016: CPT

## 2023-11-17 PROCEDURE — 83615 LACTATE (LD) (LDH) ENZYME: CPT

## 2023-11-17 PROCEDURE — 82803 BLOOD GASES ANY COMBINATION: CPT

## 2023-11-17 PROCEDURE — 71045 X-RAY EXAM CHEST 1 VIEW: CPT

## 2023-11-17 PROCEDURE — 80074 ACUTE HEPATITIS PANEL: CPT

## 2023-11-17 PROCEDURE — 83690 ASSAY OF LIPASE: CPT

## 2023-11-17 PROCEDURE — 86803 HEPATITIS C AB TEST: CPT

## 2023-11-17 RX ORDER — METOPROLOL TARTRATE 50 MG
1 TABLET ORAL
Qty: 30 | Refills: 0
Start: 2023-11-17 | End: 2023-12-16

## 2023-11-17 RX ORDER — ISOSORBIDE MONONITRATE 60 MG/1
1 TABLET, EXTENDED RELEASE ORAL
Qty: 30 | Refills: 0
Start: 2023-11-17 | End: 2023-12-16

## 2023-11-17 RX ORDER — LISINOPRIL/HYDROCHLOROTHIAZIDE 10-12.5 MG
1 TABLET ORAL
Refills: 0 | DISCHARGE

## 2023-11-17 RX ORDER — CLOPIDOGREL BISULFATE 75 MG/1
1 TABLET, FILM COATED ORAL
Qty: 30 | Refills: 11
Start: 2023-11-17 | End: 2024-11-10

## 2023-11-17 RX ORDER — ATORVASTATIN CALCIUM 80 MG/1
1 TABLET, FILM COATED ORAL
Refills: 0 | DISCHARGE

## 2023-11-17 RX ORDER — ASPIRIN/CALCIUM CARB/MAGNESIUM 324 MG
1 TABLET ORAL
Qty: 30 | Refills: 11
Start: 2023-11-17 | End: 2024-11-10

## 2023-11-17 RX ORDER — ATORVASTATIN CALCIUM 80 MG/1
1 TABLET, FILM COATED ORAL
Qty: 30 | Refills: 0
Start: 2023-11-17 | End: 2023-12-16

## 2023-11-17 RX ADMIN — CHLORHEXIDINE GLUCONATE 1 APPLICATION(S): 213 SOLUTION TOPICAL at 06:23

## 2023-11-17 RX ADMIN — CLOPIDOGREL BISULFATE 75 MILLIGRAM(S): 75 TABLET, FILM COATED ORAL at 11:45

## 2023-11-17 RX ADMIN — Medication 2: at 06:35

## 2023-11-17 RX ADMIN — Medication 6: at 11:44

## 2023-11-17 RX ADMIN — Medication 75 MICROGRAM(S): at 06:22

## 2023-11-17 RX ADMIN — Medication 25 MILLIGRAM(S): at 11:51

## 2023-11-17 RX ADMIN — HEPARIN SODIUM 5000 UNIT(S): 5000 INJECTION INTRAVENOUS; SUBCUTANEOUS at 06:23

## 2023-11-17 RX ADMIN — Medication 81 MILLIGRAM(S): at 11:45

## 2023-11-17 RX ADMIN — ISOSORBIDE MONONITRATE 60 MILLIGRAM(S): 60 TABLET, EXTENDED RELEASE ORAL at 11:51

## 2023-11-17 RX ADMIN — PANTOPRAZOLE SODIUM 40 MILLIGRAM(S): 20 TABLET, DELAYED RELEASE ORAL at 06:22

## 2023-11-17 RX ADMIN — SODIUM CHLORIDE 3 MILLILITER(S): 9 INJECTION INTRAMUSCULAR; INTRAVENOUS; SUBCUTANEOUS at 06:38

## 2023-11-17 RX ADMIN — MONTELUKAST 10 MILLIGRAM(S): 4 TABLET, CHEWABLE ORAL at 11:45

## 2023-11-17 NOTE — DISCHARGE NOTE NURSING/CASE MANAGEMENT/SOCIAL WORK - PATIENT PORTAL LINK FT
You can access the FollowMyHealth Patient Portal offered by [facility] by registering at the following website: [email or web address]. By joining [facility]’s FollowMyHealth portal, you will also be able to view your health information using other applications (apps) compatible with our system.

## 2023-11-17 NOTE — DISCHARGE NOTE PROVIDER - HOSPITAL COURSE
#Discharge: do not delete    Patient is __ yo M/F with past medical history of _____ presented with _____, found to have _____ (one liner)    Hospital course (by problem):     Patient was discharged to: (home/[facility]/acute rehab/hospice, etc, and with what services – home health PT/RN? Home O2?)    New medications:   Changes to old medications:  Medications that were stopped:    Items to follow up as outpatient:Labs to be followed outpatient:     Exam to be followed outpatient:     Physical exam at the time of discharge:                 #Discharge: do not delete    77F with PMHx HTN, HLD, DM Type II, CKD stage III (Cr 1.35 on admission, Baseline Cr 1-1.14 based on prior labs 7/2017), AOCD/AMANDA (Baseline Hgb 8.3-9.5 based on prior labs 7/2017), Asthma, GERD, hypothyroidism, glaucoma, gallstone pancreatitis presented to [facility] c/o C/P and SOB found to have NSTEMI with 3V CAD transferred to CTS for CABG workup but noted to have severe calcifications of the aorta on CT scan. S/p Impella assisted complex PCI with Rotoblator.    Hospital course (by problem):     #Fever of unknown origin.   Rectal temp 101 F on 11/15. Infectious w/u sent as discussed below. Etiology is likely reactive inflammatory response 2/2 stress from recent complex PCI and hospitalization. No infectious process determined on w/u. CXR (11/15): No lung consolidation or acute infiltrates. No pleural effusion or pneumothorax. Scattered increased lung markings unchanged. Pro-calcitonin: 0.15 (high). UA (11/15) negative. RVP negative. Blood Cx NGTD. ID consulted regarding starting empiric antibx, recommended holding off and monitoring off antibx.  - monitor off antibx per ID recs    #CAD (coronary artery disease).    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 S/p Impella assisted complex PCI with Rotoblator (got 3 stents, lt Main LAD, p Circ). Patient admitted to CCU for post-cath observation, and was medically stable for transfer to cardiac telemetry 11/16. TTE 11/13: Normal L/RV size and systolic function w/o wall motion abnormalities, mild MR, mild-mod TR, pHTN w/ PASP 44mmHg, no pericard effusion.  Carotid US: Mild b/l opaque w/o hemodynamic stenosis  - C/w ASA 81 mg QD  - C/w plavix 75 mg QD  - c/w atorvastatin 80mg QHS.    #Leukocytosis.  Pt with leukocytosis, likely reactive 2/2 procedure. Downtrending 12.30>10.93. Etiology is reactive leukocytosis in the setting of recent PCI/hospitalization. No source of infection per w/u as above.  - trend CBC  - monitor off antibx per ID recs    #Hyperkalemia.  RESOLVED  Potassium to 5.1 11/16. Given lokelma x1. Likely 2/2 to KEN on CKD.   Repeat downtrending 4.8>4.6.  - monitor CMP.    #Transaminitis.  Resolving.  Likely in the setting of NSTEMI.  AST//405 on admission. Downtrending.   - hep panel negative  - monitor CMP.    #Hypertension.  Home meds: HCTZ-Lisinopril 12.5-10  - Continue Imdur 60 mg QD  - Continue toprol XL 25 mg QD  - Continue amlodipine 10 mg q24h for better BP control.    #Hyperlipidemia.  Home med: atorvastatin 10 mg QHS  - c/w atorvastatin 80mg QHS.    #Diabetes mellitus, type 2.  A1C 6.9 (11/13)  - modISS    #Anemia.  Likely AOCD.  Iron studies sent, showing iron (32), %iron saturation low (9), ferritin (57), high haptoglobin (270), high reticulocyte count (4.2)  - S/P 1 unit PRBC during the PCI procedure  - Transfuse if hgb <7  - Maintain active T&S.    #CKD (chronic kidney disease), stage III.  Crt on admission 1.38 -> 1.63 -> 1.52  - trend serum Cr  - Monitor I/Os  - renally dose meds, avoid nephrotoxic agents.    #Asthma.   CT chest noncon 11/12/23 w/o acute pathology   - C/w montelukast  - Hold albuterol   - Incentive spirometry.    #GERD (gastroesophageal reflux disease).    -C/w Protonix   - Bowel regimen: Miralax, senna   - PO diet.      Patient was discharged to: Home    New medications: Amlodipine, Toprol  Changes to old medications: Atorvastatin  Medications that were stopped: HCTZ-Lisinopril 12.5-10    Items to follow up as outpatient: Please follow up with cardiologist.  Labs to be followed outpatient: None    Physical exam at the time of discharge:  GENERAL: NAD, resting comfortably in chair at bedside.  HEAD:  Atraumatic, normocephalic  NECK: Supple, no JVD  HEART: Regular rate and rhythm, no murmurs, rubs, or gallops  LUNGS: Unlabored respirations.  Clear to auscultation bilaterally, no crackles, wheezing, or rhonchi  ABDOMEN: Soft, nontender, nondistended, +BS, R femoral sheath site c/d/i  EXTREMITIES: 2+ peripheral pulses bilaterally. No clubbing, cyanosis, or edema  NERVOUS SYSTEM:  A&Ox3, no focal deficits   SKIN: No rashes or lesions #Discharge: do not delete    77F with PMHx HTN, HLD, DM Type II, CKD stage III (Cr 1.35 on admission, Baseline Cr 1-1.14 based on prior labs 7/2017), AOCD/AMANDA (Baseline Hgb 8.3-9.5 based on prior labs 7/2017), Asthma, GERD, hypothyroidism, glaucoma, gallstone pancreatitis presented to [facility] c/o C/P and SOB found to have NSTEMI with 3V CAD transferred to CTS for CABG workup but noted to have severe calcifications of the aorta on CT scan. S/p Impella assisted complex PCI with Rotoblator.    Hospital course (by problem):     #Fever of unknown origin.   Rectal temp 101 F on 11/15. Infectious w/u sent as discussed below. Etiology is likely reactive inflammatory response 2/2 stress from recent complex PCI and hospitalization. No infectious process determined on w/u. CXR (11/15): No lung consolidation or acute infiltrates. No pleural effusion or pneumothorax. Scattered increased lung markings unchanged. Pro-calcitonin: 0.15 (high). UA (11/15) negative. RVP negative. Blood Cx NGTD. ID consulted regarding starting empiric antibx, recommended holding off and monitoring off antibx.  - monitor off antibx per ID recs    #CAD (coronary artery disease).    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 S/p Impella assisted complex PCI with Rotoblator (got 3 stents, lt Main LAD, p Circ). Patient admitted to CCU for post-cath observation, and was medically stable for transfer to cardiac telemetry 11/16. TTE 11/13: Normal L/RV size and systolic function w/o wall motion abnormalities, mild MR, mild-mod TR, pHTN w/ PASP 44mmHg, no pericard effusion.  Carotid US: Mild b/l opaque w/o hemodynamic stenosis  - C/w ASA 81 mg QD  - C/w plavix 75 mg QD  - c/w atorvastatin 80mg QHS.    #Leukocytosis.  Pt with leukocytosis, likely reactive 2/2 procedure. Downtrending 12.30>10.93. Etiology is reactive leukocytosis in the setting of recent PCI/hospitalization. No source of infection per w/u as above.  - trend CBC  - monitor off antibx per ID recs    #Hyperkalemia.  RESOLVED  Potassium to 5.1 11/16. Given lokelma x1. Likely 2/2 to KEN on CKD.   Repeat downtrending 4.8>4.6.  - monitor CMP.    #Transaminitis.  Resolving.  Likely in the setting of NSTEMI.  AST//405 on admission. Downtrending.   - hep panel negative  - monitor CMP.    #Hypertension.  Home meds: HCTZ-Lisinopril 12.5-10  - Continue Imdur 60 mg QD  - Continue toprol XL 25 mg QD  - Continue amlodipine 10 mg q24h for better BP control.    #Hyperlipidemia.  Home med: atorvastatin 10 mg QHS  - c/w atorvastatin 80mg QHS.    #Diabetes mellitus, type 2.  A1C 6.9 (11/13)  - modISS    #Anemia.  Likely Multifactorial in the setting of AOCD and AMANDA.  Iron studies sent, showing iron (32), %iron saturation low (9), ferritin (57), high haptoglobin (270), high reticulocyte count (4.2)  - S/P 1 unit PRBC during the PCI procedure  - Transfuse if hgb <7  - Maintain active T&S.    #CKD (chronic kidney disease), stage III.  Crt on admission 1.38 -> 1.63 -> 1.52  - trend serum Cr  - Monitor I/Os  - renally dose meds, avoid nephrotoxic agents.    #Asthma.   CT chest noncon 11/12/23 w/o acute pathology   - C/w montelukast  - Hold albuterol   - Incentive spirometry.    #GERD (gastroesophageal reflux disease).    -C/w Protonix   - Bowel regimen: Miralax, senna   - PO diet.      Patient was discharged to: Home    New medications: Amlodipine, Toprol  Changes to old medications: Atorvastatin  Medications that were stopped: HCTZ-Lisinopril 12.5-10    Items to follow up as outpatient: Please follow up with cardiologist.  Labs to be followed outpatient: None    Physical exam at the time of discharge:  GENERAL: NAD, resting comfortably in chair at bedside.  HEAD:  Atraumatic, normocephalic  NECK: Supple, no JVD  HEART: Regular rate and rhythm, no murmurs, rubs, or gallops  LUNGS: Unlabored respirations.  Clear to auscultation bilaterally, no crackles, wheezing, or rhonchi  ABDOMEN: Soft, nontender, nondistended, +BS, R femoral sheath site c/d/i  EXTREMITIES: 2+ peripheral pulses bilaterally. No clubbing, cyanosis, or edema  NERVOUS SYSTEM:  A&Ox3, no focal deficits   SKIN: No rashes or lesions

## 2023-11-17 NOTE — DISCHARGE NOTE PROVIDER - ATTENDING DISCHARGE PHYSICAL EXAMINATION:
77 female with PMHx HTN, HLD, DM Type II, CKD stage III (Cr 1.35 on admission, Baseline Cr 1-1.14 based on prior labs 7/2017), AOCD/AMANDA (Baseline Hgb 8.3-9.5 based on prior labs 7/2017), Asthma, GERD, hypothyroidism, glaucoma, gallstone pancreatitis presented to [facility] c/o C/P and SOB found to have NSTEMI with 3V CAD transferred to CTS for CABG workup but noted to have severe calcifications of the aorta on CT scan. S/p Impella assisted complex PCI with Rotoblator. Patient admitted to CCU for post-cath observation.     1. CAD  Complex multivessel CAD (dLM 80%, oLAD 85%, D2 75%, oLCX 85%, mRCA 75%). S/p Impella assisted complex PCI with Rotoblator.   Asymptomatic, continue aspirin 81 mg, plavix 75 mg, and atorvastatin 80 mg.    2. Fever   No evidence of infection, post-procedure, resolved.

## 2023-11-17 NOTE — DISCHARGE NOTE PROVIDER - NSDCFUSCHEDAPPT_GEN_ALL_CORE_FT
[facility]  [facility] [address]  Scheduled Appointment: 11/24/2023

## 2023-11-17 NOTE — PROGRESS NOTE ADULT - ASSESSMENT
77F w/ HTN, HLD, DMII, asthma, hypothyroidism  who was originally BIBEMS to [facility] on 11/10 with c/o chest pain and SOB. ID consulted for fever. She was found to have NSTEMI and underwent cardiac cath 11/11/23 which revealed 3v CAD. Patient was then transferred to [facility] and is s/p impella assisted complex PCI with Rotoblator 11/14. Developed fever x 1 11/15. Has mild leukocytosis. Infectious w/u so far negative (UA, BCXs x 2, CXR). RVP/COVID both negative. Patient is well appearing on exam and denies any localizing symptoms. Peripheral lines and femoral sheath site do not appear infected. Patient still afebrile today without tylenol or antibiotics. This was likely post-op fever.     Suggest:  -f/u bcxs x 2 11/15 --ngtd   -monitor off of antibiotics     Team 2 signing off. Thank you for your consultation   Please recall with questions    Case d/w primary team.  Final recommendation pending attending note.    Kathryn Quispe, Infectious Diseases PA  Please reach out for any questions 9 am-5pm. For evenings and weekends, please call the ID physician on call.  Work cell: [phone]

## 2023-11-17 NOTE — PROGRESS NOTE ADULT - NS ATTEND AMEND GEN_ALL_CORE FT
No event overnight.  patient feels well, afebrile, BCx ngtd.  No e/o infection.      Thank you for your consult.  Please re-consult us or call us with questions.  Case d/w primary team.    Anna Aguayo MD, MS  Infectious Disease attending  office phone [phone]  work cell [phone] (provider to provider call only)  For any questions during evening/weekend/holiday, please page ID on call
77 female with PMHx HTN, HLD, DM Type II, CKD stage III (Cr 1.35 on admission, Baseline Cr 1-1.14 based on prior labs 7/2017), AOCD/AMANDA (Baseline Hgb 8.3-9.5 based on prior labs 7/2017), Asthma, GERD, hypothyroidism, glaucoma, gallstone pancreatitis presented to [facility] c/o C/P and SOB found to have NSTEMI with 3V CAD transferred to CTS for CABG workup but noted to have severe calcifications of the aorta on CT scan. S/p Impella assisted complex PCI with Rotoblator. Patient admitted to CCU for post-cath observation.     1. CAD  Known 3V CAD (dLM 80%, oLAD 85%, D2 75%, oLCX 85%, mRCA 75%). S/p Impella assisted complex PCI with Rotoblator.   Asymptomatic, continue aspirin 81 mg, plavix 75 mg, and atorvastatin 80 mg.    2. Fever   Spiked a temp yesterday, asymptomatic, normal physical examination and UA, CXR ? opacity, WBC elevated (higher than day prior) and procalcitonin positive. Blood and urine cultures sent, consult ID for ? empiric abx versus clinical monitoring.

## 2023-11-17 NOTE — DISCHARGE NOTE PROVIDER - NSDCCPCAREPLAN_GEN_ALL_CORE_FT
PRINCIPAL DISCHARGE DIAGNOSIS  Diagnosis: 3-vessel coronary artery disease  Assessment and Plan of Treatment: You have something called Triple vessel disease, which is an extreme form of coronary artery disease (CAD). CAD develops when the major blood vessels supplying the heart become damaged or diseased. Plaque (cholesterol deposits) and inflammation are the two main causes of CAD.  While minor plaque deposits may not block the blood flow to the heart, larger plaque deposits may significantly decrease or even block the flow of blood to the heart. These blockages may cause a patient to experience chest pain, shortness of breath or even cause a heart attack.  You were treated with a procedure called a Percutaneous Coronary Intervention. Please follow up with a cardiologist after discharge.  *****If you experience any chest pain or shortness of breath, please call your doctor or go to emergency room immediately.*****

## 2023-11-17 NOTE — DISCHARGE NOTE PROVIDER - NSDCMRMEDTOKEN_GEN_ALL_CORE_FT
amLODIPine 10 mg oral tablet: 1 tab(s) orally once a day  aspirin 81 mg oral tablet: 1 tab(s) orally once a day  atorvastatin 10 mg oral tablet: 1 tab(s) orally once a day (at bedtime)  docusate sodium 100 mg oral capsule: 1 cap(s) orally 3 times a day  ergocalciferol 1.25 mg (50,000 intl units) oral capsule: 1 cap(s) orally once a day  FeroSul 325 mg (65 mg elemental iron) oral tablet: 1 tab(s) orally 2 times a day  gabapentin 300 mg oral capsule: 2 cap(s) orally 2 times a day  glimepiride 2 mg oral tablet: 1 tab(s) orally once a day  hydrochlorothiazide-lisinopril 12.5 mg-10 mg oral tablet: 1 tab(s) orally once a day  levothyroxine 75 mcg (0.075 mg) oral tablet: 1 tab(s) orally once a day  metFORMIN 850 mg oral tablet: 1 tab(s) orally 2 times a day  montelukast 10 mg oral tablet: 1 tab(s) orally once a day  omeprazole 40 mg oral delayed release capsule: 1 cap(s) orally once a day   amLODIPine 10 mg oral tablet: 1 tab(s) orally once a day  aspirin 81 mg oral capsule: 1 cap(s) orally once a day  aspirin 81 mg oral tablet: 1 tab(s) orally once a day  atorvastatin 80 mg oral tablet: 1 tab(s) orally once a day (at bedtime)  clopidogrel 75 mg oral tablet: 1 tab(s) orally once a day  docusate sodium 100 mg oral capsule: 1 cap(s) orally 3 times a day  ergocalciferol 1.25 mg (50,000 intl units) oral capsule: 1 cap(s) orally once a day  FeroSul 325 mg (65 mg elemental iron) oral tablet: 1 tab(s) orally 2 times a day  gabapentin 300 mg oral capsule: 2 cap(s) orally 2 times a day  glimepiride 2 mg oral tablet: 1 tab(s) orally once a day  isosorbide mononitrate 60 mg oral tablet, extended release: 1 tab(s) orally once a day  levothyroxine 75 mcg (0.075 mg) oral tablet: 1 tab(s) orally once a day  metFORMIN 850 mg oral tablet: 1 tab(s) orally 2 times a day  metoprolol succinate 25 mg oral tablet, extended release: 1 tab(s) orally every 24 hours  montelukast 10 mg oral tablet: 1 tab(s) orally once a day  omeprazole 40 mg oral delayed release capsule: 1 cap(s) orally once a day

## 2023-11-17 NOTE — DISCHARGE NOTE PROVIDER - CARE PROVIDER_API CALL
Gildardo Mccullough  Cardiology  [address] [place] [address]  Phone: [phone]  Fax: [phone]  Follow Up Time:

## 2023-11-17 NOTE — DISCHARGE NOTE PROVIDER - DETAILS OF MALNUTRITION DIAGNOSIS/DIAGNOSES
This patient has been assessed with a concern for Malnutrition and was treated during this hospitalization for the following Nutrition diagnosis/diagnoses:     -  11/16/2023: Morbid obesity (BMI > 40)

## 2023-11-17 NOTE — PROGRESS NOTE ADULT - SUBJECTIVE AND OBJECTIVE BOX
INFECTIOUS DISEASES CONSULT FOLLOW-UP NOTE    INTERVAL HPI/OVERNIGHT EVENTS:      ROS:   Constitutional, eyes, ENT, cardiovascular, respiratory, gastrointestinal, genitourinary, integumentary, neurological, psychiatric and heme/lymph are otherwise negative other than noted above       ANTIBIOTICS/RELEVANT:    MEDICATIONS  (STANDING):  amLODIPine   Tablet 10 milliGRAM(s) Oral every 24 hours  aspirin  chewable 81 milliGRAM(s) Oral daily  atorvastatin 80 milliGRAM(s) Oral at bedtime  chlorhexidine 2% Cloths 1 Application(s) Topical <User Schedule>  clopidogrel Tablet 75 milliGRAM(s) Oral daily  dextrose 5%. 1000 milliLiter(s) (50 mL/Hr) IV Continuous <Continuous>  dextrose 5%. 1000 milliLiter(s) (100 mL/Hr) IV Continuous <Continuous>  dextrose 50% Injectable 25 Gram(s) IV Push once  dextrose 50% Injectable 12.5 Gram(s) IV Push once  dextrose 50% Injectable 25 Gram(s) IV Push once  glucagon  Injectable 1 milliGRAM(s) IntraMuscular once  heparin   Injectable 5000 Unit(s) SubCutaneous every 8 hours  insulin lispro (ADMELOG) corrective regimen sliding scale   SubCutaneous Before meals and at bedtime  isosorbide   mononitrate ER Tablet (IMDUR) 60 milliGRAM(s) Oral daily  levothyroxine 75 MICROGram(s) Oral daily  metoprolol succinate ER 25 milliGRAM(s) Oral every 24 hours  montelukast 10 milliGRAM(s) Oral daily  pantoprazole    Tablet 40 milliGRAM(s) Oral before breakfast  polyethylene glycol 3350 17 Gram(s) Oral daily  senna 2 Tablet(s) Oral at bedtime  sodium chloride 0.9% lock flush 3 milliLiter(s) IV Push every 8 hours    MEDICATIONS  (PRN):  acetaminophen     Tablet .. 650 milliGRAM(s) Oral every 6 hours PRN Temp greater or equal to 38C (100.4F), Mild Pain (1 - 3)  dextrose Oral Gel 15 Gram(s) Oral once PRN Blood Glucose LESS THAN 70 milliGRAM(s)/deciliter        Vital Signs Last 24 Hrs  T(C): 37.4 (17 Nov 2023 05:00), Max: 37.4 (16 Nov 2023 14:14)  T(F): 99.3 (17 Nov 2023 05:00), Max: 99.4 (16 Nov 2023 14:14)  HR: 67 (17 Nov 2023 08:26) (67 - 84)  BP: 126/61 (17 Nov 2023 08:26) (104/51 - 139/65)  BP(mean): 88 (17 Nov 2023 08:26) (73 - 93)  RR: 22 (17 Nov 2023 08:26) (20 - 22)  SpO2: 97% (17 Nov 2023 08:26) (97% - 98%)    Parameters below as of 17 Nov 2023 08:26  Patient On (Oxygen Delivery Method): room air        11-16-23 @ 07:01  -  11-17-23 @ 07:00  --------------------------------------------------------  IN: 861 mL / OUT: 1800 mL / NET: -939 mL      PHYSICAL EXAM:  Constitutional: alert, NAD  Eyes: the sclera and conjunctiva were normal.   ENT: the ears and nose were normal in appearance.   Neck: the appearance of the neck was normal and the neck was supple.   Pulmonary: no respiratory distress and lungs were clear to auscultation bilaterally.   Heart: heart rate was normal and rhythm regular, normal S1 and S2  Vascular:. there was no peripheral edema  Abdomen: normal bowel sounds, soft, non-tender  Neurological: no focal deficits.   Psychiatric: the affect was normal        LABS:                        8.4    12.30 )-----------( 240      ( 16 Nov 2023 05:30 )             26.8     11-16    134<L>  |  102  |  19  ----------------------------<  165<H>  4.6   |  21<L>  |  1.55<H>    Ca    8.8      16 Nov 2023 05:30  Phos  4.3     11-16  Mg     2.1     11-16    TPro  6.1  /  Alb  3.2<L>  /  TBili  0.4  /  DBili  x   /  AST  21  /  ALT  76<H>  /  AlkPhos  92  11-16      Urinalysis Basic - ( 16 Nov 2023 05:30 )    Color: x / Appearance: x / SG: x / pH: x  Gluc: 165 mg/dL / Ketone: x  / Bili: x / Urobili: x   Blood: x / Protein: x / Nitrite: x   Leuk Esterase: x / RBC: x / WBC x   Sq Epi: x / Non Sq Epi: x / Bacteria: x        MICROBIOLOGY:      RADIOLOGY & ADDITIONAL STUDIES:  Reviewed INFECTIOUS DISEASES CONSULT FOLLOW-UP NOTE    INTERVAL HPI/OVERNIGHT EVENTS:    Patient seen and examined at bedside. ZEYAD. Afebrile. no complaints       ROS:   Constitutional, eyes, ENT, cardiovascular, respiratory, gastrointestinal, genitourinary, integumentary, neurological, psychiatric and heme/lymph are otherwise negative other than noted above       ANTIBIOTICS/RELEVANT:    MEDICATIONS  (STANDING):  amLODIPine   Tablet 10 milliGRAM(s) Oral every 24 hours  aspirin  chewable 81 milliGRAM(s) Oral daily  atorvastatin 80 milliGRAM(s) Oral at bedtime  chlorhexidine 2% Cloths 1 Application(s) Topical <User Schedule>  clopidogrel Tablet 75 milliGRAM(s) Oral daily  dextrose 5%. 1000 milliLiter(s) (50 mL/Hr) IV Continuous <Continuous>  dextrose 5%. 1000 milliLiter(s) (100 mL/Hr) IV Continuous <Continuous>  dextrose 50% Injectable 25 Gram(s) IV Push once  dextrose 50% Injectable 12.5 Gram(s) IV Push once  dextrose 50% Injectable 25 Gram(s) IV Push once  glucagon  Injectable 1 milliGRAM(s) IntraMuscular once  heparin   Injectable 5000 Unit(s) SubCutaneous every 8 hours  insulin lispro (ADMELOG) corrective regimen sliding scale   SubCutaneous Before meals and at bedtime  isosorbide   mononitrate ER Tablet (IMDUR) 60 milliGRAM(s) Oral daily  levothyroxine 75 MICROGram(s) Oral daily  metoprolol succinate ER 25 milliGRAM(s) Oral every 24 hours  montelukast 10 milliGRAM(s) Oral daily  pantoprazole    Tablet 40 milliGRAM(s) Oral before breakfast  polyethylene glycol 3350 17 Gram(s) Oral daily  senna 2 Tablet(s) Oral at bedtime  sodium chloride 0.9% lock flush 3 milliLiter(s) IV Push every 8 hours    MEDICATIONS  (PRN):  acetaminophen     Tablet .. 650 milliGRAM(s) Oral every 6 hours PRN Temp greater or equal to 38C (100.4F), Mild Pain (1 - 3)  dextrose Oral Gel 15 Gram(s) Oral once PRN Blood Glucose LESS THAN 70 milliGRAM(s)/deciliter        Vital Signs Last 24 Hrs  T(C): 37.4 (17 Nov 2023 05:00), Max: 37.4 (16 Nov 2023 14:14)  T(F): 99.3 (17 Nov 2023 05:00), Max: 99.4 (16 Nov 2023 14:14)  HR: 67 (17 Nov 2023 08:26) (67 - 84)  BP: 126/61 (17 Nov 2023 08:26) (104/51 - 139/65)  BP(mean): 88 (17 Nov 2023 08:26) (73 - 93)  RR: 22 (17 Nov 2023 08:26) (20 - 22)  SpO2: 97% (17 Nov 2023 08:26) (97% - 98%)    Parameters below as of 17 Nov 2023 08:26  Patient On (Oxygen Delivery Method): room air        11-16-23 @ 07:01  -  11-17-23 @ 07:00  --------------------------------------------------------  IN: 861 mL / OUT: 1800 mL / NET: -939 mL      PHYSICAL EXAM:  Constitutional: alert, NAD  Eyes: the sclera and conjunctiva were normal. PERRLA  ENT: the ears and nose were normal in appearance.   Neck: the appearance of the neck was normal and the neck was supple.   Pulmonary: no respiratory distress and lungs were clear to auscultation bilaterally.   Heart: heart rate was normal and rhythm regular, normal S1 and S2  Vascular:. there was no peripheral edema  Abdomen: normal bowel sounds, soft, non-tender. R femoral sheath site without signs of infection   Neurological: no focal deficits.   Psychiatric: the affect was normal  Peripheral lines without signs of infection       LABS:                        8.4    12.30 )-----------( 240      ( 16 Nov 2023 05:30 )             26.8     11-16    134<L>  |  102  |  19  ----------------------------<  165<H>  4.6   |  21<L>  |  1.55<H>    Ca    8.8      16 Nov 2023 05:30  Phos  4.3     11-16  Mg     2.1     11-16    TPro  6.1  /  Alb  3.2<L>  /  TBili  0.4  /  DBili  x   /  AST  21  /  ALT  76<H>  /  AlkPhos  92  11-16      Urinalysis Basic - ( 16 Nov 2023 05:30 )    Color: x / Appearance: x / SG: x / pH: x  Gluc: 165 mg/dL / Ketone: x  / Bili: x / Urobili: x   Blood: x / Protein: x / Nitrite: x   Leuk Esterase: x / RBC: x / WBC x   Sq Epi: x / Non Sq Epi: x / Bacteria: x        MICROBIOLOGY:  reviewed    RADIOLOGY & ADDITIONAL STUDIES:  Reviewed

## 2023-11-20 LAB
CULTURE RESULTS: SIGNIFICANT CHANGE UP
SPECIMEN SOURCE: SIGNIFICANT CHANGE UP

## 2023-11-23 DIAGNOSIS — I12.9 HYPERTENSIVE CHRONIC KIDNEY DISEASE WITH STAGE 1 THROUGH STAGE 4 CHRONIC KIDNEY DISEASE, OR UNSPECIFIED CHRONIC KIDNEY DISEASE: ICD-10-CM

## 2023-11-23 DIAGNOSIS — I21.4 NON-ST ELEVATION (NSTEMI) MYOCARDIAL INFARCTION: ICD-10-CM

## 2023-11-23 DIAGNOSIS — Z79.84 LONG TERM (CURRENT) USE OF ORAL HYPOGLYCEMIC DRUGS: ICD-10-CM

## 2023-11-23 DIAGNOSIS — E03.9 HYPOTHYROIDISM, UNSPECIFIED: ICD-10-CM

## 2023-11-23 DIAGNOSIS — I25.84 CORONARY ATHEROSCLEROSIS DUE TO CALCIFIED CORONARY LESION: ICD-10-CM

## 2023-11-23 DIAGNOSIS — J45.909 UNSPECIFIED ASTHMA, UNCOMPLICATED: ICD-10-CM

## 2023-11-23 DIAGNOSIS — N18.30 CHRONIC KIDNEY DISEASE, STAGE 3 UNSPECIFIED: ICD-10-CM

## 2023-11-23 DIAGNOSIS — Z79.82 LONG TERM (CURRENT) USE OF ASPIRIN: ICD-10-CM

## 2023-11-23 DIAGNOSIS — K21.9 GASTRO-ESOPHAGEAL REFLUX DISEASE WITHOUT ESOPHAGITIS: ICD-10-CM

## 2023-11-23 DIAGNOSIS — E11.22 TYPE 2 DIABETES MELLITUS WITH DIABETIC CHRONIC KIDNEY DISEASE: ICD-10-CM

## 2023-11-23 DIAGNOSIS — E78.5 HYPERLIPIDEMIA, UNSPECIFIED: ICD-10-CM

## 2023-11-24 ENCOUNTER — NON-APPOINTMENT (OUTPATIENT)
Age: 78
End: 2023-11-24

## 2023-11-24 ENCOUNTER — APPOINTMENT (OUTPATIENT)
Dept: HEART AND VASCULAR | Facility: CLINIC | Age: 78
End: 2023-11-24
Payer: MEDICARE

## 2023-11-24 VITALS
OXYGEN SATURATION: 97 % | TEMPERATURE: 98.3 F | WEIGHT: 198 LBS | HEART RATE: 76 BPM | SYSTOLIC BLOOD PRESSURE: 140 MMHG | DIASTOLIC BLOOD PRESSURE: 57 MMHG

## 2023-11-24 DIAGNOSIS — Z83.3 FAMILY HISTORY OF DIABETES MELLITUS: ICD-10-CM

## 2023-11-24 DIAGNOSIS — E78.5 HYPERLIPIDEMIA, UNSPECIFIED: ICD-10-CM

## 2023-11-24 DIAGNOSIS — I10 ESSENTIAL (PRIMARY) HYPERTENSION: ICD-10-CM

## 2023-11-24 DIAGNOSIS — I25.10 ATHEROSCLEROTIC HEART DISEASE OF NATIVE CORONARY ARTERY W/OUT ANGINA PECTORIS: ICD-10-CM

## 2023-11-24 DIAGNOSIS — Z82.49 FAMILY HISTORY OF ISCHEMIC HEART DISEASE AND OTHER DISEASES OF THE CIRCULATORY SYSTEM: ICD-10-CM

## 2023-11-24 DIAGNOSIS — Z95.5 PRESENCE OF CORONARY ANGIOPLASTY IMPLANT AND GRAFT: ICD-10-CM

## 2023-11-24 PROCEDURE — 99214 OFFICE O/P EST MOD 30 MIN: CPT | Mod: 25

## 2023-11-24 PROCEDURE — 93000 ELECTROCARDIOGRAM COMPLETE: CPT

## 2023-11-24 RX ORDER — GABAPENTIN 300 MG/1
300 CAPSULE ORAL 4 TIMES DAILY
Refills: 0 | Status: ACTIVE | COMMUNITY

## 2023-11-24 RX ORDER — ERGOCALCIFEROL 1.25 MG/1
1.25 MG CAPSULE ORAL
Qty: 12 | Refills: 1 | Status: ACTIVE | COMMUNITY

## 2023-11-24 RX ORDER — AMLODIPINE BESYLATE 10 MG/1
10 TABLET ORAL DAILY
Refills: 0 | Status: ACTIVE | COMMUNITY

## 2023-11-24 RX ORDER — ATORVASTATIN CALCIUM 80 MG/1
80 TABLET, FILM COATED ORAL DAILY
Refills: 0 | Status: ACTIVE | COMMUNITY

## 2023-11-24 RX ORDER — ACETAMINOPHEN 500 MG
500 TABLET ORAL 4 TIMES DAILY
Refills: 0 | Status: ACTIVE | COMMUNITY

## 2023-11-24 RX ORDER — FERROUS SULFATE TAB 325 MG (65 MG ELEMENTAL FE) 325 (65 FE) MG
325 (65 FE) TAB ORAL DAILY
Refills: 0 | Status: ACTIVE | COMMUNITY

## 2023-11-24 RX ORDER — LEVOTHYROXINE SODIUM 0.07 MG/1
75 TABLET ORAL DAILY
Refills: 0 | Status: ACTIVE | COMMUNITY

## 2023-11-24 RX ORDER — ISOSORBIDE MONONITRATE 60 MG/1
60 TABLET, EXTENDED RELEASE ORAL DAILY
Refills: 0 | Status: ACTIVE | COMMUNITY

## 2023-11-24 RX ORDER — OMEPRAZOLE 40 MG/1
40 CAPSULE, DELAYED RELEASE ORAL
Qty: 90 | Refills: 0 | Status: ACTIVE | COMMUNITY

## 2023-11-24 RX ORDER — DOCUSATE SODIUM 100 MG/1
100 CAPSULE, LIQUID FILLED ORAL 3 TIMES DAILY
Refills: 0 | Status: ACTIVE | COMMUNITY

## 2023-11-24 RX ORDER — METOPROLOL SUCCINATE 25 MG/1
25 TABLET, EXTENDED RELEASE ORAL DAILY
Refills: 0 | Status: ACTIVE | COMMUNITY

## 2023-11-24 RX ORDER — MONTELUKAST 10 MG/1
10 TABLET, FILM COATED ORAL DAILY
Refills: 0 | Status: ACTIVE | COMMUNITY

## 2023-11-24 RX ORDER — METFORMIN HYDROCHLORIDE 850 MG/1
850 TABLET, COATED ORAL TWICE DAILY
Refills: 0 | Status: ACTIVE | COMMUNITY

## 2023-11-24 RX ORDER — ASPIRIN 81 MG/1
81 TABLET, CHEWABLE ORAL DAILY
Refills: 0 | Status: ACTIVE | COMMUNITY

## 2023-11-24 RX ORDER — GLIMEPIRIDE 2 MG/1
2 TABLET ORAL DAILY
Refills: 0 | Status: ACTIVE | COMMUNITY

## 2023-11-24 RX ORDER — CLOPIDOGREL BISULFATE 75 MG/1
75 TABLET, FILM COATED ORAL DAILY
Refills: 0 | Status: ACTIVE | COMMUNITY

## 2023-11-24 RX ORDER — ALBUTEROL SULFATE 90 UG/1
108 (90 BASE) INHALANT RESPIRATORY (INHALATION)
Refills: 0 | Status: ACTIVE | COMMUNITY

## 2025-01-30 ENCOUNTER — INPATIENT (INPATIENT)
Facility: HOSPITAL | Age: 80
LOS: 14 days | Discharge: NURSING, REHAB OR LONG-TERM CARE | DRG: 270 | End: 2025-02-14
Attending: STUDENT IN AN ORGANIZED HEALTH CARE EDUCATION/TRAINING PROGRAM | Admitting: THORACIC SURGERY (CARDIOTHORACIC VASCULAR SURGERY)
Payer: MEDICARE

## 2025-01-30 VITALS
SYSTOLIC BLOOD PRESSURE: 140 MMHG | RESPIRATION RATE: 18 BRPM | OXYGEN SATURATION: 100 % | HEART RATE: 86 BPM | DIASTOLIC BLOOD PRESSURE: 66 MMHG

## 2025-01-30 DIAGNOSIS — Z98.890 OTHER SPECIFIED POSTPROCEDURAL STATES: Chronic | ICD-10-CM

## 2025-01-30 DIAGNOSIS — Z90.49 ACQUIRED ABSENCE OF OTHER SPECIFIED PARTS OF DIGESTIVE TRACT: Chronic | ICD-10-CM

## 2025-01-30 DIAGNOSIS — D17.9 BENIGN LIPOMATOUS NEOPLASM, UNSPECIFIED: Chronic | ICD-10-CM

## 2025-01-30 DIAGNOSIS — Z96.653 PRESENCE OF ARTIFICIAL KNEE JOINT, BILATERAL: Chronic | ICD-10-CM

## 2025-01-30 LAB
A1C WITH ESTIMATED AVERAGE GLUCOSE RESULT: 7 % — HIGH (ref 4–5.6)
ALBUMIN SERPL ELPH-MCNC: 3.8 G/DL — SIGNIFICANT CHANGE UP (ref 3.3–5)
ALP SERPL-CCNC: 48 U/L — SIGNIFICANT CHANGE UP (ref 40–120)
ALT FLD-CCNC: 24 U/L — SIGNIFICANT CHANGE UP (ref 10–45)
ANION GAP SERPL CALC-SCNC: 12 MMOL/L — SIGNIFICANT CHANGE UP (ref 5–17)
APPEARANCE UR: CLEAR — SIGNIFICANT CHANGE UP
APTT BLD: 24.3 SEC — LOW (ref 24.5–35.6)
AST SERPL-CCNC: 15 U/L — SIGNIFICANT CHANGE UP (ref 10–40)
BASOPHILS # BLD AUTO: 0 K/UL — SIGNIFICANT CHANGE UP (ref 0–0.2)
BASOPHILS NFR BLD AUTO: 0 % — SIGNIFICANT CHANGE UP (ref 0–2)
BILIRUB SERPL-MCNC: 0.3 MG/DL — SIGNIFICANT CHANGE UP (ref 0.2–1.2)
BILIRUB UR-MCNC: NEGATIVE — SIGNIFICANT CHANGE UP
BLD GP AB SCN SERPL QL: NEGATIVE — SIGNIFICANT CHANGE UP
BUN SERPL-MCNC: 56 MG/DL — HIGH (ref 7–23)
CALCIUM SERPL-MCNC: 8.6 MG/DL — SIGNIFICANT CHANGE UP (ref 8.4–10.5)
CHLORIDE SERPL-SCNC: 99 MMOL/L — SIGNIFICANT CHANGE UP (ref 96–108)
CHOLEST SERPL-MCNC: 153 MG/DL — SIGNIFICANT CHANGE UP
CK MB CFR SERPL CALC: 2.4 NG/ML — SIGNIFICANT CHANGE UP (ref 0–6.7)
CK SERPL-CCNC: 51 U/L — SIGNIFICANT CHANGE UP (ref 25–170)
CO2 SERPL-SCNC: 26 MMOL/L — SIGNIFICANT CHANGE UP (ref 22–31)
COLOR SPEC: YELLOW — SIGNIFICANT CHANGE UP
CREAT SERPL-MCNC: 1.44 MG/DL — HIGH (ref 0.5–1.3)
DIFF PNL FLD: NEGATIVE — SIGNIFICANT CHANGE UP
EGFR: 37 ML/MIN/1.73M2 — LOW
EOSINOPHIL # BLD AUTO: 0 K/UL — SIGNIFICANT CHANGE UP (ref 0–0.5)
EOSINOPHIL NFR BLD AUTO: 0 % — SIGNIFICANT CHANGE UP (ref 0–6)
ESTIMATED AVERAGE GLUCOSE: 154 MG/DL — HIGH (ref 68–114)
GLUCOSE SERPL-MCNC: 263 MG/DL — HIGH (ref 70–99)
GLUCOSE UR QL: NEGATIVE MG/DL — SIGNIFICANT CHANGE UP
HCT VFR BLD CALC: 32.1 % — LOW (ref 34.5–45)
HDLC SERPL-MCNC: 44 MG/DL — LOW
HGB BLD-MCNC: 10.1 G/DL — LOW (ref 11.5–15.5)
INR BLD: 1.12 — SIGNIFICANT CHANGE UP (ref 0.85–1.16)
KETONES UR-MCNC: NEGATIVE MG/DL — SIGNIFICANT CHANGE UP
LEUKOCYTE ESTERASE UR-ACNC: NEGATIVE — SIGNIFICANT CHANGE UP
LIPID PNL WITH DIRECT LDL SERPL: 79 MG/DL — SIGNIFICANT CHANGE UP
LYMPHOCYTES # BLD AUTO: 28.7 % — SIGNIFICANT CHANGE UP (ref 13–44)
LYMPHOCYTES # BLD AUTO: 3.73 K/UL — HIGH (ref 1–3.3)
MAGNESIUM SERPL-MCNC: 1.6 MG/DL — SIGNIFICANT CHANGE UP (ref 1.6–2.6)
MCHC RBC-ENTMCNC: 28 PG — SIGNIFICANT CHANGE UP (ref 27–34)
MCHC RBC-ENTMCNC: 31.5 G/DL — LOW (ref 32–36)
MCV RBC AUTO: 88.9 FL — SIGNIFICANT CHANGE UP (ref 80–100)
MONOCYTES # BLD AUTO: 1.57 K/UL — HIGH (ref 0–0.9)
MONOCYTES NFR BLD AUTO: 12.1 % — SIGNIFICANT CHANGE UP (ref 2–14)
NEUTROPHILS # BLD AUTO: 7.58 K/UL — HIGH (ref 1.8–7.4)
NEUTROPHILS NFR BLD AUTO: 58.3 % — SIGNIFICANT CHANGE UP (ref 43–77)
NITRITE UR-MCNC: NEGATIVE — SIGNIFICANT CHANGE UP
NON HDL CHOLESTEROL: 109 MG/DL — SIGNIFICANT CHANGE UP
NT-PROBNP SERPL-SCNC: HIGH PG/ML (ref 0–300)
NT-PROBNP SERPL-SCNC: HIGH PG/ML (ref 0–300)
PH UR: 5.5 — SIGNIFICANT CHANGE UP (ref 5–8)
PHOSPHATE SERPL-MCNC: 3.7 MG/DL — SIGNIFICANT CHANGE UP (ref 2.5–4.5)
PLATELET # BLD AUTO: 259 K/UL — SIGNIFICANT CHANGE UP (ref 150–400)
POTASSIUM SERPL-MCNC: 4.1 MMOL/L — SIGNIFICANT CHANGE UP (ref 3.5–5.3)
POTASSIUM SERPL-SCNC: 4.1 MMOL/L — SIGNIFICANT CHANGE UP (ref 3.5–5.3)
PROT SERPL-MCNC: 5.8 G/DL — LOW (ref 6–8.3)
PROT UR-MCNC: 30 MG/DL
PROTHROM AB SERPL-ACNC: 12.9 SEC — SIGNIFICANT CHANGE UP (ref 9.9–13.4)
RBC # BLD: 3.61 M/UL — LOW (ref 3.8–5.2)
RBC # FLD: 14.2 % — SIGNIFICANT CHANGE UP (ref 10.3–14.5)
RH IG SCN BLD-IMP: NEGATIVE — SIGNIFICANT CHANGE UP
SODIUM SERPL-SCNC: 137 MMOL/L — SIGNIFICANT CHANGE UP (ref 135–145)
SP GR SPEC: 1.02 — SIGNIFICANT CHANGE UP (ref 1–1.03)
TRIGL SERPL-MCNC: 173 MG/DL — HIGH
TROPONIN T, HIGH SENSITIVITY RESULT: 2147 NG/L — CRITICAL HIGH (ref 0–51)
TSH SERPL-MCNC: 0.36 UIU/ML — SIGNIFICANT CHANGE UP (ref 0.27–4.2)
UROBILINOGEN FLD QL: 0.2 MG/DL — SIGNIFICANT CHANGE UP (ref 0.2–1)
WBC # BLD: 13.01 K/UL — HIGH (ref 3.8–10.5)
WBC # FLD AUTO: 13.01 K/UL — HIGH (ref 3.8–10.5)

## 2025-01-30 PROCEDURE — 93880 EXTRACRANIAL BILAT STUDY: CPT | Mod: 26

## 2025-01-30 RX ORDER — DM/PSEUDOEPHED/ACETAMINOPHEN 10-30-250
12.5 CAPSULE ORAL ONCE
Refills: 0 | Status: DISCONTINUED | OUTPATIENT
Start: 2025-01-30 | End: 2025-02-05

## 2025-01-30 RX ORDER — BACTERIOSTATIC SODIUM CHLORIDE 0.9 %
3 VIAL (ML) INJECTION EVERY 8 HOURS
Refills: 0 | Status: DISCONTINUED | OUTPATIENT
Start: 2025-01-30 | End: 2025-02-10

## 2025-01-30 RX ORDER — HEPARIN SODIUM,PORCINE 10000/ML
1000 VIAL (ML) INJECTION
Qty: 25000 | Refills: 0 | Status: DISCONTINUED | OUTPATIENT
Start: 2025-01-30 | End: 2025-01-31

## 2025-01-30 RX ORDER — SODIUM CHLORIDE 9 G/ML
1000 INJECTION, SOLUTION INTRAVENOUS
Refills: 0 | Status: DISCONTINUED | OUTPATIENT
Start: 2025-01-30 | End: 2025-02-05

## 2025-01-30 RX ORDER — ASPIRIN 81 MG/1
81 TABLET, COATED ORAL DAILY
Refills: 0 | Status: DISCONTINUED | OUTPATIENT
Start: 2025-01-30 | End: 2025-02-05

## 2025-01-30 RX ORDER — GLUCAGON 3 MG/1
1 POWDER NASAL ONCE
Refills: 0 | Status: DISCONTINUED | OUTPATIENT
Start: 2025-01-30 | End: 2025-02-05

## 2025-01-30 RX ORDER — HEPARIN SODIUM,PORCINE 10000/ML
5000 VIAL (ML) INJECTION EVERY 8 HOURS
Refills: 0 | Status: DISCONTINUED | OUTPATIENT
Start: 2025-01-30 | End: 2025-01-30

## 2025-01-30 RX ORDER — DM/PSEUDOEPHED/ACETAMINOPHEN 10-30-250
25 CAPSULE ORAL ONCE
Refills: 0 | Status: DISCONTINUED | OUTPATIENT
Start: 2025-01-30 | End: 2025-02-05

## 2025-01-30 RX ORDER — INSULIN LISPRO 100/ML
5 VIAL (ML) SUBCUTANEOUS
Refills: 0 | Status: DISCONTINUED | OUTPATIENT
Start: 2025-01-30 | End: 2025-02-05

## 2025-01-30 RX ORDER — PANTOPRAZOLE 20 MG/1
40 TABLET, DELAYED RELEASE ORAL
Refills: 0 | Status: DISCONTINUED | OUTPATIENT
Start: 2025-01-30 | End: 2025-02-05

## 2025-01-30 RX ORDER — DM/PSEUDOEPHED/ACETAMINOPHEN 10-30-250
15 CAPSULE ORAL ONCE
Refills: 0 | Status: DISCONTINUED | OUTPATIENT
Start: 2025-01-30 | End: 2025-02-05

## 2025-01-30 RX ORDER — MUPIROCIN 2 G/100G
1 CREAM TOPICAL
Refills: 0 | Status: DISCONTINUED | OUTPATIENT
Start: 2025-01-30 | End: 2025-02-06

## 2025-01-30 RX ORDER — METOPROLOL SUCCINATE 25 MG
12.5 TABLET, EXTENDED RELEASE 24 HR ORAL EVERY 12 HOURS
Refills: 0 | Status: DISCONTINUED | OUTPATIENT
Start: 2025-01-30 | End: 2025-02-03

## 2025-01-30 RX ORDER — INSULIN GLARGINE-YFGN 100 [IU]/ML
15 INJECTION, SOLUTION SUBCUTANEOUS AT BEDTIME
Refills: 0 | Status: DISCONTINUED | OUTPATIENT
Start: 2025-01-30 | End: 2025-02-05

## 2025-01-30 RX ORDER — INSULIN LISPRO 100/ML
VIAL (ML) SUBCUTANEOUS
Refills: 0 | Status: DISCONTINUED | OUTPATIENT
Start: 2025-01-30 | End: 2025-02-05

## 2025-01-30 RX ORDER — LEVOTHYROXINE SODIUM 25 UG/1
75 TABLET ORAL DAILY
Refills: 0 | Status: DISCONTINUED | OUTPATIENT
Start: 2025-01-30 | End: 2025-02-05

## 2025-01-30 RX ORDER — CEFTRIAXONE 250 MG/1
1000 INJECTION, POWDER, FOR SOLUTION INTRAMUSCULAR; INTRAVENOUS EVERY 24 HOURS
Refills: 0 | Status: DISCONTINUED | OUTPATIENT
Start: 2025-01-30 | End: 2025-01-31

## 2025-01-30 RX ORDER — SENNOSIDES 8.6 MG
2 TABLET ORAL AT BEDTIME
Refills: 0 | Status: DISCONTINUED | OUTPATIENT
Start: 2025-01-30 | End: 2025-02-05

## 2025-01-30 RX ORDER — POLYETHYLENE GLYCOL 3350 17 G/17G
17 POWDER, FOR SOLUTION ORAL DAILY
Refills: 0 | Status: DISCONTINUED | OUTPATIENT
Start: 2025-01-30 | End: 2025-02-05

## 2025-01-30 RX ORDER — ATORVASTATIN CALCIUM 80 MG/1
40 TABLET, FILM COATED ORAL AT BEDTIME
Refills: 0 | Status: DISCONTINUED | OUTPATIENT
Start: 2025-01-30 | End: 2025-02-05

## 2025-01-30 RX ADMIN — Medication 3 MILLILITER(S): at 21:24

## 2025-01-30 RX ADMIN — Medication 5 UNIT(S): at 19:48

## 2025-01-30 RX ADMIN — Medication 4: at 19:48

## 2025-01-30 RX ADMIN — Medication 10 UNIT(S)/HR: at 19:44

## 2025-01-30 RX ADMIN — MUPIROCIN 1 APPLICATION(S): 2 CREAM TOPICAL at 19:45

## 2025-01-30 RX ADMIN — ATORVASTATIN CALCIUM 40 MILLIGRAM(S): 80 TABLET, FILM COATED ORAL at 21:32

## 2025-01-30 RX ADMIN — Medication 12.5 MILLIGRAM(S): at 19:45

## 2025-01-30 RX ADMIN — LEVOTHYROXINE SODIUM 75 MICROGRAM(S): 25 TABLET ORAL at 19:45

## 2025-01-30 NOTE — H&P ADULT - ASSESSMENT
Assessment   78 yo female with PMH of HTN, HLD, Type II DM, tripple vessel CAD s/p 3 stents at [facility] 11/2023, CHF, asthma, GERD, and hypothyroidism who presented to [facility] with CP and SOB. She was having fever, chills, a productive cough and congestion for 2-3 days prior to the chest pain. She states the pain was constant and radiated to her back/left arm with associated palpitations, SOB, orthopnea and dizziness. She felt the chest pain was similar to when she had the stents prompting her to present to the ER on 1/24. He was admitted for acute hypoxic respiratory failure 2/2 asthma exacerbation vs CHF exacerbation. He was started on IV vancomycin for Klebsiella UTI. He was also found to have MRSA+ nasal swab He underwent a cardiac cath and was found to have 3V CAD and ruled in for a NSTEMI. He was started on a heparin drip and transferred to [facility] under the service of Dr. Hurst for surgical intervention.    Plan:    Neurovascular:   -Pain well controlled with current regimen. PRN's:    Cardiovascular:   -Hemodynamically stable.   -Monitor: BP, HR, tele    Respiratory:   -Oxygenating well on room air  -Encourage continued use of IS 10x/hr and frequent ambulation  -CXR:    GI:  -GI PPX:  -PO Diet  -Bowel Regimen:     Renal / :  -Continue to monitor renal function: BUN/Cr  -Monitor I/O's daily     Endocrine:    -No hx of DM or thyroid dx  -A1c:  -TSH:    Hematologic:  -CBC: H/H-  -Coagulation Panel.    ID:  -Temperature:   -CBC: WBC-  -Continue to observe for SIRS/Sepsis Syndrome.    Prophylaxis:  -DVT prophylaxis with 5000 SubQ Heparin q8h.  -Continue with SCD's b/l while patient is at rest     Disposition:  -Discharge home once patient is medically ready  Assessment   80 yo female with PMH of HTN, HLD, Type II DM, tripple vessel CAD s/p 3 stents at [facility] 11/2023, CHF, asthma, GERD, and hypothyroidism who presented to [facility] with CP and SOB. She was having fever, chills, a productive cough and congestion for 2-3 days prior to the chest pain. She states the pain was constant and radiated to her back/left arm with associated palpitations, SOB, orthopnea and dizziness. She felt the chest pain was similar to when she had the stents prompting her to present to the ER on 1/24. He was admitted for acute hypoxic respiratory failure 2/2 asthma exacerbation vs CHF exacerbation. He was started on IV vancomycin for Klebsiella UTI. He was also found to have MRSA+ nasal swab He underwent a cardiac cath and was found to have 3V CAD and ruled in for a NSTEMI. He was started on a heparin drip and transferred to [facility] under the service of Dr. Hurst for surgical intervention.    Plan:    Neurovascular:   -Pain well controlled with current regimen. PRN's: tylenol  - no hx of CVA    Cardiovascular:   - 3V CAD, pending interventino next week      - PST in progress      - reports b/l vein stripping, needs vein mapping     - started on hep gtt, continue lopressor, imdur, and aspirin, and statin  -Hemodynamically stable.   -Monitor: BP, HR, tele    Respiratory:   -Oxygenating well on room air  -Encourage continued use of IS 10x/hr and frequent ambulation  -CXR: pending Pa/Lat  - hx of asthma, pending CT chest    GI:  -GI PPX: protonix  -PO Diet  -Bowel Regimen: miralax/senna    Renal / :  -Continue to monitor renal function: BUN/Cr pending  -Monitor I/O's daily     Endocrine:    - Hx of DM, on oral medications, starting weight based insulin  -A1c: pending  -TSH: pending    Hematologic:  -CBC: H/H- pending  -Coagulation Panel.    ID:  -Temperature: afebrile  -CBC: WBC- pending    Prophylaxis:  -DVT prophylaxis with hep gtt  -Continue with SCD's b/l while patient is at rest     Disposition:  -Pending OR for CAD Assessment   80 yo female with PMH of HTN, HLD, Type II DM, tripple vessel CAD s/p 3 stents at [facility] 11/2023, CHF, asthma, GERD, and hypothyroidism who presented to [facility] with CP and SOB. She was having fever, chills, a productive cough and congestion for 2-3 days prior to the chest pain. She states the pain was constant and radiated to her back/left arm with associated palpitations, SOB, orthopnea and dizziness. She felt the chest pain was similar to when she had the stents prompting her to present to the ER on 1/24. He was admitted for acute hypoxic respiratory failure 2/2 asthma exacerbation vs CHF exacerbation. He was started on IV vancomycin for Klebsiella UTI. He was also found to have MRSA+ nasal swab He underwent a cardiac cath and was found to have 3V CAD and ruled in for a NSTEMI. He was started on a heparin drip and transferred to [facility] under the service of Dr. Hurst for surgical intervention.    Plan:    Neurovascular:   -Pain well controlled with current regimen. PRN's: tylenol  - no hx of CVA    Cardiovascular:   - 3V CAD, pending interventino next week      - PST in progress      - reports b/l vein stripping, needs vein mapping     - started on hep gtt, continue lopressor, imdur, and aspirin, and statin  -Hemodynamically stable.   -Monitor: BP, HR, tele    Respiratory:   -Oxygenating well on room air  -Encourage continued use of IS 10x/hr and frequent ambulation  -CXR: pending Pa/Lat  - hx of asthma, pending CT chest    GI:  -GI PPX: protonix  -PO Diet  -Bowel Regimen: miralax/senna    Renal / :  -Continue to monitor renal function: BUN/Cr pending  -Monitor I/O's daily     Endocrine:    - Hx of DM, on oral medications, continue lantus 15U, lispro 5U and MISS  -A1c: pending  -hx of hypothyroidism, continue synthroid TSH: pending    Hematologic:  -CBC: H/H- pending  -Coagulation Panel.    ID:  - +MRSA nasal swab at OSH, continue mupirocin ointment BID  - + UTI at OSH, was on CTX, daptomycin and vancomycin there, continuing 7 day course of CTX 1g here, pending U/A   -Temperature: afebrile  -CBC: WBC- pending    Prophylaxis:  -DVT prophylaxis with hep gtt  -Continue with SCD's b/l while patient is at rest     Disposition:  -Pending OR for CAD Assessment   78 yo female with PMH of HTN, HLD, Type II DM, tripple vessel CAD s/p 3 stents at [facility] 11/2023, CHF, asthma, GERD, and hypothyroidism who presented to [facility] with CP and SOB. She was having fever, chills, a productive cough and congestion for 2-3 days prior to the chest pain. She states the pain was constant and radiated to her back/left arm with associated palpitations, SOB, orthopnea and dizziness. She felt the chest pain was similar to when she had the stents prompting her to present to the ER on 1/24. He was admitted for acute hypoxic respiratory failure 2/2 asthma exacerbation vs CHF exacerbation. He was started on IV vancomycin for Klebsiella UTI. He was also found to have MRSA+ nasal swab. Nephrology was consulted for KEN, cardiology for CAD, pulmonary for asthma exacerbation and ID for pneumonia but unable to locate and culture results were consulted at [place].  He underwent a cardiac cath and was found to have 3V CAD and ruled in for a NSTEMI. He was started on a heparin drip and transferred to [facility] under the service of Dr. Hurst for surgical intervention.     Plan:    Neurovascular:   -Pain well controlled with current regimen. PRN's: tylenol  - no hx of CVA    Cardiovascular:   - 3V CAD, pending interventino next week      - PST in progress      - reports b/l vein stripping, needs vein mapping     - started on hep gtt, continue lopressor 12.5mg q12h, imdur 30mg qd, and aspirin 81mg, and atorvastatin 40mg daily, and lasix 40mg daily   -Hemodynamically stable.   -Monitor: BP, HR, tele    Respiratory:   -Oxygenating well on room air  -Encourage continued use of IS 10x/hr and frequent ambulation  -CXR: pending Pa/Lat  - hx of asthma, pending CT chest    GI:  -GI PPX: protonix  -PO Diet  -Bowel Regimen: miralax/senna    Renal / :  -Continue to monitor renal function: BUN/Cr pending  -Monitor I/O's daily     Endocrine:    - Hx of DM, on oral medications, continue lantus 15U, lispro 5U and MISS  -A1c: pending  -hx of hypothyroidism, continue synthroid TSH: pending    Hematologic:  -CBC: H/H- pending  -Coagulation Panel.    ID:  - +MRSA nasal swab at OSH, continue mupirocin ointment BID  - Pneumonia at OSH, unable to located + cultures, was on CTX, daptomycin and vancomycin there, continuing 7 day course of CTX 1g here, pending U/A   -Temperature: afebrile  -CBC: WBC- pending    Prophylaxis:  -DVT prophylaxis with hep gtt  -Continue with SCD's b/l while patient is at rest     Disposition:  -Pending OR for CAD Assessment   80 yo female with PMH of HTN, HLD, Type II DM, tripple vessel CAD s/p 3 stents at [facility] 11/2023, CHF, asthma, GERD, and hypothyroidism who presented to [facility] with CP and SOB. She was having fever, chills, a productive cough and congestion for 2-3 days prior to the chest pain. She states the pain was constant and radiated to her back/left arm with associated palpitations, SOB, orthopnea and dizziness. She felt the chest pain was similar to when she had the stents prompting her to present to the ER on 1/24. He was admitted for acute hypoxic respiratory failure 2/2 asthma exacerbation vs CHF exacerbation. He was started on IV vancomycin for Klebsiella UTI. He was also found to have MRSA+ nasal swab. Nephrology was consulted for KEN, cardiology for CAD, pulmonary for asthma exacerbation and ID for pneumonia but unable to locate and culture results were consulted at [place].  He underwent a cardiac cath and was found to have 3V CAD and ruled in for a NSTEMI. He was started on a heparin drip and transferred to [facility] under the service of Dr. Hurst for surgical intervention.     Plan:    Neurovascular:   -Pain well controlled with current regimen. PRN's: tylenol  - no hx of CVA    Cardiovascular:   - 3V CAD, pending interventino next week      - PST in progress      - reports b/l vein stripping, needs vein mapping     - started on hep gtt, continue lopressor 12.5mg q12h, imdur 30mg qd, and aspirin 81mg, and atorvastatin 40mg daily, and lasix 40mg daily   -Hemodynamically stable.   -Monitor: BP, HR, tele    Respiratory:   -Oxygenating well on room air  -Encourage continued use of IS 10x/hr and frequent ambulation  -CXR: pending Pa/Lat  - hx of asthma, pending CT chest    GI:  -GI PPX: protonix  -PO Diet  -Bowel Regimen: miralax/senna    Renal / :  -Continue to monitor renal function: BUN/Cr pending  -Monitor I/O's daily     Endocrine:    - Hx of DM, on oral medications, continue lantus 15U, lispro 5U and MISS  -A1c: pending  -hx of hypothyroidism, continue synthroid TSH: pending    Hematologic:  -CBC: H/H- pending  -Coagulation Panel.    ID:  - +MRSA nasal swab at OSH, continue mupirocin ointment BID  - Pneumonia at OSH, unable to located + cultures, was on CTX, daptomycin and vancomycin there, discontinuing all abx here  -Temperature: afebrile  -CBC: WBC- pending    Prophylaxis:  -DVT prophylaxis with hep gtt  -Continue with SCD's b/l while patient is at rest     Disposition:  -Pending OR for CAD

## 2025-01-30 NOTE — H&P ADULT - NSHPPHYSICALEXAM_GEN_ALL_CORE
General: Patient lying comfortably in bed, no acute distress     Neurological: Alert and oriented. No focal neurological deficits     Cardiovascular: S1S2, RRR, no murmurs appreciated on exam     Respiratory: Clear to ausculation bilaterally, no wheeze/rhonchi/rales    Gastrointestinal: + BS, soft, non tender, non distended     Extremities: Warm and well perfused. no calf tenderness. +  LE and UE edema     Vascular: 2+ Peripheral pulses b/l     Incision: Small incision to LLL where vein stripping may have taken place noted

## 2025-01-30 NOTE — H&P ADULT - NSHPREVIEWOFSYSTEMS_GEN_ALL_CORE
Review of Systems  CONSTITUTIONAL:  Denies Fevers / chills, sweats, fatigue, weight loss, weight gain                                      NEURO:  Denies changes in sensation, seizures, syncope, confusion                                                                            EYES:  Denies Blurry vision, discharge, pain, loss of vision                                                                                    ENMT:  Denies Difficulty hearing, vertigo, dysphagia, epistaxis, recent dental work                                       CV:  Denies Chest pain, palpitations, SOSA, orthopnea                                                                                          RESPIRATORY:  Denies Wheezing, SOB, cough / sputum, hemoptysis                                                                GI:  Denies Nausea, vomiting, diarrhea, constipation, melena, difficulty swallowing                                               : Denies Hematuria, dysuria, urgency, incontinence                                                                                         MUSKULOSKELETAL:  Denies arthritis, joint swelling, muscle weakness                                                             SKIN/BREAST:  Denies rash, itching, hair loss, masses                                                                                            PSYCH:  Denies depression, anxiety, suicidal ideation                                                                                               HEME/LYMPH:  Denies bruises easily, enlarged lymph nodes, tender lymph nodes                                        ENDOCRINE:  Denies cold intolerance, heat intolerance, polydipsia

## 2025-01-30 NOTE — H&P ADULT - HISTORY OF PRESENT ILLNESS
78 yo female with PMH of HTN, HLD, Type II DM, tripple vessel CAD s/p 3 stents at [facility] 11/2023, CHF, asthma, GERD, and hypothyroidism who [place] [facility] [place] and SOB. She was having fever, chills, a productive cough and congestion for 2-3 days prior to the chest pain. She states the pain was constant and radiated to her back/left arm with associated palpitations, SOB, orthopnea and dizziness. She felt the chest pain was similar to when she had the stents prompting her to present to the ER on 1/24.    Initially she was placed on Bipap for respiratory distress and was ASA/plavix loaded and started on a heparin drip 78 yo female with PMH of HTN, HLD, Type II DM, tripple vessel CAD s/p 3 stents at [facility] 11/2023, CHF, asthma, GERD, and hypothyroidism who presented to [facility] with CP and SOB. She was having fever, chills, a productive cough and congestion for 2-3 days prior to the chest pain. She states the pain was constant and radiated to her back/left arm with associated palpitations, SOB, orthopnea and dizziness. She felt the chest pain was similar to when she had the stents prompting her to present to the ER on 1/24.    Initially she was placed on Bipap for respiratory distress and was ASA/plavix loaded and started on a heparin drip. At [place] his nasal swab was positive for MRSA, he was treated for acute respiratory failure due to pneumonia and asthma exacerbation. He underwent a cardiac catheterization and was found to have 3V CAD with a reduced EF of 25%.  80 yo female with PMH of HTN, HLD, Type II DM, triple vessel CAD s/p 3 stents at [facility] 11/2023, CHF, asthma, GERD, and hypothyroidism who [place] [facility] [place]. She was having fever, chills, a productive cough and congestion for 2-3 days prior to the chest pain. She states the pain was constant and radiated to her back/left arm with associated palpitations, SOB, orthopnea and dizziness. She felt the chest pain was similar to when she had the stents prompting her to present to the ER on 1/24.In ED she was found to be tachycardic and tachypneic. Her labs were significant for elevated BUN/Cr, potassium, lactate and elevated troponin. CXR showed pulmonary congestion vs. infiltrates. She was placed on BiPAP for respiratory distress and loaded with ASA/Plavix and placed on a heparin gtt for NSTEMI. Her nasal swab was + for MRSA and she recieved CTX 1g daily. She is s/p cardiac cath showing 3V CAD and a reduced EF of 25%. She was transferred to [facility] under the service of Dr. Hurst for further workup and management.    She denies any history of CVA, she states she has a hx of asthma, she also states she had b/l vein stripping to both of her legs in the past. She denies any past surgeries or traumas to the chest.

## 2025-01-31 ENCOUNTER — RESULT REVIEW (OUTPATIENT)
Age: 80
End: 2025-01-31

## 2025-01-31 DIAGNOSIS — N18.30 CHRONIC KIDNEY DISEASE, STAGE 3 UNSPECIFIED: ICD-10-CM

## 2025-01-31 DIAGNOSIS — I50.21 ACUTE SYSTOLIC (CONGESTIVE) HEART FAILURE: ICD-10-CM

## 2025-01-31 DIAGNOSIS — J18.9 PNEUMONIA, UNSPECIFIED ORGANISM: ICD-10-CM

## 2025-01-31 DIAGNOSIS — I10 ESSENTIAL (PRIMARY) HYPERTENSION: ICD-10-CM

## 2025-01-31 DIAGNOSIS — N17.9 ACUTE KIDNEY FAILURE, UNSPECIFIED: ICD-10-CM

## 2025-01-31 DIAGNOSIS — I50.22 CHRONIC SYSTOLIC (CONGESTIVE) HEART FAILURE: ICD-10-CM

## 2025-01-31 DIAGNOSIS — E11.9 TYPE 2 DIABETES MELLITUS WITHOUT COMPLICATIONS: ICD-10-CM

## 2025-01-31 DIAGNOSIS — E03.9 HYPOTHYROIDISM, UNSPECIFIED: ICD-10-CM

## 2025-01-31 DIAGNOSIS — I21.4 NON-ST ELEVATION (NSTEMI) MYOCARDIAL INFARCTION: ICD-10-CM

## 2025-01-31 LAB
A1C WITH ESTIMATED AVERAGE GLUCOSE RESULT: 6.9 % — HIGH (ref 4–5.6)
ANION GAP SERPL CALC-SCNC: 13 MMOL/L — SIGNIFICANT CHANGE UP (ref 5–17)
APTT BLD: 30.1 SEC — SIGNIFICANT CHANGE UP (ref 24.5–35.6)
APTT BLD: 30.8 SEC — SIGNIFICANT CHANGE UP (ref 24.5–35.6)
APTT BLD: 42.9 SEC — HIGH (ref 24.5–35.6)
BUN SERPL-MCNC: 53 MG/DL — HIGH (ref 7–23)
CALCIUM SERPL-MCNC: 9 MG/DL — SIGNIFICANT CHANGE UP (ref 8.4–10.5)
CHLORIDE SERPL-SCNC: 103 MMOL/L — SIGNIFICANT CHANGE UP (ref 96–108)
CO2 SERPL-SCNC: 24 MMOL/L — SIGNIFICANT CHANGE UP (ref 22–31)
CREAT SERPL-MCNC: 1.33 MG/DL — HIGH (ref 0.5–1.3)
EGFR: 41 ML/MIN/1.73M2 — LOW
ESTIMATED AVERAGE GLUCOSE: 151 MG/DL — HIGH (ref 68–114)
GLUCOSE SERPL-MCNC: 138 MG/DL — HIGH (ref 70–99)
HCT VFR BLD CALC: 36.4 % — SIGNIFICANT CHANGE UP (ref 34.5–45)
HGB BLD-MCNC: 11.2 G/DL — LOW (ref 11.5–15.5)
INR BLD: 1.08 — SIGNIFICANT CHANGE UP (ref 0.85–1.16)
INR BLD: 1.11 — SIGNIFICANT CHANGE UP (ref 0.85–1.16)
MAGNESIUM SERPL-MCNC: 1.6 MG/DL — SIGNIFICANT CHANGE UP (ref 1.6–2.6)
MCHC RBC-ENTMCNC: 27.7 PG — SIGNIFICANT CHANGE UP (ref 27–34)
MCHC RBC-ENTMCNC: 30.8 G/DL — LOW (ref 32–36)
MCV RBC AUTO: 90.1 FL — SIGNIFICANT CHANGE UP (ref 80–100)
NRBC # BLD: 0 /100 WBCS — SIGNIFICANT CHANGE UP (ref 0–0)
NRBC BLD-RTO: 0 /100 WBCS — SIGNIFICANT CHANGE UP (ref 0–0)
PA ADP PRP-ACNC: 130 PRU — LOW (ref 182–335)
PLATELET # BLD AUTO: 251 K/UL — SIGNIFICANT CHANGE UP (ref 150–400)
POTASSIUM SERPL-MCNC: 4.2 MMOL/L — SIGNIFICANT CHANGE UP (ref 3.5–5.3)
POTASSIUM SERPL-SCNC: 4.2 MMOL/L — SIGNIFICANT CHANGE UP (ref 3.5–5.3)
PROCALCITONIN SERPL-MCNC: <0.02 NG/ML — SIGNIFICANT CHANGE UP (ref 0.02–0.1)
PROTHROM AB SERPL-ACNC: 12.4 SEC — SIGNIFICANT CHANGE UP (ref 9.9–13.4)
PROTHROM AB SERPL-ACNC: 13 SEC — SIGNIFICANT CHANGE UP (ref 9.9–13.4)
RBC # BLD: 4.04 M/UL — SIGNIFICANT CHANGE UP (ref 3.8–5.2)
RBC # FLD: 14.3 % — SIGNIFICANT CHANGE UP (ref 10.3–14.5)
SODIUM SERPL-SCNC: 140 MMOL/L — SIGNIFICANT CHANGE UP (ref 135–145)
TROPONIN T, HIGH SENSITIVITY RESULT: 2783 NG/L — CRITICAL HIGH (ref 0–51)
WBC # BLD: 17.87 K/UL — HIGH (ref 3.8–10.5)
WBC # FLD AUTO: 17.87 K/UL — HIGH (ref 3.8–10.5)

## 2025-01-31 PROCEDURE — 71046 X-RAY EXAM CHEST 2 VIEWS: CPT | Mod: 26

## 2025-01-31 PROCEDURE — 93306 TTE W/DOPPLER COMPLETE: CPT | Mod: 26

## 2025-01-31 PROCEDURE — 99222 1ST HOSP IP/OBS MODERATE 55: CPT

## 2025-01-31 PROCEDURE — 71250 CT THORAX DX C-: CPT | Mod: 26

## 2025-01-31 PROCEDURE — 93971 EXTREMITY STUDY: CPT | Mod: 26

## 2025-01-31 PROCEDURE — 99223 1ST HOSP IP/OBS HIGH 75: CPT | Mod: GC

## 2025-01-31 RX ORDER — PIPERACILLIN SODIUM AND TAZOBACTAM SODIUM 2; 250 G/50ML; MG/50ML
3.38 INJECTION, POWDER, FOR SOLUTION INTRAVENOUS ONCE
Refills: 0 | Status: COMPLETED | OUTPATIENT
Start: 2025-01-31 | End: 2025-01-31

## 2025-01-31 RX ORDER — VANCOMYCIN HYDROCHLORIDE 50 MG/ML
1250 KIT ORAL EVERY 24 HOURS
Refills: 0 | Status: DISCONTINUED | OUTPATIENT
Start: 2025-01-31 | End: 2025-02-02

## 2025-01-31 RX ORDER — LOSARTAN POTASSIUM 100 MG
25 TABLET ORAL DAILY
Refills: 0 | Status: DISCONTINUED | OUTPATIENT
Start: 2025-01-31 | End: 2025-02-01

## 2025-01-31 RX ORDER — MAGNESIUM OXIDE 400 MG
800 TABLET ORAL ONCE
Refills: 0 | Status: COMPLETED | OUTPATIENT
Start: 2025-01-31 | End: 2025-01-31

## 2025-01-31 RX ORDER — HEPARIN SODIUM,PORCINE 10000/ML
5000 VIAL (ML) INJECTION EVERY 6 HOURS
Refills: 0 | Status: DISCONTINUED | OUTPATIENT
Start: 2025-01-31 | End: 2025-02-01

## 2025-01-31 RX ORDER — ALBUTEROL 90 MCG
2 AEROSOL REFILL (GRAM) INHALATION EVERY 4 HOURS
Refills: 0 | Status: DISCONTINUED | OUTPATIENT
Start: 2025-01-31 | End: 2025-02-14

## 2025-01-31 RX ORDER — HEPARIN SODIUM,PORCINE 10000/ML
5000 VIAL (ML) INJECTION EVERY 6 HOURS
Refills: 0 | Status: DISCONTINUED | OUTPATIENT
Start: 2025-01-31 | End: 2025-01-31

## 2025-01-31 RX ORDER — HEPARIN SODIUM,PORCINE 10000/ML
1550 VIAL (ML) INJECTION
Qty: 25000 | Refills: 0 | Status: DISCONTINUED | OUTPATIENT
Start: 2025-01-31 | End: 2025-02-01

## 2025-01-31 RX ORDER — PIPERACILLIN SODIUM AND TAZOBACTAM SODIUM 2; 250 G/50ML; MG/50ML
3.38 INJECTION, POWDER, FOR SOLUTION INTRAVENOUS EVERY 8 HOURS
Refills: 0 | Status: DISCONTINUED | OUTPATIENT
Start: 2025-01-31 | End: 2025-01-31

## 2025-01-31 RX ORDER — IPRATROPIUM BROMIDE AND ALBUTEROL SULFATE .5; 2.5 MG/3ML; MG/3ML
3 SOLUTION RESPIRATORY (INHALATION) EVERY 6 HOURS
Refills: 0 | Status: DISCONTINUED | OUTPATIENT
Start: 2025-01-31 | End: 2025-02-14

## 2025-01-31 RX ORDER — PIPERACILLIN SODIUM AND TAZOBACTAM SODIUM 2; 250 G/50ML; MG/50ML
3.38 INJECTION, POWDER, FOR SOLUTION INTRAVENOUS EVERY 8 HOURS
Refills: 0 | Status: DISCONTINUED | OUTPATIENT
Start: 2025-02-01 | End: 2025-02-02

## 2025-01-31 RX ORDER — HEPARIN SODIUM,PORCINE 10000/ML
VIAL (ML) INJECTION
Qty: 25000 | Refills: 0 | Status: DISCONTINUED | OUTPATIENT
Start: 2025-01-31 | End: 2025-01-31

## 2025-01-31 RX ORDER — PIPERACILLIN SODIUM AND TAZOBACTAM SODIUM 2; 250 G/50ML; MG/50ML
3.38 INJECTION, POWDER, FOR SOLUTION INTRAVENOUS ONCE
Refills: 0 | Status: COMPLETED | OUTPATIENT
Start: 2025-01-31 | End: 2025-02-01

## 2025-01-31 RX ORDER — HEPARIN SODIUM,PORCINE 10000/ML
5000 VIAL (ML) INJECTION ONCE
Refills: 0 | Status: DISCONTINUED | OUTPATIENT
Start: 2025-01-31 | End: 2025-01-31

## 2025-01-31 RX ADMIN — Medication 6: at 11:48

## 2025-01-31 RX ADMIN — Medication 800 MILLIGRAM(S): at 11:03

## 2025-01-31 RX ADMIN — MUPIROCIN 1 APPLICATION(S): 2 CREAM TOPICAL at 22:29

## 2025-01-31 RX ADMIN — Medication 12.5 MILLIGRAM(S): at 17:18

## 2025-01-31 RX ADMIN — Medication 2: at 17:26

## 2025-01-31 RX ADMIN — INSULIN GLARGINE-YFGN 15 UNIT(S): 100 INJECTION, SOLUTION SUBCUTANEOUS at 21:52

## 2025-01-31 RX ADMIN — Medication 3 MILLILITER(S): at 21:50

## 2025-01-31 RX ADMIN — Medication 3 MILLILITER(S): at 05:14

## 2025-01-31 RX ADMIN — LEVOTHYROXINE SODIUM 75 MICROGRAM(S): 25 TABLET ORAL at 07:38

## 2025-01-31 RX ADMIN — Medication 4: at 21:52

## 2025-01-31 RX ADMIN — IPRATROPIUM BROMIDE AND ALBUTEROL SULFATE 3 MILLILITER(S): .5; 2.5 SOLUTION RESPIRATORY (INHALATION) at 22:19

## 2025-01-31 RX ADMIN — Medication 1550 UNIT(S)/HR: at 23:02

## 2025-01-31 RX ADMIN — Medication 300 MILLIGRAM(S): at 17:18

## 2025-01-31 RX ADMIN — Medication 12.5 MILLIGRAM(S): at 07:39

## 2025-01-31 RX ADMIN — PANTOPRAZOLE 40 MILLIGRAM(S): 20 TABLET, DELAYED RELEASE ORAL at 07:39

## 2025-01-31 RX ADMIN — ASPIRIN 81 MILLIGRAM(S): 81 TABLET, COATED ORAL at 11:03

## 2025-01-31 RX ADMIN — Medication 5 UNIT(S): at 17:18

## 2025-01-31 RX ADMIN — MUPIROCIN 1 APPLICATION(S): 2 CREAM TOPICAL at 07:39

## 2025-01-31 RX ADMIN — Medication 40 MILLIGRAM(S): at 07:39

## 2025-01-31 RX ADMIN — CEFTRIAXONE 100 MILLIGRAM(S): 250 INJECTION, POWDER, FOR SOLUTION INTRAMUSCULAR; INTRAVENOUS at 07:38

## 2025-01-31 RX ADMIN — Medication 25 MILLIGRAM(S): at 18:38

## 2025-01-31 RX ADMIN — ATORVASTATIN CALCIUM 40 MILLIGRAM(S): 80 TABLET, FILM COATED ORAL at 21:39

## 2025-01-31 RX ADMIN — PIPERACILLIN SODIUM AND TAZOBACTAM SODIUM 200 GRAM(S): 2; 250 INJECTION, POWDER, FOR SOLUTION INTRAVENOUS at 21:40

## 2025-01-31 RX ADMIN — Medication 2: at 07:37

## 2025-01-31 RX ADMIN — Medication 14 UNIT(S)/HR: at 17:06

## 2025-01-31 RX ADMIN — Medication 3 MILLILITER(S): at 14:32

## 2025-01-31 RX ADMIN — Medication 2 TABLET(S): at 21:40

## 2025-01-31 RX ADMIN — Medication 30 MILLIGRAM(S): at 11:03

## 2025-01-31 RX ADMIN — Medication 5 UNIT(S): at 07:39

## 2025-01-31 RX ADMIN — INSULIN GLARGINE-YFGN 15 UNIT(S): 100 INJECTION, SOLUTION SUBCUTANEOUS at 00:19

## 2025-01-31 NOTE — CONSULT NOTE ADULT - ATTENDING COMMENTS
Elderly [place] woman with a recent diagnosis of asthma treated w Symbicort, here now w ACS and CT scan shows some airway findings, notably to my eye the appearance of endobronchial material in superior segment RLL bronchus. This is not an urgent problem and any workup or procedure is contraindicated now in context of ICS. My recommendation would be outpatient pulm follow up w PFTs (to diagnosis obstructive lung disease) and consideration of repeat imaging. For now, nebs and pulmonary toilet, and treat ACS.

## 2025-01-31 NOTE — PATIENT PROFILE ADULT - NSTRANSFEREYEGLASSESPAIRS_GEN_A_NUR
No protocol for requested medication.    Medication:  semaglutide-Weight Management (WEGOVY) 2.4 MG/0.75ML injection   Last office visit date: 12/18/2023  Pharmacy: [facility] [id number] - [address] [id number]    Order pended, routed to clinician for review.     
1 pair

## 2025-01-31 NOTE — PROGRESS NOTE ADULT - PROBLEM SELECTOR PLAN 3
###R/o PNA  WBC on Admission 17.87, Afebrile   s/p Ceftriaxone x1 in [facility]   Concern for PNA per Collateral ppw MRSA swab + at [facility]   Repeat RVP and MRSA on admission   CT Chest 1/31/2025 with   Pulm Consulted for findings of Mucuous Plugging on CT Chest and hx of Asthma ###R/o PNA  WBC on Admission 17.87, Afebrile   s/p Ceftriaxone x1 in [facility]   Concern for PNA per Collateral ppw MRSA swab + at [facility]   Repeat RVP and MRSA on admission   CT Chest 1/31/2025 with   Pulm Consulted for findings of Mucuous Plugging on CT Chest and hx of Asthma    ON prior admission 11/2024 had issues of Elevated Leukocytosis and Fever ID was consulted   Infectious w/u was negative (UA, BCXs x 2, CXR). RVP/COVID both negative ###R/o PNA vs Ashtma Exacerbation @ [facility] [place] was on Bipap got steroids and Rocephin x1 for PNA vs Asthma Exacerbation vs CHF   WBC on Admission 17.87, Afebrile with concern for PNA per Collateral ppw MRSA swab + at [facility] [place] [facility]   Started Vanc and Zosyn   CT Chest 1/31/2025 Persistent small large airways inflammation with interval increase in mucous plugging with near complete obstruction of the lateral segmental bronchus to the right middle lobe, right lower lobe bronchus and anteromedial basal segmental bronchus to the left lower lobe. Associated groundglass opacities are identified and early multifocal  pneumonia possibly postobstructive cannot be excluded.   Air-trapping from underlying small airways inflammation consistent with the provided history of asthma. Mild peripheral reticular pattern without fibrotic changes which may represent an early interstitial lung abnormality.  Severe calcified atherosclerotic plaque thoracic and abdominal aorta and major  side branches including the origin of the SMA.  Pulm Consulted appreciate recs, examined findings on CT and does not think PNA, hold off Abx for now  Duonebs for Now and Hypertonic Saline     OF note **   ON prior admission 11/2024 had issues of Elevated Leukocytosis and Fever ID was consulted   Infectious w/u was negative (UA, BCXs x 2, CXR). RVP/COVID both negative ###R/o PNA vs Ashtma Exacerbation @ [facility] [place] was on Bipap got steroids and Rocephin x1 for PNA   WBC on Admission 13--->17.87, Afebrile with concern for PNA per Collateral ppw MRSA swab + at [facility]   Blood Cultures x1 set sent difficult stick 1/31/2025, UA 1/30 Negative, Procal Sent off    Started Vanc and Zosyn descalate as warranted   CT Chest 1/31/2025 Persistent small large airways inflammation with interval increase in mucous plugging with near complete obstruction of the lateral segmental bronchus to the right middle lobe, right lower lobe bronchus and anteromedial basal segmental bronchus to the left lower lobe. Associated groundglass opacities are identified and early multifocal  pneumonia possibly postobstructive cannot be excluded.   Air-trapping from underlying small airways inflammation consistent with the provided history of asthma. Mild peripheral reticular pattern without fibrotic changes which may represent an early interstitial lung abnormality.  Severe calcified atherosclerotic plaque thoracic and abdominal aorta and major  side branches including the origin of the SMA.  Pulm Consulted appreciate recs, examined findings on CT and does not think PNA  Duonebs for Now and Hypertonic Saline for airway clearance     OF note **   ON prior admission 11/2023 had issues of Elevated Leukocytosis and Fever ID was consulted was attributed to Post op fever   Infectious w/u was negative (UA, BCXs x 2, CXR). RVP/COVID both negative

## 2025-01-31 NOTE — CHART NOTE - NSCHARTNOTEFT_GEN_A_CORE
80 yo female with PMHx of HTN, HLD, Type II DM, triple vessel CAD s/p 3 stents at [facility] 11/2023, CHF, asthma, GERD, and hypothyroidism who presented to [facility] [place] with CP and SOB on 1/24. She was having fever, chills, a productive cough and congestion for 2-3 days prior to the chest pain. In ED she was found to be tachycardic and tachypneic. Her labs were significant for elevated BUN/Cr, potassium, lactate and elevated troponin. CXR showed pulmonary congestion vs. infiltrates. She was placed on BiPAP for respiratory distress and loaded with ASA/Plavix and placed on a heparin gtt for NSTEMI. Her nasal swab was + for MRSA and she recieved CTX 1g daily. She is s/p cardiac cath showing 3V CAD and a reduced EF of 25%. She was transferred to [facility] under the service of Dr. Hurst for further workup and management.       Upon assessment at [facility] patient deemed extremely high risk for surgical intervention given significant comorbidities, porcelain aorta, and lack of conduit (patient s/p bilateral vein stripping). Discussion held between Dr. Hurst, Dr. Gallegos, and cardiology and decision made to proceed with high risk PCI. To be transferred to CCU post procedure.

## 2025-01-31 NOTE — PROGRESS NOTE ADULT - ASSESSMENT
A/P:    80 yo female with PMHx of HTN, HLD, Type II DM, triple vessel CAD s/p 3 stents at [facility] 11/2023, CHF, asthma, GERD, and hypothyroidism who [place] [facility] [place]. She was having fever, chills, a productive cough and congestion for 2-3 days prior to the chest pain. In ED she was found to be tachycardic and tachypneic. Her labs were significant for elevated BUN/Cr, potassium, lactate and elevated troponin. CXR showed pulmonary congestion vs. infiltrates. She was placed on BiPAP for respiratory distress and loaded with ASA/Plavix and placed on a heparin gtt for NSTEMI. Her nasal swab was + for MRSA and she recieved CTX 1g daily. She is s/p cardiac cath showing 3V CAD and a reduced EF of 25%. She was transferred to [facility] under the service of Dr. Hurst for further workup and management. Upon assessment at [facility] patient deemed poor surgical candidate given significant comorbidities, porcelain aorta, and lack of conduit (patient s/p bilateral vein stripping). Discussion held between Dr. Hurst, Dr. Gallegos, and cardiolgoy and decision made to proceed with high risk PCI. To be transferred to CCU post procedure.      Neurovascular:   - No delirium. Pain well controlled with current regimen.  -Continue tylenol PRN    Cardiovascular:   - CAD: 3VCAD (has had stents in the past), continue heparin gtt, ASA, atorvastatin 40 mg daily pending intervention   - Patient with no vein conduit: has had bilateral LE stripping in the past  - Patient with porcelain aorta   -Hemodynamically stable. HR controlled (66-86)  - Hx of HTN, BP controlled (138//65), continue metoprolol 12.5 mg q12 hours, imdur 30 mg daily  - ASA, Plavix, BB, statin  - EKG. TTE. Cardiac Panel. Lipid Panel. BNP.      Respiratory:   - 02 Sat = 98% on RA.  -If on oxygen wean to RA from for O2 Sat > 93%.  -Encourage C+DB and Use of IS 10x / hr while awake.  -CXR.    GI:   - Stable.  -NPO after MN.  -Continue GI PPX with protonix  -PO Diet.    Renal / :  - BUN/Cr stable at X/X  -Continue to monitor renal function.  -Monitor I/O's.  - Replete electrolytes PRN    Endocrine:    -A1c.  -TSH.    Hematologic:  -H/H stable at X/X with no obvious signs of bleeding  -Coagulation Panel.    ID:  -Pt remains afebrile with no elevation in WBC or signs of infection  -Continue to monitor CBC  -Observe for SIRS/Sepsis Syndrome.    Prophylaxis:  -DVT prophylaxis with 5000 SubQ Heparin q8h.  -SCD's    Disposition:  -Home when medically appropriate.   A/P:    80 yo female with PMHx of HTN, HLD, Type II DM, triple vessel CAD s/p 3 stents at [facility] 11/2023, CHF, asthma, GERD, and hypothyroidism who presented to [place] with CP and SOB on 1/24. She was having fever, chills, a productive cough and congestion for 2-3 days prior to the chest pain. In ED she was found to be tachycardic and tachypneic. Her labs were significant for elevated BUN/Cr, potassium, lactate and elevated troponin. CXR showed pulmonary congestion vs. infiltrates. She was placed on BiPAP for respiratory distress and loaded with ASA/Plavix and placed on a heparin gtt for NSTEMI. Her nasal swab was + for MRSA and she recieved CTX 1g daily. She is s/p cardiac cath showing 3V CAD and a reduced EF of 25%. She was transferred to [facility] under the service of Dr. Hurst for further workup and management. Upon assessment at [facility] patient deemed poor surgical candidate given significant comorbidities, porcelain aorta, and lack of conduit (patient s/p bilateral vein stripping). Discussion held between Dr. Hurst, Dr. Gallegos, and cardiolgoy and decision made to proceed with high risk PCI. To be transferred to CCU post procedure.      Neurovascular:   - No delirium. Pain well controlled with current regimen.  -Continue tylenol PRN    Cardiovascular:   - CAD: 3VCAD (has had stents in the past), continue heparin gtt, ASA, atorvastatin 40 mg daily pending intervention   - Patient with no vein conduit: has had bilateral LE stripping in the past  - Patient with porcelain aorta   - HFrEF: EF 25%, continue diuresis with lasix 40 mg daily   -Hemodynamically stable. HR controlled (66-86)  - Hx of HTN, BP controlled (138//65), continue metoprolol 12.5 mg q12 hours, imdur 30 mg daily    Respiratory:   - 02 Sat = 98% on RA.  - Known hx asthma   -Encourage C+DB and Use of IS 10x / hr while awake.  -CT Chest with evidence of asthmatic disease process     GI:   -NPO for likely PCI today   -Continue GI PPX with protonix  -PO Diet.    Renal / :  - ? hx CKD, BUN/Cr 53/1.33, downtrending from Cr 1.4 yesterday   -Continue to monitor renal function.  -Monitor I/O's.  - Replete electrolytes PRN    Endocrine:    -A1c 6.9, known hx DM, continue lantus 15, lispro 6, MISS  -TSH 0.395, known hx hypothyroidism, continue synthroid 75 mcg daily     Hematologic:  -H/H stable at 11.2/36.4 with no obvious signs of bleeding  -Coagulation Panel.    ID:  -Pt remains afebrile with no elevation in WBC or signs of infection  - Per documentation treated with CTX, dapto, and vancomycin at OSH, no evidence PNA here, antibiotics discontinued   - +MRSA nasal swab at OSH, continue mupirocin ointment BID here, no need for isolation per epidemiology   -Continue to monitor CBC  -Observe for SIRS/Sepsis Syndrome.    Prophylaxis:  -DVT prophylaxis with heparin gtt   -SCD's    Disposition:  - Pending PCI and transfer to CCU

## 2025-01-31 NOTE — CONSULT NOTE ADULT - ATTENDING COMMENTS
Very pleasant, 80 yo F, with hypertension and CAD, prior NSTEMI, now presenting with CP found to have significant ISR RCA and LAD on LHC at [facility]. RHC data incomplete, but RAP 4, PCW 17 with /63. She was transferred to consider for CABG, but given a porcelain aorta is not a surgical candidate. HF asked to optimize prior to planned PCI. LVEF 44% on echo here with normal RV size/function, normal IVC, and normal atria. Grade I DD.    PE notable for euvolemic and well compensated. She has 3-pillow orthopnea, a cough, and remains hypertensive. No JVD or LE edema. Obese abdomen precludes full assessment, but she is nontender with good bowel sounds. Of note, on CT scan, she has a significant SMA origin stenosis, but denies any abdominal pain. Her losartan was held in setting of anticipating CTS.    She remains hypertensive, but otherwise looks reasonably well.  Please resume her losartan 25 mg once daily - 1st dose now.   Can continue lopressor 12.5 mg BID or switch her back to her home Toprol XL 25 mg daily. If further BP control is needed, consider switching to Coreg or increasing losartan.  No diuretics given low preload.    At time of LHC, please get LVEDP if possible. And complete RHC report from [facility].  Can consider MRA and SGLT2-i at time of discharge given past HFrEF with improved LVEF.  Would consider outpatient GLP1 agonist given DM II, obesity, and CAD.    HF will sign off at this time. Please call if additional concerns or questions.

## 2025-01-31 NOTE — PROGRESS NOTE ADULT - PROBLEM SELECTOR PLAN 7
On Admission BP Stable 130s/140s     Added Losartan Today 1/31   Continue with Imdur for BP control and Antianginal   Holding Home Norvasc

## 2025-01-31 NOTE — CONSULT NOTE ADULT - SUBJECTIVE AND OBJECTIVE BOX
PULMONARY SERVICE INITIAL CONSULT NOTE    HPI:  80 yo female with PMH of HTN, HLD, Type II DM, triple vessel CAD s/p 3 stents at [facility] 11/2023, CHF, asthma, GERD, and hypothyroidism who presented to [facility] [place] with [place] and SOB on 1/24. She was having fever, chills, a productive cough and congestion for 2-3 days prior to the chest pain. She states the pain was constant and radiated to her back/left arm with associated palpitations, SOB, orthopnea and dizziness. She felt the chest pain was similar to when she had the stents prompting her to present to the ER on 1/24.In ED she was found to be tachycardic and tachypneic. Her labs were significant for elevated BUN/Cr, potassium, lactate and elevated troponin. CXR showed pulmonary congestion vs. infiltrates. She was placed on BiPAP for respiratory distress and loaded with ASA/Plavix and placed on a heparin gtt for NSTEMI. Her nasal swab was + for MRSA and she recieved CTX 1g daily. She is s/p cardiac cath showing 3V CAD and a reduced EF of 25%. She was transferred to [facility] under the service of Dr. Hurst for further workup and management.    She denies any history of CVA, she states she has a hx of asthma, she also states she had b/l vein stripping to both of her legs in the past. She denies any past surgeries or traumas to the chest.      (30 Jan 2025 14:37)    Additional pulmonary history: Pulmonary consulted for CT findings. Seen and examined at bedside. Labeled with asthma ~2 years ago (just prior to her first MI and 3VD neccesitating high risk PCI) by her PCP (Dr. Ruelas?) and given symbicort inhaler. SHe reports no relief from this inhaler. Her main complaint is mucus that is persistently stuck and productive cough which is chronic.       REVIEW OF SYSTEMS:  Constitutional: No fever, weight loss or fatigue  Eyes: No eye pain, visual disturbances, or discharge  ENMT:  No difficulty hearing, tinnitus, vertigo; No sinus or throat pain  Neck: No pain, stiffness or neck swelling  Respiratory: see HPI  Cardiovascular: No chest pain, palpitations, dizziness or leg swelling  Gastrointestinal: No abdominal or epigastric pain. No nausea, vomiting or hematemesis; No diarrhea or constipation. No melena or hematochezia.  Genitourinary: No dysuria, frequency, hematuria or incontinence  Neurological: No headaches, memory loss, loss of strength, numbness or tremors  Skin: No itching, burning, rashes or lesions   Lymph Nodes: No enlarged glands  Endocrine: No heat or cold intolerance; No hair loss  Musculoskeletal: No joint pain or swelling; No muscle, back or extremity pain  Psychiatric: No depression, anxiety, mood swings or difficulty sleeping  Heme/Lymph: No easy bruising or bleeding gums  Allergy and Immunologic: No hives or eczema    PAST MEDICAL & SURGICAL HISTORY:  GERD (gastroesophageal reflux disease)      Hyperlipemia      HTN (hypertension)      Hypothyroid      DM (diabetes mellitus)      Obesity (BMI 30-39.9)      Glaucoma  bilateral eyes      Primary osteoarthritis of left knee      CAD (coronary artery disease)      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      History of cholecystectomy      History of back surgery      History of bilateral knee replacement          FAMILY HISTORY:  Family history of diabetes mellitus (Mother, Sibling)    Family history of hypertension (Mother, Sibling)    Family history of blindness (Grandparent)        SOCIAL HISTORY:  Smoking Status: [ ] Current, [ ] Former, [ x ] Never  Pack Years:    MEDICATIONS:  Pulmonary:    Antimicrobials:    Anticoagulants:  aspirin enteric coated 81 milliGRAM(s) Oral daily  heparin  Infusion 1000 Unit(s)/Hr IV Continuous <Continuous>    Onc:    GI/:  pantoprazole    Tablet 40 milliGRAM(s) Oral before breakfast  polyethylene glycol 3350 17 Gram(s) Oral daily PRN  senna 2 Tablet(s) Oral at bedtime    Endocrine:  atorvastatin 40 milliGRAM(s) Oral at bedtime  dextrose 50% Injectable 25 Gram(s) IV Push once  dextrose 50% Injectable 12.5 Gram(s) IV Push once  dextrose 50% Injectable 25 Gram(s) IV Push once  dextrose Oral Gel 15 Gram(s) Oral once PRN  glucagon  Injectable 1 milliGRAM(s) IntraMuscular once  insulin glargine Injectable (LANTUS) 15 Unit(s) SubCutaneous at bedtime  insulin lispro (ADMELOG) corrective regimen sliding scale   SubCutaneous Before meals and at bedtime  insulin lispro Injectable (ADMELOG) 5 Unit(s) SubCutaneous three times a day before meals  levothyroxine 75 MICROGram(s) Oral daily    Cardiac:  isosorbide   mononitrate ER Tablet (IMDUR) 30 milliGRAM(s) Oral daily  losartan 25 milliGRAM(s) Oral daily  metoprolol tartrate 12.5 milliGRAM(s) Oral every 12 hours    Other Medications:  dextrose 5%. 1000 milliLiter(s) IV Continuous <Continuous>  dextrose 5%. 1000 milliLiter(s) IV Continuous <Continuous>  mupirocin 2% Nasal 1 Application(s) Both Nostrils two times a day  sodium chloride 0.9% lock flush 3 milliLiter(s) IV Push every 8 hours      Allergies    No Known Allergies    Intolerances        Vital Signs Last 24 Hrs  T(C): 36.6 (31 Jan 2025 16:46), Max: 36.6 (31 Jan 2025 16:46)  T(F): 97.9 (31 Jan 2025 16:46), Max: 97.9 (31 Jan 2025 16:46)  HR: 104 (31 Jan 2025 16:46) (66 - 108)  BP: 141/84 (31 Jan 2025 16:46) (111/49 - 170/64)  BP(mean): 103 (31 Jan 2025 16:46) (70 - 103)  RR: 18 (31 Jan 2025 16:46) (15 - 19)  SpO2: 93% (31 Jan 2025 16:46) (91% - 100%)    Parameters below as of 31 Jan 2025 16:46  Patient On (Oxygen Delivery Method): room air        01-30 @ 07:01  -  01-31 @ 07:00  --------------------------------------------------------  IN: 30 mL / OUT: 200 mL / NET: -170 mL          PHYSICAL EXAM:  Constitutional: well-appearing  Head: NC/AT  EENT: PERRL, anicteric sclera; oropharynx clear, MMM  Neck: supple, no appreciable JVD  Respiratory: CTA B/L; no W/R/R  Cardiovascular: +S1/S2, RRR  Gastrointestinal: soft, NT/ND  Extremities: WWP; no edema, clubbing or cyanosis  Vascular: 2+ radial pulses B/L  Neurological: awake and alert; HAMMONDS    LABS:      CBC Full  -  ( 31 Jan 2025 06:54 )  WBC Count : 17.87 K/uL  RBC Count : 4.04 M/uL  Hemoglobin : 11.2 g/dL  Hematocrit : 36.4 %  Platelet Count - Automated : 251 K/uL  Mean Cell Volume : 90.1 fl  Mean Cell Hemoglobin : 27.7 pg  Mean Cell Hemoglobin Concentration : 30.8 g/dL  Auto Neutrophil # : x  Auto Lymphocyte # : x  Auto Monocyte # : x  Auto Eosinophil # : x  Auto Basophil # : x  Auto Neutrophil % : x  Auto Lymphocyte % : x  Auto Monocyte % : x  Auto Eosinophil % : x  Auto Basophil % : x    01-31    140  |  103  |  53[H]  ----------------------------<  138[H]  4.2   |  24  |  1.33[H]    Ca    9.0      31 Jan 2025 06:54  Phos  3.7     01-30  Mg     1.6     01-31    TPro  5.8[L]  /  Alb  3.8  /  TBili  0.3  /  DBili  x   /  AST  15  /  ALT  24  /  AlkPhos  48  01-30    PT/INR - ( 31 Jan 2025 06:54 )   PT: 12.4 sec;   INR: 1.08          PTT - ( 31 Jan 2025 12:00 )  PTT:30.1 sec      Urinalysis Basic - ( 31 Jan 2025 06:54 )    Color: x / Appearance: x / SG: x / pH: x  Gluc: 138 mg/dL / Ketone: x  / Bili: x / Urobili: x   Blood: x / Protein: x / Nitrite: x   Leuk Esterase: x / RBC: x / WBC x   Sq Epi: x / Non Sq Epi: x / Bacteria: x                RADIOLOGY & ADDITIONAL STUDIES: PULMONARY SERVICE INITIAL CONSULT NOTE    HPI:  78 yo female with PMH of HTN, HLD, Type II DM, triple vessel CAD s/p 3 stents at [facility] 11/2023, CHF, asthma, GERD, and hypothyroidism who presented to [facility] with CP and SOB on 1/24. She was having fever, chills, a productive cough and congestion for 2-3 days prior to the chest pain. She states the pain was constant and radiated to her back/left arm with associated palpitations, SOB, orthopnea and dizziness. She felt the chest pain was similar to when she had the stents prompting her to present to the ER on 1/24.In ED she was found to be tachycardic and tachypneic. Her labs were significant for elevated BUN/Cr, potassium, lactate and elevated troponin. CXR showed pulmonary congestion vs. infiltrates. She was placed on BiPAP for respiratory distress and loaded with ASA/Plavix and placed on a heparin gtt for NSTEMI. Her nasal swab was + for MRSA and she recieved CTX 1g daily. She is s/p cardiac cath showing 3V CAD and a reduced EF of 25%. She was transferred to [facility] under the service of Dr. Hurst for further workup and management.    She denies any history of CVA, she states she has a hx of asthma, she also states she had b/l vein stripping to both of her legs in the past. She denies any past surgeries or traumas to the chest.      (30 Jan 2025 14:37)    Additional pulmonary history: Pulmonary consulted for CT findings. Seen and examined at bedside. Labeled with asthma ~2 years ago (just prior to her first MI and 3VD neccesitating high risk PCI) by her PCP (Dr. Ruelas?) and given symbicort inhaler. SHe reports no relief from this inhaler. Her main complaint is mucus that is persistently stuck and productive cough which is chronic. Originally from [facility], exposure to biomatter while she lived there. No family history or personal history of parenchymal lung disease prior to this. Never smoker. No exposures. Previously worked as a healthcare assistant.       REVIEW OF SYSTEMS:  Constitutional: No fever, weight loss or fatigue  Eyes: No eye pain, visual disturbances, or discharge  ENMT:  No difficulty hearing, tinnitus, vertigo; No sinus or throat pain  Neck: No pain, stiffness or neck swelling  Respiratory: see HPI  Cardiovascular: No chest pain, palpitations, dizziness or leg swelling  Gastrointestinal: No abdominal or epigastric pain. No nausea, vomiting or hematemesis; No diarrhea or constipation. No melena or hematochezia.  Genitourinary: No dysuria, frequency, hematuria or incontinence  Neurological: No headaches, memory loss, loss of strength, numbness or tremors  Skin: No itching, burning, rashes or lesions   Lymph Nodes: No enlarged glands  Endocrine: No heat or cold intolerance; No hair loss  Musculoskeletal: No joint pain or swelling; No muscle, back or extremity pain  Psychiatric: No depression, anxiety, mood swings or difficulty sleeping  Heme/Lymph: No easy bruising or bleeding gums  Allergy and Immunologic: No hives or eczema    PAST MEDICAL & SURGICAL HISTORY:  GERD (gastroesophageal reflux disease)      Hyperlipemia      HTN (hypertension)      Hypothyroid      DM (diabetes mellitus)      Obesity (BMI 30-39.9)      Glaucoma  bilateral eyes      Primary osteoarthritis of left knee      CAD (coronary artery disease)      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      History of cholecystectomy      History of back surgery      History of bilateral knee replacement          FAMILY HISTORY:  Family history of diabetes mellitus (Mother, Sibling)    Family history of hypertension (Mother, Sibling)    Family history of blindness (Grandparent)        SOCIAL HISTORY:  Smoking Status: [ ] Current, [ ] Former, [ x ] Never  Pack Years:    MEDICATIONS:  Pulmonary:    Antimicrobials:    Anticoagulants:  aspirin enteric coated 81 milliGRAM(s) Oral daily  heparin  Infusion 1000 Unit(s)/Hr IV Continuous <Continuous>    Onc:    GI/:  pantoprazole    Tablet 40 milliGRAM(s) Oral before breakfast  polyethylene glycol 3350 17 Gram(s) Oral daily PRN  senna 2 Tablet(s) Oral at bedtime    Endocrine:  atorvastatin 40 milliGRAM(s) Oral at bedtime  dextrose 50% Injectable 25 Gram(s) IV Push once  dextrose 50% Injectable 12.5 Gram(s) IV Push once  dextrose 50% Injectable 25 Gram(s) IV Push once  dextrose Oral Gel 15 Gram(s) Oral once PRN  glucagon  Injectable 1 milliGRAM(s) IntraMuscular once  insulin glargine Injectable (LANTUS) 15 Unit(s) SubCutaneous at bedtime  insulin lispro (ADMELOG) corrective regimen sliding scale   SubCutaneous Before meals and at bedtime  insulin lispro Injectable (ADMELOG) 5 Unit(s) SubCutaneous three times a day before meals  levothyroxine 75 MICROGram(s) Oral daily    Cardiac:  isosorbide   mononitrate ER Tablet (IMDUR) 30 milliGRAM(s) Oral daily  losartan 25 milliGRAM(s) Oral daily  metoprolol tartrate 12.5 milliGRAM(s) Oral every 12 hours    Other Medications:  dextrose 5%. 1000 milliLiter(s) IV Continuous <Continuous>  dextrose 5%. 1000 milliLiter(s) IV Continuous <Continuous>  mupirocin 2% Nasal 1 Application(s) Both Nostrils two times a day  sodium chloride 0.9% lock flush 3 milliLiter(s) IV Push every 8 hours      Allergies    No Known Allergies    Intolerances        Vital Signs Last 24 Hrs  T(C): 36.6 (31 Jan 2025 16:46), Max: 36.6 (31 Jan 2025 16:46)  T(F): 97.9 (31 Jan 2025 16:46), Max: 97.9 (31 Jan 2025 16:46)  HR: 104 (31 Jan 2025 16:46) (66 - 108)  BP: 141/84 (31 Jan 2025 16:46) (111/49 - 170/64)  BP(mean): 103 (31 Jan 2025 16:46) (70 - 103)  RR: 18 (31 Jan 2025 16:46) (15 - 19)  SpO2: 93% (31 Jan 2025 16:46) (91% - 100%)    Parameters below as of 31 Jan 2025 16:46  Patient On (Oxygen Delivery Method): room air        01-30 @ 07:01  -  01-31 @ 07:00  --------------------------------------------------------  IN: 30 mL / OUT: 200 mL / NET: -170 mL          PHYSICAL EXAM:  Constitutional: well-appearing  Head: NC/AT  EENT: PERRL, anicteric sclera; oropharynx clear, MMM  Neck: supple, no appreciable JVD  Respiratory: CTA B/L; no W/R/R  Cardiovascular: +S1/S2, RRR  Gastrointestinal: soft, NT/ND  Extremities: WWP; no edema, clubbing or cyanosis  Vascular: 2+ radial pulses B/L  Neurological: awake and alert; HAMMONDS    LABS:      CBC Full  -  ( 31 Jan 2025 06:54 )  WBC Count : 17.87 K/uL  RBC Count : 4.04 M/uL  Hemoglobin : 11.2 g/dL  Hematocrit : 36.4 %  Platelet Count - Automated : 251 K/uL  Mean Cell Volume : 90.1 fl  Mean Cell Hemoglobin : 27.7 pg  Mean Cell Hemoglobin Concentration : 30.8 g/dL  Auto Neutrophil # : x  Auto Lymphocyte # : x  Auto Monocyte # : x  Auto Eosinophil # : x  Auto Basophil # : x  Auto Neutrophil % : x  Auto Lymphocyte % : x  Auto Monocyte % : x  Auto Eosinophil % : x  Auto Basophil % : x    01-31    140  |  103  |  53[H]  ----------------------------<  138[H]  4.2   |  24  |  1.33[H]    Ca    9.0      31 Jan 2025 06:54  Phos  3.7     01-30  Mg     1.6     01-31    TPro  5.8[L]  /  Alb  3.8  /  TBili  0.3  /  DBili  x   /  AST  15  /  ALT  24  /  AlkPhos  48  01-30    PT/INR - ( 31 Jan 2025 06:54 )   PT: 12.4 sec;   INR: 1.08          PTT - ( 31 Jan 2025 12:00 )  PTT:30.1 sec      Urinalysis Basic - ( 31 Jan 2025 06:54 )    Color: x / Appearance: x / SG: x / pH: x  Gluc: 138 mg/dL / Ketone: x  / Bili: x / Urobili: x   Blood: x / Protein: x / Nitrite: x   Leuk Esterase: x / RBC: x / WBC x   Sq Epi: x / Non Sq Epi: x / Bacteria: x                RADIOLOGY & ADDITIONAL STUDIES: PULMONARY SERVICE INITIAL CONSULT NOTE    HPI:  80 yo female with PMH of HTN, HLD, Type II DM, triple vessel CAD s/p 3 stents at [facility] 11/2023, CHF, asthma, GERD, and hypothyroidism who presented to [facility] with CP and SOB on 1/24. She was having fever, chills, a productive cough and congestion for 2-3 days prior to the chest pain. She states the pain was constant and radiated to her back/left arm with associated palpitations, SOB, orthopnea and dizziness. She felt the chest pain was similar to when she had the stents prompting her to present to the ER on 1/24.In ED she was found to be tachycardic and tachypneic. Her labs were significant for elevated BUN/Cr, potassium, lactate and elevated troponin. CXR showed pulmonary congestion vs. infiltrates. She was placed on BiPAP for respiratory distress and loaded with ASA/Plavix and placed on a heparin gtt for NSTEMI. Her nasal swab was + for MRSA and she recieved CTX 1g daily. She is s/p cardiac cath showing 3V CAD and a reduced EF of 25%. She was transferred to [facility] under the service of Dr. Hurst for further workup and management.    She denies any history of CVA, she states she has a hx of asthma, she also states she had b/l vein stripping to both of her legs in the past. She denies any past surgeries or traumas to the chest.      (30 Jan 2025 14:37)    Additional pulmonary history: Pulmonary consulted for CT findings. Seen and examined at bedside. Labeled with asthma ~2 years ago (just prior to her first MI and 3VD neccesitating high risk PCI) by her PCP (Dr. Ruelas?) and given symbicort inhaler. SHe reports no relief from this inhaler. Her main complaint is mucus that is persistently stuck and productive cough which is chronic. Originally from [facility], exposure to biomatter while she lived there. No family history or personal history of parenchymal lung disease prior to this. Never smoker. No exposures. Previously worked as a healthcare assistant.       REVIEW OF SYSTEMS:  Constitutional: No fever, weight loss or fatigue  Eyes: No eye pain, visual disturbances, or discharge  ENMT:  No difficulty hearing, tinnitus, vertigo; No sinus or throat pain  Neck: No pain, stiffness or neck swelling  Respiratory: see HPI  Cardiovascular: No chest pain, palpitations, dizziness or leg swelling  Gastrointestinal: No abdominal or epigastric pain. No nausea, vomiting or hematemesis; No diarrhea or constipation. No melena or hematochezia.  Genitourinary: No dysuria, frequency, hematuria or incontinence  Neurological: No headaches, memory loss, loss of strength, numbness or tremors  Skin: No itching, burning, rashes or lesions   Lymph Nodes: No enlarged glands  Endocrine: No heat or cold intolerance; No hair loss  Musculoskeletal: No joint pain or swelling; No muscle, back or extremity pain  Psychiatric: No depression, anxiety, mood swings or difficulty sleeping  Heme/Lymph: No easy bruising or bleeding gums  Allergy and Immunologic: No hives or eczema    PAST MEDICAL & SURGICAL HISTORY:  GERD (gastroesophageal reflux disease)      Hyperlipemia      HTN (hypertension)      Hypothyroid      DM (diabetes mellitus)      Obesity (BMI 30-39.9)      Glaucoma  bilateral eyes      Primary osteoarthritis of left knee      CAD (coronary artery disease)      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      History of cholecystectomy      History of back surgery      History of bilateral knee replacement          FAMILY HISTORY:  Family history of diabetes mellitus (Mother, Sibling)    Family history of hypertension (Mother, Sibling)    Family history of blindness (Grandparent)        SOCIAL HISTORY:  Smoking Status: [ ] Current, [ ] Former, [ x ] Never  Pack Years:    MEDICATIONS:  Pulmonary:    Antimicrobials:    Anticoagulants:  aspirin enteric coated 81 milliGRAM(s) Oral daily  heparin  Infusion 1000 Unit(s)/Hr IV Continuous <Continuous>    Onc:    GI/:  pantoprazole    Tablet 40 milliGRAM(s) Oral before breakfast  polyethylene glycol 3350 17 Gram(s) Oral daily PRN  senna 2 Tablet(s) Oral at bedtime    Endocrine:  atorvastatin 40 milliGRAM(s) Oral at bedtime  dextrose 50% Injectable 25 Gram(s) IV Push once  dextrose 50% Injectable 12.5 Gram(s) IV Push once  dextrose 50% Injectable 25 Gram(s) IV Push once  dextrose Oral Gel 15 Gram(s) Oral once PRN  glucagon  Injectable 1 milliGRAM(s) IntraMuscular once  insulin glargine Injectable (LANTUS) 15 Unit(s) SubCutaneous at bedtime  insulin lispro (ADMELOG) corrective regimen sliding scale   SubCutaneous Before meals and at bedtime  insulin lispro Injectable (ADMELOG) 5 Unit(s) SubCutaneous three times a day before meals  levothyroxine 75 MICROGram(s) Oral daily    Cardiac:  isosorbide   mononitrate ER Tablet (IMDUR) 30 milliGRAM(s) Oral daily  losartan 25 milliGRAM(s) Oral daily  metoprolol tartrate 12.5 milliGRAM(s) Oral every 12 hours    Other Medications:  dextrose 5%. 1000 milliLiter(s) IV Continuous <Continuous>  dextrose 5%. 1000 milliLiter(s) IV Continuous <Continuous>  mupirocin 2% Nasal 1 Application(s) Both Nostrils two times a day  sodium chloride 0.9% lock flush 3 milliLiter(s) IV Push every 8 hours      Allergies    No Known Allergies    Intolerances        Vital Signs Last 24 Hrs  T(C): 36.6 (31 Jan 2025 16:46), Max: 36.6 (31 Jan 2025 16:46)  T(F): 97.9 (31 Jan 2025 16:46), Max: 97.9 (31 Jan 2025 16:46)  HR: 104 (31 Jan 2025 16:46) (66 - 108)  BP: 141/84 (31 Jan 2025 16:46) (111/49 - 170/64)  BP(mean): 103 (31 Jan 2025 16:46) (70 - 103)  RR: 18 (31 Jan 2025 16:46) (15 - 19)  SpO2: 93% (31 Jan 2025 16:46) (91% - 100%)    Parameters below as of 31 Jan 2025 16:46  Patient On (Oxygen Delivery Method): room air        01-30 @ 07:01  -  01-31 @ 07:00  --------------------------------------------------------  IN: 30 mL / OUT: 200 mL / NET: -170 mL          PHYSICAL EXAM:  Constitutional: well-appearing  Head: NC/AT  EENT: PERRL, anicteric sclera; oropharynx clear, MMM  Neck: supple, no appreciable JVD  Respiratory: CTA B/L; no W/R/R  Cardiovascular: +S1/S2, RRR  Gastrointestinal: soft, NT/ND  Extremities: WWP; no edema, clubbing or cyanosis  Vascular: 2+ radial pulses B/L  Neurological: awake and alert; HAMMONDS    LABS:      CBC Full  -  ( 31 Jan 2025 06:54 )  WBC Count : 17.87 K/uL  RBC Count : 4.04 M/uL  Hemoglobin : 11.2 g/dL  Hematocrit : 36.4 %  Platelet Count - Automated : 251 K/uL  Mean Cell Volume : 90.1 fl  Mean Cell Hemoglobin : 27.7 pg  Mean Cell Hemoglobin Concentration : 30.8 g/dL  Auto Neutrophil # : x  Auto Lymphocyte # : x  Auto Monocyte # : x  Auto Eosinophil # : x  Auto Basophil # : x  Auto Neutrophil % : x  Auto Lymphocyte % : x  Auto Monocyte % : x  Auto Eosinophil % : x  Auto Basophil % : x    01-31    140  |  103  |  53[H]  ----------------------------<  138[H]  4.2   |  24  |  1.33[H]    Ca    9.0      31 Jan 2025 06:54  Phos  3.7     01-30  Mg     1.6     01-31    TPro  5.8[L]  /  Alb  3.8  /  TBili  0.3  /  DBili  x   /  AST  15  /  ALT  24  /  AlkPhos  48  01-30    PT/INR - ( 31 Jan 2025 06:54 )   PT: 12.4 sec;   INR: 1.08          PTT - ( 31 Jan 2025 12:00 )  PTT:30.1 sec      Urinalysis Basic - ( 31 Jan 2025 06:54 )    Color: x / Appearance: x / SG: x / pH: x  Gluc: 138 mg/dL / Ketone: x  / Bili: x / Urobili: x   Blood: x / Protein: x / Nitrite: x   Leuk Esterase: x / RBC: x / WBC x   Sq Epi: x / Non Sq Epi: x / Bacteria: x                RADIOLOGY & ADDITIONAL STUDIES:

## 2025-01-31 NOTE — PROGRESS NOTE ADULT - PROBLEM SELECTOR PLAN 5
Continue with ISS   A1C 6.9 CKD Stage 3 Creatinine Baseline Around 1.3-1.5 range from prior Labs    on Admission 1.44 down to 1.33 today   Continue to Monitor while on Lasix and Starting Losartan

## 2025-01-31 NOTE — PROGRESS NOTE ADULT - PROBLEM SELECTOR PLAN 2
New Acute CHF likely in setting of NSTEMI   Etiology Ischemic   Repeat ECHO pending   Prior Echo as above from November normal EF    GMDT:   Diuresis:   Advanced CHF Consulted and now Following New Acute CHF likely in setting of NSTEMI EF at [facility] was 25%, Echo on Arrival improved to 1/31/2025 44%   Etiology Ischemic   CHF Consulted seen and appears Euvolemic   Continue Lasix 40mg (home Dose) and Losartan 25mg for BP Control   Prior Echo as above   GMDT: Losartan 25, Toprol 12.5mg and   Diuresis: Lasix 40mg PO (Home Dose)   Advanced CHF Consulted and now Following

## 2025-01-31 NOTE — PROGRESS NOTE ADULT - SUBJECTIVE AND OBJECTIVE BOX
INCOMPLETE    TRANSFER from [facility] to [address] [place] [address] Course     80 yo female with PMH of HTN, HLD, Type II DM, triple vessel CAD s/p 3 stents at [facility] 11/2023, CHF, asthma, GERD, and hypothyroidism who presented to [facility] with CP and SOB on 1/24. She was having fever, chills, a productive cough and congestion for 2-3 days prior to the chest pain. She states the pain was constant and radiated to her back/left arm with associated palpitations, SOB, orthopnea and dizziness. She felt the chest pain was similar to when she had the stents prompting her to present to the ER on 1/24.In ED she was found to be tachycardic and tachypneic. Her labs were significant for elevated BUN/Cr, potassium, lactate and elevated troponin. CXR showed pulmonary congestion vs. infiltrates. She was placed on BiPAP for respiratory distress and loaded with ASA/Plavix and placed on a heparin gtt for NSTEMI. Her nasal swab was + for MRSA and she recieved CTX 1g daily. She is s/p cardiac cath at [facility] showing 3V CAD and a newly reduced EF of 25%.   She was transferred to [facility] under the service of Dr. Hurst for further workup and management however in discussion with Dr. Hernandez given prior vein stripping, porcelain Aorta patient is not a surgical candidate and is planned to transfer to Cardiac Tele for optimization prior to Complex PCI with Dr. Partida    Imaging Done at [facility]   Carotid Duplex: IMPRESSION: No significant hemodynamic stenosis of either carotid artery.  CT Chest 1/31/2025: Persistent small large airways inflammation consistent with provided history of asthma with interval increase  in mucous plugging with near complete obstruction of the lateral segmental bronchus to the right middle lobe, right lower lobe bronchus and anteromedial basal segmental bronchus to the left lower lobe. Associated groundglass opacities are identified and early multifocal   pneumonia possibly postobstructive cannot be excluded. Pulmonary toiletry bronchoscopic correlation is suggested. Mosaic attenuation pattern to suggest air-trapping from underlying small airways inflammation consistent with the provided history of asthma. Mild peripheral reticular pattern without fibrotic changes which may represent an early interstitial lung abnormality. Severe calcified atherosclerotic plaque most pronounced within the visualized coronary arteries and thoracic and abdominal aorta and major side branches including the origin of the SMA.    OVERNIGHT EVENTS:  No acute events overnight.    SUBJECTIVE / INTERVAL HPI: Patient seen and examined at bedside.    Denies CP, SOB, dizziness/lightheadedness, palpitations    12 point ROS otherwise negative    VITAL SIGNS:  Vital Signs Last 24 Hrs  T(C): 36.3 (31 Jan 2025 08:59), Max: 36.3 (30 Jan 2025 21:19)  T(F): 97.3 (31 Jan 2025 08:59), Max: 97.3 (30 Jan 2025 21:19)  HR: 108 (31 Jan 2025 12:05) (66 - 108)  BP: 129/60 (31 Jan 2025 12:05) (111/49 - 170/64)  BP(mean): 87 (31 Jan 2025 12:05) (70 - 95)  RR: 16 (31 Jan 2025 12:05) (15 - 19)  SpO2: 93% (31 Jan 2025 12:05) (91% - 100%)    Parameters below as of 31 Jan 2025 12:05  Patient On (Oxygen Delivery Method): room air          I&O's Summary    30 Jan 2025 07:01  -  31 Jan 2025 07:00  --------------------------------------------------------  IN: 30 mL / OUT: 200 mL / NET: -170 mL      Height (cm): 144.8 (01-30 @ 19:48)  Weight (kg): 84 (01-30 @ 19:48)  BMI (kg/m2): 40.1 (01-30 @ 19:48)  BSA (m2): 1.74 (01-30 @ 19:48)    PHYSICAL EXAM:    General: sitting up in bed, NAD  HEENT: conjunctiva clear; MMM  Neck: supple, no JVD  Cardiovascular: RRR, no murmurs  Respiratory: CTA B/L  Gastrointestinal: soft, NT/ND, +BS  Extremities: WWP, no edema or cyanosis  Vascular: Peripheral pulses palpable 2+ bilaterally/ carotid 2+ b/l, no bruits; radial 2+ b/l; femoral 2+ b/l no bruits; DP/PT 2+ b/l  Neurological: AAOx3, no focal deficits    MEDICATIONS:  MEDICATIONS  (STANDING):  aspirin enteric coated 81 milliGRAM(s) Oral daily  atorvastatin 40 milliGRAM(s) Oral at bedtime  dextrose 5%. 1000 milliLiter(s) (50 mL/Hr) IV Continuous <Continuous>  dextrose 5%. 1000 milliLiter(s) (100 mL/Hr) IV Continuous <Continuous>  dextrose 50% Injectable 25 Gram(s) IV Push once  dextrose 50% Injectable 12.5 Gram(s) IV Push once  dextrose 50% Injectable 25 Gram(s) IV Push once  furosemide    Tablet 40 milliGRAM(s) Oral daily  glucagon  Injectable 1 milliGRAM(s) IntraMuscular once  heparin  Infusion 1000 Unit(s)/Hr (12 mL/Hr) IV Continuous <Continuous>  insulin glargine Injectable (LANTUS) 15 Unit(s) SubCutaneous at bedtime  insulin lispro (ADMELOG) corrective regimen sliding scale   SubCutaneous Before meals and at bedtime  insulin lispro Injectable (ADMELOG) 5 Unit(s) SubCutaneous three times a day before meals  isosorbide   mononitrate ER Tablet (IMDUR) 30 milliGRAM(s) Oral daily  levothyroxine 75 MICROGram(s) Oral daily  metoprolol tartrate 12.5 milliGRAM(s) Oral every 12 hours  mupirocin 2% Nasal 1 Application(s) Both Nostrils two times a day  pantoprazole    Tablet 40 milliGRAM(s) Oral before breakfast  senna 2 Tablet(s) Oral at bedtime  sodium chloride 0.9% lock flush 3 milliLiter(s) IV Push every 8 hours    MEDICATIONS  (PRN):  dextrose Oral Gel 15 Gram(s) Oral once PRN Blood Glucose LESS THAN 70 milliGRAM(s)/deciliter  polyethylene glycol 3350 17 Gram(s) Oral daily PRN Constipation      LABS:                        11.2   17.87 )-----------( 251      ( 31 Jan 2025 06:54 )             36.4       01-31    140  |  103  |  53[H]  ----------------------------<  138[H]  4.2   |  24  |  1.33[H]    Ca    9.0      31 Jan 2025 06:54  Phos  3.7     01-30  Mg     1.6     01-31    TPro  5.8[L]  /  Alb  3.8  /  TBili  0.3  /  DBili  x   /  AST  15  /  ALT  24  /  AlkPhos  48  01-30      PT/INR - ( 31 Jan 2025 06:54 )   PT: 12.4 sec;   INR: 1.08          PTT - ( 31 Jan 2025 12:00 )  PTT:30.1 sec    CARDIAC MARKERS ( 30 Jan 2025 21:24 )  x     / x     / x     / x     / 2.4 ng/mL        TELEMETRY:    RADIOLOGY & ADDITIONAL TESTS: Reviewed. TRANSFER from [facility] to [address] [place] [address] Course     80 yo female with PMH of HTN, HLD, Type II DM, triple vessel CAD s/p 3 stents at [facility] 11/2023, CHF, asthma, GERD, and hypothyroidism who [facility] with CP and SOB on 1/24. She was having fever, chills, a productive cough and congestion for 2-3 days prior to the chest pain. She states the pain was constant and radiated to her back/left arm with associated palpitations, SOB, orthopnea and dizziness. She felt the chest pain was similar to when she had the stents prompting her to present to the ER on 1/24.In ED she was found to be tachycardic and tachypneic. Her labs were significant for elevated BUN/Cr, potassium, lactate and elevated troponin. CXR showed pulmonary congestion vs. infiltrates. She was placed on BiPAP for respiratory distress and loaded with ASA/Plavix and placed on a heparin gtt for NSTEMI. Her nasal swab was + for MRSA and she recieved CTX 1g daily. She is s/p cardiac cath at [facility] showing 3V CAD and a newly reduced EF of 25%.   She was transferred to [facility] under the service of Dr. Hurst for further workup and management however in discussion with Dr. Hernandez given prior vein stripping, porcelain Aorta patient is not a surgical candidate and is planned to transfer to Cardiac Tele for optimization prior to Complex PCI with Dr. Partida    Imaging Done at [facility]   Carotid Duplex: IMPRESSION: No significant hemodynamic stenosis of either carotid artery.  CT Chest 1/31/2025: Persistent small large airways inflammation consistent with provided history of asthma with interval increase  in mucous plugging with near complete obstruction of the lateral segmental bronchus to the right middle lobe, right lower lobe bronchus and anteromedial basal segmental bronchus to the left lower lobe. Associated groundglass opacities are identified and early multifocal   pneumonia possibly postobstructive cannot be excluded. Pulmonary toiletry bronchoscopic correlation is suggested. Mosaic attenuation pattern to suggest air-trapping from underlying small airways inflammation consistent with the provided history of asthma. Mild peripheral reticular pattern without fibrotic changes which may represent an early interstitial lung abnormality. Severe calcified atherosclerotic plaque most pronounced within the visualized coronary arteries and thoracic and abdominal aorta and major side branches including the origin of the SMA.    OVERNIGHT EVENTS:  No acute events overnight.    SUBJECTIVE / INTERVAL HPI: Patient seen and examined at bedside.    Denies CP, SOB, dizziness/lightheadedness, palpitations    12 point ROS otherwise negative    VITAL SIGNS:  Vital Signs Last 24 Hrs  T(C): 36.3 (31 Jan 2025 08:59), Max: 36.3 (30 Jan 2025 21:19)  T(F): 97.3 (31 Jan 2025 08:59), Max: 97.3 (30 Jan 2025 21:19)  HR: 108 (31 Jan 2025 12:05) (66 - 108)  BP: 129/60 (31 Jan 2025 12:05) (111/49 - 170/64)  BP(mean): 87 (31 Jan 2025 12:05) (70 - 95)  RR: 16 (31 Jan 2025 12:05) (15 - 19)  SpO2: 93% (31 Jan 2025 12:05) (91% - 100%)    Parameters below as of 31 Jan 2025 12:05  Patient On (Oxygen Delivery Method): room air          I&O's Summary    30 Jan 2025 07:01  -  31 Jan 2025 07:00  --------------------------------------------------------  IN: 30 mL / OUT: 200 mL / NET: -170 mL      Height (cm): 144.8 (01-30 @ 19:48)  Weight (kg): 84 (01-30 @ 19:48)  BMI (kg/m2): 40.1 (01-30 @ 19:48)  BSA (m2): 1.74 (01-30 @ 19:48)    PHYSICAL EXAM:    General: sitting up in bed, NAD  HEENT: conjunctiva clear; MMM  Neck: supple, no JVD  Cardiovascular: RRR, no murmurs  Respiratory: CTA B/L  Gastrointestinal: soft, NT/ND, +BS  Extremities: WWP, no edema or cyanosis  Vascular: Peripheral pulses palpable 2+ bilaterally/ carotid 2+ b/l, no bruits; radial 2+ b/l; femoral 2+ b/l no bruits; DP/PT 2+ b/l  Neurological: AAOx3, no focal deficits    MEDICATIONS:  MEDICATIONS  (STANDING):  aspirin enteric coated 81 milliGRAM(s) Oral daily  atorvastatin 40 milliGRAM(s) Oral at bedtime  dextrose 5%. 1000 milliLiter(s) (50 mL/Hr) IV Continuous <Continuous>  dextrose 5%. 1000 milliLiter(s) (100 mL/Hr) IV Continuous <Continuous>  dextrose 50% Injectable 25 Gram(s) IV Push once  dextrose 50% Injectable 12.5 Gram(s) IV Push once  dextrose 50% Injectable 25 Gram(s) IV Push once  furosemide    Tablet 40 milliGRAM(s) Oral daily  glucagon  Injectable 1 milliGRAM(s) IntraMuscular once  heparin  Infusion 1000 Unit(s)/Hr (12 mL/Hr) IV Continuous <Continuous>  insulin glargine Injectable (LANTUS) 15 Unit(s) SubCutaneous at bedtime  insulin lispro (ADMELOG) corrective regimen sliding scale   SubCutaneous Before meals and at bedtime  insulin lispro Injectable (ADMELOG) 5 Unit(s) SubCutaneous three times a day before meals  isosorbide   mononitrate ER Tablet (IMDUR) 30 milliGRAM(s) Oral daily  levothyroxine 75 MICROGram(s) Oral daily  metoprolol tartrate 12.5 milliGRAM(s) Oral every 12 hours  mupirocin 2% Nasal 1 Application(s) Both Nostrils two times a day  pantoprazole    Tablet 40 milliGRAM(s) Oral before breakfast  senna 2 Tablet(s) Oral at bedtime  sodium chloride 0.9% lock flush 3 milliLiter(s) IV Push every 8 hours    MEDICATIONS  (PRN):  dextrose Oral Gel 15 Gram(s) Oral once PRN Blood Glucose LESS THAN 70 milliGRAM(s)/deciliter  polyethylene glycol 3350 17 Gram(s) Oral daily PRN Constipation      LABS:                        11.2   17.87 )-----------( 251      ( 31 Jan 2025 06:54 )             36.4       01-31    140  |  103  |  53[H]  ----------------------------<  138[H]  4.2   |  24  |  1.33[H]    Ca    9.0      31 Jan 2025 06:54  Phos  3.7     01-30  Mg     1.6     01-31    TPro  5.8[L]  /  Alb  3.8  /  TBili  0.3  /  DBili  x   /  AST  15  /  ALT  24  /  AlkPhos  48  01-30      PT/INR - ( 31 Jan 2025 06:54 )   PT: 12.4 sec;   INR: 1.08          PTT - ( 31 Jan 2025 12:00 )  PTT:30.1 sec    CARDIAC MARKERS ( 30 Jan 2025 21:24 )  x     / x     / x     / x     / 2.4 ng/mL        TELEMETRY:    RADIOLOGY & ADDITIONAL TESTS: Reviewed. TRANSFER from [facility] to [address] [place] [address] Course     78 yo female with PMH of HTN, HLD, Type II DM, triple vessel CAD s/p Impella Assisted PCI 11/2023 with EMILIE to LM EMILIE to LAD and EMILIE to LCX, CHF, Asthma, GERD, and hypothyroidism who presented to [facility] with CP and SOB on 1/24. She was having fever, chills, a productive cough and congestion for 2-3 days prior to the chest pain. She states the pain was constant and radiated to her back/left arm with associated palpitations, SOB, orthopnea and dizziness. She felt the chest pain was similar to when she had the stents prompting her to present to the ER on 1/24.In ED she was found to be tachycardic and tachypneic. Her labs were significant for elevated BUN/Cr, potassium, lactate and elevated troponin. CXR showed pulmonary congestion vs. infiltrates. She was placed on BiPAP for respiratory distress and loaded with ASA/Plavix and placed on a heparin gtt for NSTEMI. Her nasal swab was + for MRSA and she recieved CTX 1g. She is s/p cardiac cath with Dr. Morgan at [facility] showing 3V CAD and a newly reduced EF of 25%.   She was transferred to [facility] under the service of Dr. Hurst for further workup and management however in discussion with Dr. Hernandez given prior vein stripping, porcelain Aorta patient is not a surgical candidate and is planned to transfer to Cardiac Tele for optimization prior to Complex PCI with Dr. Partida    Imaging Done PRIOR   Carotid Duplex: IMPRESSION: No significant hemodynamic stenosis of either carotid artery.  CT Chest 1/31/2025: Persistent small large airways inflammation consistent with provided history of asthma with interval increase  in mucous plugging with near complete obstruction of the lateral segmental bronchus to the right middle lobe, right lower lobe bronchus and anteromedial basal segmental bronchus to the left lower lobe. Associated groundglass opacities are identified and early multifocal   pneumonia possibly postobstructive cannot be excluded. Pulmonary toiletry bronchoscopic correlation is suggested. Mosaic attenuation pattern to suggest air-trapping from underlying small airways inflammation consistent with the provided history of asthma. Mild peripheral reticular pattern without fibrotic changes which may represent an early interstitial lung abnormality. Severe calcified atherosclerotic plaque most pronounced within the visualized coronary arteries and thoracic and abdominal aorta and major side branches including the origin of the SMA.    OVERNIGHT EVENTS:  No acute events overnight.    SUBJECTIVE / INTERVAL HPI: Patient seen and examined at bedside.    Denies CP, SOB, dizziness/lightheadedness, palpitations    12 point ROS otherwise negative    VITAL SIGNS:  Vital Signs Last 24 Hrs  T(C): 36.3 (31 Jan 2025 08:59), Max: 36.3 (30 Jan 2025 21:19)  T(F): 97.3 (31 Jan 2025 08:59), Max: 97.3 (30 Jan 2025 21:19)  HR: 108 (31 Jan 2025 12:05) (66 - 108)  BP: 129/60 (31 Jan 2025 12:05) (111/49 - 170/64)  BP(mean): 87 (31 Jan 2025 12:05) (70 - 95)  RR: 16 (31 Jan 2025 12:05) (15 - 19)  SpO2: 93% (31 Jan 2025 12:05) (91% - 100%)    Parameters below as of 31 Jan 2025 12:05  Patient On (Oxygen Delivery Method): room air          I&O's Summary    30 Jan 2025 07:01  -  31 Jan 2025 07:00  --------------------------------------------------------  IN: 30 mL / OUT: 200 mL / NET: -170 mL      Height (cm): 144.8 (01-30 @ 19:48)  Weight (kg): 84 (01-30 @ 19:48)  BMI (kg/m2): 40.1 (01-30 @ 19:48)  BSA (m2): 1.74 (01-30 @ 19:48)    PHYSICAL EXAM:    General: sitting up in bed, NAD  HEENT: conjunctiva clear; MMM  Neck: supple, no JVD  Cardiovascular: RRR, no murmurs  Respiratory: CTA B/L  Gastrointestinal: soft, NT/ND, +BS  Extremities: WWP, no edema or cyanosis  Vascular: Peripheral pulses palpable 2+ bilaterally/ carotid 2+ b/l, no bruits; radial 2+ b/l; femoral 2+ b/l no bruits; DP/PT 2+ b/l  Neurological: AAOx3, no focal deficits    MEDICATIONS:  MEDICATIONS  (STANDING):  aspirin enteric coated 81 milliGRAM(s) Oral daily  atorvastatin 40 milliGRAM(s) Oral at bedtime  dextrose 5%. 1000 milliLiter(s) (50 mL/Hr) IV Continuous <Continuous>  dextrose 5%. 1000 milliLiter(s) (100 mL/Hr) IV Continuous <Continuous>  dextrose 50% Injectable 25 Gram(s) IV Push once  dextrose 50% Injectable 12.5 Gram(s) IV Push once  dextrose 50% Injectable 25 Gram(s) IV Push once  furosemide    Tablet 40 milliGRAM(s) Oral daily  glucagon  Injectable 1 milliGRAM(s) IntraMuscular once  heparin  Infusion 1000 Unit(s)/Hr (12 mL/Hr) IV Continuous <Continuous>  insulin glargine Injectable (LANTUS) 15 Unit(s) SubCutaneous at bedtime  insulin lispro (ADMELOG) corrective regimen sliding scale   SubCutaneous Before meals and at bedtime  insulin lispro Injectable (ADMELOG) 5 Unit(s) SubCutaneous three times a day before meals  isosorbide   mononitrate ER Tablet (IMDUR) 30 milliGRAM(s) Oral daily  levothyroxine 75 MICROGram(s) Oral daily  metoprolol tartrate 12.5 milliGRAM(s) Oral every 12 hours  mupirocin 2% Nasal 1 Application(s) Both Nostrils two times a day  pantoprazole    Tablet 40 milliGRAM(s) Oral before breakfast  senna 2 Tablet(s) Oral at bedtime  sodium chloride 0.9% lock flush 3 milliLiter(s) IV Push every 8 hours    MEDICATIONS  (PRN):  dextrose Oral Gel 15 Gram(s) Oral once PRN Blood Glucose LESS THAN 70 milliGRAM(s)/deciliter  polyethylene glycol 3350 17 Gram(s) Oral daily PRN Constipation      LABS:                        11.2   17.87 )-----------( 251      ( 31 Jan 2025 06:54 )             36.4       01-31    140  |  103  |  53[H]  ----------------------------<  138[H]  4.2   |  24  |  1.33[H]    Ca    9.0      31 Jan 2025 06:54  Phos  3.7     01-30  Mg     1.6     01-31    TPro  5.8[L]  /  Alb  3.8  /  TBili  0.3  /  DBili  x   /  AST  15  /  ALT  24  /  AlkPhos  48  01-30      PT/INR - ( 31 Jan 2025 06:54 )   PT: 12.4 sec;   INR: 1.08          PTT - ( 31 Jan 2025 12:00 )  PTT:30.1 sec    CARDIAC MARKERS ( 30 Jan 2025 21:24 )  x     / x     / x     / x     / 2.4 ng/mL        TELEMETRY:    RADIOLOGY & ADDITIONAL TESTS: Reviewed. TRANSFER from [facility] to [address] [place] [address] Course     78 yo female with PMH of HTN, HLD, Type II DM, triple vessel CAD s/p Impella Assisted PCI 11/2023 with EMILIE to LM EMILIE to LAD and EMILIE to LCX, CHF, Asthma, GERD, and hypothyroidism who presented to [facility] with CP and SOB on 1/24. She was having fever, chills, a productive cough and congestion for 2-3 days prior to the chest pain. She states the pain was constant and radiated to her back/left arm with associated palpitations, SOB, orthopnea and dizziness. She felt the chest pain was similar to when she had the stents prompting her to present to the ER on 1/24.In ED she was found to be tachycardic and tachypneic. Her labs were significant for elevated BUN/Cr, potassium, lactate and elevated troponin. CXR showed pulmonary congestion vs. infiltrates. She was placed on BiPAP for respiratory distress and loaded with ASA/Plavix and placed on a heparin gtt for NSTEMI. Her nasal swab was + for MRSA and she recieved CTX 1g. She is s/p cardiac cath with Dr. Morgan at [facility] showing 3V CAD and a newly reduced EF of 25%.   She was transferred to [facility] under the service of Dr. Hurst for further workup and management however in discussion with Dr. Hernandez given prior vein stripping, porcelain Aorta patient is not a surgical candidate and is planned to transfer to Cardiac Tele for optimization prior to Complex PCI with Dr. Partida    Imaging Done at [facility]  RUQ for Abdominal Pain and Elevated LFTS: With No Acute Findings Common Bile Duct mild dilation 1.6, was prior 1.3   Carotid Duplex: IMPRESSION: No significant hemodynamic stenosis of either carotid artery.  CT Chest 1/31/2025: Persistent small large airways inflammation consistent with provided history of asthma with interval increase  in mucous plugging with near complete obstruction of the lateral segmental bronchus to the right middle lobe, right lower lobe bronchus and anteromedial basal segmental bronchus to the left lower lobe. Associated groundglass opacities are identified and early multifocal   pneumonia possibly postobstructive cannot be excluded. Pulmonary toiletry bronchoscopic correlation is suggested. Mosaic attenuation pattern to suggest air-trapping from underlying small airways inflammation consistent with the provided history of asthma. Mild peripheral reticular pattern without fibrotic changes which may represent an early interstitial lung abnormality. Severe calcified atherosclerotic plaque most pronounced within the visualized coronary arteries and thoracic and abdominal aorta and major side branches including the origin of the SMA.    OVERNIGHT EVENTS:  No acute events overnight.    SUBJECTIVE / INTERVAL HPI: Patient seen and examined at bedside.    Denies CP, SOB, dizziness/lightheadedness, palpitations    12 point ROS otherwise negative    VITAL SIGNS:  Vital Signs Last 24 Hrs  T(C): 36.3 (31 Jan 2025 08:59), Max: 36.3 (30 Jan 2025 21:19)  T(F): 97.3 (31 Jan 2025 08:59), Max: 97.3 (30 Jan 2025 21:19)  HR: 108 (31 Jan 2025 12:05) (66 - 108)  BP: 129/60 (31 Jan 2025 12:05) (111/49 - 170/64)  BP(mean): 87 (31 Jan 2025 12:05) (70 - 95)  RR: 16 (31 Jan 2025 12:05) (15 - 19)  SpO2: 93% (31 Jan 2025 12:05) (91% - 100%)    Parameters below as of 31 Jan 2025 12:05  Patient On (Oxygen Delivery Method): room air          I&O's Summary    30 Jan 2025 07:01  -  31 Jan 2025 07:00  --------------------------------------------------------  IN: 30 mL / OUT: 200 mL / NET: -170 mL      Height (cm): 144.8 (01-30 @ 19:48)  Weight (kg): 84 (01-30 @ 19:48)  BMI (kg/m2): 40.1 (01-30 @ 19:48)  BSA (m2): 1.74 (01-30 @ 19:48)    PHYSICAL EXAM:    General: sitting up in bed, NAD  HEENT: conjunctiva clear; MMM  Neck: supple, no JVD  Cardiovascular: RRR, no murmurs  Respiratory: CTA B/L  Gastrointestinal: soft, NT/ND, +BS  Extremities: WWP, no edema or cyanosis  Vascular: Peripheral pulses palpable 2+ bilaterally/ carotid 2+ b/l, no bruits; radial 2+ b/l; femoral 2+ b/l no bruits; DP/PT 2+ b/l  Neurological: AAOx3, no focal deficits    MEDICATIONS:  MEDICATIONS  (STANDING):  aspirin enteric coated 81 milliGRAM(s) Oral daily  atorvastatin 40 milliGRAM(s) Oral at bedtime  dextrose 5%. 1000 milliLiter(s) (50 mL/Hr) IV Continuous <Continuous>  dextrose 5%. 1000 milliLiter(s) (100 mL/Hr) IV Continuous <Continuous>  dextrose 50% Injectable 25 Gram(s) IV Push once  dextrose 50% Injectable 12.5 Gram(s) IV Push once  dextrose 50% Injectable 25 Gram(s) IV Push once  furosemide    Tablet 40 milliGRAM(s) Oral daily  glucagon  Injectable 1 milliGRAM(s) IntraMuscular once  heparin  Infusion 1000 Unit(s)/Hr (12 mL/Hr) IV Continuous <Continuous>  insulin glargine Injectable (LANTUS) 15 Unit(s) SubCutaneous at bedtime  insulin lispro (ADMELOG) corrective regimen sliding scale   SubCutaneous Before meals and at bedtime  insulin lispro Injectable (ADMELOG) 5 Unit(s) SubCutaneous three times a day before meals  isosorbide   mononitrate ER Tablet (IMDUR) 30 milliGRAM(s) Oral daily  levothyroxine 75 MICROGram(s) Oral daily  metoprolol tartrate 12.5 milliGRAM(s) Oral every 12 hours  mupirocin 2% Nasal 1 Application(s) Both Nostrils two times a day  pantoprazole    Tablet 40 milliGRAM(s) Oral before breakfast  senna 2 Tablet(s) Oral at bedtime  sodium chloride 0.9% lock flush 3 milliLiter(s) IV Push every 8 hours    MEDICATIONS  (PRN):  dextrose Oral Gel 15 Gram(s) Oral once PRN Blood Glucose LESS THAN 70 milliGRAM(s)/deciliter  polyethylene glycol 3350 17 Gram(s) Oral daily PRN Constipation      LABS:                        11.2   17.87 )-----------( 251      ( 31 Jan 2025 06:54 )             36.4       01-31    140  |  103  |  53[H]  ----------------------------<  138[H]  4.2   |  24  |  1.33[H]    Ca    9.0      31 Jan 2025 06:54  Phos  3.7     01-30  Mg     1.6     01-31    TPro  5.8[L]  /  Alb  3.8  /  TBili  0.3  /  DBili  x   /  AST  15  /  ALT  24  /  AlkPhos  48  01-30      PT/INR - ( 31 Jan 2025 06:54 )   PT: 12.4 sec;   INR: 1.08          PTT - ( 31 Jan 2025 12:00 )  PTT:30.1 sec    CARDIAC MARKERS ( 30 Jan 2025 21:24 )  x     / x     / x     / x     / 2.4 ng/mL        TELEMETRY:    RADIOLOGY & ADDITIONAL TESTS: Reviewed. 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 She was having fever, chills, a productive cough and congestion for 2-3 days prior to the chest pain. She states the pain was constant and radiated to her back/left arm with associated palpitations, SOB, orthopnea and dizziness. She felt the chest pain was similar to when she had the stents prompting her to present to the ER on 1/24.In ED she was found to be tachycardic and tachypneic. Her labs were significant for elevated BUN/Cr, potassium, lactate and elevated troponin. CXR showed pulmonary congestion vs. infiltrates. She was placed on BiPAP for respiratory distress and loaded with ASA/Plavix and placed on a heparin gtt for NSTEMI. Her nasal swab was + for MRSA and she recieved CTX 1g. She is s/p cardiac cath with Dr. Morgan at [facility] showing 3V CAD and a newly reduced EF of 25%.   She was transferred to [facility] under the service of Dr. Hurst for further workup and management however in discussion with Dr. Hernandez given prior vein stripping, porcelain Aorta patient is not a surgical candidate and is planned to transfer to Cardiac Tele for optimization prior to Complex PCI with Dr. Partida    Imaging Done at [facility]  RUQ for Abdominal Pain and Elevated LFTS: With No Acute Findings Common Bile Duct mild dilation 1.6, was prior 1.3   Carotid Duplex: IMPRESSION: No significant hemodynamic stenosis of either carotid artery.  CT Chest 1/31/2025: Persistent small large airways inflammation consistent with provided history of asthma with interval increase  in mucous plugging with near complete obstruction of the lateral segmental bronchus to the right middle lobe, right lower lobe bronchus and anteromedial basal segmental bronchus to the left lower lobe. Associated groundglass opacities are identified and early multifocal   pneumonia possibly postobstructive cannot be excluded. Pulmonary toiletry bronchoscopic correlation is suggested. Mosaic attenuation pattern to suggest air-trapping from underlying small airways inflammation consistent with the provided history of asthma. Mild peripheral reticular pattern without fibrotic changes which may represent an early interstitial lung abnormality. Severe calcified atherosclerotic plaque most pronounced within the visualized coronary arteries and thoracic and abdominal aorta and major side branches including the origin of the SMA.    OVERNIGHT EVENTS:  No acute events overnight.    SUBJECTIVE / INTERVAL HPI: Patient seen and examined at bedside.    Denies CP, SOB, dizziness/lightheadedness, palpitations    12 point ROS otherwise negative    VITAL SIGNS:  Vital Signs Last 24 Hrs  T(C): 36.3 (31 Jan 2025 08:59), Max: 36.3 (30 Jan 2025 21:19)  T(F): 97.3 (31 Jan 2025 08:59), Max: 97.3 (30 Jan 2025 21:19)  HR: 108 (31 Jan 2025 12:05) (66 - 108)  BP: 129/60 (31 Jan 2025 12:05) (111/49 - 170/64)  BP(mean): 87 (31 Jan 2025 12:05) (70 - 95)  RR: 16 (31 Jan 2025 12:05) (15 - 19)  SpO2: 93% (31 Jan 2025 12:05) (91% - 100%)    Parameters below as of 31 Jan 2025 12:05  Patient On (Oxygen Delivery Method): room air          I&O's Summary    30 Jan 2025 07:01  -  31 Jan 2025 07:00  --------------------------------------------------------  IN: 30 mL / OUT: 200 mL / NET: -170 mL      Height (cm): 144.8 (01-30 @ 19:48)  Weight (kg): 84 (01-30 @ 19:48)  BMI (kg/m2): 40.1 (01-30 @ 19:48)  BSA (m2): 1.74 (01-30 @ 19:48)    PHYSICAL EXAM:    General: sitting up in bed, NAD  HEENT: conjunctiva clear; MMM  Neck: supple, no JVD  Cardiovascular: RRR, no murmurs  Respiratory: CTA B/L  Gastrointestinal: soft, NT/ND, +BS  Extremities: WWP, no edema or cyanosis  Vascular: Peripheral pulses palpable 2+ bilaterally/ carotid 2+ b/l, no bruits; radial 2+ b/l; femoral 2+ b/l no bruits; DP/PT 2+ b/l  Neurological: AAOx3, no focal deficits    MEDICATIONS:  MEDICATIONS  (STANDING):  aspirin enteric coated 81 milliGRAM(s) Oral daily  atorvastatin 40 milliGRAM(s) Oral at bedtime  dextrose 5%. 1000 milliLiter(s) (50 mL/Hr) IV Continuous <Continuous>  dextrose 5%. 1000 milliLiter(s) (100 mL/Hr) IV Continuous <Continuous>  dextrose 50% Injectable 25 Gram(s) IV Push once  dextrose 50% Injectable 12.5 Gram(s) IV Push once  dextrose 50% Injectable 25 Gram(s) IV Push once  furosemide    Tablet 40 milliGRAM(s) Oral daily  glucagon  Injectable 1 milliGRAM(s) IntraMuscular once  heparin  Infusion 1000 Unit(s)/Hr (12 mL/Hr) IV Continuous <Continuous>  insulin glargine Injectable (LANTUS) 15 Unit(s) SubCutaneous at bedtime  insulin lispro (ADMELOG) corrective regimen sliding scale   SubCutaneous Before meals and at bedtime  insulin lispro Injectable (ADMELOG) 5 Unit(s) SubCutaneous three times a day before meals  isosorbide   mononitrate ER Tablet (IMDUR) 30 milliGRAM(s) Oral daily  levothyroxine 75 MICROGram(s) Oral daily  metoprolol tartrate 12.5 milliGRAM(s) Oral every 12 hours  mupirocin 2% Nasal 1 Application(s) Both Nostrils two times a day  pantoprazole    Tablet 40 milliGRAM(s) Oral before breakfast  senna 2 Tablet(s) Oral at bedtime  sodium chloride 0.9% lock flush 3 milliLiter(s) IV Push every 8 hours    MEDICATIONS  (PRN):  dextrose Oral Gel 15 Gram(s) Oral once PRN Blood Glucose LESS THAN 70 milliGRAM(s)/deciliter  polyethylene glycol 3350 17 Gram(s) Oral daily PRN Constipation      LABS:                        11.2   17.87 )-----------( 251      ( 31 Jan 2025 06:54 )             36.4       01-31    140  |  103  |  53[H]  ----------------------------<  138[H]  4.2   |  24  |  1.33[H]    Ca    9.0      31 Jan 2025 06:54  Phos  3.7     01-30  Mg     1.6     01-31    TPro  5.8[L]  /  Alb  3.8  /  TBili  0.3  /  DBili  x   /  AST  15  /  ALT  24  /  AlkPhos  48  01-30      PT/INR - ( 31 Jan 2025 06:54 )   PT: 12.4 sec;   INR: 1.08          PTT - ( 31 Jan 2025 12:00 )  PTT:30.1 sec    CARDIAC MARKERS ( 30 Jan 2025 21:24 )  x     / x     / x     / x     / 2.4 ng/mL        TELEMETRY:    RADIOLOGY & ADDITIONAL TESTS: Reviewed.

## 2025-01-31 NOTE — PROGRESS NOTE ADULT - ASSESSMENT
78 yo female with PMH of HTN, HLD, Type II DM, triple vessel CAD s/p 3 stents at [facility] 11/2023, CHF, asthma, GERD, and hypothyroidism who presented to [facility] with CP and SOB on 1/24. She was having fever, chills, a productive cough and congestion for 2-3 days prior to the chest pain. She states the pain was constant and radiated to her back/left arm with associated palpitations, SOB, orthopnea and dizziness. She felt the chest pain was similar to when she had the stents prompting her to present to the ER on 1/24.In ED she was found to be tachycardic and tachypneic. Her labs were significant for elevated BUN/Cr, potassium, lactate and elevated troponin. CXR showed pulmonary congestion vs. infiltrates. She was placed on BiPAP for respiratory distress and loaded with ASA/Plavix and placed on a heparin gtt for NSTEMI. Her nasal swab was + for MRSA and she recieved CTX 1g daily. She is s/p cardiac cath at [facility] showing 3V CAD and a newly reduced EF of 25%.  She was transferred to [facility] under the service of Dr. Hurst for further workup and management however in discussion with Dr. 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 Pand is planned to transfer to Cardiac Tele for optimization prior to Complex PCI with   78 yo female with PMH of HTN, HLD, Type II DM, triple vessel CAD s/p 3 stents at [facility] 11/2023, CHF, asthma, GERD, and hypothyroidism who presented to [facility] [place] [facility] with CP and SOB on 1/24. She was having fever, chills, a productive cough and congestion for 2-3 days prior to the chest pain. She states the pain was constant and radiated to her back/left arm with associated palpitations, SOB, orthopnea and dizziness. She felt the chest pain was similar to when she had the stents prompting her to present to the ER on 1/24.In ED she was found to be tachycardic and tachypneic. Her labs were significant for elevated BUN/Cr, potassium, lactate and elevated troponin. CXR showed pulmonary congestion vs. infiltrates. She was placed on BiPAP for respiratory distress and loaded with ASA/Plavix and placed on a heparin gtt for NSTEMI. Her nasal swab was + for MRSA and she recieved CTX 1g daily. She is s/p cardiac cath at [facility] showing 3V CAD and a newly reduced EF of 25%.  She was transferred to [facility] under the service of Dr. Hurst for further workup and management however in discussion with Dr. 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 Pand is planned to transfer to Cardiac Tele for optimization prior to Complex PCI with Dr. Jaquan Sanabria

## 2025-01-31 NOTE — PROGRESS NOTE ADULT - SUBJECTIVE AND OBJECTIVE BOX
Interventional Cardiology PA Adult Progress Note    INCOMPLETE    H/P  78 yo female with PMH of HTN, HLD, Type II DM, triple vessel CAD s/p 3 stents at [facility] 11/2023, CHF, asthma, GERD, and hypothyroidism who [place] [facility] [place]24. She was having fever, chills, a productive cough and congestion for 2-3 days prior to the chest pain. She states the pain was constant and radiated to her back/left arm with associated palpitations, SOB, orthopnea and dizziness. She felt the chest pain was similar to when she had the stents prompting her to present to the ER on 1/24.In ED she was found to be tachycardic and tachypneic. Her labs were significant for elevated BUN/Cr, potassium, lactate and elevated troponin. CXR showed pulmonary congestion vs. infiltrates. She was placed on BiPAP for respiratory distress and loaded with ASA/Plavix and placed on a heparin gtt for NSTEMI. Her nasal swab was + for MRSA and she recieved CTX 1g daily. She is s/p cardiac cath showing 3V CAD and a reduced EF of 25%. She was transferred to [facility] under the service of Dr. Hurst for further workup and management and now is pending PCI with Dr. David RIBERA:     Subjective Assessment: Patient seen at Bedside no complaints today feeling well no Cough/SOB or CP      ROS Negative except as per Subjective and HPI  	  MEDICATIONS:  furosemide    Tablet 40 milliGRAM(s) Oral daily  isosorbide   mononitrate ER Tablet (IMDUR) 30 milliGRAM(s) Oral daily  metoprolol tartrate 12.5 milliGRAM(s) Oral every 12 hours    cefTRIAXone   IVPB 1000 milliGRAM(s) IV Intermittent every 24 hours        pantoprazole    Tablet 40 milliGRAM(s) Oral before breakfast  polyethylene glycol 3350 17 Gram(s) Oral daily PRN  senna 2 Tablet(s) Oral at bedtime    atorvastatin 40 milliGRAM(s) Oral at bedtime  dextrose 50% Injectable 25 Gram(s) IV Push once  dextrose 50% Injectable 12.5 Gram(s) IV Push once  dextrose 50% Injectable 25 Gram(s) IV Push once  dextrose Oral Gel 15 Gram(s) Oral once PRN  glucagon  Injectable 1 milliGRAM(s) IntraMuscular once  insulin glargine Injectable (LANTUS) 15 Unit(s) SubCutaneous at bedtime  insulin lispro (ADMELOG) corrective regimen sliding scale   SubCutaneous Before meals and at bedtime  insulin lispro Injectable (ADMELOG) 5 Unit(s) SubCutaneous three times a day before meals  levothyroxine 75 MICROGram(s) Oral daily    aspirin enteric coated 81 milliGRAM(s) Oral daily  dextrose 5%. 1000 milliLiter(s) IV Continuous <Continuous>  dextrose 5%. 1000 milliLiter(s) IV Continuous <Continuous>  heparin  Infusion 1000 Unit(s)/Hr IV Continuous <Continuous>  magnesium oxide 800 milliGRAM(s) Oral once  mupirocin 2% Nasal 1 Application(s) Both Nostrils two times a day  sodium chloride 0.9% lock flush 3 milliLiter(s) IV Push every 8 hours      	    [PHYSICAL EXAM:  TELEMETRY:  T(C): 36.3 (01-31-25 @ 08:59), Max: 36.3 (01-30-25 @ 21:19)  HR: 86 (01-31-25 @ 08:28) (66 - 104)  BP: 127/58 (01-31-25 @ 08:28) (127/58 - 170/64)  RR: 15 (01-31-25 @ 08:28) (15 - 19)  SpO2: 93% (01-31-25 @ 08:28) (91% - 100%)  Wt(kg): --  I&O's Summary    30 Jan 2025 07:01  -  31 Jan 2025 07:00  --------------------------------------------------------  IN: 30 mL / OUT: 200 mL / NET: -170 mL      Height (cm): 144.8 (01-30 @ 19:48)  Weight (kg): 84 (01-30 @ 19:48)  BMI (kg/m2): 40.1 (01-30 @ 19:48)  BSA (m2): 1.74 (01-30 @ 19:48)  Condon:  Central/PICC/Mid Line:                                         Appearance: Normal	  HEENT:   Normal oral mucosa, PERRL, EOMI	  Neck: Supple, + JVD/ - JVD; Carotid Bruit   Cardiovascular: Normal S1 S2, No JVD, No murmurs,   Respiratory: Lungs clear to auscultation/Decreased Breath Sounds/No Rales, Rhonchi, Wheezing	  Gastrointestinal:  Soft, Non-tender, + BS	  Skin: No rashes, No ecchymoses, No cyanosis  Extremities: Normal range of motion, No clubbing, cyanosis or edema  Vascular: Peripheral pulses palpable 2+ bilaterally  Neurologic: Non-focal  Psychiatry: A & O x 3, Mood & affect appropriate      	    ECG:  	  RADIOLOGY:   1/2025: Carotid Duplex IMPRESSION: No significant hemodynamic stenosis of either carotid artery.  DIAGNOSTIC TESTING:  [ ] Echocardiogram: 11/2024 repeat pending   1. Normal left and right ventricular size and systolic function.   2. Grade I left ventricular diastolic dysfunction.   3. Mild mitral regurgitation.   4. Mild-to-moderate tricuspid regurgitation.   5. Pulmonary hypertension present, pulmonary artery systolic pressure is   44 mmHg.   6. No pericardial effusion.   7. No prior echo is available for comparison.  [ ]  Catheterization: Prior Cath in 11/2024: 3V CAD (dLM 80%, oLAD 85%, D2 75%, oLCX 85%, mRCA 75%). S/p Impella assisted complex PCI with Rota    [ ] Stress Test:    [ ] ANSELMO  OTHER: 	    LABS:	 	  CARDIAC MARKERS:                                  11.2   17.87 )-----------( 251      ( 31 Jan 2025 06:54 )             36.4     01-31    140  |  103  |  53[H]  ----------------------------<  138[H]  4.2   |  24  |  1.33[H]    Ca    9.0      31 Jan 2025 06:54  Phos  3.7     01-30  Mg     1.6     01-31    TPro  5.8[L]  /  Alb  3.8  /  TBili  0.3  /  DBili  x   /  AST  15  /  ALT  24  /  AlkPhos  48  01-30    TSH: Thyroid Stimulating Hormone, Serum: 0.359 uIU/mL (01-30 @ 21:24)    PT/INR - ( 31 Jan 2025 06:54 )   PT: 12.4 sec;   INR: 1.08     PTT - ( 31 Jan 2025 06:54 )  PTT:30.8 sec    ASSESSMENT/PLAN:

## 2025-01-31 NOTE — PROGRESS NOTE ADULT - SUBJECTIVE AND OBJECTIVE BOX
Patient discussed on morning rounds with Dr. Hurst    REASON FOR ADMISSION: 3VCAD    SUBJECTIVE ASSESSMENT:  Assessed at bedside. No acute complaints. Denies chest pain, SOB, fever, chills, nausea, vomiting.     VITAL SIGNS:  Vital Signs Last 24 Hrs  T(C): 36.3 (31 Jan 2025 08:59), Max: 36.3 (30 Jan 2025 21:19)  T(F): 97.3 (31 Jan 2025 08:59), Max: 97.3 (30 Jan 2025 21:19)  HR: 98 (31 Jan 2025 11:05) (66 - 104)  BP: 111/49 (31 Jan 2025 11:05) (111/49 - 170/64)  BP(mean): 70 (31 Jan 2025 11:05) (70 - 95)  RR: 18 (31 Jan 2025 11:05) (15 - 19)  SpO2: 92% (31 Jan 2025 11:05) (91% - 100%)    Parameters below as of 31 Jan 2025 11:05  Patient On (Oxygen Delivery Method): room air      I&O's Detail    30 Jan 2025 07:01  -  31 Jan 2025 07:00  --------------------------------------------------------  IN:    Heparin: 30 mL  Total IN: 30 mL    OUT:    Voided (mL): 200 mL  Total OUT: 200 mL    Total NET: -170 mL    CHEST TUBE:  No  MARCELO DRAIN:  No  EPICARDIAL WIRES: No   STITCHES: No  STAPLES: No  LYLE:  No  CENTRAL LINE: No  MIDLINE/PICC: No  WOUND VAC: No    Physical Exam  CONSTITUTIONAL: Well appearing in NAD assessed laying comfortably in bed   NEURO: A&OX3. No focal deficits noted, moving bilateral upper and lower extremities                    CV: RRR, no murmurs, rubs, gallops  RESPIRATORY: Clear to auscultation bilateral posterior lung fields, no wheezes, rales, rhonchi. Shallow breath sounds   GI: +BS, NT/ND  MUSKULOSKELETAL: No peripheral edema or calf tenderness. Full strength and ROM bilateral upper and lower extremities   INCISIONS: None    LABS:                        11.2   17.87 )-----------( 251      ( 31 Jan 2025 06:54 )             36.4     PT/INR - ( 31 Jan 2025 06:54 )   PT: 12.4 sec;   INR: 1.08          PTT - ( 31 Jan 2025 06:54 )  PTT:30.8 sec  01-31    140  |  103  |  53[H]  ----------------------------<  138[H]  4.2   |  24  |  1.33[H]    Ca    9.0      31 Jan 2025 06:54  Phos  3.7     01-30  Mg     1.6     01-31    TPro  5.8[L]  /  Alb  3.8  /  TBili  0.3  /  DBili  x   /  AST  15  /  ALT  24  /  AlkPhos  48  01-30    Urinalysis Basic - ( 31 Jan 2025 06:54 )    Color: x / Appearance: x / SG: x / pH: x  Gluc: 138 mg/dL / Ketone: x  / Bili: x / Urobili: x   Blood: x / Protein: x / Nitrite: x   Leuk Esterase: x / RBC: x / WBC x   Sq Epi: x / Non Sq Epi: x / Bacteria: x      MEDICATIONS  (STANDING):  aspirin enteric coated 81 milliGRAM(s) Oral daily  atorvastatin 40 milliGRAM(s) Oral at bedtime  dextrose 5%. 1000 milliLiter(s) (50 mL/Hr) IV Continuous <Continuous>  dextrose 5%. 1000 milliLiter(s) (100 mL/Hr) IV Continuous <Continuous>  dextrose 50% Injectable 25 Gram(s) IV Push once  dextrose 50% Injectable 12.5 Gram(s) IV Push once  dextrose 50% Injectable 25 Gram(s) IV Push once  furosemide    Tablet 40 milliGRAM(s) Oral daily  glucagon  Injectable 1 milliGRAM(s) IntraMuscular once  heparin  Infusion 1000 Unit(s)/Hr (12 mL/Hr) IV Continuous <Continuous>  insulin glargine Injectable (LANTUS) 15 Unit(s) SubCutaneous at bedtime  insulin lispro (ADMELOG) corrective regimen sliding scale   SubCutaneous Before meals and at bedtime  insulin lispro Injectable (ADMELOG) 5 Unit(s) SubCutaneous three times a day before meals  isosorbide   mononitrate ER Tablet (IMDUR) 30 milliGRAM(s) Oral daily  levothyroxine 75 MICROGram(s) Oral daily  metoprolol tartrate 12.5 milliGRAM(s) Oral every 12 hours  mupirocin 2% Nasal 1 Application(s) Both Nostrils two times a day  pantoprazole    Tablet 40 milliGRAM(s) Oral before breakfast  senna 2 Tablet(s) Oral at bedtime  sodium chloride 0.9% lock flush 3 milliLiter(s) IV Push every 8 hours    MEDICATIONS  (PRN):  dextrose Oral Gel 15 Gram(s) Oral once PRN Blood Glucose LESS THAN 70 milliGRAM(s)/deciliter  polyethylene glycol 3350 17 Gram(s) Oral daily PRN Constipation    RADIOLOGY & ADDITIONAL TESTS:    < from: TTE Echo Complete w/o Contrast w/ Doppler (11.13.23 @ 15:08) >  CONCLUSIONS:     1. Normal left and right ventricular size and systolic function.   2. Grade I left ventricular diastolic dysfunction.   3. Mild mitral regurgitation.   4. Mild-to-moderate tricuspid regurgitation.   5. Pulmonary hypertension present, pulmonary artery systolic pressure is   44 mmHg.   6. No pericardial effusion.   7. No prior echo is available for comparison.    < end of copied text >

## 2025-01-31 NOTE — CONSULT NOTE ADULT - ASSESSMENT
80 yo female with PMH of HTN, HLD, Type II DM, triple vessel CAD s/p 3 stents at [facility] 11/2023, CHF, asthma, GERD, and hypothyroidism who presented to [facility] with [place] and SOB on 1/24, found to have 3VCAD and transferred to [facility] for further management. Pulmonary consulted for abnormal CT.     Data Reviewed:   No outpatient pulmonologist   CT Chest 11/2023: air trapping and mosaic attentuation pattern    CT Chest 1/31/25: same as 2023, more prominent bronchial wall thickening and mucus plugging, some areas of GGOs (non specific)   Labs: peripheral eos as high as 1280 on 2023 labs, none this admission    #Abnormal CT Chest   #Asthma by History     Asked to evaluate for atypical CT findings. Would be very unusual to develop asthma only over the last two years without any prior pulmonary symptoms or complaints. Notably, it seems that her respiratory symptoms have coincided with much of her cardiac symptoms and deterioration over the last two years. His CT findings could be seen in a small airways disease such as asthma; also on the differential would be hypersensitivity pneumonitis or airway disease from biomatter exposure given her history (would be COPD in this case, chronic bronchitic phenotype). She needs formal pulmonary evaluation and follow up - this should be done as an outpatient; PFTs and further pulmonary testing contraindicated with active ACS.     Recommend:   - can trial nebulized albuterol followed by 3% saline nebs BID to see if this assists with airway clearance   - would also benefit from an aerobika device as well as incentive spirometer   - needs outpatient pulmonary follow up; should be scheduled with Dr. Ga once acute cardiac issues are resolved     S/E/D with Dr. Ga

## 2025-01-31 NOTE — PATIENT PROFILE ADULT - FALL HARM RISK - HARM RISK INTERVENTIONS

## 2025-01-31 NOTE — CONSULT NOTE ADULT - SUBJECTIVE AND OBJECTIVE BOX
HPI:    80 yo female with PMH of HTN, HLD, Type II DM, CAD s/p PCI 2023 (LM, LAD, Lcx), asthma, who presented to [facility] [place] with chest pain and cough on 1/24. She was having fever, chills, a productive cough and congestion for 2-3 days prior to the chest pain. She states the pain was constant and radiated to her back/left arm with associated palpitations, SOB, orthopnea and dizziness. She felt the chest pain was similar to when she had the stents prompting her to present to the ER on 1/24.In ED she was found to be tachycardic and tachypneic. Her labs were significant for elevated BUN/Cr, potassium, lactate and elevated troponin. CXR showed pulmonary congestion vs. infiltrates. She was placed on BiPAP for respiratory distress and loaded with ASA/Plavix and placed on a heparin gtt for NSTEMI. Her nasal swab was + for MRSA and she recieved CTX 1g daily. She is s/p cardiac cath showing 3V CAD and a reduced EF of 25%. She was transferred to [facility] under the service of Dr. Hurst for further workup and management.  Heart failure consulted for management. Patient states that her chest pain has resolved but still has a mild cough. 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      History of cholecystectomy      History of back surgery      History of bilateral knee replacement        [ ] Diabetes   [ ] Hypertension  [ ] Hyperlipidemia  [ ] CAD  [ ] PCI  [ ] CABG    PREVIOUS DIAGNOSTIC TESTING:    [ ] Echocardiogram:  [ ] Stress Test:  [ ] Catheterization: 	    FAMILY HISTORY:  Family history of diabetes mellitus (Mother, Sibling)    Family history of hypertension (Mother, Sibling)    Family history of blindness (Grandparent)      SOCIAL HISTORY:    [ ] Non-smoker  [ ] Current Smoker  [ ] Former Smoker  [ ] Alcohol Use  [ ] Drug Use    ALLERGIES/INTOLERANCES:  No Known Allergies    HOME MEDICATIONS:    INPATIENT MEDICATIONS:  isosorbide   mononitrate ER Tablet (IMDUR) 30 milliGRAM(s) Oral daily  metoprolol tartrate 12.5 milliGRAM(s) Oral every 12 hours    aspirin enteric coated 81 milliGRAM(s) Oral daily  clopidogrel Tablet 300 milliGRAM(s) Oral once  heparin  Infusion 1000 Unit(s)/Hr IV Continuous <Continuous>    atorvastatin 40 milliGRAM(s) Oral at bedtime  dextrose 5%. 1000 milliLiter(s) IV Continuous <Continuous>  dextrose 5%. 1000 milliLiter(s) IV Continuous <Continuous>  dextrose 50% Injectable 25 Gram(s) IV Push once  dextrose 50% Injectable 12.5 Gram(s) IV Push once  dextrose 50% Injectable 25 Gram(s) IV Push once  dextrose Oral Gel 15 Gram(s) Oral once PRN  glucagon  Injectable 1 milliGRAM(s) IntraMuscular once  insulin glargine Injectable (LANTUS) 15 Unit(s) SubCutaneous at bedtime  insulin lispro (ADMELOG) corrective regimen sliding scale   SubCutaneous Before meals and at bedtime  insulin lispro Injectable (ADMELOG) 5 Unit(s) SubCutaneous three times a day before meals  levothyroxine 75 MICROGram(s) Oral daily  mupirocin 2% Nasal 1 Application(s) Both Nostrils two times a day  pantoprazole    Tablet 40 milliGRAM(s) Oral before breakfast  polyethylene glycol 3350 17 Gram(s) Oral daily PRN  senna 2 Tablet(s) Oral at bedtime  sodium chloride 0.9% lock flush 3 milliLiter(s) IV Push every 8 hours      REVIEW OF SYSTEMS:    CONSTITUTIONAL: No weakness, F/C, wt loss/gain  EYES: No visual changes/disturbances  ENMT: No dry mouth, no vertigo  NECK: No pain or stiffness  RESPIRATORY: No cough, wheezing, hemoptysis; No shortness of breath  CARDIOVASCULAR: No chest pain, palpitations, lightheadedness/dizziness, LOC, or leg swelling  GASTROINTESTINAL: No abdominal or epigastric pain. No N/V/D/C. No melena, hematochezia, or hematemesis.  GENITOURINARY: No dysuria, increased frequency, hematuria, or incontinence  NEUROLOGICAL: No lightheadedness/dizziness, LOC, headaches, numbness or weakness  MUSCULOSKELETAL: No joint pain or swelling; No muscle, back, or extremity pain  SKIN: No itching, burning, rashes, or lesions   ENDOCRINE: No heat or cold intolerance; No hair loss  HEME/LYMPH: No easy bruising, or bleeding gums  PSYCHIATRIC: No depression, anxiety, mood swings, or difficulty sleeping    [ ] All other review of systems are negative unless indicated above.  [ ] Unable to obtain due to:    PHYSICAL EXAM:    T(C): 36.4 (01-31-25 @ 12:43), Max: 36.4 (01-31-25 @ 12:43)  HR: 102 (01-31-25 @ 15:05) (66 - 108)  BP: 132/63 (01-31-25 @ 15:05) (111/49 - 170/64)  RR: 16 (01-31-25 @ 12:05) (15 - 19)  SpO2: 93% (01-31-25 @ 15:05) (91% - 100%)  Wt(kg): --    GEN: NAD  HEENT: EOMI   RESP: CTA b/l  CV: RRR. Normal S1/S2. No m/r/g. No JVD.   ABD: Gastrointestinal: abdomen soft, NT/ND; no rebound or guarding; +BSx4  EXT: No edema, warm extremities   NEURO: alert and attentive    TELEMETRY: 	      ECG:  	  	  LABS:                        11.2   17.87 )-----------( 251      ( 31 Jan 2025 06:54 )             36.4     01-31    140  |  103  |  53[H]  ----------------------------<  138[H]  4.2   |  24  |  1.33[H]    Ca    9.0      31 Jan 2025 06:54  Phos  3.7     01-30  Mg     1.6     01-31    TPro  5.8[L]  /  Alb  3.8  /  TBili  0.3  /  DBili  x   /  AST  15  /  ALT  24  /  AlkPhos  48  01-30      Lipid Profile:   HgA1c:   TSH: Thyroid Stimulating Hormone, Serum: 0.359 uIU/mL (01-30 @ 21:24)      CARDIAC MARKERS:          proBNP:     RADIOLOGY:      ASSESSMENT/PLAN: 	     HPI:    80 yo female with PMH of HTN, HLD, Type II DM, CAD s/p PCI 2023 (RCA, LAD, Lcx), asthma, who presented to [facility] with chest pain and cough on 1/24. She was having fever, chills, a productive cough and congestion for 2-3 days prior to the chest pain. She states the pain was constant and radiated to her back/left arm with associated palpitations, SOB, orthopnea and dizziness. She felt the chest pain was similar to when she had the stents prompting her to present to the ER on 1/24.In ED she was found to be tachycardic and tachypneic. Her labs were significant for elevated BUN/Cr, potassium, lactate and elevated troponin. CXR showed pulmonary congestion vs. infiltrates. She was placed on BiPAP for respiratory distress and loaded with ASA/Plavix and placed on a heparin gtt for NSTEMI. Her nasal swab was + for MRSA and she recieved CTX 1g daily. She is s/p cardiac cath showing 3V CAD and a reduced EF of 25%. She was transferred to [facility] under the service of Dr. Hurst for further workup and management.  Heart failure consulted for management. Patient states that her chest pain has resolved but still has a mild cough. She denies any lower extremity edema or dyspnea - she walks with a roller and can walk about 5 blocks. She also endorses recent 3 pillow orthopnea. She follows with a cardiologist in [place].       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  History of cholecystectomy  History of back surgery  History of bilateral knee replacemen	    FAMILY HISTORY:  Family history of diabetes mellitus (Mother, Sibling)  Family history of hypertension (Mother, Sibling)  Family history of blindness (Grandparent)      SOCIAL HISTORY: denies smoking, denies alcohol use, denies IVDU    ALLERGIES/INTOLERANCES:  No Known Allergies    HOME MEDICATIONS:    INPATIENT MEDICATIONS:  isosorbide   mononitrate ER Tablet (IMDUR) 30 milliGRAM(s) Oral daily  metoprolol tartrate 12.5 milliGRAM(s) Oral every 12 hours    aspirin enteric coated 81 milliGRAM(s) Oral daily  clopidogrel Tablet 300 milliGRAM(s) Oral once  heparin  Infusion 1000 Unit(s)/Hr IV Continuous <Continuous>    atorvastatin 40 milliGRAM(s) Oral at bedtime  dextrose 5%. 1000 milliLiter(s) IV Continuous <Continuous>  dextrose 5%. 1000 milliLiter(s) IV Continuous <Continuous>  dextrose 50% Injectable 25 Gram(s) IV Push once  dextrose 50% Injectable 12.5 Gram(s) IV Push once  dextrose 50% Injectable 25 Gram(s) IV Push once  dextrose Oral Gel 15 Gram(s) Oral once PRN  glucagon  Injectable 1 milliGRAM(s) IntraMuscular once  insulin glargine Injectable (LANTUS) 15 Unit(s) SubCutaneous at bedtime  insulin lispro (ADMELOG) corrective regimen sliding scale   SubCutaneous Before meals and at bedtime  insulin lispro Injectable (ADMELOG) 5 Unit(s) SubCutaneous three times a day before meals  levothyroxine 75 MICROGram(s) Oral daily  mupirocin 2% Nasal 1 Application(s) Both Nostrils two times a day  pantoprazole    Tablet 40 milliGRAM(s) Oral before breakfast  polyethylene glycol 3350 17 Gram(s) Oral daily PRN  senna 2 Tablet(s) Oral at bedtime  sodium chloride 0.9% lock flush 3 milliLiter(s) IV Push every 8 hours      REVIEW OF SYSTEMS:    CONSTITUTIONAL: No weakness, F/C, wt loss/gain  EYES: No visual changes/disturbances  ENMT: No dry mouth, no vertigo  NECK: No pain or stiffness  RESPIRATORY: No cough, wheezing, hemoptysis; No shortness of breath  CARDIOVASCULAR: No chest pain, palpitations, lightheadedness/dizziness, LOC, or leg swelling  GASTROINTESTINAL: No abdominal or epigastric pain. No N/V/D/C. No melena, hematochezia, or hematemesis.  GENITOURINARY: No dysuria, increased frequency, hematuria, or incontinence  NEUROLOGICAL: No lightheadedness/dizziness, LOC, headaches, numbness or weakness  MUSCULOSKELETAL: No joint pain or swelling; No muscle, back, or extremity pain  SKIN: No itching, burning, rashes, or lesions   ENDOCRINE: No heat or cold intolerance; No hair loss  HEME/LYMPH: No easy bruising, or bleeding gums  PSYCHIATRIC: No depression, anxiety, mood swings, or difficulty sleeping    [ ] All other review of systems are negative unless indicated above.  [ ] Unable to obtain due to:    PHYSICAL EXAM:    T(C): 36.4 (01-31-25 @ 12:43), Max: 36.4 (01-31-25 @ 12:43)  HR: 102 (01-31-25 @ 15:05) (66 - 108)  BP: 132/63 (01-31-25 @ 15:05) (111/49 - 170/64)  RR: 16 (01-31-25 @ 12:05) (15 - 19)  SpO2: 93% (01-31-25 @ 15:05) (91% - 100%)  Wt(kg): --    GEN: NAD, obese  HEENT: EOMI   RESP: CTA b/l  CV: RRR. Normal S1/S2. No m/r/g. JVP ~6cm.   ABD: abdomen soft, NT/ND; no rebound or guarding; +BSx4  EXT: No edema, warm extremities   NEURO: alert and attentive    TELEMETRY: sinus rhythm	      ECG:  	  	  LABS:                        11.2   17.87 )-----------( 251      ( 31 Jan 2025 06:54 )             36.4     01-31    140  |  103  |  53[H]  ----------------------------<  138[H]  4.2   |  24  |  1.33[H]    Ca    9.0      31 Jan 2025 06:54  Phos  3.7     01-30  Mg     1.6     01-31    TPro  5.8[L]  /  Alb  3.8  /  TBili  0.3  /  DBili  x   /  AST  15  /  ALT  24  /  AlkPhos  48  01-30      Lipid Profile:   HgA1c:   TSH: Thyroid Stimulating Hormone, Serum: 0.359 uIU/mL (01-30 @ 21:24)      CARDIAC MARKERS:          proBNP:     RADIOLOGY    ASSESSMENT/PLAN: 	     HPI:    80 yo female with PMH of HTN, HLD, Type II DM, CAD s/p PCI 2023 (RCA, LAD, Lcx), asthma, who presented to [facility] with chest pain and cough on 1/24. She was having fever, chills, a productive cough and congestion for 2-3 days prior to the chest pain. She states the pain was constant and radiated to her back/left arm with associated palpitations, SOB, orthopnea and dizziness. She felt the chest pain was similar to when she had the stents prompting her to present to the ER on 1/24.In ED she was found to be tachycardic and tachypneic. Her labs were significant for elevated BUN/Cr, potassium, lactate and elevated troponin. CXR showed pulmonary congestion vs. infiltrates. She was placed on BiPAP for respiratory distress and loaded with ASA/Plavix and placed on a heparin gtt for NSTEMI. Her nasal swab was + for MRSA and she recieved CTX 1g daily. She is s/p cardiac cath showing 3V CAD and a reduced EF of 25%. She was transferred to [facility] under the service of Dr. Hurst for further workup and management.  Heart failure consulted for management. Patient states that her chest pain has resolved but still has a mild cough. She denies any lower extremity edema or dyspnea - she walks with a roller and can walk about 5 blocks. She also endorses recent 3 pillow orthopnea. She follows with a cardiologist in [place].       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  History of cholecystectomy  History of back surgery  History of bilateral knee replacemen	    FAMILY HISTORY:  Family history of diabetes mellitus (Mother, Sibling)  Family history of hypertension (Mother, Sibling)  Family history of blindness (Grandparent)      SOCIAL HISTORY: denies smoking, denies alcohol use, denies IVDU    ALLERGIES/INTOLERANCES:  No Known Allergies    HOME MEDICATIONS:  Losartan 25 mg daily  Lasix 40 mg daily  Toprol XL 25 mg daily  DAPT    INPATIENT MEDICATIONS:  isosorbide   mononitrate ER Tablet (IMDUR) 30 milliGRAM(s) Oral daily  metoprolol tartrate 12.5 milliGRAM(s) Oral every 12 hours    aspirin enteric coated 81 milliGRAM(s) Oral daily  clopidogrel Tablet 300 milliGRAM(s) Oral once  heparin  Infusion 1000 Unit(s)/Hr IV Continuous <Continuous>    atorvastatin 40 milliGRAM(s) Oral at bedtime  dextrose 5%. 1000 milliLiter(s) IV Continuous <Continuous>  dextrose 5%. 1000 milliLiter(s) IV Continuous <Continuous>  dextrose 50% Injectable 25 Gram(s) IV Push once  dextrose 50% Injectable 12.5 Gram(s) IV Push once  dextrose 50% Injectable 25 Gram(s) IV Push once  dextrose Oral Gel 15 Gram(s) Oral once PRN  glucagon  Injectable 1 milliGRAM(s) IntraMuscular once  insulin glargine Injectable (LANTUS) 15 Unit(s) SubCutaneous at bedtime  insulin lispro (ADMELOG) corrective regimen sliding scale   SubCutaneous Before meals and at bedtime  insulin lispro Injectable (ADMELOG) 5 Unit(s) SubCutaneous three times a day before meals  levothyroxine 75 MICROGram(s) Oral daily  mupirocin 2% Nasal 1 Application(s) Both Nostrils two times a day  pantoprazole    Tablet 40 milliGRAM(s) Oral before breakfast  polyethylene glycol 3350 17 Gram(s) Oral daily PRN  senna 2 Tablet(s) Oral at bedtime  sodium chloride 0.9% lock flush 3 milliLiter(s) IV Push every 8 hours      REVIEW OF SYSTEMS:    CONSTITUTIONAL: No weakness, F/C, wt loss/gain  EYES: No visual changes/disturbances  ENMT: No dry mouth, no vertigo  NECK: No pain or stiffness  RESPIRATORY: No cough, wheezing, hemoptysis; No shortness of breath  CARDIOVASCULAR: No chest pain, palpitations, lightheadedness/dizziness, LOC, or leg swelling  GASTROINTESTINAL: No abdominal or epigastric pain. No N/V/D/C. No melena, hematochezia, or hematemesis.  GENITOURINARY: No dysuria, increased frequency, hematuria, or incontinence  NEUROLOGICAL: No lightheadedness/dizziness, LOC, headaches, numbness or weakness  MUSCULOSKELETAL: No joint pain or swelling; No muscle, back, or extremity pain  SKIN: No itching, burning, rashes, or lesions   ENDOCRINE: No heat or cold intolerance; No hair loss  HEME/LYMPH: No easy bruising, or bleeding gums  PSYCHIATRIC: No depression, anxiety, mood swings, or difficulty sleeping    [X ] All other review of systems are negative unless indicated above.  [ ] Unable to obtain due to:    PHYSICAL EXAM:    T(C): 36.4 (01-31-25 @ 12:43), Max: 36.4 (01-31-25 @ 12:43)  HR: 102 (01-31-25 @ 15:05) (66 - 108)  BP: 132/63 (01-31-25 @ 15:05) (111/49 - 170/64)  RR: 16 (01-31-25 @ 12:05) (15 - 19)  SpO2: 93% (01-31-25 @ 15:05) (91% - 100%)  Wt(kg): not recorded    GEN: NAD, obese  HEENT: EOMI   RESP: CTA b/l  CV: RRR. Normal S1/S2. No m/r/g. JVP ~6cm.   ABD: abdomen soft, NT/ND; no rebound or guarding; +BSx4  EXT: No edema, warm extremities   NEURO: alert and attentive    TELEMETRY: sinus rhythm	   	  	  LABS:                        11.2   17.87 )-----------( 251      ( 31 Jan 2025 06:54 )             36.4     01-31    140  |  103  |  53[H]  ----------------------------<  138[H]  4.2   |  24  |  1.33[H]    Ca    9.0      31 Jan 2025 06:54  Phos  3.7     01-30  Mg     1.6     01-31    TPro  5.8[L]  /  Alb  3.8  /  TBili  0.3  /  DBili  x   /  AST  15  /  ALT  24  /  AlkPhos  48  01-30    HgA1c: 6.9%  TSH: Thyroid Stimulating Hormone, Serum: 0.359 uIU/mL (01-30 @ 21:24)    proBNP: 12K

## 2025-01-31 NOTE — PROGRESS NOTE ADULT - PROBLEM SELECTOR PLAN 1
[place] [facility] [place] [place] and found to have Elevated troponins underwent repeat Cath with 3VCAD  Continue with Heparin   Reload with Plavix 300 and DAPT starting tomr   Trend Trops   ECHO: 11/13/2024: Normal L/RV size and systolic function w/o wall motion abnormalities, mild MR, mild-mod TR, pHTN w/ PASP 44mmHg, no pericard effusion   Prior Cath 11/2024: Impella Assisted Rota/PCI with EMILIE to LM 90%, EMILIE ostial LAD and EMILIE ostial LCX, (dLM 80%, oLAD 85%, D2 75%, oLCX 85%, mRCA 75%)  [place] [facility] [place] and found to have Elevated troponins underwent repeat Cath with 3VCAD ISR Trops on Arrival 2289 no Plan to trend   No Current CP some SOB with Minimal Ambulation   Continue with Heparin for 48 hours [place] [facility] [place]   [place] [facility] [place]: 1/28/2024: distal LM to prox LAD 80% Stenosed In Stent Restenosis, LCX ostial to Prox LCX Instent Restenosis, % Stenosed     Reload with Plavix 300 and continue DAPT starting tomr   ECHO: Appears Improved 44%, Mild LVH, Moderately reduced left ventricular systolic function, LV EF 44%. RWMA. Grade I DD. Aortic sclerosis without significant stenosis. Trace aortic regurgitation. No pericardial effusion.  [place] [facility] [place]: Appeared to be Severe LVEF 25%,   ECHO: 11/13/2023: Normal L/RV size and systolic function w/o wall motion abnormalities, mild MR, mild-mod TR, pHTN w/ PASP 44mmHg, no pericard effusion   Prior Cath 11/2023: Impella Assisted Rota/PCI with EMILIE to LM 90%, EMILIE ostial LAD and EMILIE ostial LCX, (dLM 80%, oLAD 85%, D2 75%, oLCX 85%, mRCA 75%) [place] [facility] [place] and found to have Elevated troponins underwent repeat Cath with 3VCAD ISR Trops on Arrival 0088 no Plan to trend, No Current CP some SOB with Minimal Ambulation   Continue with Heparin ACS protocol until cath  [place] [facility] [place] Morgan: 1/28/2024: distal LM to prox LAD 80% Stenosed In Stent Restenosis, LCX ostial to Prox LCX Instent Restenosis, % Stenosed     Reload with Plavix 300 and continue DAPT starting tomr   ECHO: Appears Improved 44%, Mild LVH, Moderately reduced left ventricular systolic function, LV EF 44%. RWMA. Grade I DD. Aortic sclerosis without significant stenosis. Trace aortic regurgitation. No pericardial effusion.  [place] [facility] [place]: Appeared to be Severe LVEF 25%,   ECHO: 11/13/2023: Normal L/RV size and systolic function w/o wall motion abnormalities, mild MR, mild-mod TR, pHTN w/ PASP 44mmHg, no pericard effusion   Prior Cath 11/2023: Impella Assisted Rota/PCI with EMILIE to LM 90%, EMILIE ostial LAD and EMILIE ostial LCX, (dLM 80%, oLAD 85%, D2 75%, oLCX 85%, mRCA 75%)

## 2025-01-31 NOTE — CONSULT NOTE ADULT - ASSESSMENT
80 yo female with PMH of HTN, HLD, Type II DM, CAD s/p PCI 2023 (RCA, LM-pLAD, Lcx), asthma, who presented to [facility] [place] with chest pain, s/p LHC/RHC found to have 3VCAD an LVEF 35% so transferred to [facility] CTS but not a surgical candidate, thus transferred to cardiology for PCI. Heart failure consulted for management.     Review of studies:  TTE 1/31/25: LV 3.8cm, mildly reduced LVEF 44% with RWA, mild LVH, normal RV function, RAP 8  [facility] (1/28/25, [facility] [place]): 3VCAD (80% ISR of distal LM-pLAD stent, 80% ISR of pLcx stent, 100% ISR of pRCA stent)  [facility] (1/28/25, [facility] [place]): RA 4, PCWP 17, /62 (73)  [facility] 2023: s/p PCI x2 LM-pLAD, s/p PCI oLcx    Home meds: losartan 25mg QD, toprol 25mg QD, lasix 40mg QD, atorvastatin 80mg QD, aspirin 81mg QD

## 2025-01-31 NOTE — CHART NOTE - NSCHARTNOTEFT_GEN_A_CORE
Lower extremity ultrasound    Right greater saphenous vein measures ~0.5 mm above the knee in very short segment only, otherwise not visible, and <0.1 mm below the knee.     Left greater saphenous vein not visible 2/2 vein stripping     Findings:    The right saphenous vein is not suitable as conduit for bypass surgery. The left saphenous vein has been stripped and therefore is not usable.    Assessed with ANTONIO Rinaldi

## 2025-02-01 LAB
ADD ON TEST-SPECIMEN IN LAB: SIGNIFICANT CHANGE UP
ANION GAP SERPL CALC-SCNC: 13 MMOL/L — SIGNIFICANT CHANGE UP (ref 5–17)
APTT BLD: 102.4 SEC — HIGH (ref 24.5–35.6)
BASOPHILS # BLD AUTO: 0 K/UL — SIGNIFICANT CHANGE UP (ref 0–0.2)
BASOPHILS NFR BLD AUTO: 0 % — SIGNIFICANT CHANGE UP (ref 0–2)
BUN SERPL-MCNC: 57 MG/DL — HIGH (ref 7–23)
CALCIUM SERPL-MCNC: 8.1 MG/DL — LOW (ref 8.4–10.5)
CHLORIDE SERPL-SCNC: 98 MMOL/L — SIGNIFICANT CHANGE UP (ref 96–108)
CK MB CFR SERPL CALC: 1.8 NG/ML — SIGNIFICANT CHANGE UP (ref 0–6.7)
CK SERPL-CCNC: 46 U/L — SIGNIFICANT CHANGE UP (ref 25–170)
CO2 SERPL-SCNC: 25 MMOL/L — SIGNIFICANT CHANGE UP (ref 22–31)
CREAT SERPL-MCNC: 2.21 MG/DL — HIGH (ref 0.5–1.3)
EGFR: 22 ML/MIN/1.73M2 — LOW
EOSINOPHIL # BLD AUTO: 1.62 K/UL — HIGH (ref 0–0.5)
EOSINOPHIL NFR BLD AUTO: 8 % — HIGH (ref 0–6)
GLUCOSE SERPL-MCNC: 250 MG/DL — HIGH (ref 70–99)
HCT VFR BLD CALC: 30.6 % — LOW (ref 34.5–45)
HGB BLD-MCNC: 9.6 G/DL — LOW (ref 11.5–15.5)
INR BLD: 1.26 — HIGH (ref 0.85–1.16)
LYMPHOCYTES # BLD AUTO: 30.1 % — SIGNIFICANT CHANGE UP (ref 13–44)
LYMPHOCYTES # BLD AUTO: 6.1 K/UL — HIGH (ref 1–3.3)
MAGNESIUM SERPL-MCNC: 1.6 MG/DL — SIGNIFICANT CHANGE UP (ref 1.6–2.6)
MCHC RBC-ENTMCNC: 28.3 PG — SIGNIFICANT CHANGE UP (ref 27–34)
MCHC RBC-ENTMCNC: 31.4 G/DL — LOW (ref 32–36)
MCV RBC AUTO: 90.3 FL — SIGNIFICANT CHANGE UP (ref 80–100)
MONOCYTES # BLD AUTO: 0.71 K/UL — SIGNIFICANT CHANGE UP (ref 0–0.9)
MONOCYTES NFR BLD AUTO: 3.5 % — SIGNIFICANT CHANGE UP (ref 2–14)
NEUTROPHILS # BLD AUTO: 11.47 K/UL — HIGH (ref 1.8–7.4)
NEUTROPHILS NFR BLD AUTO: 56.6 % — SIGNIFICANT CHANGE UP (ref 43–77)
PLATELET # BLD AUTO: 235 K/UL — SIGNIFICANT CHANGE UP (ref 150–400)
POTASSIUM SERPL-MCNC: 3.8 MMOL/L — SIGNIFICANT CHANGE UP (ref 3.5–5.3)
POTASSIUM SERPL-SCNC: 3.8 MMOL/L — SIGNIFICANT CHANGE UP (ref 3.5–5.3)
PROTHROM AB SERPL-ACNC: 14.5 SEC — HIGH (ref 9.9–13.4)
RBC # BLD: 3.39 M/UL — LOW (ref 3.8–5.2)
RBC # FLD: 14.5 % — SIGNIFICANT CHANGE UP (ref 10.3–14.5)
SODIUM SERPL-SCNC: 136 MMOL/L — SIGNIFICANT CHANGE UP (ref 135–145)
TROPONIN T, HIGH SENSITIVITY RESULT: 3262 NG/L — CRITICAL HIGH (ref 0–51)
WBC # BLD: 20.27 K/UL — HIGH (ref 3.8–10.5)
WBC # FLD AUTO: 20.27 K/UL — HIGH (ref 3.8–10.5)

## 2025-02-01 PROCEDURE — 99233 SBSQ HOSP IP/OBS HIGH 50: CPT

## 2025-02-01 PROCEDURE — 71045 X-RAY EXAM CHEST 1 VIEW: CPT | Mod: 26

## 2025-02-01 RX ORDER — BACTERIOSTATIC SODIUM CHLORIDE 0.9 %
250 VIAL (ML) INJECTION ONCE
Refills: 0 | Status: COMPLETED | OUTPATIENT
Start: 2025-02-01 | End: 2025-02-01

## 2025-02-01 RX ORDER — MAGNESIUM OXIDE 400 MG
800 TABLET ORAL ONCE
Refills: 0 | Status: COMPLETED | OUTPATIENT
Start: 2025-02-01 | End: 2025-02-01

## 2025-02-01 RX ORDER — POTASSIUM CHLORIDE 750 MG/1
40 TABLET, EXTENDED RELEASE ORAL ONCE
Refills: 0 | Status: COMPLETED | OUTPATIENT
Start: 2025-02-01 | End: 2025-02-01

## 2025-02-01 RX ORDER — OXYCODONE HYDROCHLORIDE 30 MG/1
5 TABLET ORAL ONCE
Refills: 0 | Status: DISCONTINUED | OUTPATIENT
Start: 2025-02-01 | End: 2025-02-01

## 2025-02-01 RX ORDER — ACETAMINOPHEN 160 MG/5ML
650 SUSPENSION ORAL EVERY 6 HOURS
Refills: 0 | Status: DISCONTINUED | OUTPATIENT
Start: 2025-02-01 | End: 2025-02-05

## 2025-02-01 RX ORDER — ENOXAPARIN SODIUM 100 MG/ML
84 INJECTION SUBCUTANEOUS EVERY 24 HOURS
Refills: 0 | Status: DISCONTINUED | OUTPATIENT
Start: 2025-02-01 | End: 2025-02-01

## 2025-02-01 RX ORDER — ENOXAPARIN SODIUM 100 MG/ML
80 INJECTION SUBCUTANEOUS EVERY 12 HOURS
Refills: 0 | Status: DISCONTINUED | OUTPATIENT
Start: 2025-02-01 | End: 2025-02-01

## 2025-02-01 RX ORDER — ENOXAPARIN SODIUM 100 MG/ML
80 INJECTION SUBCUTANEOUS EVERY 24 HOURS
Refills: 0 | Status: DISCONTINUED | OUTPATIENT
Start: 2025-02-02 | End: 2025-02-02

## 2025-02-01 RX ADMIN — Medication 6: at 12:09

## 2025-02-01 RX ADMIN — Medication 3 MILLILITER(S): at 22:02

## 2025-02-01 RX ADMIN — Medication 2 TABLET(S): at 21:40

## 2025-02-01 RX ADMIN — Medication 5 UNIT(S): at 08:29

## 2025-02-01 RX ADMIN — ACETAMINOPHEN 650 MILLIGRAM(S): 160 SUSPENSION ORAL at 13:43

## 2025-02-01 RX ADMIN — Medication 12.5 MILLIGRAM(S): at 05:27

## 2025-02-01 RX ADMIN — Medication 500 MILLILITER(S): at 16:55

## 2025-02-01 RX ADMIN — PIPERACILLIN SODIUM AND TAZOBACTAM SODIUM 25 GRAM(S): 2; 250 INJECTION, POWDER, FOR SOLUTION INTRAVENOUS at 13:51

## 2025-02-01 RX ADMIN — ENOXAPARIN SODIUM 80 MILLIGRAM(S): 100 INJECTION SUBCUTANEOUS at 23:38

## 2025-02-01 RX ADMIN — Medication 25 MILLIGRAM(S): at 08:28

## 2025-02-01 RX ADMIN — MUPIROCIN 1 APPLICATION(S): 2 CREAM TOPICAL at 21:51

## 2025-02-01 RX ADMIN — ACETAMINOPHEN 650 MILLIGRAM(S): 160 SUSPENSION ORAL at 12:43

## 2025-02-01 RX ADMIN — INSULIN GLARGINE-YFGN 15 UNIT(S): 100 INJECTION, SOLUTION SUBCUTANEOUS at 21:40

## 2025-02-01 RX ADMIN — ASPIRIN 81 MILLIGRAM(S): 81 TABLET, COATED ORAL at 12:08

## 2025-02-01 RX ADMIN — PIPERACILLIN SODIUM AND TAZOBACTAM SODIUM 25 GRAM(S): 2; 250 INJECTION, POWDER, FOR SOLUTION INTRAVENOUS at 06:05

## 2025-02-01 RX ADMIN — ENOXAPARIN SODIUM 80 MILLIGRAM(S): 100 INJECTION SUBCUTANEOUS at 12:43

## 2025-02-01 RX ADMIN — OXYCODONE HYDROCHLORIDE 5 MILLIGRAM(S): 30 TABLET ORAL at 14:55

## 2025-02-01 RX ADMIN — VANCOMYCIN HYDROCHLORIDE 166.67 MILLIGRAM(S): KIT at 04:25

## 2025-02-01 RX ADMIN — Medication 30 MILLIGRAM(S): at 12:08

## 2025-02-01 RX ADMIN — Medication 6: at 08:29

## 2025-02-01 RX ADMIN — Medication 40 MILLIGRAM(S): at 08:28

## 2025-02-01 RX ADMIN — ACETAMINOPHEN 650 MILLIGRAM(S): 160 SUSPENSION ORAL at 19:02

## 2025-02-01 RX ADMIN — ACETAMINOPHEN 650 MILLIGRAM(S): 160 SUSPENSION ORAL at 18:02

## 2025-02-01 RX ADMIN — PANTOPRAZOLE 40 MILLIGRAM(S): 20 TABLET, DELAYED RELEASE ORAL at 05:30

## 2025-02-01 RX ADMIN — PIPERACILLIN SODIUM AND TAZOBACTAM SODIUM 25 GRAM(S): 2; 250 INJECTION, POWDER, FOR SOLUTION INTRAVENOUS at 00:02

## 2025-02-01 RX ADMIN — Medication 6: at 21:40

## 2025-02-01 RX ADMIN — Medication 5 UNIT(S): at 12:08

## 2025-02-01 RX ADMIN — Medication 800 MILLIGRAM(S): at 17:44

## 2025-02-01 RX ADMIN — LEVOTHYROXINE SODIUM 75 MICROGRAM(S): 25 TABLET ORAL at 05:27

## 2025-02-01 RX ADMIN — Medication 3 MILLILITER(S): at 05:30

## 2025-02-01 RX ADMIN — IPRATROPIUM BROMIDE AND ALBUTEROL SULFATE 3 MILLILITER(S): .5; 2.5 SOLUTION RESPIRATORY (INHALATION) at 21:41

## 2025-02-01 RX ADMIN — ATORVASTATIN CALCIUM 40 MILLIGRAM(S): 80 TABLET, FILM COATED ORAL at 21:40

## 2025-02-01 RX ADMIN — PIPERACILLIN SODIUM AND TAZOBACTAM SODIUM 25 GRAM(S): 2; 250 INJECTION, POWDER, FOR SOLUTION INTRAVENOUS at 21:41

## 2025-02-01 RX ADMIN — IPRATROPIUM BROMIDE AND ALBUTEROL SULFATE 3 MILLILITER(S): .5; 2.5 SOLUTION RESPIRATORY (INHALATION) at 09:51

## 2025-02-01 RX ADMIN — IPRATROPIUM BROMIDE AND ALBUTEROL SULFATE 3 MILLILITER(S): .5; 2.5 SOLUTION RESPIRATORY (INHALATION) at 04:25

## 2025-02-01 RX ADMIN — Medication 5 UNIT(S): at 17:32

## 2025-02-01 RX ADMIN — OXYCODONE HYDROCHLORIDE 5 MILLIGRAM(S): 30 TABLET ORAL at 13:55

## 2025-02-01 RX ADMIN — MUPIROCIN 1 APPLICATION(S): 2 CREAM TOPICAL at 09:51

## 2025-02-01 RX ADMIN — Medication 500 MILLILITER(S): at 17:12

## 2025-02-01 RX ADMIN — IPRATROPIUM BROMIDE AND ALBUTEROL SULFATE 3 MILLILITER(S): .5; 2.5 SOLUTION RESPIRATORY (INHALATION) at 16:08

## 2025-02-01 RX ADMIN — Medication 6: at 17:33

## 2025-02-01 RX ADMIN — Medication 75 MILLIGRAM(S): at 12:08

## 2025-02-01 RX ADMIN — POTASSIUM CHLORIDE 40 MILLIEQUIVALENT(S): 750 TABLET, EXTENDED RELEASE ORAL at 17:43

## 2025-02-01 RX ADMIN — Medication 3 MILLILITER(S): at 13:08

## 2025-02-01 NOTE — PROGRESS NOTE ADULT - PROBLEM SELECTOR PLAN 3
###R/o PNA vs [place] [facility] [place]ip[place] got steroids and Rocephin x1 for PNA   WBC on Admission 13--->17.87, Afebrile with concern for PNA per Collateral ppw [place]ab + [place] [facility]   Blood Cultures x1 set sent difficult stick 1/31/2025, UA 1/30 Negative, Procal Sent off    Started Vanc and Zosyn descalate as warranted   CT Chest 1/31/2025 Persistent small large airways inflammation with interval increase in mucous plugging with near complete obstruction of the lateral segmental bronchus to the right middle lobe, right lower lobe bronchus and anteromedial basal segmental bronchus to the left lower lobe. Associated groundglass opacities are identified and early multifocal  pneumonia possibly postobstructive cannot be excluded.   Air-trapping from underlying small airways inflammation consistent with the provided history of asthma. Mild peripheral reticular pattern without fibrotic changes which may represent an early interstitial lung abnormality.  Severe calcified atherosclerotic plaque thoracic and abdominal aorta and major  side branches including the origin of the SMA.  Pulm Consulted appreciate recs, examined findings on CT and does not think PNA  Duonebs for Now and Hypertonic Saline for airway clearance     OF note **   ON prior admission 11/2023 had issues of Elevated Leukocytosis and Fever ID was consulted was attributed to Post op fever   Infectious w/u was negative (UA, BCXs x 2, CXR). RVP/COVID both negative CT Chest 1/31/2025 Persistent small large airways inflammation with interval increase in mucous plugging with near complete obstruction of the lateral segmental bronchus to the RML, RLL bronchus and anteromedial basal segmental bronchus to the LLL. Associated groundglass opacities are identified and early multifocal pneumonia possibly postobstructive cannot be excluded.       Pulm Consulted appreciate recs: low suspicion for PNA,  noted to have appearance of endobronchial material in superior segment RLL bronchus. Recommended outpatient pulm follow up w PFTs (to diagnosis obstructive lung disease) and consideration of repeat imaging.     examined findings on CT and does not think PNA  Duonebs for Now and Hypertonic Saline for airway clearance   --WBC on Admission 13--->17.87, Afebrile with concern for PNA per Collateral ppw [place] [facility] [place]   Blood Cultures x1 set sent difficult stick 1/31/2025, UA 1/30 Negative, Procal Sent off    Started Vanc and Zosyn descalate as warranted   OF note **   ON prior admission 11/2023 had issues of Elevated Leukocytosis and Fever ID was consulted was attributed to Post op fever   Infectious w/u was negative (UA, BCXs x 2, CXR). RVP/COVID both negative CT Chest 1/31/2025 Persistent small large airways inflammation with interval increase in mucous plugging with near complete obstruction of the lateral segmental bronchus to the RML, RLL bronchus and anteromedial basal segmental bronchus to the LLL. Associated groundglass opacities are identified and early multifocal pneumonia possibly postobstructive cannot be excluded.       Pulm Consulted: low suspicion for PNA,  noted to have appearance of endobronchial material in superior segment RLL bronchus.   --Recommended outpatient pulm follow up w PFTs (to diagnosis obstructive lung disease) and consideration of repeat imaging.   --WBC on Admission 13--->17.87->20.27, [place] [facility] [place]   --UA normal, Blood Cultures 1/31 NGTD, Procal normal.   --will continue empiric treatment with Vancomycin and Zosyn, descalate as warranted     **OF note during prior admission 11/2023: had issues of Elevated Leukocytosis and Fever ID was consulted was attributed to Post op fever. Infectious work-up was negative at the time.

## 2025-02-01 NOTE — PROGRESS NOTE ADULT - NSPROGADDITIONALINFOA_GEN_ALL_CORE
Attending Attestation:  I was physically present for the key portions of the evaluation and management (E/M) service provided. I reviewed relevant data including imaging, labs and prior procedure reports available. I agree with the above history, physical, and plan which I have reviewed with the following edits/addendum:    79F w HTN, CAD PCI 2023, CHF, hypothyroid- initially presented to [place] as NSTEMI, fevers and cough s/p CTX. New reduced EF s/p [facility] with 3v CAD -> [facility] CABG eval. Not surgical candidate, planned PCI with Dr Partida 2/4.   - empiric ABX for CAP, consulted pulm. Will de-escalate as able  - adjust BP meds, consider MRA and SGLT2i at DC given hx of HF recovered EF    Edvin Marino MD  Cardiology Attending Attestation:  I was physically present for the key portions of the evaluation and management (E/M) service provided. I reviewed relevant data including imaging, labs and prior procedure reports available. I agree with the above history, physical, and plan which I have reviewed with the following edits/addendum:    79F w HTN, CAD PCI 2023, CHF, hypothyroid- initially presented to [place] as NSTEMI, fevers and cough s/p CTX. New reduced EF s/p [facility] with 3v CAD -> [facility] CABG eval. Not surgical candidate, planned PCI with Dr Partida 2/4.   - empiric ABX for CAP, consulted pulm. Will de-escalate as able  - adjust BP meds, consider MRA and SGLT2i at DC given hx of HF recovered EF  - hold IV diuresis. replete w IVF during episode of hypotension, KEN on CKD suspect prerenal, BMP BID s/p IV fluids    Edvin Marino MD  Cardiology

## 2025-02-01 NOTE — PROGRESS NOTE ADULT - ASSESSMENT
80 yo female with PMH of HTN, HLD, Type II DM, triple vessel CAD s/p 3 stents at [facility] 11/2023, CHF, asthma, GERD, and hypothyroidism who presented to [facility] [place] with CP and SOB on 1/24. She was having fever, chills, a productive cough and congestion for 2-3 days prior to the chest pain. She states the pain was constant and radiated to her back/left arm with associated palpitations, SOB, orthopnea and dizziness. She felt the chest pain was similar to when she had the stents prompting her to present to the ER on 1/24.In ED she was found to be tachycardic and tachypneic. Her labs were significant for elevated BUN/Cr, potassium, lactate and elevated troponin. CXR showed pulmonary congestion vs. infiltrates. She was placed on BiPAP for respiratory distress and loaded with ASA/Plavix and placed on a heparin gtt for NSTEMI. Her nasal swab was + for MRSA and she recieved CTX 1g daily. She is s/p cardiac cath at [facility] [place] [facility] showing 3V CAD and a newly reduced EF of 25%.  She was transferred to [facility] under the service of Dr. Hurst for further workup and management however in discussion with Dr. 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 Pand is planned to transfer to Cardiac Tele for optimization prior to Complex PCI with Dr. Jaquan Sanabria  79 yr old F with PMHx of HTN, hyperlipidemia, DM, hypothyroidism, GERD, asthma, CAD s/p prior PCIs at [facility] 11/2023 who [place] [facility] [place] with chest pain and URI symptoms x 2-3 days, R/I NSTEMI, s/p diagnostic cath[place] [place] revealing 3VD and newly reduced EF, initially transferred to [facility] 9[facility] under Dr. 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 Partida.

## 2025-02-01 NOTE — PROVIDER CONTACT NOTE (OTHER) - ASSESSMENT
BP 70s-80s/30s, see VS flowsheet. Remains A&Ox4. Denies CP, SOB, palpitations, lightheadedness, dizziness. ANTONIO Yung at bedside.

## 2025-02-01 NOTE — PROGRESS NOTE ADULT - SUBJECTIVE AND OBJECTIVE BOX
**INCOMPLETE  CARDIOLOGY PA PROGRESS NOTE              	  MEDICATIONS:  furosemide    Tablet 40 milliGRAM(s) Oral daily  isosorbide   mononitrate ER Tablet (IMDUR) 30 milliGRAM(s) Oral daily  losartan 25 milliGRAM(s) Oral daily  metoprolol tartrate 12.5 milliGRAM(s) Oral every 12 hours  piperacillin/tazobactam IVPB.. 3.375 Gram(s) IV Intermittent every 8 hours  vancomycin  IVPB 1250 milliGRAM(s) IV Intermittent every 24 hours  albuterol    90 MICROgram(s) HFA Inhaler 2 Puff(s) Inhalation every 4 hours PRN  albuterol/ipratropium for Nebulization 3 milliLiter(s) Nebulizer every 6 hours  pantoprazole    Tablet 40 milliGRAM(s) Oral before breakfast  polyethylene glycol 3350 17 Gram(s) Oral daily PRN  senna 2 Tablet(s) Oral at bedtime  atorvastatin 40 milliGRAM(s) Oral at bedtime  insulin glargine Injectable (LANTUS) 15 Unit(s) SubCutaneous at bedtime  insulin lispro (ADMELOG) corrective regimen sliding scale   SubCutaneous Before meals and at bedtime  insulin lispro Injectable (ADMELOG) 5 Unit(s) SubCutaneous three times a day before meals  levothyroxine 75 MICROGram(s) Oral daily  aspirin enteric coated 81 milliGRAM(s) Oral daily  clopidogrel Tablet 75 milliGRAM(s) Oral daily  dextrose 5%. 1000 milliLiter(s) IV Continuous <Continuous>  dextrose 5%. 1000 milliLiter(s) IV Continuous <Continuous>  mupirocin 2% Nasal 1 Application(s) Both Nostrils two times a day  sodium chloride 0.9% lock flush 3 milliLiter(s) IV Push every 8 hours      	    [PHYSICAL EXAM:  TELEMETRY:  T(C): 36.6 (02-01-25 @ 08:18), Max: 36.7 (01-31-25 @ 20:26)  HR: 83 (02-01-25 @ 08:18) (83 - 108)  BP: 105/51 (02-01-25 @ 08:18) (100/47 - 144/65)  RR: 17 (02-01-25 @ 08:18) (16 - 19)  SpO2: 96% (02-01-25 @ 08:18) (92% - 96%)  Wt(kg): --  I&O's Summary    31 Jan 2025 07:01  -  01 Feb 2025 07:00  --------------------------------------------------------  IN: 582 mL / OUT: 400 mL / NET: 182 mL            Appearance: well appearing in NAD  Neck: Supple, no JVD  Cardiovascular: RRR S1S12  Respiratory: CTA B/L  Gastrointestinal:  + BS, soft, NT/ND	  Extremities: warm well perfused, no pedal bilaterally  Neurologic: no focal neurodeficits      	        LABS:	 	                          11.2   17.87 )-----------( 251      ( 31 Jan 2025 06:54 )             36.4     01-31    140  |  103  |  53[H]  ----------------------------<  138[H]  4.2   |  24  |  1.33[H]    Ca    9.0      31 Jan 2025 06:54  Phos  3.7     01-30  Mg     1.6     01-31    TPro  5.8[L]  /  Alb  3.8  /  TBili  0.3  /  DBili  x   /  AST  15  /  ALT  24  /  AlkPhos  48  01-30     CARDIOLOGY PA PROGRESS NOTE    Patient seen and examined at bedside resting comfortably. Patient c/o bilateral upper extremity pain due to multiple phlebotomy attempts, denies further acute complaints.        	  MEDICATIONS:  furosemide    Tablet 40 milliGRAM(s) Oral daily  isosorbide   mononitrate ER Tablet (IMDUR) 30 milliGRAM(s) Oral daily  losartan 25 milliGRAM(s) Oral daily  metoprolol tartrate 12.5 milliGRAM(s) Oral every 12 hours  piperacillin/tazobactam IVPB.. 3.375 Gram(s) IV Intermittent every 8 hours  vancomycin  IVPB 1250 milliGRAM(s) IV Intermittent every 24 hours  albuterol    90 MICROgram(s) HFA Inhaler 2 Puff(s) Inhalation every 4 hours PRN  albuterol/ipratropium for Nebulization 3 milliLiter(s) Nebulizer every 6 hours  pantoprazole    Tablet 40 milliGRAM(s) Oral before breakfast  polyethylene glycol 3350 17 Gram(s) Oral daily PRN  senna 2 Tablet(s) Oral at bedtime  atorvastatin 40 milliGRAM(s) Oral at bedtime  insulin glargine Injectable (LANTUS) 15 Unit(s) SubCutaneous at bedtime  insulin lispro (ADMELOG) corrective regimen sliding scale   SubCutaneous Before meals and at bedtime  insulin lispro Injectable (ADMELOG) 5 Unit(s) SubCutaneous three times a day before meals  levothyroxine 75 MICROGram(s) Oral daily  aspirin enteric coated 81 milliGRAM(s) Oral daily  clopidogrel Tablet 75 milliGRAM(s) Oral daily  mupirocin 2% Nasal 1 Application(s) Both Nostrils two times a day  sodium chloride 0.9% lock flush 3 milliLiter(s) IV Push every 8 hours      	    [PHYSICAL EXAM:  TELEMETRY: no acute events  T(C): 36.6 (02-01-25 @ 08:18), Max: 36.7 (01-31-25 @ 20:26)  HR: 83 (02-01-25 @ 08:18) (83 - 108)  BP: 105/51 (02-01-25 @ 08:18) (100/47 - 144/65)  RR: 17 (02-01-25 @ 08:18) (16 - 19)  SpO2: 96% (02-01-25 @ 08:18) (92% - 96%)  Wt(kg): --  I&O's Summary    31 Jan 2025 07:01  -  01 Feb 2025 07:00  --------------------------------------------------------  IN: 582 mL / OUT: 400 mL / NET: 182 mL            Appearance: well appearing elderly female in NAD  Neck: Supple, no JVD  Cardiovascular: RRR S1S12  Respiratory: CTA B/L  Gastrointestinal:  + BS, soft, NT/ND	  Extremities: warm well perfused, no pedal bilaterally  Neurologic: no focal neurodeficits      	        LABS:	 	                          11.2   17.87 )-----------( 251      ( 31 Jan 2025 06:54 )             36.4     01-31    140  |  103  |  53[H]  ----------------------------<  138[H]  4.2   |  24  |  1.33[H]    Ca    9.0      31 Jan 2025 06:54  Phos  3.7     01-30  Mg     1.6     01-31    TPro  5.8[L]  /  Alb  3.8  /  TBili  0.3  /  DBili  x   /  AST  15  /  ALT  24  /  AlkPhos  48  01-30

## 2025-02-01 NOTE — PROGRESS NOTE ADULT - PROBLEM SELECTOR PLAN 4
Continue with Synthroid 75mcg   TFTS repeated continue HOME MED: Levothyroxine 75mcg PO daily.  --F/U TSH and T4 level.

## 2025-02-01 NOTE — PROGRESS NOTE ADULT - PROBLEM SELECTOR PLAN 5
CKD Stage 3 Creatinine Baseline Around 1.3-1.5 range from prior Labs    on Admission 1.44 down to 1.33 today   Continue to Monitor while on Lasix and Starting Losartan KEN on CKD Stage 3 (baseline Cr ~1.3-1.5 from prior Labs)   --Cr uptrended to 2.21 today, will hold ARB and Lasix for now.  --will renally dose medications and avoid nephrotoxic agents.  --F/U Urine studies.

## 2025-02-01 NOTE — PROGRESS NOTE ADULT - PROBLEM SELECTOR PLAN 1
Presented to [facility] with CP and found to have Elevated troponins underwent repeat Cath with 3VCAD ISR Trops on Arrival 4206 no Plan to trend, No Current CP some SOB with Minimal Ambulation   Continue with Heparin ACS protocol until cath  Cath at [facility] with Dr. Morgan: 1/28/2024: distal LM to prox LAD 80% Stenosed In Stent Restenosis, LCX ostial to Prox LCX Instent Restenosis, % Stenosed     Reload with Plavix 300 and continue DAPT starting tomr   ECHO: Appears Improved 44%, Mild LVH, Moderately reduced left ventricular systolic function, LV EF 44%. RWMA. Grade I DD. Aortic sclerosis without significant stenosis. Trace aortic regurgitation. No pericardial effusion.  ECHO at [facility] 1/25/2025: Appeared to be Severe LVEF 25%,   ECHO: 11/13/2023: Normal L/RV size and systolic function w/o wall motion abnormalities, mild MR, mild-mod TR, pHTN w/ PASP 44mmHg, no pericard effusion   Prior Cath 11/2023: Impella Assisted Rota/PCI with EMILIE to LM 90%, EMILIE ostial LAD and EMILIE ostial LCX, (dLM 80%, oLAD 85%, D2 75%, oLCX 85%, mRCA 75%) currently chest pain free, Trop T@[facility] 2147-->2783, CKMB 2.4, CK 51.  --s/p diagnotic cath@[facility] [place] 1/28/24 w/ Dr. Morgan: distal LM to prox LAD 80% Stenosed In Stent Restenosis, LCX ostial to Prox LCX Instent Restenosis, % Stenosed.  --TTE 1/31/25: mild LVH, Moderately reduced LVSF, LV EF 44%. RWMA. Grade I DD. Aortic sclerosis without significant stenosis. Trace AR.  --loaded with total of Plavix 600mg (300mg@[facility] & 300mg@[facility]), on ASA/Plavix/Statin/Imdur and will continue Lovenox 80mg subcut q 12hrs per ACS protocol until PCI on 2/5/25.  --precath/consented for PCI with Dr. Partida on Tuesday 2/4/25.    **Prior Cath@[facility] 11/2023: Impella Assisted Rota/PCI with EMILIE to LM 90%, EMILIE ostial LAD and EMILIE ostial LCX, (dLM 80%, oLAD 85%, D2 75%, oLCX 85%, mRCA 75%). currently chest pain free, Trop T@[facility] 2147-->2783, CKMB 2.4, CK 51.  --s/p diagnotic cath[place] [place] 1/28/24 w/ Dr. Morgan: distal LM to prox LAD 80% Stenosed In Stent Restenosis, LCX ostial to Prox LCX Instent Restenosis, % Stenosed.  --TTE 1/31/25: mild LVH, Moderately reduced LVSF, LV EF 44%. RWMA. Grade I DD. Aortic sclerosis without significant stenosis. Trace AR.  --loaded with total of Plavix 600mg (300mg@[facility] & 300mg@[facility]), on ASA/Plavix/Statin/Imdur and will continue Lovenox 80mg subcut q 24hrs per ACS protocol until PCI on 2/5/25.  --precath/consented for PCI with Dr. Partida on Tuesday 2/4/25.    **Prior Cath@[facility] 11/2023: Impella Assisted Rota/PCI with EMILIE to LM 90%, EMILIE ostial LAD and EMILIE ostial LCX, (dLM 80%, oLAD 85%, D2 75%, oLCX 85%, mRCA 75%).

## 2025-02-01 NOTE — PROGRESS NOTE ADULT - PROBLEM SELECTOR PLAN 2
New Acute CHF likely in setting of NSTEMI [place] [facility] [place] 25%, Echo on Arrival improved to 1/31/2025 44%   Etiology Ischemic   CHF Consulted seen and appears Euvolemic   Continue Lasix 40mg (home Dose) and Losartan 25mg for BP Control   Prior Echo as above   GMDT: Losartan 25, Toprol 12.5mg and   Diuresis: Lasix 40mg PO (Home Dose)   Advanced CHF Consulted and now Following New Acute CHF likely in setting of NSTEMI [place] [facility] [place] 25%, Echo on Arrival improved to 1/31/2025 44%     ETIOLOGY: Ischemic   DIURESIS: Lasix 40mg PO daily.   GDMT: Losartan 25mg PO daily and Toprol 12.5mg PO daily.     Advanced CHF Consulted: pt well compensated, HF team signed off. New Acute CHF likely in setting of NSTEMI EF at [facility] was 25%, Echo on Arrival improved to 1/31/2025 44%     ETIOLOGY: Ischemic   DIURESIS: on hold in setting of KEN.  GDMT: Toprol 12.5mg PO daily, Losartan on HOLD in setting of KEN.    Advanced CHF Consulted: pt well compensated, HF team signed off.

## 2025-02-01 NOTE — PROGRESS NOTE ADULT - PROBLEM SELECTOR PLAN 8
Pulm following Appreciate Recs   Dubob, Hypertonic Saline to aid with Mucous plugging Pulm following Appreciate Recs   Yoana, Hypertonic Saline to aid with Mucous plugging    N: DASH  E: Maintain K>4.0 and Mg>2.0  VTE PPx: Lovenox   Code: Full

## 2025-02-01 NOTE — PROGRESS NOTE ADULT - PROBLEM SELECTOR PLAN 7
On Admission BP Stable 130s/140s     Added Losartan Today 1/31   Continue with Imdur for BP control and Antianginal   Holding Home Norvasc stable, continue Metoprolol Tartrate 12.5mg PO BID and Imdur ER 30mg PO daily.

## 2025-02-02 LAB
ADD ON TEST-SPECIMEN IN LAB: SIGNIFICANT CHANGE UP
ADD ON TEST-SPECIMEN IN LAB: SIGNIFICANT CHANGE UP
ALBUMIN SERPL ELPH-MCNC: 2.9 G/DL — LOW (ref 3.3–5)
ALP SERPL-CCNC: 47 U/L — SIGNIFICANT CHANGE UP (ref 40–120)
ALT FLD-CCNC: 21 U/L — SIGNIFICANT CHANGE UP (ref 10–45)
ANION GAP SERPL CALC-SCNC: 10 MMOL/L — SIGNIFICANT CHANGE UP (ref 5–17)
ANION GAP SERPL CALC-SCNC: 16 MMOL/L — SIGNIFICANT CHANGE UP (ref 5–17)
APPEARANCE UR: CLEAR — SIGNIFICANT CHANGE UP
APTT BLD: 53.3 SEC — HIGH (ref 24.5–35.6)
AST SERPL-CCNC: SIGNIFICANT CHANGE UP (ref 10–40)
BILIRUB SERPL-MCNC: 0.6 MG/DL — SIGNIFICANT CHANGE UP (ref 0.2–1.2)
BILIRUB UR-MCNC: NEGATIVE — SIGNIFICANT CHANGE UP
BLD GP AB SCN SERPL QL: NEGATIVE — SIGNIFICANT CHANGE UP
BUN SERPL-MCNC: 54 MG/DL — HIGH (ref 7–23)
BUN SERPL-MCNC: 55 MG/DL — HIGH (ref 7–23)
CALCIUM SERPL-MCNC: 7.9 MG/DL — LOW (ref 8.4–10.5)
CALCIUM SERPL-MCNC: 8 MG/DL — LOW (ref 8.4–10.5)
CHLORIDE SERPL-SCNC: 101 MMOL/L — SIGNIFICANT CHANGE UP (ref 96–108)
CHLORIDE SERPL-SCNC: 95 MMOL/L — LOW (ref 96–108)
CO2 SERPL-SCNC: 19 MMOL/L — LOW (ref 22–31)
CO2 SERPL-SCNC: 23 MMOL/L — SIGNIFICANT CHANGE UP (ref 22–31)
COLOR SPEC: YELLOW — SIGNIFICANT CHANGE UP
CREAT ?TM UR-MCNC: 147 MG/DL — SIGNIFICANT CHANGE UP
CREAT SERPL-MCNC: 2.42 MG/DL — HIGH (ref 0.5–1.3)
CREAT SERPL-MCNC: 2.78 MG/DL — HIGH (ref 0.5–1.3)
DIFF PNL FLD: NEGATIVE — SIGNIFICANT CHANGE UP
EGFR: 17 ML/MIN/1.73M2 — LOW
EGFR: 20 ML/MIN/1.73M2 — LOW
GLUCOSE SERPL-MCNC: 214 MG/DL — HIGH (ref 70–99)
GLUCOSE SERPL-MCNC: 298 MG/DL — HIGH (ref 70–99)
GLUCOSE UR QL: NEGATIVE MG/DL — SIGNIFICANT CHANGE UP
HCT VFR BLD CALC: 30.9 % — LOW (ref 34.5–45)
HGB BLD-MCNC: 9.4 G/DL — LOW (ref 11.5–15.5)
INR BLD: 1.2 — HIGH (ref 0.85–1.16)
KETONES UR-MCNC: ABNORMAL MG/DL
LEUKOCYTE ESTERASE UR-ACNC: ABNORMAL
MAGNESIUM SERPL-MCNC: 1.5 MG/DL — LOW (ref 1.6–2.6)
MCHC RBC-ENTMCNC: 27.6 PG — SIGNIFICANT CHANGE UP (ref 27–34)
MCHC RBC-ENTMCNC: 30.4 G/DL — LOW (ref 32–36)
MCV RBC AUTO: 90.6 FL — SIGNIFICANT CHANGE UP (ref 80–100)
NITRITE UR-MCNC: NEGATIVE — SIGNIFICANT CHANGE UP
NRBC # BLD: 0 /100 WBCS — SIGNIFICANT CHANGE UP (ref 0–0)
NRBC BLD-RTO: 0 /100 WBCS — SIGNIFICANT CHANGE UP (ref 0–0)
OSMOLALITY SERPL: 303 MOSM/KG — HIGH (ref 280–301)
OSMOLALITY UR: 477 MOSM/KG — SIGNIFICANT CHANGE UP (ref 300–900)
PH UR: 5 — SIGNIFICANT CHANGE UP (ref 5–8)
PLATELET # BLD AUTO: 240 K/UL — SIGNIFICANT CHANGE UP (ref 150–400)
POTASSIUM SERPL-MCNC: 4.9 MMOL/L — SIGNIFICANT CHANGE UP (ref 3.5–5.3)
POTASSIUM SERPL-MCNC: SIGNIFICANT CHANGE UP (ref 3.5–5.3)
POTASSIUM SERPL-SCNC: 4.9 MMOL/L — SIGNIFICANT CHANGE UP (ref 3.5–5.3)
POTASSIUM SERPL-SCNC: SIGNIFICANT CHANGE UP (ref 3.5–5.3)
POTASSIUM UR-SCNC: 41 MMOL/L — SIGNIFICANT CHANGE UP
PROT ?TM UR-MCNC: 42 MG/DL — HIGH (ref 0–12)
PROT SERPL-MCNC: 5.4 G/DL — LOW (ref 6–8.3)
PROT UR-MCNC: 30 MG/DL
PROT/CREAT UR-RTO: 0.3 RATIO — HIGH (ref 0–0.2)
PROTHROM AB SERPL-ACNC: 14 SEC — HIGH (ref 9.9–13.4)
RAPID RVP RESULT: SIGNIFICANT CHANGE UP
RBC # BLD: 3.41 M/UL — LOW (ref 3.8–5.2)
RBC # FLD: 14.6 % — HIGH (ref 10.3–14.5)
RH IG SCN BLD-IMP: NEGATIVE — SIGNIFICANT CHANGE UP
SARS-COV-2 RNA SPEC QL NAA+PROBE: SIGNIFICANT CHANGE UP
SODIUM SERPL-SCNC: 130 MMOL/L — LOW (ref 135–145)
SODIUM SERPL-SCNC: 134 MMOL/L — LOW (ref 135–145)
SODIUM UR-SCNC: 43 MMOL/L — SIGNIFICANT CHANGE UP
SP GR SPEC: 1.02 — SIGNIFICANT CHANGE UP (ref 1–1.03)
UROBILINOGEN FLD QL: 0.2 MG/DL — SIGNIFICANT CHANGE UP (ref 0.2–1)
UUN UR-MCNC: 710 MG/DL — SIGNIFICANT CHANGE UP
WBC # BLD: 18.43 K/UL — HIGH (ref 3.8–10.5)
WBC # FLD AUTO: 18.43 K/UL — HIGH (ref 3.8–10.5)

## 2025-02-02 PROCEDURE — 99233 SBSQ HOSP IP/OBS HIGH 50: CPT

## 2025-02-02 PROCEDURE — 99223 1ST HOSP IP/OBS HIGH 75: CPT | Mod: GC

## 2025-02-02 RX ORDER — HEPARIN SODIUM,PORCINE 10000/ML
5000 VIAL (ML) INJECTION EVERY 6 HOURS
Refills: 0 | Status: DISCONTINUED | OUTPATIENT
Start: 2025-02-02 | End: 2025-02-04

## 2025-02-02 RX ORDER — VANCOMYCIN HYDROCHLORIDE 50 MG/ML
1250 KIT ORAL ONCE
Refills: 0 | Status: COMPLETED | OUTPATIENT
Start: 2025-02-03 | End: 2025-02-03

## 2025-02-02 RX ORDER — HEPARIN SODIUM,PORCINE 10000/ML
1200 VIAL (ML) INJECTION
Qty: 25000 | Refills: 0 | Status: DISCONTINUED | OUTPATIENT
Start: 2025-02-02 | End: 2025-02-03

## 2025-02-02 RX ORDER — BACTERIOSTATIC SODIUM CHLORIDE 0.9 %
4 VIAL (ML) INJECTION EVERY 12 HOURS
Refills: 0 | Status: DISCONTINUED | OUTPATIENT
Start: 2025-02-02 | End: 2025-02-05

## 2025-02-02 RX ORDER — VANCOMYCIN HYDROCHLORIDE 50 MG/ML
1250 KIT ORAL ONCE
Refills: 0 | Status: DISCONTINUED | OUTPATIENT
Start: 2025-02-02 | End: 2025-02-02

## 2025-02-02 RX ORDER — BACTERIOSTATIC SODIUM CHLORIDE 0.9 %
1000 VIAL (ML) INJECTION
Refills: 0 | Status: DISCONTINUED | OUTPATIENT
Start: 2025-02-02 | End: 2025-02-03

## 2025-02-02 RX ORDER — MAGNESIUM SULFATE 0.8 MEQ/ML
2 AMPUL (ML) INJECTION ONCE
Refills: 0 | Status: COMPLETED | OUTPATIENT
Start: 2025-02-02 | End: 2025-02-02

## 2025-02-02 RX ADMIN — Medication 75 MILLIGRAM(S): at 12:29

## 2025-02-02 RX ADMIN — Medication 2 TABLET(S): at 21:48

## 2025-02-02 RX ADMIN — MUPIROCIN 1 APPLICATION(S): 2 CREAM TOPICAL at 10:35

## 2025-02-02 RX ADMIN — IPRATROPIUM BROMIDE AND ALBUTEROL SULFATE 3 MILLILITER(S): .5; 2.5 SOLUTION RESPIRATORY (INHALATION) at 10:35

## 2025-02-02 RX ADMIN — Medication 5 UNIT(S): at 12:28

## 2025-02-02 RX ADMIN — Medication 12.5 MILLIGRAM(S): at 17:02

## 2025-02-02 RX ADMIN — INSULIN GLARGINE-YFGN 15 UNIT(S): 100 INJECTION, SOLUTION SUBCUTANEOUS at 21:48

## 2025-02-02 RX ADMIN — Medication 100 MILLILITER(S): at 12:54

## 2025-02-02 RX ADMIN — VANCOMYCIN HYDROCHLORIDE 166.67 MILLIGRAM(S): KIT at 05:37

## 2025-02-02 RX ADMIN — IPRATROPIUM BROMIDE AND ALBUTEROL SULFATE 3 MILLILITER(S): .5; 2.5 SOLUTION RESPIRATORY (INHALATION) at 21:48

## 2025-02-02 RX ADMIN — ACETAMINOPHEN 650 MILLIGRAM(S): 160 SUSPENSION ORAL at 18:04

## 2025-02-02 RX ADMIN — ACETAMINOPHEN 650 MILLIGRAM(S): 160 SUSPENSION ORAL at 10:35

## 2025-02-02 RX ADMIN — LEVOTHYROXINE SODIUM 75 MICROGRAM(S): 25 TABLET ORAL at 05:38

## 2025-02-02 RX ADMIN — ATORVASTATIN CALCIUM 40 MILLIGRAM(S): 80 TABLET, FILM COATED ORAL at 21:48

## 2025-02-02 RX ADMIN — Medication 25 GRAM(S): at 12:54

## 2025-02-02 RX ADMIN — ACETAMINOPHEN 650 MILLIGRAM(S): 160 SUSPENSION ORAL at 11:35

## 2025-02-02 RX ADMIN — Medication 4 MILLILITER(S): at 17:01

## 2025-02-02 RX ADMIN — IPRATROPIUM BROMIDE AND ALBUTEROL SULFATE 3 MILLILITER(S): .5; 2.5 SOLUTION RESPIRATORY (INHALATION) at 17:01

## 2025-02-02 RX ADMIN — Medication 100 MILLILITER(S): at 19:06

## 2025-02-02 RX ADMIN — Medication 4: at 17:02

## 2025-02-02 RX ADMIN — Medication 3 MILLILITER(S): at 06:54

## 2025-02-02 RX ADMIN — Medication 3 MILLILITER(S): at 21:59

## 2025-02-02 RX ADMIN — Medication 4: at 08:48

## 2025-02-02 RX ADMIN — PIPERACILLIN SODIUM AND TAZOBACTAM SODIUM 25 GRAM(S): 2; 250 INJECTION, POWDER, FOR SOLUTION INTRAVENOUS at 06:29

## 2025-02-02 RX ADMIN — Medication 5 UNIT(S): at 08:47

## 2025-02-02 RX ADMIN — Medication 12.5 MILLIGRAM(S): at 05:38

## 2025-02-02 RX ADMIN — ACETAMINOPHEN 650 MILLIGRAM(S): 160 SUSPENSION ORAL at 17:04

## 2025-02-02 RX ADMIN — Medication 6: at 12:28

## 2025-02-02 RX ADMIN — MUPIROCIN 1 APPLICATION(S): 2 CREAM TOPICAL at 21:48

## 2025-02-02 RX ADMIN — ASPIRIN 81 MILLIGRAM(S): 81 TABLET, COATED ORAL at 12:29

## 2025-02-02 RX ADMIN — PANTOPRAZOLE 40 MILLIGRAM(S): 20 TABLET, DELAYED RELEASE ORAL at 05:38

## 2025-02-02 RX ADMIN — Medication 5 UNIT(S): at 17:02

## 2025-02-02 RX ADMIN — IPRATROPIUM BROMIDE AND ALBUTEROL SULFATE 3 MILLILITER(S): .5; 2.5 SOLUTION RESPIRATORY (INHALATION) at 05:37

## 2025-02-02 RX ADMIN — Medication 3 MILLILITER(S): at 14:11

## 2025-02-02 RX ADMIN — PIPERACILLIN SODIUM AND TAZOBACTAM SODIUM 25 GRAM(S): 2; 250 INJECTION, POWDER, FOR SOLUTION INTRAVENOUS at 13:02

## 2025-02-02 NOTE — PROGRESS NOTE ADULT - ASSESSMENT
79 yr old F with PMHx of HTN, hyperlipidemia, DM, hypothyroidism, GERD, asthma, CAD s/p prior PCIs at [facility] 11/2023 who [place] [facility] [place] with chest pain and URI symptoms x 2-3 days, R/I NSTEMI, s[place]p [place] [place] and newly reduced EF, initially transferred to [facility] under Dr. 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 Partida.

## 2025-02-02 NOTE — PROGRESS NOTE ADULT - PROBLEM SELECTOR PLAN 1
currently chest pain free, Trop T@[facility] 2147-->2783, CKMB 2.4, CK 51.  --s/p diagnotic cath@[facility] 1/28/24 w/ Dr. Morgan: distal LM to prox LAD 80% Stenosed In Stent Restenosis, LCX ostial to Prox LCX Instent Restenosis, % Stenosed.  --TTE 1/31/25: mild LVH, Moderately reduced LVSF, LV EF 44%. RWMA. Grade I DD. Aortic sclerosis without significant stenosis. Trace AR.  --loaded with total of Plavix 600mg (300mg@[facility] & 300mg@[facility]), on ASA/Plavix/Statin/Imdur and will continue Lovenox 80mg subcut q 24hrs per ACS protocol until PCI on 2/5/25.  --precath/consented for PCI with Dr. Partida on Tuesday 2/4/25.    **Prior Cath@[facility] 11/2023: Impella Assisted Rota/PCI with EMILIE to LM 90%, EMILIE ostial LAD and EMILIE ostial LCX, (dLM 80%, oLAD 85%, D2 75%, oLCX 85%, mRCA 75%).

## 2025-02-02 NOTE — PROGRESS NOTE ADULT - PROBLEM SELECTOR PLAN 8
Pulm following Appreciate Recs   Home Montelukast and Symbicort   Duonebs, Hypertonic Saline to aid with Mucous plugging    N: DASH  E: Maintain K>4.0 and Mg>2.0  VTE PPx: Heparin   Code: Full

## 2025-02-02 NOTE — CONSULT NOTE ADULT - ATTENDING COMMENTS
79F hx of HTN, HLD, T2DM, tripple vessel CAD s/p 3 stents, CHF, asthma, GERD, hypothyroid, presented as transfer from [facility] (1/24-1/30) after hospitalization for NSTEMI and MRSA PNA, transferred to [facility] cardiology for  possible complex PCI. Medicine was consulted for persistent leukocytosis     VSS  Labs as above    #Leukocytosis-WBC 13 -> 20 -> 18 during this hospitalization with neutrophilic predominance and absolute eosinophilia. S/p solumedrol course at [facility] for asthma exacerbation and <7 day course of vanc/zosyn for presumed MRSA PNA, resumed here on 2/1/25. She remains HDS, with improving but persistent productive yellow/green cough and sore throat. Last CXR on 2/1/25 without consolidation and CT Chest on 1/31 with above findings, but non-infectious per pulmonlogy consult. Etiologies could include incompletely treated MRSA PNA, reactive iso recent steroids, NSTEMI, less likely viral PNA given negative RVP at [facility] and overall clinically improving.   -Cont. Vanc/Zosyn for 5 total days of treatment during this hospitalization  -CTM WBC and diff count     #KEN  #Hyponatremia-Baseline Crt ~1.3, increased from baseline to 2.2 on 2/1, and 2.4 today, Na 130 today. Underwent angiogram at [facility]. Etiology spans from pre-renal to post-renal.  Has not been receiving diuresis. Timing not c/w ATN 2/2 re-initiation of vancomycin (as Crt had already risen to 2.2 prior to vancomycin reinitiation later that) and not entirely c/w contrast nephropathy either as Crt was stable for at least 4 days after angiogram. Does not appear to be volume overloaded despite newly reduced EF 25%.   [] Urine sediment and urine lytes, serum osm   [] Bladder scan   -Monitor UOP    -CTM, consider involving nephrology if continues to uptrend 79F hx of HTN, HLD, T2DM, tripple vessel CAD s/p 3 stents, CHF, asthma, GERD, hypothyroid, presented [facility] (1[facility]24[facility]1/30) after hospitalization for NSTEMI and MRSA PNA, transferred to [facility] cardiology for  possible complex PCI. Medicine was consulted for persistent leukocytosis     Subjective      Exam:  Gen: Well-appearing  CV: RRR no m/g/r, JVD not distended  Pulm: CTAB, no wheezing  Peripheral: No peripheral edema     VSS  Labs as above    #Leukocytosis-WBC 13 -> 20 -> 18 (1/31-2/2) with neutrophilic predominance and absolute eosinophilia. S/p solumedrol course at [facility] for asthma exacerbation and <7 day course of vanc/zosyn for presumed MRSA PNA, resumed here on 2/1/25. She remains HDS, with improving but persistent productive yellow/green cough and sore throat. Last CXR on 2/1/25 without consolidation and CT Chest on 1/31 with above findings, but low suspicion for infection per pulmonlogy consult. Etiologies could include incompletely treated MRSA PNA, reactive iso recent steroids, NSTEMI, less likely viral PNA given negative RVP at [facility] and overall clinically improving.   -Cont. Vanc/Zosyn for 5 total days of treatment during this hospitalization given incomplete course at OSH  -CTM WBC and diff count     #KEN  #Hyponatremia-Baseline Crt ~1.3, increased from baseline to 2.2 on 2/1, and 2.4 today, Na 130 today. Underwent angiogram on 1/28 at OSH. Etiology spans from pre-renal to post-renal.  Received maintenance diuresis w/ lasix 40mg PO until 2/1 but was not receiving IV diuresis. Also received one dose of losartan for HTN before being discontinued. Timing not c/w ATN 2/2 re-initiation of vancomycin (as Crt had already risen to 2.2 prior to vancomycin reinitiation) and not c/w contrast nephropathy either as Crt was stable for at least 5 days after angiogram. Would expect Crt increase of 30% after initiation of losartan, so witnessed Crt increase is out of proportion. With regard to volume status, more likely to be volume down, than up, new EF as of 1/31/25 was 44%.   [] Urine sediment and urine lytes, serum osm   [] Bladder scan   -Agree with holding losartan and further diuresis   -Monitor UOP    -CTM, consider involving nephrology if continues to uptrend 79F hx of HTN, HLD, T2DM, tripple vessel CAD s/p 3 stents, CHF, asthma, GERD, hypothyroid, presented as transfer from [facility] (1/24-1/30) after hospitalization for NSTEMI and MRSA PNA, transferred to [facility] cardiology for  possible complex PCI. Medicine was consulted for persistent leukocytosis     Subjective  Reports chest pain, cough all improving over hospitalization course. Denies orthopnea, PND.     Exam:  Gen: Well-appearing  CV: RRR no m/g/r, JVP 4cm above sternal angle  Pulm: CTAB, no wheezing  Peripheral: No peripheral edema     VSS  Labs as above    #Leukocytosis-WBC 13 -> 20 -> 18 (1/31-2/2) with neutrophilic predominance and absolute eosinophilia. S/p solumedrol course at OSH for asthma exacerbation and <7 day course of vanc/zosyn for presumed MRSA PNA, resumed here on 2/1/25. Procal on 1/31/25 <0.02. She remains HDS, with improving but persistent productive yellow/green cough and sore throat. Last CXR on 2/1/25 without consolidation and CT Chest on 1/31 with above findings, but low suspicion for infection per pulmonlogy consult. Etiologies could include incompletely treated MRSA PNA, reactive iso recent steroids, NSTEMI, less likely viral PNA given negative RVP at [facility] and overall clinically improving.   [] Trend procal, if continues to be <0.25 unlikely to have MRSA PNA can d/c vancomycin  -Cont. Vanc, d/c Zosyn  -CTM WBC and diff count     #KEN  #Hyponatremia-Baseline Crt ~1.3, increased from baseline to 2.2 on 2/1, and 2.4 today, Na 130 today. Underwent angiogram on 1/28 at OSH. Etiology spans from pre-renal to post-renal.  Received maintenance diuresis w/ lasix 40mg PO until 2/1 but was not receiving IV diuresis. Also received one dose of losartan for HTN before being discontinued. Timing not c/w ATN 2/2 re-initiation of vancomycin (as Crt had already risen to 2.2 prior to vancomycin reinitiation) and not c/w contrast nephropathy either as Crt was stable for at least 5 days after angiogram. Would expect Crt increase of 30% after initiation of losartan, so witnessed Crt increase is out of proportion. With regard to volume status, more likely to be volume down, than up, new EF as of 1/31/25 was 44%.   [] Urine sediment and urine lytes, serum osm   [] Bladder scan   -Agree with holding losartan and further diuresis   -Monitor UOP    -CTM, consider involving nephrology if continues to uptrend

## 2025-02-02 NOTE — CONSULT NOTE ADULT - SUBJECTIVE AND OBJECTIVE BOX
HERMELINDO ROLDAN  79y  Female    HPI: 78 yo female with PMH of HTN, HLD, Type II DM, triple vessel CAD s/p 3 stents at [facility] 11/2023, CHF, asthma, GERD, and hypothyroidism who presented to [facility] with [place] and SOB on 1/24. She was having fever, chills, a productive cough and congestion for 2-3 days prior to the chest pain. She states the pain was constant and radiated to her back/left arm with associated palpitations, SOB, orthopnea and dizziness. She felt the chest pain was similar to when she had the stents prompting her to present to the ER on 1/24.In ED she was found to be tachycardic and tachypneic. Her labs were significant for elevated BUN/Cr, potassium, lactate and elevated troponin. CXR showed pulmonary congestion vs. infiltrates. She was placed on BiPAP for respiratory distress and loaded with ASA/Plavix and placed on a heparin gtt for NSTEMI. Her nasal swab was + for MRSA and she recieved CTX 1g daily. She is s/p cardiac cath showing 3V CAD and a reduced EF of 25%. She was transferred to [facility] under the service of Dr. Hurst for further workup and management. She denies any history of CVA, she states she has a hx of asthma, she also states she had b/l vein stripping to both of her legs in the past. She denies any past surgeries or traumas to the chest.     Consultant HPI: Patient reports she had been experiencing a cough and sore throat that started ~2 days prior to presenting to [facility] on 1/24. Denies sick contacts, recent travel, n/v, constipation, diarrhea, urinary sxs. Denies recent use of steroids and tobacco use. As per primary team, patient was RVP negative and MRSA positive at [facility].       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 No nausea, vomiting, or hematemesis; No diarrhea or constipation. No melena or hematochezia.  GENITOURINARY: No dysuria, frequency, hematuria, or incontinence  NEUROLOGICAL: No headaches, memory loss, loss of strength, numbness, or tremors  SKIN: No itching, burning, rashes, or lesions   LYMPH NODES: No enlarged glands  ENDOCRINE: No heat or cold intolerance; No hair loss  MUSCULOSKELETAL: No joint pain or swelling; No muscle, back, or extremity pain  PSYCHIATRIC: No depression, anxiety, mood swings, or difficulty sleeping  HEME/LYMPH: No easy bruising, or bleeding gums  ALLERY AND IMMUNOLOGIC: No hives or eczema    T(C): 36.5 (02-02-25 @ 12:13), Max: 36.7 (02-02-25 @ 08:44)  HR: 88 (02-02-25 @ 12:13) (88 - 98)  BP: 100/41 (02-02-25 @ 12:13) (74/34 - 117/43)  RR: 17 (02-02-25 @ 12:13) (17 - 19)  SpO2: 100% (02-02-25 @ 12:13) (92% - 100%)  Wt(kg): --Vital Signs Last 24 Hrs  T(C): 36.5 (02 Feb 2025 12:13), Max: 36.7 (02 Feb 2025 08:44)  T(F): 97.7 (02 Feb 2025 12:13), Max: 98 (02 Feb 2025 08:44)  HR: 88 (02 Feb 2025 12:13) (88 - 98)  BP: 100/41 (02 Feb 2025 12:13) (74/34 - 117/43)  BP(mean): 59 (02 Feb 2025 12:13) (48 - 76)  RR: 17 (02 Feb 2025 12:13) (17 - 19)  SpO2: 100% (02 Feb 2025 12:13) (92% - 100%)    Parameters below as of 02 Feb 2025 12:13  Patient On (Oxygen Delivery Method): room air        PHYSICAL EXAM:  GENERAL: NAD  EYES: conjunctiva and sclera clear  ENMT: No tonsillar erythema, exudates, or enlargement; slightly dry mucous membranes. lesions  NERVOUS SYSTEM:  Alert & Oriented X3,  CHEST/LUNG: CTA b/l. No W/R/R   HEART: Regular rate and rhythm; No murmurs, rubs, or gallops  ABDOMEN: Soft, Nontender, Nondistended; Bowel sounds present  EXTREMITIES: No clubbing, cyanosis, or edema. WWP  SKIN: No rashes or lesions        LABS:  CBC   02-02-25 @ 09:51  Hematcorit 30.9  Hemoglobin 9.4  Mean Cell Hemoglobin 27.6  Platelet Count-Automated 240  RBC Count 3.41  Red Cell Distrib Width 14.6  Wbc Count 18.43  02-01-25 @ 14:37  Hematcorit 30.6  Hemoglobin 9.6  Mean Cell Hemoglobin 28.3  Platelet Count-Automated 235  RBC Count 3.39  Red Cell Distrib Width 14.5  Wbc Count 20.27      BMP  02-02-25 @ 09:51  Anion Gap. Serum 16  Blood Urea Nitrogen,Serm 54  Calcium, Total Serum 8.0  Carbon Dioxide, Serum 19  Chloride, Serum 95  Creatinine, Serum 2.42  eGFR in  --  eGFR in Non Afican American --  Gloucose, serum 298  Potassium, Serum 4.9  Sodium, Serum 130      02-01-25 @ 14:37  Anion Gap. Serum 13  Blood Urea Nitrogen,Serm 57  Calcium, Total Serum 8.1  Carbon Dioxide, Serum 25  Chloride, Serum 98  Creatinine, Serum 2.21  eGFR in  --  eGFR in Non Afican American --  Gloucose, serum 250  Potassium, Serum 3.8  Sodium, Serum 136      01-31-25 @ 06:54  Anion Gap. Serum 13  Blood Urea Nitrogen,Serm 53  Calcium, Total Serum 9.0  Carbon Dioxide, Serum 24  Chloride, Serum 103  Creatinine, Serum 1.33  eGFR in  --  eGFR in Non Afican American --  Gloucose, serum 138  Potassium, Serum 4.2  Sodium, Serum 140      01-30-25 @ 21:24  Anion Gap. Serum 12  Blood Urea Nitrogen,Serm 56  Calcium, Total Serum 8.6  Carbon Dioxide, Serum 26  Chloride, Serum 99  Creatinine, Serum 1.44  eGFR in  --  eGFR in Non Afican American --  Gloucose, serum 263  Potassium, Serum 4.1  Sodium, Serum 137      CMP  02-02-25 @ 09:51  Flores Aminotransferase(ALT/SGPT)21  Albumin, Serum 2.9  Alkaline Phosphatase, Serum 47  Anion Gap, Serum 16  Aspartate Aminotransferase (AST/SGOT)See Note  Bilirubin Total, Serum 0.6  Blood Urea Nitrogen, Serum 54  Calcium,Total Serum 8.0  Carbon Dioxide, Serum 19  Chloride, Serum 95  Creatinine, Serum 2.42  eGFR if  --  eGFR if Non African American --  Glucose, Serum 298  Potassium, Serum 4.9  Protein Total, Serum 5.4  Sodium, Serum 130      02-01-25 @ 14:37  Flores Aminotransferase(ALT/SGPT)--  Albumin, Serum --  Alkaline Phosphatase, Serum --  Anion Gap, Serum 13  Aspartate Aminotransferase (AST/SGOT)--  Bilirubin Total, Serum --  Blood Urea Nitrogen, Serum 57  Calcium,Total Serum 8.1  Carbon Dioxide, Serum 25  Chloride, Serum 98  Creatinine, Serum 2.21  eGFR if  --  eGFR if Non African American --  Glucose, Serum 250  Potassium, Serum 3.8  Protein Total, Serum --  Sodium, Serum 136      01-31-25 @ 06:54  Flores Aminotransferase(ALT/SGPT)--  Albumin, Serum --  Alkaline Phosphatase, Serum --  Anion Gap, Serum 13  Aspartate Aminotransferase (AST/SGOT)--  Bilirubin Total, Serum --  Blood Urea Nitrogen, Serum 53  Calcium,Total Serum 9.0  Carbon Dioxide, Serum 24  Chloride, Serum 103  Creatinine, Serum 1.33  eGFR if  --  eGFR if Non African American --  Glucose, Serum 138  Potassium, Serum 4.2  Protein Total, Serum --  Sodium, Serum 140      01-30-25 @ 21:24  Flores Aminotransferase(ALT/SGPT)24  Albumin, Serum 3.8  Alkaline Phosphatase, Serum 48  Anion Gap, Serum 12  Aspartate Aminotransferase (AST/SGOT)15  Bilirubin Total, Serum 0.3  Blood Urea Nitrogen, Serum 56  Calcium,Total Serum 8.6  Carbon Dioxide, Serum 26  Chloride, Serum 99  Creatinine, Serum 1.44  eGFR if  --  eGFR if Non African American --  Glucose, Serum 263  Potassium, Serum 4.1  Protein Total, Serum 5.8  Sodium, Serum 137      PT/INR  PT/INR  02-02-25 @ 09:51  INR 1.20  Prothrombin Time Comment --  Prothrobin Time, Ahsret36.0  PT/INR  02-01-25 @ 14:37  INR 1.26  Prothrombin Time Comment --  Prothrobin Time, Cuthyl07.5  PT/INR  01-31-25 @ 21:31  INR 1.11  Prothrombin Time Comment --  Prothrobin Time, [id number]  PT/INR  01-31-25 @ 06:54  INR 1.08  Prothrombin Time Comment --  Prothrobin Time, [id number]  PT/INR  01-30-25 @ 21:24  INR 1.12  Prothrombin Time Comment --  Prothrobin Time, [id number] HERMELINDO ROLDAN  79y  Female    HPI: 78 yo female with PMH of HTN, HLD, Type II DM, triple vessel CAD s/p 3 stents at [facility] 11/2023, CHF, asthma, GERD, and hypothyroidism who presented to [facility] with CP and SOB on 1/24. She was having fever, chills, a productive cough and congestion for 2-3 days prior to the chest pain. She states the pain was constant and radiated to her back/left arm with associated palpitations, SOB, orthopnea and dizziness. She felt the chest pain was similar to when she had the stents prompting her to present to the ER on 1/24.In ED she was found to be tachycardic and tachypneic. Her labs were significant for elevated BUN/Cr, potassium, lactate and elevated troponin. CXR showed pulmonary congestion vs. infiltrates. She was placed on BiPAP for respiratory distress and loaded with ASA/Plavix and placed on a heparin gtt for NSTEMI. Her nasal swab was + for MRSA and she recieved CTX 1g daily. She is s/p cardiac cath showing 3V CAD and a reduced EF of 25%. She was transferred to [facility] under the service of Dr. Hurst for further workup and management. She denies any history of CVA, she states she has a hx of asthma, she also states she had b/l vein stripping to both of her legs in the past. She denies any past surgeries or traumas to the chest.     Consultant HPI: Patient reports she had been experiencing a cough and sore throat that started ~2 days prior to presenting to [facility] on 1/24. Denies sick contacts, recent travel, n/v, constipation, diarrhea, urinary sxs. Denies recent use of steroids and tobacco use. As per primary team, patient was RVP negative and MRSA positive at [facility]. Upon review of records from [facility], patient was meeting sepsis criteria (HR>90, RR> 20) with source likely pneumonia. She was receiving Vanc/Zosyn there. Patient was also being treated for asthma exacerbation, and received IV Solumedrol 40mg q12h.       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 No nausea, vomiting, or hematemesis; No diarrhea or constipation. No melena or hematochezia.  GENITOURINARY: No dysuria, frequency, hematuria, or incontinence  NEUROLOGICAL: No headaches, memory loss, loss of strength, numbness, or tremors  SKIN: No itching, burning, rashes, or lesions   LYMPH NODES: No enlarged glands  ENDOCRINE: No heat or cold intolerance; No hair loss  MUSCULOSKELETAL: No joint pain or swelling; No muscle, back, or extremity pain  PSYCHIATRIC: No depression, anxiety, mood swings, or difficulty sleeping  HEME/LYMPH: No easy bruising, or bleeding gums  ALLERY AND IMMUNOLOGIC: No hives or eczema    T(C): 36.5 (02-02-25 @ 12:13), Max: 36.7 (02-02-25 @ 08:44)  HR: 88 (02-02-25 @ 12:13) (88 - 98)  BP: 100/41 (02-02-25 @ 12:13) (74/34 - 117/43)  RR: 17 (02-02-25 @ 12:13) (17 - 19)  SpO2: 100% (02-02-25 @ 12:13) (92% - 100%)  Wt(kg): --Vital Signs Last 24 Hrs  T(C): 36.5 (02 Feb 2025 12:13), Max: 36.7 (02 Feb 2025 08:44)  T(F): 97.7 (02 Feb 2025 12:13), Max: 98 (02 Feb 2025 08:44)  HR: 88 (02 Feb 2025 12:13) (88 - 98)  BP: 100/41 (02 Feb 2025 12:13) (74/34 - 117/43)  BP(mean): 59 (02 Feb 2025 12:13) (48 - 76)  RR: 17 (02 Feb 2025 12:13) (17 - 19)  SpO2: 100% (02 Feb 2025 12:13) (92% - 100%)    Parameters below as of 02 Feb 2025 12:13  Patient On (Oxygen Delivery Method): room air        PHYSICAL EXAM:  GENERAL: NAD  EYES: conjunctiva and sclera clear  ENMT: No tonsillar erythema, exudates, or enlargement; slightly dry mucous membranes. lesions  NERVOUS SYSTEM:  Alert & Oriented X3,  CHEST/LUNG: CTA b/l. No W/R/R   HEART: Regular rate and rhythm; No murmurs, rubs, or gallops  ABDOMEN: Soft, Nontender, Nondistended; Bowel sounds present  EXTREMITIES: No clubbing, cyanosis, or edema. WWP  SKIN: No rashes or lesions        LABS:  CBC   02-02-25 @ 09:51  Hematcorit 30.9  Hemoglobin 9.4  Mean Cell Hemoglobin 27.6  Platelet Count-Automated 240  RBC Count 3.41  Red Cell Distrib Width 14.6  Wbc Count 18.43  02-01-25 @ 14:37  Hematcorit 30.6  Hemoglobin 9.6  Mean Cell Hemoglobin 28.3  Platelet Count-Automated 235  RBC Count 3.39  Red Cell Distrib Width 14.5  Wbc Count 20.27      BMP  02-02-25 @ 09:51  Anion Gap. Serum 16  Blood Urea Nitrogen,Serm 54  Calcium, Total Serum 8.0  Carbon Dioxide, Serum 19  Chloride, Serum 95  Creatinine, Serum 2.42  eGFR in  --  eGFR in Non Afican American --  Gloucose, serum 298  Potassium, Serum 4.9  Sodium, Serum 130      02-01-25 @ 14:37  Anion Gap. Serum 13  Blood Urea Nitrogen,Serm 57  Calcium, Total Serum 8.1  Carbon Dioxide, Serum 25  Chloride, Serum 98  Creatinine, Serum 2.21  eGFR in  --  eGFR in Non Afican American --  Gloucose, serum 250  Potassium, Serum 3.8  Sodium, Serum 136      01-31-25 @ 06:54  Anion Gap. Serum 13  Blood Urea Nitrogen,Serm 53  Calcium, Total Serum 9.0  Carbon Dioxide, Serum 24  Chloride, Serum 103  Creatinine, Serum 1.33  eGFR in  --  eGFR in Non Afican American --  Gloucose, serum 138  Potassium, Serum 4.2  Sodium, Serum 140      01-30-25 @ 21:24  Anion Gap. Serum 12  Blood Urea Nitrogen,Serm 56  Calcium, Total Serum 8.6  Carbon Dioxide, Serum 26  Chloride, Serum 99  Creatinine, Serum 1.44  eGFR in  --  eGFR in Non Afican American --  Gloucose, serum 263  Potassium, Serum 4.1  Sodium, Serum 137      CMP  02-02-25 @ 09:51  Flores Aminotransferase(ALT/SGPT)21  Albumin, Serum 2.9  Alkaline Phosphatase, Serum 47  Anion Gap, Serum 16  Aspartate Aminotransferase (AST/SGOT)See Note  Bilirubin Total, Serum 0.6  Blood Urea Nitrogen, Serum 54  Calcium,Total Serum 8.0  Carbon Dioxide, Serum 19  Chloride, Serum 95  Creatinine, Serum 2.42  eGFR if  --  eGFR if Non African American --  Glucose, Serum 298  Potassium, Serum 4.9  Protein Total, Serum 5.4  Sodium, Serum 130      02-01-25 @ 14:37  Flores Aminotransferase(ALT/SGPT)--  Albumin, Serum --  Alkaline Phosphatase, Serum --  Anion Gap, Serum 13  Aspartate Aminotransferase (AST/SGOT)--  Bilirubin Total, Serum --  Blood Urea Nitrogen, Serum 57  Calcium,Total Serum 8.1  Carbon Dioxide, Serum 25  Chloride, Serum 98  Creatinine, Serum 2.21  eGFR if  --  eGFR if Non African American --  Glucose, Serum 250  Potassium, Serum 3.8  Protein Total, Serum --  Sodium, Serum 136      01-31-25 @ 06:54  Flores Aminotransferase(ALT/SGPT)--  Albumin, Serum --  Alkaline Phosphatase, Serum --  Anion Gap, Serum 13  Aspartate Aminotransferase (AST/SGOT)--  Bilirubin Total, Serum --  Blood Urea Nitrogen, Serum 53  Calcium,Total Serum 9.0  Carbon Dioxide, Serum 24  Chloride, Serum 103  Creatinine, Serum 1.33  eGFR if  --  eGFR if Non African American --  Glucose, Serum 138  Potassium, Serum 4.2  Protein Total, Serum --  Sodium, Serum 140      01-30-25 @ 21:24  Flores Aminotransferase(ALT/SGPT)24  Albumin, Serum 3.8  Alkaline Phosphatase, Serum 48  Anion Gap, Serum 12  Aspartate Aminotransferase (AST/SGOT)15  Bilirubin Total, Serum 0.3  Blood Urea Nitrogen, Serum 56  Calcium,Total Serum 8.6  Carbon Dioxide, Serum 26  Chloride, Serum 99  Creatinine, Serum 1.44  eGFR if  --  eGFR if Non African American --  Glucose, Serum 263  Potassium, Serum 4.1  Protein Total, Serum 5.8  Sodium, Serum 137      PT/INR  PT/INR  02-02-25 @ 09:51  INR 1.20  Prothrombin Time Comment --  Prothrobin Time, Iylfbe41.0  PT/INR  02-01-25 @ 14:37  INR 1.26  Prothrombin Time Comment --  Prothrobin Time, [id number]  PT/INR  01-31-25 @ 21:31  INR 1.11  Prothrombin Time Comment --  Prothrobin Time, Xrdqzc61.0  PT/INR  01-31-25 @ 06:54  INR 1.08  Prothrombin Time Comment --  Prothrobin Time, Fjkhwx20.4  PT/INR  01-30-25 @ 21:24  INR 1.12  Prothrombin Time Comment --  Prothrobin Time, [id number] HERMELINDO ROLDAN  79y  Female    HPI: 78 yo female with PMH of HTN, HLD, Type II DM, triple vessel CAD s/p 3 stents at [facility] 11/2023, CHF, asthma, GERD, and hypothyroidism who presented to [facility] with CP and SOB on 1/24. She was having fever, chills, a productive cough and congestion for 2-3 days prior to the chest pain. She states the pain was constant and radiated to her back/left arm with associated palpitations, SOB, orthopnea and dizziness. She felt the chest pain was similar to when she had the stents prompting her to present to the ER on 1/24.In ED she was found to be tachycardic and tachypneic. Her labs were significant for elevated BUN/Cr, potassium, lactate and elevated troponin. CXR showed pulmonary congestion vs. infiltrates. She was placed on BiPAP for respiratory distress and loaded with ASA/Plavix and placed on a heparin gtt for NSTEMI. Her nasal swab was + for MRSA and she recieved CTX 1g daily. She is s/p cardiac cath showing 3V CAD and a reduced EF of 25%. She was transferred to [facility] under the service of Dr. Hurst for further workup and management. She denies any history of CVA, she states she has a hx of asthma, she also states she had b/l vein stripping to both of her legs in the past. She denies any past surgeries or traumas to the chest.     Consultant HPI: Patient reports she had been experiencing a cough and sore throat that started ~2 days prior to presenting to [facility] on 1/24. Denies sick contacts, recent travel, n/v, constipation, diarrhea, urinary sxs. Denies recent use of steroids and tobacco use. As per primary team, patient was RVP negative and MRSA positive at [facility]. Upon review of records from [facility], patient was meeting sepsis criteria (HR>90, RR> 20) with source likely pneumonia. She was receiving Vanc/Zosyn there. Patient was also being treated for asthma exacerbation, and received IV Solumedrol 40mg q12h.       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  History of cholecystectomy  History of back surgery  History of bilateral knee replacement        REVIEW OF SYSTEMS: negative unless noted in HPI    T(C): 36.5 (02-02-25 @ 12:13), Max: 36.7 (02-02-25 @ 08:44)  HR: 88 (02-02-25 @ 12:13) (88 - 98)  BP: 100/41 (02-02-25 @ 12:13) (74/34 - 117/43)  RR: 17 (02-02-25 @ 12:13) (17 - 19)  SpO2: 100% (02-02-25 @ 12:13) (92% - 100%)  Wt(kg): --Vital Signs Last 24 Hrs  T(C): 36.5 (02 Feb 2025 12:13), Max: 36.7 (02 Feb 2025 08:44)  T(F): 97.7 (02 Feb 2025 12:13), Max: 98 (02 Feb 2025 08:44)  HR: 88 (02 Feb 2025 12:13) (88 - 98)  BP: 100/41 (02 Feb 2025 12:13) (74/34 - 117/43)  BP(mean): 59 (02 Feb 2025 12:13) (48 - 76)  RR: 17 (02 Feb 2025 12:13) (17 - 19)  SpO2: 100% (02 Feb 2025 12:13) (92% - 100%)    Parameters below as of 02 Feb 2025 12:13  Patient On (Oxygen Delivery Method): room air        PHYSICAL EXAM:  GENERAL: NAD  EYES: conjunctiva and sclera clear  ENMT: No tonsillar erythema, exudates, or enlargement; slightly dry mucous membranes. lesions  NERVOUS SYSTEM:  Alert & Oriented X3,  CHEST/LUNG: CTA b/l. No W/R/R   HEART: Regular rate and rhythm; No murmurs, rubs, or gallops  ABDOMEN: Soft, Nontender, Nondistended; Bowel sounds present  EXTREMITIES: No clubbing, cyanosis, or edema. WWP  SKIN: No rashes or lesions        LABS:  CBC   02-02-25 @ 09:51  Hematcorit 30.9  Hemoglobin 9.4  Mean Cell Hemoglobin 27.6  Platelet Count-Automated 240  RBC Count 3.41  Red Cell Distrib Width 14.6  Wbc Count 18.43  02-01-25 @ 14:37  Hematcorit 30.6  Hemoglobin 9.6  Mean Cell Hemoglobin 28.3  Platelet Count-Automated 235  RBC Count 3.39  Red Cell Distrib Width 14.5  Wbc Count 20.27      BMP  02-02-25 @ 09:51  Anion Gap. Serum 16  Blood Urea Nitrogen,Serm 54  Calcium, Total Serum 8.0  Carbon Dioxide, Serum 19  Chloride, Serum 95  Creatinine, Serum 2.42  eGFR in  --  eGFR in Non Afican American --  Gloucose, serum 298  Potassium, Serum 4.9  Sodium, Serum 130      02-01-25 @ 14:37  Anion Gap. Serum 13  Blood Urea Nitrogen,Serm 57  Calcium, Total Serum 8.1  Carbon Dioxide, Serum 25  Chloride, Serum 98  Creatinine, Serum 2.21  eGFR in  --  eGFR in Non Afican American --  Gloucose, serum 250  Potassium, Serum 3.8  Sodium, Serum 136      01-31-25 @ 06:54  Anion Gap. Serum 13  Blood Urea Nitrogen,Serm 53  Calcium, Total Serum 9.0  Carbon Dioxide, Serum 24  Chloride, Serum 103  Creatinine, Serum 1.33  eGFR in  --  eGFR in Non Afican American --  Gloucose, serum 138  Potassium, Serum 4.2  Sodium, Serum 140      01-30-25 @ 21:24  Anion Gap. Serum 12  Blood Urea Nitrogen,Serm 56  Calcium, Total Serum 8.6  Carbon Dioxide, Serum 26  Chloride, Serum 99  Creatinine, Serum 1.44  eGFR in  --  eGFR in Non Afican American --  Gloucose, serum 263  Potassium, Serum 4.1  Sodium, Serum 137      CMP  02-02-25 @ 09:51  Flores Aminotransferase(ALT/SGPT)21  Albumin, Serum 2.9  Alkaline Phosphatase, Serum 47  Anion Gap, Serum 16  Aspartate Aminotransferase (AST/SGOT)See Note  Bilirubin Total, Serum 0.6  Blood Urea Nitrogen, Serum 54  Calcium,Total Serum 8.0  Carbon Dioxide, Serum 19  Chloride, Serum 95  Creatinine, Serum 2.42  eGFR if  --  eGFR if Non African American --  Glucose, Serum 298  Potassium, Serum 4.9  Protein Total, Serum 5.4  Sodium, Serum 130      02-01-25 @ 14:37  Flores Aminotransferase(ALT/SGPT)--  Albumin, Serum --  Alkaline Phosphatase, Serum --  Anion Gap, Serum 13  Aspartate Aminotransferase (AST/SGOT)--  Bilirubin Total, Serum --  Blood Urea Nitrogen, Serum 57  Calcium,Total Serum 8.1  Carbon Dioxide, Serum 25  Chloride, Serum 98  Creatinine, Serum 2.21  eGFR if  --  eGFR if Non African American --  Glucose, Serum 250  Potassium, Serum 3.8  Protein Total, Serum --  Sodium, Serum 136      01-31-25 @ 06:54  Flores Aminotransferase(ALT/SGPT)--  Albumin, Serum --  Alkaline Phosphatase, Serum --  Anion Gap, Serum 13  Aspartate Aminotransferase (AST/SGOT)--  Bilirubin Total, Serum --  Blood Urea Nitrogen, Serum 53  Calcium,Total Serum 9.0  Carbon Dioxide, Serum 24  Chloride, Serum 103  Creatinine, Serum 1.33  eGFR if  --  eGFR if Non African American --  Glucose, Serum 138  Potassium, Serum 4.2  Protein Total, Serum --  Sodium, Serum 140      01-30-25 @ 21:24  Flores Aminotransferase(ALT/SGPT)24  Albumin, Serum 3.8  Alkaline Phosphatase, Serum 48  Anion Gap, Serum 12  Aspartate Aminotransferase (AST/SGOT)15  Bilirubin Total, Serum 0.3  Blood Urea Nitrogen, Serum 56  Calcium,Total Serum 8.6  Carbon Dioxide, Serum 26  Chloride, Serum 99  Creatinine, Serum 1.44  eGFR if  --  eGFR if Non African American --  Glucose, Serum 263  Potassium, Serum 4.1  Protein Total, Serum 5.8  Sodium, Serum 137      PT/INR  PT/INR  02-02-25 @ 09:51  INR 1.20  Prothrombin Time Comment --  Prothrobin Time, Bolrnq81.0  PT/INR  02-01-25 @ 14:37  INR 1.26  Prothrombin Time Comment --  Prothrobin Time, Phutym73.5  PT/INR  01-31-25 @ 21:31  INR 1.11  Prothrombin Time Comment --  Prothrobin Time, Glttca69.0  PT/INR  01-31-25 @ 06:54  INR 1.08  Prothrombin Time Comment --  Prothrobin Time, Vhlguo34.4  PT/INR  01-30-25 @ 21:24  INR 1.12  Prothrombin Time Comment --  Prothrobin Time, Grrona80.9

## 2025-02-02 NOTE — PROGRESS NOTE ADULT - PROBLEM SELECTOR PLAN 5
KEN on CKD Stage 3 (baseline Cr ~1.3-1.5 from prior Labs)   --Cr uptrended to 2.42 today, will hold ARB and Lasix for now.  Given Dry on Exam given additional Liter of NS repeat BMP PM  --will renally dose medications and avoid nephrotoxic agents.  --Urine studies sent

## 2025-02-02 NOTE — PROGRESS NOTE ADULT - NSPROGADDITIONALINFOA_GEN_ALL_CORE
Attending Attestation:  I was physically present for the key portions of the evaluation and management (E/M) service provided. I reviewed relevant data including imaging, labs and prior procedure reports available. I agree with the above history, physical, and plan which I have reviewed with the following edits/addendum:    79F w HTN, CAD PCI 2023, CHF, hypothyroid- initially presented to [facility] as NSTEMI, fevers and cough s/p CTX. New reduced EF s/p [facility] with 3v CAD -> [facility] CABG eval. Not surgical candidate, planned PCI with Dr Partida 2/4.   - empiric ABX for CAP. Consulted medicine for worsening   - MRA and SGLT2i at DC given hx of HF recovered EF  - hold IV diuresis. repeat BMP after IVF repletion  - consult     Edvin Marino MD  Cardiology Attending Attestation:  I was physically present for the key portions of the evaluation and management (E/M) service provided. I reviewed relevant data including imaging, labs and prior procedure reports available. I agree with the above history, physical, and plan which I have reviewed with the following edits/addendum:    79F w HTN, CAD PCI 2023, CHF, hypothyroid- initially presented to [place] as NSTEMI, fevers and cough s/p CTX. New reduced EF s/p [facility] with 3v CAD -> [facility] CABG eval. Not surgical candidate, planned PCI with Dr Partida 2/4.   - MRA and SGLT2i at DC given hx of HF recovered EF  - hold IV diuresis. repeat BMP after IVF repletion  - leukocytosis - unclear cause (no fevers, clean urine, pna unlikely per pulm, recent steroid use) . rule out arm DVT. continue antibx for now. med consult for opinion    Edvin Marino MD  Cardiology

## 2025-02-02 NOTE — CONSULT NOTE ADULT - ASSESSMENT
80 yo female with PMH of HTN, HLD, Type II DM, triple vessel CAD s/p 3 stents at [facility] 11/2023, CHF, asthma, GERD, and hypothyroidism who presented to [facility] with CP and SOB on 1/24, found to have NSTEMI, pending complex PCI. Medicine consulted for leukocytosis.     #Leukocytosis   Patient found to have uptrending WBC. 13.01 (1/30) --> 20.27 (2/1) with improvement this morning to 18.43 after initiation of empiric antibiotics Vanc/Zosyn. Neutrophils elevated: 11.47. Eosinophils elevated: 1.62  She denies urinary or GI sxs but does report URI symptoms- productive cough with yellow/green sputum and sore throat that have been improving.   -UA (1/30): negative   -CXR (2/1): no consolidation   -CT Chest (1/31):   1. Persistent small large airways inflammation consistent with provided history of asthma with interval increase in mucous plugging with near complete obstruction of the lateral segmental bronchus to the right middle lobe, right lower lobe bronchus and anteromedial basal segmental bronchus to the left lower lobe. Associated groundglass opacities are identified and early multifocal pneumonia possibly postobstructive cannot be excluded. Pulmonary toiletry bronchoscopic correlation is suggested. 2. Mosaic attenuation pattern to suggest air-trapping from underlying small airways inflammation consistent with the provided history of asthma. 3. Mild peripheral reticular pattern without fibrotic changes which may represent an early interstitial lung abnormality. 4. Severe calcified atherosclerotic plaque most pronounced within the visualized coronary arteries and thoracic and abdominal aorta and major side branches including the origin of the SMA.     Leukocytosis could be reactive in the setting of NSTEMI but also cannot rule out infectious cause given patient reports URI symptoms and leukocytosis improved with empiric antibiotics CT Chest findings as above   Recommendations   -      #Hyponatremia  #KEN on CKD  Patient found to have hyponatremia to 130 this morning. Corrected Na: 133. Has hx of CKD III, noted to have uptrending Cr- 1.44 (1/30) --> 2.42 (2/2).   As per primary team- baseline Cr 1.3-1.5   Recommendations   -   80 yo female with PMH of HTN, HLD, Type II DM, triple vessel CAD s/p 3 stents at [facility] 11/2023, CHF, asthma, GERD, and hypothyroidism who [place] [facility] [place] and [place] on 1/24, found to have NSTEMI, pending complex PCI. Medicine consulted for leukocytosis.     **Recs prelim, pending discussion with attending**    #Leukocytosis   Patient denies urinary or GI sxs but does report URI symptoms- productive cough with yellow/green sputum and sore throat that have been improving. Patient found to have uptrending WBC. 13.01 (1/30) --> 20.27 (2/1) with improvement this morning to 18.43. Empiric antibiotics Vanc/Zosyn were started on 2/1. Neutrophils elevated: 11.47. Eosinophils elevated: 1.62  As above, patient received IV Solumedrol for asthma exacerbation and IV Vanc/Zosyn for PNA at [facility].   Review of work-up:   -UA (1/30): negative   -BCx (1/31): NGTD   -CXR (2/1): no consolidation   -CT Chest (1/31):   1. Persistent small large airways inflammation consistent with provided history of asthma with interval increase in mucous plugging with near complete obstruction of the lateral segmental bronchus to the right middle lobe, right lower lobe bronchus and anteromedial basal segmental bronchus to the left lower lobe. Associated groundglass opacities are identified and early multifocal pneumonia possibly postobstructive cannot be excluded. Pulmonary toiletry bronchoscopic correlation is suggested. 2. Mosaic attenuation pattern to suggest air-trapping from underlying small airways inflammation consistent with the provided history of asthma. 3. Mild peripheral reticular pattern without fibrotic changes which may represent an early interstitial lung abnormality. 4. Severe calcified atherosclerotic plaque most pronounced within the visualized coronary arteries and thoracic and abdominal aorta and major side branches including the origin of the SMA.     Leukocytosis likely multifactorial i/s/o NSTEMI, recent use of SoluMedrol for asthma exacerbation and PNA at [facility].   Recommendations   - would obtain collateral from [facility] regarding results of sputum cx   - can repeat RVP   - can continue empiric course of abx for total course of 7 days      #Hyponatremia  #KEN on CKD  Patient found to have hyponatremia to 130 this morning. Corrected Na: 133. Has hx of CKD III, noted to have uptrending Cr- 1.44 (1/30) --> 2.42 (2/2).   As per primary team- baseline Cr 1.3-1.5. Patient with mildly dry MM on exam and reports decreased PO intake since being in the hospital, possibly pre-renal etiology   Recommendations   - collect urine studies   - encourage PO intake  - can get bladder scan to ensure patient is not retaining   - avoid nephrotoxic meds  - if Cr continues to increase, would recommend nephrology consult     **Recs prelim, pending discussion with attending**    78 yo female with PMH of HTN, HLD, Type II DM, triple vessel CAD s/p 3 stents at [facility] 11/2023, CHF, asthma, GERD, and hypothyroidism who presented to [facility] with CP and SOB on 1/24, found to have NSTEMI, pending complex PCI. Medicine consulted for leukocytosis.     #Leukocytosis   Patient denies urinary or GI sxs but does report URI symptoms- productive cough with yellow/green sputum and sore throat that have been improving. Patient found to have uptrending WBC. 13.01 (1/30) --> 20.27 (2/1) with improvement this morning to 18.43. Empiric antibiotics Vanc/Zosyn were started on 2/1. Neutrophils elevated: 11.47. Eosinophils elevated: 1.62  As above, patient received IV Solumedrol for asthma exacerbation and IV Vanc/Zosyn for PNA at [facility].   Review of work-up:   -UA (1/30): negative   -BCx (1/31): NGTD   -CXR (2/1): no consolidation   -CT Chest (1/31):   1. Persistent small large airways inflammation consistent with provided history of asthma with interval increase in mucous plugging with near complete obstruction of the lateral segmental bronchus to the right middle lobe, right lower lobe bronchus and anteromedial basal segmental bronchus to the left lower lobe. Associated groundglass opacities are identified and early multifocal pneumonia possibly postobstructive cannot be excluded. Pulmonary toiletry bronchoscopic correlation is suggested. 2. Mosaic attenuation pattern to suggest air-trapping from underlying small airways inflammation consistent with the provided history of asthma. 3. Mild peripheral reticular pattern without fibrotic changes which may represent an early interstitial lung abnormality. 4. Severe calcified atherosclerotic plaque most pronounced within the visualized coronary arteries and thoracic and abdominal aorta and major side branches including the origin of the SMA.     Leukocytosis likely multifactorial i/s/o NSTEMI, recent use of SoluMedrol for asthma exacerbation and PNA at [facility]. Patient was rceiving Vanc/Zosyn at [facility] but was not started on antibiotics here until 2/1  Recommendations   - would obtain collateral from [facility] regarding results of sputum cx  - can continue empiric abx Vanc/Zosyn for 5 days   - continue to monitor WBC      #Hyponatremia  #KEN on CKD  Patient found to have hyponatremia to 130 this morning. Corrected Na: 133. Has hx of CKD III, noted to have uptrending Cr- 1.44 (1/30) --> 2.42 (2/2).   As per primary team- baseline Cr 1.3-1.5. Patient with mildly dry MM on exam and reports decreased PO intake since being in the hospital, possibly pre-renal etiology. Patient is s/p cardiac cath on 1/28, contrast can also cause KEN but patient had an acute elevation in Cr and this would be unlikely for contrast received ~1 week ago   Recommendations   - collect urine studies- UA and ULytes    - encourage PO intake  - can get bladder scan to ensure patient is not retaining   - avoid nephrotoxic meds  - if Cr continues to increase, would recommend nephrology consult     Recs prelim, pending attending attestation    78 yo female with PMH of HTN, HLD, Type II DM, triple vessel CAD s/p 3 stents at [facility] 11/2023, CHF, asthma, GERD, and hypothyroidism who presented to [facility] with [place] and SOB on 1/24, found to have NSTEMI, pending complex PCI. Medicine consulted for leukocytosis.     #Leukocytosis   Patient denies urinary or GI sxs but does report URI symptoms- productive cough with yellow/green sputum and sore throat that have been improving. Patient found to have uptrending WBC. 13.01 (1/30) --> 20.27 (2/1) with improvement this morning to 18.43. Empiric antibiotics Vanc/Zosyn were started on 2/1. Neutrophils elevated: 11.47. Eosinophils elevated: 1.62  As above, patient received IV Solumedrol for asthma exacerbation and IV Vanc/Zosyn for PNA at [facility].   Review of work-up:   -UA (1/30): negative   -BCx (1/31): NGTD   -CXR (2/1): no consolidation   -CT Chest (1/31):   1. Persistent small large airways inflammation consistent with provided history of asthma with interval increase in mucous plugging with near complete obstruction of the lateral segmental bronchus to the right middle lobe, right lower lobe bronchus and anteromedial basal segmental bronchus to the left lower lobe. Associated groundglass opacities are identified and early multifocal pneumonia possibly postobstructive cannot be excluded. Pulmonary toiletry bronchoscopic correlation is suggested. 2. Mosaic attenuation pattern to suggest air-trapping from underlying small airways inflammation consistent with the provided history of asthma. 3. Mild peripheral reticular pattern without fibrotic changes which may represent an early interstitial lung abnormality. 4. Severe calcified atherosclerotic plaque most pronounced within the visualized coronary arteries and thoracic and abdominal aorta and major side branches including the origin of the SMA.     Leukocytosis likely multifactorial i/s/o NSTEMI, recent use of SoluMedrol for asthma exacerbation and PNA at [facility]. Patient was receiving Vanc/Zosyn at [facility] and was started on antibiotics here on 2/1  Recommendations   - d/c Zosyn   - trend procal, if continues to be <0.25 unlikely to have MRSA PNA and can d/c vancomycin  - continue to monitor WBC      #Hyponatremia  #KEN on CKD  Patient found to have hyponatremia to 130 this morning. Corrected Na: 133. Has hx of CKD III, noted to have uptrending Cr- 1.44 (1/30) --> 2.42 (2/2).   As per primary team- baseline Cr 1.3-1.5. Patient with mildly dry MM on exam and reports decreased PO intake since being in the hospital, possibly pre-renal etiology. Patient is s/p cardiac cath on 1/28, contrast can also cause KEN but patient had an acute elevation in Cr and this would be unlikely for contrast received ~1 week ago   Recommendations   - collect urine studies- UA, UOsm, ULytes    - collect serum Osm  - encourage PO intake  - can get bladder scan to ensure patient is not retaining   - avoid nephrotoxic meds  - if Cr continues to increase, would recommend nephrology consult     Recs prelim, pending attending attestation

## 2025-02-02 NOTE — PROGRESS NOTE ADULT - SUBJECTIVE AND OBJECTIVE BOX
INCOMPLETE    OVERNIGHT EVENTS:  No acute events overnight.    SUBJECTIVE / INTERVAL HPI: Patient seen and examined at bedside.    Denies CP, SOB, dizziness/lightheadedness, palpitations    12 point ROS otherwise negative    VITAL SIGNS:  Vital Signs Last 24 Hrs  T(C): 36.6 (02 Feb 2025 05:02), Max: 36.7 (01 Feb 2025 11:57)  T(F): 97.9 (02 Feb 2025 05:02), Max: 98.1 (01 Feb 2025 11:57)  HR: 92 (02 Feb 2025 05:02) (83 - 98)  BP: 113/53 (02 Feb 2025 05:02) (74/34 - 117/43)  BP(mean): 76 (02 Feb 2025 05:02) (48 - 77)  RR: 17 (02 Feb 2025 05:02) (17 - 19)  SpO2: 93% (02 Feb 2025 05:02) (93% - 100%)    Parameters below as of 02 Feb 2025 05:02  Patient On (Oxygen Delivery Method): room air          I&O's Summary    01 Feb 2025 07:01  -  02 Feb 2025 07:00  --------------------------------------------------------  IN: 1230 mL / OUT: 500 mL / NET: 730 mL        Weight (kg): 81.1 (02-01 @ 10:23)    PHYSICAL EXAM:    General: sitting up in bed, NAD  HEENT: conjunctiva clear; MMM  Neck: supple, no JVD  Cardiovascular: RRR, no murmurs  Respiratory: CTA B/L  Gastrointestinal: soft, NT/ND, +BS  Extremities: WWP, no edema or cyanosis  Vascular: Peripheral pulses palpable 2+ bilaterally/ carotid 2+ b/l, no bruits; radial 2+ b/l; femoral 2+ b/l no bruits; DP/PT 2+ b/l  Neurological: AAOx3, no focal deficits    MEDICATIONS:  MEDICATIONS  (STANDING):  albuterol/ipratropium for Nebulization 3 milliLiter(s) Nebulizer every 6 hours  aspirin enteric coated 81 milliGRAM(s) Oral daily  atorvastatin 40 milliGRAM(s) Oral at bedtime  clopidogrel Tablet 75 milliGRAM(s) Oral daily  dextrose 5%. 1000 milliLiter(s) (100 mL/Hr) IV Continuous <Continuous>  dextrose 5%. 1000 milliLiter(s) (50 mL/Hr) IV Continuous <Continuous>  dextrose 50% Injectable 25 Gram(s) IV Push once  dextrose 50% Injectable 12.5 Gram(s) IV Push once  dextrose 50% Injectable 25 Gram(s) IV Push once  enoxaparin Injectable 80 milliGRAM(s) SubCutaneous every 24 hours  glucagon  Injectable 1 milliGRAM(s) IntraMuscular once  insulin glargine Injectable (LANTUS) 15 Unit(s) SubCutaneous at bedtime  insulin lispro (ADMELOG) corrective regimen sliding scale   SubCutaneous Before meals and at bedtime  insulin lispro Injectable (ADMELOG) 5 Unit(s) SubCutaneous three times a day before meals  isosorbide   mononitrate ER Tablet (IMDUR) 30 milliGRAM(s) Oral daily  levothyroxine 75 MICROGram(s) Oral daily  metoprolol tartrate 12.5 milliGRAM(s) Oral every 12 hours  mupirocin 2% Nasal 1 Application(s) Both Nostrils two times a day  pantoprazole    Tablet 40 milliGRAM(s) Oral before breakfast  piperacillin/tazobactam IVPB.. 3.375 Gram(s) IV Intermittent every 8 hours  senna 2 Tablet(s) Oral at bedtime  sodium chloride 0.9% lock flush 3 milliLiter(s) IV Push every 8 hours  vancomycin  IVPB 1250 milliGRAM(s) IV Intermittent every 24 hours    MEDICATIONS  (PRN):  acetaminophen     Tablet .. 650 milliGRAM(s) Oral every 6 hours PRN Moderate Pain (4 - 6)  albuterol    90 MICROgram(s) HFA Inhaler 2 Puff(s) Inhalation every 4 hours PRN Bronchospasm  dextrose Oral Gel 15 Gram(s) Oral once PRN Blood Glucose LESS THAN 70 milliGRAM(s)/deciliter  polyethylene glycol 3350 17 Gram(s) Oral daily PRN Constipation      LABS:                        9.6    20.27 )-----------( 235      ( 01 Feb 2025 14:37 )             30.6       02-01    136  |  98  |  57[H]  ----------------------------<  250[H]  3.8   |  25  |  2.21[H]    Ca    8.1[L]      01 Feb 2025 14:37  Mg     1.6     02-01        PT/INR - ( 01 Feb 2025 14:37 )   PT: 14.5 sec;   INR: 1.26          PTT - ( 01 Feb 2025 14:37 )  PTT:102.4 sec    CARDIAC MARKERS ( 01 Feb 2025 14:37 )  x     / x     / x     / x     / 1.8 ng/mL        TELEMETRY:    RADIOLOGY & ADDITIONAL TESTS: Reviewed. OVERNIGHT EVENTS:  No acute events overnight.    SUBJECTIVE / INTERVAL HPI: Patient seen and examined at bedside. Feels Well    Denies CP, SOB, dizziness/lightheadedness, palpitations    12 point ROS otherwise negative    VITAL SIGNS:  Vital Signs Last 24 Hrs  T(C): 36.6 (02 Feb 2025 05:02), Max: 36.7 (01 Feb 2025 11:57)  T(F): 97.9 (02 Feb 2025 05:02), Max: 98.1 (01 Feb 2025 11:57)  HR: 92 (02 Feb 2025 05:02) (83 - 98)  BP: 113/53 (02 Feb 2025 05:02) (74/34 - 117/43)  BP(mean): 76 (02 Feb 2025 05:02) (48 - 77)  RR: 17 (02 Feb 2025 05:02) (17 - 19)  SpO2: 93% (02 Feb 2025 05:02) (93% - 100%)    Parameters below as of 02 Feb 2025 05:02  Patient On (Oxygen Delivery Method): room air          I&O's Summary    01 Feb 2025 07:01  -  02 Feb 2025 07:00  --------------------------------------------------------  IN: 1230 mL / OUT: 500 mL / NET: 730 mL        Weight (kg): 81.1 (02-01 @ 10:23)    PHYSICAL EXAM:    General: sitting up in bed, NAD  HEENT: conjunctiva clear; MMM  Neck: supple, no JVD  Cardiovascular: RRR, no murmurs  Respiratory: CTA B/L  Gastrointestinal: soft, NT/ND, +BS  Extremities: WWP, no edema or cyanosis  Vascular: Peripheral pulses palpable 2+ bilaterally/ carotid 2+ b/l, no bruits; radial 2+ b/l; femoral 2+ b/l no bruits; DP/PT 2+ b/l  Neurological: AAOx3, no focal deficits    MEDICATIONS:  MEDICATIONS  (STANDING):  albuterol/ipratropium for Nebulization 3 milliLiter(s) Nebulizer every 6 hours  aspirin enteric coated 81 milliGRAM(s) Oral daily  atorvastatin 40 milliGRAM(s) Oral at bedtime  clopidogrel Tablet 75 milliGRAM(s) Oral daily  dextrose 5%. 1000 milliLiter(s) (100 mL/Hr) IV Continuous <Continuous>  dextrose 5%. 1000 milliLiter(s) (50 mL/Hr) IV Continuous <Continuous>  dextrose 50% Injectable 25 Gram(s) IV Push once  dextrose 50% Injectable 12.5 Gram(s) IV Push once  dextrose 50% Injectable 25 Gram(s) IV Push once  enoxaparin Injectable 80 milliGRAM(s) SubCutaneous every 24 hours  glucagon  Injectable 1 milliGRAM(s) IntraMuscular once  insulin glargine Injectable (LANTUS) 15 Unit(s) SubCutaneous at bedtime  insulin lispro (ADMELOG) corrective regimen sliding scale   SubCutaneous Before meals and at bedtime  insulin lispro Injectable (ADMELOG) 5 Unit(s) SubCutaneous three times a day before meals  isosorbide   mononitrate ER Tablet (IMDUR) 30 milliGRAM(s) Oral daily  levothyroxine 75 MICROGram(s) Oral daily  metoprolol tartrate 12.5 milliGRAM(s) Oral every 12 hours  mupirocin 2% Nasal 1 Application(s) Both Nostrils two times a day  pantoprazole    Tablet 40 milliGRAM(s) Oral before breakfast  piperacillin/tazobactam IVPB.. 3.375 Gram(s) IV Intermittent every 8 hours  senna 2 Tablet(s) Oral at bedtime  sodium chloride 0.9% lock flush 3 milliLiter(s) IV Push every 8 hours  vancomycin  IVPB 1250 milliGRAM(s) IV Intermittent every 24 hours    MEDICATIONS  (PRN):  acetaminophen     Tablet .. 650 milliGRAM(s) Oral every 6 hours PRN Moderate Pain (4 - 6)  albuterol    90 MICROgram(s) HFA Inhaler 2 Puff(s) Inhalation every 4 hours PRN Bronchospasm  dextrose Oral Gel 15 Gram(s) Oral once PRN Blood Glucose LESS THAN 70 milliGRAM(s)/deciliter  polyethylene glycol 3350 17 Gram(s) Oral daily PRN Constipation      LABS:                        9.6    20.27 )-----------( 235      ( 01 Feb 2025 14:37 )             30.6       02-01    136  |  98  |  57[H]  ----------------------------<  250[H]  3.8   |  25  |  2.21[H]    Ca    8.1[L]      01 Feb 2025 14:37  Mg     1.6     02-01        PT/INR - ( 01 Feb 2025 14:37 )   PT: 14.5 sec;   INR: 1.26          PTT - ( 01 Feb 2025 14:37 )  PTT:102.4 sec    CARDIAC MARKERS ( 01 Feb 2025 14:37 )  x     / x     / x     / x     / 1.8 ng/mL        TELEMETRY:    RADIOLOGY & ADDITIONAL TESTS: Reviewed.

## 2025-02-02 NOTE — PROGRESS NOTE ADULT - PROBLEM SELECTOR PLAN 2
New Acute CHF likely in setting of NSTEMI EF at [facility] was 25%, Echo on Arrival improved to 1/31/2025 44%   Appearing Dry on Exam, given fluids for hypotension yesterday, today appearing dry  ETIOLOGY: Ischemic   DIURESIS: on hold in setting of KEN.  GDMT: Toprol 12.5mg PO daily, Losartan on HOLD in setting of KEN.  Advanced CHF Consulted: pt well compensated, HF team signed off.

## 2025-02-02 NOTE — PROGRESS NOTE ADULT - PROBLEM SELECTOR PLAN 3
RESOVLED  -CT Chest 1/31/2025 Persistent small large airways inflammation with interval increase in mucous plugging with near complete obstruction of the lateral segmental bronchus to the RML, RLL bronchus and anteromedial basal segmental bronchus to the LLL. Associated groundglass opacities are identified and early multifocal pneumonia possibly postobstructive cannot be excluded.   -Medicine Consulted stopped Terryn, will DC Vanc if procal negative  Pulm Consulted: low suspicion for PNA,  noted to have appearance of endobronchial material in superior segment RLL bronchus.   --Recommended outpatient pulm follow up w PFTs (to diagnosis obstructive lung disease) and consideration of repeat imaging.    -Leukocytosis    --WBC on Admission 13--->17.87->20.27, 18 today, MRSA swab + at [facility].   --UA normal, Blood Cultures 1/31 NGTD, Procal normal.     **OF note during prior admission 11/2023: had issues of Elevated Leukocytosis and Fever ID was consulted was attributed to Post op fever. Infectious work-up was negative at the time.

## 2025-02-03 ENCOUNTER — TRANSCRIPTION ENCOUNTER (OUTPATIENT)
Age: 80
End: 2025-02-03

## 2025-02-03 LAB
ANION GAP SERPL CALC-SCNC: 12 MMOL/L — SIGNIFICANT CHANGE UP (ref 5–17)
ANION GAP SERPL CALC-SCNC: 12 MMOL/L — SIGNIFICANT CHANGE UP (ref 5–17)
APTT BLD: 121.6 SEC — CRITICAL HIGH (ref 24.5–35.6)
APTT BLD: 50.2 SEC — HIGH (ref 24.5–35.6)
APTT BLD: >200 SEC — CRITICAL HIGH (ref 24.5–35.6)
BUN SERPL-MCNC: 38 MG/DL — HIGH (ref 7–23)
BUN SERPL-MCNC: 45 MG/DL — HIGH (ref 7–23)
CALCIUM SERPL-MCNC: 8.2 MG/DL — LOW (ref 8.4–10.5)
CALCIUM SERPL-MCNC: 8.2 MG/DL — LOW (ref 8.4–10.5)
CHLORIDE SERPL-SCNC: 101 MMOL/L — SIGNIFICANT CHANGE UP (ref 96–108)
CHLORIDE SERPL-SCNC: 102 MMOL/L — SIGNIFICANT CHANGE UP (ref 96–108)
CK MB CFR SERPL CALC: 3 NG/ML — SIGNIFICANT CHANGE UP (ref 0–6.7)
CK SERPL-CCNC: 54 U/L — SIGNIFICANT CHANGE UP (ref 25–170)
CO2 SERPL-SCNC: 20 MMOL/L — LOW (ref 22–31)
CO2 SERPL-SCNC: 20 MMOL/L — LOW (ref 22–31)
CREAT ?TM UR-MCNC: 108 MG/DL — SIGNIFICANT CHANGE UP
CREAT SERPL-MCNC: 1.79 MG/DL — HIGH (ref 0.5–1.3)
CREAT SERPL-MCNC: 2.09 MG/DL — HIGH (ref 0.5–1.3)
EGFR: 24 ML/MIN/1.73M2 — LOW
EGFR: 29 ML/MIN/1.73M2 — LOW
GLUCOSE SERPL-MCNC: 166 MG/DL — HIGH (ref 70–99)
GLUCOSE SERPL-MCNC: 222 MG/DL — HIGH (ref 70–99)
HCT VFR BLD CALC: 30.1 % — LOW (ref 34.5–45)
HGB BLD-MCNC: 9.1 G/DL — LOW (ref 11.5–15.5)
INR BLD: 1.14 — SIGNIFICANT CHANGE UP (ref 0.85–1.16)
MAGNESIUM SERPL-MCNC: 2.2 MG/DL — SIGNIFICANT CHANGE UP (ref 1.6–2.6)
MCHC RBC-ENTMCNC: 27.9 PG — SIGNIFICANT CHANGE UP (ref 27–34)
MCHC RBC-ENTMCNC: 30.2 G/DL — LOW (ref 32–36)
MCV RBC AUTO: 92.3 FL — SIGNIFICANT CHANGE UP (ref 80–100)
NRBC # BLD: 0 /100 WBCS — SIGNIFICANT CHANGE UP (ref 0–0)
NRBC BLD-RTO: 0 /100 WBCS — SIGNIFICANT CHANGE UP (ref 0–0)
OSMOLALITY UR: 505 MOSM/KG — SIGNIFICANT CHANGE UP (ref 300–900)
PLATELET # BLD AUTO: 216 K/UL — SIGNIFICANT CHANGE UP (ref 150–400)
POTASSIUM SERPL-MCNC: 4.7 MMOL/L — SIGNIFICANT CHANGE UP (ref 3.5–5.3)
POTASSIUM SERPL-MCNC: 5.2 MMOL/L — SIGNIFICANT CHANGE UP (ref 3.5–5.3)
POTASSIUM SERPL-SCNC: 4.7 MMOL/L — SIGNIFICANT CHANGE UP (ref 3.5–5.3)
POTASSIUM SERPL-SCNC: 5.2 MMOL/L — SIGNIFICANT CHANGE UP (ref 3.5–5.3)
POTASSIUM UR-SCNC: 21 MMOL/L — SIGNIFICANT CHANGE UP
PROCALCITONIN SERPL-MCNC: 0.09 NG/ML — SIGNIFICANT CHANGE UP (ref 0.02–0.1)
PROT ?TM UR-MCNC: 27 MG/DL — HIGH (ref 0–12)
PROT/CREAT UR-RTO: 0.2 RATIO — SIGNIFICANT CHANGE UP (ref 0–0.2)
PROTHROM AB SERPL-ACNC: 13.1 SEC — SIGNIFICANT CHANGE UP (ref 9.9–13.4)
RBC # BLD: 3.26 M/UL — LOW (ref 3.8–5.2)
RBC # FLD: 14.5 % — SIGNIFICANT CHANGE UP (ref 10.3–14.5)
SODIUM SERPL-SCNC: 133 MMOL/L — LOW (ref 135–145)
SODIUM SERPL-SCNC: 134 MMOL/L — LOW (ref 135–145)
SODIUM UR-SCNC: 71 MMOL/L — SIGNIFICANT CHANGE UP
TROPONIN T, HIGH SENSITIVITY RESULT: 1520 NG/L — CRITICAL HIGH (ref 0–51)
UUN UR-MCNC: 832 MG/DL — SIGNIFICANT CHANGE UP
WBC # BLD: 17.74 K/UL — HIGH (ref 3.8–10.5)
WBC # FLD AUTO: 17.74 K/UL — HIGH (ref 3.8–10.5)

## 2025-02-03 PROCEDURE — 93971 EXTREMITY STUDY: CPT | Mod: 26,59,LT

## 2025-02-03 PROCEDURE — 99233 SBSQ HOSP IP/OBS HIGH 50: CPT | Mod: GC

## 2025-02-03 PROCEDURE — 93971 EXTREMITY STUDY: CPT | Mod: 26,LT

## 2025-02-03 PROCEDURE — 99233 SBSQ HOSP IP/OBS HIGH 50: CPT

## 2025-02-03 RX ORDER — BACTERIOSTATIC SODIUM CHLORIDE 0.9 %
250 VIAL (ML) INJECTION ONCE
Refills: 0 | Status: COMPLETED | OUTPATIENT
Start: 2025-02-03 | End: 2025-02-03

## 2025-02-03 RX ORDER — MONTELUKAST SODIUM 5 MG/1
10 TABLET, CHEWABLE ORAL DAILY
Refills: 0 | Status: DISCONTINUED | OUTPATIENT
Start: 2025-02-04 | End: 2025-02-05

## 2025-02-03 RX ORDER — FLUTICASONE PROPIONATE AND SALMETEROL 113; 14 UG/1; UG/1
1 POWDER, METERED RESPIRATORY (INHALATION)
Refills: 0 | Status: DISCONTINUED | OUTPATIENT
Start: 2025-02-04 | End: 2025-02-05

## 2025-02-03 RX ORDER — METOPROLOL SUCCINATE 25 MG
25 TABLET, EXTENDED RELEASE 24 HR ORAL DAILY
Refills: 0 | Status: DISCONTINUED | OUTPATIENT
Start: 2025-02-03 | End: 2025-02-04

## 2025-02-03 RX ORDER — ACETAMINOPHEN 160 MG/5ML
1000 SUSPENSION ORAL ONCE
Refills: 0 | Status: COMPLETED | OUTPATIENT
Start: 2025-02-03 | End: 2025-02-03

## 2025-02-03 RX ORDER — EPOETIN ALFA 2000 [IU]/ML
10000 SOLUTION INTRAVENOUS; SUBCUTANEOUS ONCE
Refills: 0 | Status: COMPLETED | OUTPATIENT
Start: 2025-02-03 | End: 2025-02-03

## 2025-02-03 RX ORDER — METOPROLOL SUCCINATE 25 MG
12.5 TABLET, EXTENDED RELEASE 24 HR ORAL ONCE
Refills: 0 | Status: COMPLETED | OUTPATIENT
Start: 2025-02-03 | End: 2025-02-03

## 2025-02-03 RX ORDER — HEPARIN SODIUM,PORCINE 10000/ML
950 VIAL (ML) INJECTION
Qty: 25000 | Refills: 0 | Status: DISCONTINUED | OUTPATIENT
Start: 2025-02-03 | End: 2025-02-04

## 2025-02-03 RX ADMIN — MUPIROCIN 1 APPLICATION(S): 2 CREAM TOPICAL at 21:38

## 2025-02-03 RX ADMIN — ATORVASTATIN CALCIUM 40 MILLIGRAM(S): 80 TABLET, FILM COATED ORAL at 21:38

## 2025-02-03 RX ADMIN — Medication 950 UNIT(S)/HR: at 20:42

## 2025-02-03 RX ADMIN — LEVOTHYROXINE SODIUM 75 MICROGRAM(S): 25 TABLET ORAL at 05:10

## 2025-02-03 RX ADMIN — ACETAMINOPHEN 400 MILLIGRAM(S): 160 SUSPENSION ORAL at 23:43

## 2025-02-03 RX ADMIN — Medication 75 MILLIGRAM(S): at 10:04

## 2025-02-03 RX ADMIN — Medication 3 MILLILITER(S): at 21:08

## 2025-02-03 RX ADMIN — Medication 83.33 MILLILITER(S): at 20:40

## 2025-02-03 RX ADMIN — IPRATROPIUM BROMIDE AND ALBUTEROL SULFATE 3 MILLILITER(S): .5; 2.5 SOLUTION RESPIRATORY (INHALATION) at 10:06

## 2025-02-03 RX ADMIN — EPOETIN ALFA 10000 UNIT(S): 2000 SOLUTION INTRAVENOUS; SUBCUTANEOUS at 22:27

## 2025-02-03 RX ADMIN — Medication 950 UNIT(S)/HR: at 08:33

## 2025-02-03 RX ADMIN — IPRATROPIUM BROMIDE AND ALBUTEROL SULFATE 3 MILLILITER(S): .5; 2.5 SOLUTION RESPIRATORY (INHALATION) at 19:52

## 2025-02-03 RX ADMIN — Medication 2: at 19:51

## 2025-02-03 RX ADMIN — Medication 5 UNIT(S): at 08:34

## 2025-02-03 RX ADMIN — ASPIRIN 81 MILLIGRAM(S): 81 TABLET, COATED ORAL at 10:04

## 2025-02-03 RX ADMIN — PANTOPRAZOLE 40 MILLIGRAM(S): 20 TABLET, DELAYED RELEASE ORAL at 05:12

## 2025-02-03 RX ADMIN — Medication 4: at 22:18

## 2025-02-03 RX ADMIN — ACETAMINOPHEN 650 MILLIGRAM(S): 160 SUSPENSION ORAL at 23:15

## 2025-02-03 RX ADMIN — Medication 12.5 MILLIGRAM(S): at 05:10

## 2025-02-03 RX ADMIN — Medication 4 MILLILITER(S): at 05:10

## 2025-02-03 RX ADMIN — Medication 3 MILLILITER(S): at 11:41

## 2025-02-03 RX ADMIN — Medication 3 MILLILITER(S): at 06:39

## 2025-02-03 RX ADMIN — Medication 4 MILLILITER(S): at 21:38

## 2025-02-03 RX ADMIN — Medication 4: at 12:12

## 2025-02-03 RX ADMIN — Medication 0 UNIT(S)/HR: at 06:58

## 2025-02-03 RX ADMIN — INSULIN GLARGINE-YFGN 15 UNIT(S): 100 INJECTION, SOLUTION SUBCUTANEOUS at 22:18

## 2025-02-03 RX ADMIN — Medication 0 UNIT(S)/HR: at 16:17

## 2025-02-03 RX ADMIN — Medication 950 UNIT(S)/HR: at 20:46

## 2025-02-03 RX ADMIN — MUPIROCIN 1 APPLICATION(S): 2 CREAM TOPICAL at 10:05

## 2025-02-03 RX ADMIN — Medication 4: at 08:35

## 2025-02-03 RX ADMIN — ACETAMINOPHEN 650 MILLIGRAM(S): 160 SUSPENSION ORAL at 06:35

## 2025-02-03 RX ADMIN — VANCOMYCIN HYDROCHLORIDE 166.67 MILLIGRAM(S): KIT at 04:46

## 2025-02-03 RX ADMIN — ACETAMINOPHEN 650 MILLIGRAM(S): 160 SUSPENSION ORAL at 22:17

## 2025-02-03 RX ADMIN — Medication 5 UNIT(S): at 19:51

## 2025-02-03 RX ADMIN — IPRATROPIUM BROMIDE AND ALBUTEROL SULFATE 3 MILLILITER(S): .5; 2.5 SOLUTION RESPIRATORY (INHALATION) at 03:26

## 2025-02-03 RX ADMIN — Medication 5 UNIT(S): at 12:12

## 2025-02-03 RX ADMIN — ACETAMINOPHEN 400 MILLIGRAM(S): 160 SUSPENSION ORAL at 13:30

## 2025-02-03 RX ADMIN — Medication 1200 UNIT(S)/HR: at 00:11

## 2025-02-03 RX ADMIN — Medication 12.5 MILLIGRAM(S): at 21:38

## 2025-02-03 RX ADMIN — ACETAMINOPHEN 1000 MILLIGRAM(S): 160 SUSPENSION ORAL at 14:00

## 2025-02-03 NOTE — PROGRESS NOTE ADULT - ASSESSMENT
78 yo female with PMH of HTN, HLD, Type II DM, triple vessel CAD s/p 3 stents at [facility] 11/2023, CHF, asthma, GERD, and hypothyroidism who presented to [facility] with CP and SOB on 1/24, found to have NSTEMI, pending complex PCI. Medicine consulted for leukocytosis.     #Leukocytosis   Pt presenting with leukocytosis without clear source of infection. Completed course of ABx at [facility] inaddition to IV steroids. Zosyn stopped yesterday and last dose of vanc this AM. White count down trending and procal minimally elevated. Presentation of leukocytosis likely reactive and as result of steroid use. Very low concern for active infection  As above, patient received IV Solumedrol for asthma exacerbation and IV Vanc/Zosyn for PNA at [facility].   - Discontinue all antibiotics and monitor     #Asthma  Pt with history of asthma at home uses symbicort 160 BID and Montelukast 10mg qd.   - please order symbicort and montelukast for hospital stay  - c/w duoneb q6hrs    # LE pain and swelling  Pt with new onset left thigh/groin pain. Worsens with flexion and movement of the leg. Currently on full AC heparin gtt.  - obtain LE dopplers to r/o DVTs. Although will not acutely  but would be beneficial for long term management if positive  - PT consult     Case discussed with Dr. Brewster    Medicine will continue to follow

## 2025-02-03 NOTE — DISCHARGE NOTE NURSING/CASE MANAGEMENT/SOCIAL WORK - NSDCPEFALRISK_GEN_ALL_CORE
For information on Fall & Injury Prevention, visit: [email or web address] OR  [email or web address] OR  https://www.cdc.gov/steadi/patient.html

## 2025-02-03 NOTE — PROGRESS NOTE ADULT - PROBLEM SELECTOR PLAN 3
CT Chest 1/31/2025 Persistent small large airways inflammation with interval increase in mucous plugging with near complete obstruction of the lateral segmental bronchus to the RML, RLL bronchus and anteromedial basal segmental bronchus to the LLL. Associated groundglass opacities are identified and early multifocal pneumonia possibly postobstructive cannot be excluded.       Pulm Consulted: low suspicion for PNA,  noted to have appearance of endobronchial material in superior segment RLL bronchus.   --Recommended outpatient pulm follow up w PFTs (to diagnosis obstructive lung disease) and consideration of repeat imaging.   --WBC on Admission 13--->17.87->20.27, MRSA swab + at [facility].   --UA normal, Blood Cultures 1/31 NGTD, Procal normal.   --will continue empiric treatment with Vancomycin and Zosyn, descalate as warranted     **OF note during prior admission 11/2023: had issues of Elevated Leukocytosis and Fever ID was consulted was attributed to Post op fever. Infectious work-up was negative at the time. CT Chest 1/31/2025 Persistent small large airways inflammation with interval increase in mucous plugging with near complete obstruction of the lateral segmental bronchus to the RML, RLL bronchus and anteromedial basal segmental bronchus to the LLL. Associated groundglass opacities are identified and early multifocal pneumonia possibly postobstructive cannot be excluded.       Pulm Consulted: low suspicion for PNA, noted to have appearance of endobronchial material in superior segment RLL bronchus.   --Recommended outpatient pulm follow up w PFTs (to diagnosis obstructive lung disease) and consideration of repeat imaging.   --WBC on Admission 13--->17.87->20.27, today 17 MRSA swab + at [facility].   --UA normal, Blood Cultures 1/31 NGTD, Procal normal.   --Medicine Following appreciate gerald MCCLAIN'ed Tanna and Yasmine    **OF note during prior admission 11/2023: had issues of Elevated Leukocytosis and Fever ID was consulted was attributed to Post op fever. Infectious work-up was negative at the time.

## 2025-02-03 NOTE — DISCHARGE NOTE NURSING/CASE MANAGEMENT/SOCIAL WORK - NSDCVIVACCINE_GEN_ALL_CORE_FT
H35.363  Drusen (degenerative) of macula, bilateral  OU Assessment:  Examination revealed few Drusen OU. Plan:  Monitor for macular changes. Eat healthy and wear sunglasses. Advised patient to call our office with decreased vision or increased symptoms. No Vaccines Administered.

## 2025-02-03 NOTE — PROGRESS NOTE ADULT - ATTENDING COMMENTS
80 yo female with PMH of Obesity (BMI 38.7), HTN, HLD, Type II DM, triple vessel CAD s/p 3 stents at [facility] 11/2023, CHF, asthma, GERD, and hypothyroidism who [place] [facility] [place] with CP and SOB , found to have NSTEMI, sepsis 2/2 CAP/Asthma exacerbation given Vanco/Zosyn and IV Solumedrol, transferred to [facility] ( 1/30) for complex PCI. Medicine consulted (2/2) for leukocytosis.     Pt seen with Dr. Black, denies cough, SOB,CP,etc. Pt reports L groin pain radiating down to L foot.     #Leukocytosis (improving)  - likely 2/2 recent steroid use, WBC 13.01(1/30)-> 17.8(1/31)-> peaked at 20.27(2/1)-> 18.43-> 17.74( 2/3) down trending   - procal <0.02(1/31)->0.04(2/1)-> 0.09(2/3)  - already off Zosyn (2/2), will d/c Vanco as well ( 2/3)-already completed the course of abx for CAP   - no clinical evidence of occult infection, will keep off iv abx and trend WBC( down trending)      #Asthma  Pt with history of asthma at home uses symbicort 160 BID and Montelukast 10mg qd.   - please order ICS/LABA: symbicort MDI 2 puffs bid ( rinse mouth/gurgle throat after use) and Leukotriene inhibitor with montelukast 10mg PO daily for hospital stay  - c/w duoneb q6hrs    # LLE pain and swelling  - obtain LLE venoud doppler to rule out DVT     Case discussed with Cardiology team, Jim ALVAREZ    Medicine will continue to follow, thank you.

## 2025-02-03 NOTE — PROGRESS NOTE ADULT - NS ATTEND AMEND GEN_ALL_CORE FT
Mr. Petersen is a 79F w HTN, CAD (PCI in 2023), HFrEF (LVEF 44%), hypothyroid, presented initially to [facility] with fevers and cough treated with CTX and also ruled in for NSTEMI. Found to have distal LM and prox LAD ISR as well as ostial to prox LCx restenosis and RCA total occlusion and sent to [facility] for CABG evaluation. Declined as CABG evaluation and pending optimization for potential percutaneous intervention. Complicated by fevers treated course with Abx- work up negative. Anemia- work up pending and KEN.     Exam:  NAD  JVP Normal  Lungs decreased at bases but otherwise clear  WWP, no edema    - ICM- EF mild reduced. With severe multivessel and LM disease. Optimizing for PCI.   - CAD- switch to lovenox, dapt. PPI  - Anemia- Iron studies, EPO given.   - KEN- s/p diuresis and sepsis likely over diuresed. Hold diuretics. Remain euvolemic  -Leukocytosis- Received course at [facility]. Empiric abx trialed but stopped.

## 2025-02-03 NOTE — PROGRESS NOTE ADULT - PROBLEM SELECTOR PLAN 4
continue HOME MED: Levothyroxine 75mcg PO daily.  --F/U TSH and T4 level. continue HOME MED: Levothyroxine 75mcg PO daily.  --TSH 1.390

## 2025-02-03 NOTE — PROGRESS NOTE ADULT - SUBJECTIVE AND OBJECTIVE BOX
----- INTERVAL HPI/OVERNIGHT EVENTS -----     Patient was seen and examined at bedside. As per nurse and patient, no o/n events, patient resting comfortably. No complaints at this time. Patient denies: fever, chills, dizziness, weakness, HA, Changes in vision, CP, palpitations, SOB, cough, N/V/D/C, dysuria, changes in bowel movements, LE edema. ROS otherwise negative.      Subjective: Patient is a 79y old  Female who presents with a chief complaint of CAD (02 Feb 2025 12:48)      ----- VITAL SIGNS -----  T(F): 98.2 (02-03-25 @ 08:30)  HR: 72 (02-03-25 @ 08:30)  BP: 93/54 (02-03-25 @ 08:30)  RR: 18 (02-03-25 @ 08:30)  SpO2: 95% (02-03-25 @ 08:30)  Wt(kg): --      02-02-25 @ 07:01  -  02-03-25 @ 07:00  --------------------------------------------------------  IN: 1948 mL / OUT: 950 mL / NET: 998 mL        ----- Physical Exam -----     Constitutional: resting comfortably in bed; NAD  HEENT: NC/AT, PERRL, EOMI, anicteric sclera, no nasal discharge; uvula midline, no oropharyngeal erythema or exudates, MMM; no thyromegaly or anterior/posterior or submental NAINA; neck supple  Respiratory: CTA B/L; no W/R/R, no retractions  Cardiac: +S1/S2; RRR; no M/R/G appreciated  Gastrointestinal: abdomen soft, NT/ND, +BS, no rebound tenderness or guarding  Back: no CVAT B/L  Extremities: legs WWP, no clubbing or cyanosis; no peripheral edema  Vascular: 2+ distal pedal pulses B/L  Dermatologic: skin warm, dry and intact; no rashes, wounds, or scars  Neurologic: AAOx3; CNII-XII grossly intact; strength 5/5 and sensation intact in all 4 extremities; no focal deficits  Psychiatric: affect and characteristics of appearance, verbalizations, behaviors are appropriate    MEDICATIONS  (STANDING):  albuterol/ipratropium for Nebulization 3 milliLiter(s) Nebulizer every 6 hours  aspirin enteric coated 81 milliGRAM(s) Oral daily  atorvastatin 40 milliGRAM(s) Oral at bedtime  clopidogrel Tablet 75 milliGRAM(s) Oral daily  dextrose 5%. 1000 milliLiter(s) (50 mL/Hr) IV Continuous <Continuous>  dextrose 5%. 1000 milliLiter(s) (100 mL/Hr) IV Continuous <Continuous>  dextrose 50% Injectable 25 Gram(s) IV Push once  dextrose 50% Injectable 12.5 Gram(s) IV Push once  dextrose 50% Injectable 25 Gram(s) IV Push once  glucagon  Injectable 1 milliGRAM(s) IntraMuscular once  heparin  Infusion. 1200 Unit(s)/Hr (12 mL/Hr) IV Continuous <Continuous>  insulin glargine Injectable (LANTUS) 15 Unit(s) SubCutaneous at bedtime  insulin lispro (ADMELOG) corrective regimen sliding scale   SubCutaneous Before meals and at bedtime  insulin lispro Injectable (ADMELOG) 5 Unit(s) SubCutaneous three times a day before meals  isosorbide   mononitrate ER Tablet (IMDUR) 30 milliGRAM(s) Oral daily  levothyroxine 75 MICROGram(s) Oral daily  metoprolol tartrate 12.5 milliGRAM(s) Oral every 12 hours  mupirocin 2% Nasal 1 Application(s) Both Nostrils two times a day  pantoprazole    Tablet 40 milliGRAM(s) Oral before breakfast  senna 2 Tablet(s) Oral at bedtime  sodium chloride 0.9% lock flush 3 milliLiter(s) IV Push every 8 hours  sodium chloride 0.9%. 1000 milliLiter(s) (100 mL/Hr) IV Continuous <Continuous>  sodium chloride 0.9%. 1000 milliLiter(s) (100 mL/Hr) IV Continuous <Continuous>  sodium chloride 3%  Inhalation 4 milliLiter(s) Inhalation every 12 hours    MEDICATIONS  (PRN):  acetaminophen     Tablet .. 650 milliGRAM(s) Oral every 6 hours PRN Moderate Pain (4 - 6)  albuterol    90 MICROgram(s) HFA Inhaler 2 Puff(s) Inhalation every 4 hours PRN Bronchospasm  dextrose Oral Gel 15 Gram(s) Oral once PRN Blood Glucose LESS THAN 70 milliGRAM(s)/deciliter  heparin   Injectable 5000 Unit(s) IV Push every 6 hours PRN For aPTT less than 40  polyethylene glycol 3350 17 Gram(s) Oral daily PRN Constipation      Allergies    No Known Allergies    Intolerances        ----- LABS -----                         9.1    17.74 )-----------( 216      ( 03 Feb 2025 05:30 )             30.1     02-03    133[L]  |  101  |  45[H]  ----------------------------<  222[H]  4.7   |  20[L]  |  2.09[H]    Ca    8.2[L]      03 Feb 2025 05:30  Mg     2.2     02-03    TPro  5.4[L]  /  Alb  2.9[L]  /  TBili  0.6  /  DBili  x   /  AST  See Note  /  ALT  21  /  AlkPhos  47  02-02    PT/INR - ( 02 Feb 2025 09:51 )   PT: 14.0 sec;   INR: 1.20          PTT - ( 03 Feb 2025 05:46 )  PTT:121.6 sec  Urinalysis Basic - ( 03 Feb 2025 05:30 )    Color: x / Appearance: x / SG: x / pH: x  Gluc: 222 mg/dL / Ketone: x  / Bili: x / Urobili: x   Blood: x / Protein: x / Nitrite: x   Leuk Esterase: x / RBC: x / WBC x   Sq Epi: x / Non Sq Epi: x / Bacteria: x          ----- RADIOLOGY & ADDITIONAL TESTS -----     Reviewed ----- INTERVAL HPI/OVERNIGHT EVENTS -----     Patient was seen and examined at bedside. As per nurse and patient, no o/n events, patient resting comfortably. No complaints at this time. Patient denies: fever, chills, dizziness, weakness, HA, Changes in vision, CP, palpitations, SOB, cough, N/V/D/C, dysuria, changes in bowel movements, LE edema. ROS otherwise negative.      Subjective: Patient is a 79y old  Female who presents with a chief complaint of CAD (02 Feb 2025 12:48)      ----- VITAL SIGNS -----  T(F): 98.2 (02-03-25 @ 08:30)  HR: 72 (02-03-25 @ 08:30)  BP: 93/54 (02-03-25 @ 08:30)  RR: 18 (02-03-25 @ 08:30)  SpO2: 95% (02-03-25 @ 08:30)  Wt(kg): --      02-02-25 @ 07:01  -  02-03-25 @ 07:00  --------------------------------------------------------  IN: 1948 mL / OUT: 950 mL / NET: 998 mL        ----- Physical Exam -----     Constitutional: Resting; NAD  HEENT: anicteric sclera, no nasal discharge; uvula midline, no oropharyngeal erythema or exudates, MMM  Respiratory: CTA B/L; no W/R/R, no retractions  Cardiac: +S1/S2; RRR; no M/R/G appreciated  Gastrointestinal: abdomen soft, NT/ND, +BS, no rebound tenderness or guarding  Back: no CVAT B/L  Extremities: legs WWP, no clubbing or cyanosis; L sided MACIEJ >R  Vascular: 2+ distal pedal pulses B/L  Dermatologic: skin warm, dry and intact; no rashes, wounds, or scars  Neurologic: AAOx3; CNII-XII grossly intact; strength 5/5 and sensation intact in all 4 extremities; no focal deficits  Psychiatric: affect and characteristics of appearance, verbalizations, behaviors are appropriate    MEDICATIONS  (STANDING):  albuterol/ipratropium for Nebulization 3 milliLiter(s) Nebulizer every 6 hours  aspirin enteric coated 81 milliGRAM(s) Oral daily  atorvastatin 40 milliGRAM(s) Oral at bedtime  clopidogrel Tablet 75 milliGRAM(s) Oral daily  dextrose 5%. 1000 milliLiter(s) (50 mL/Hr) IV Continuous <Continuous>  dextrose 5%. 1000 milliLiter(s) (100 mL/Hr) IV Continuous <Continuous>  dextrose 50% Injectable 25 Gram(s) IV Push once  dextrose 50% Injectable 12.5 Gram(s) IV Push once  dextrose 50% Injectable 25 Gram(s) IV Push once  glucagon  Injectable 1 milliGRAM(s) IntraMuscular once  heparin  Infusion. 1200 Unit(s)/Hr (12 mL/Hr) IV Continuous <Continuous>  insulin glargine Injectable (LANTUS) 15 Unit(s) SubCutaneous at bedtime  insulin lispro (ADMELOG) corrective regimen sliding scale   SubCutaneous Before meals and at bedtime  insulin lispro Injectable (ADMELOG) 5 Unit(s) SubCutaneous three times a day before meals  isosorbide   mononitrate ER Tablet (IMDUR) 30 milliGRAM(s) Oral daily  levothyroxine 75 MICROGram(s) Oral daily  metoprolol tartrate 12.5 milliGRAM(s) Oral every 12 hours  mupirocin 2% Nasal 1 Application(s) Both Nostrils two times a day  pantoprazole    Tablet 40 milliGRAM(s) Oral before breakfast  senna 2 Tablet(s) Oral at bedtime  sodium chloride 0.9% lock flush 3 milliLiter(s) IV Push every 8 hours  sodium chloride 0.9%. 1000 milliLiter(s) (100 mL/Hr) IV Continuous <Continuous>  sodium chloride 0.9%. 1000 milliLiter(s) (100 mL/Hr) IV Continuous <Continuous>  sodium chloride 3%  Inhalation 4 milliLiter(s) Inhalation every 12 hours    MEDICATIONS  (PRN):  acetaminophen     Tablet .. 650 milliGRAM(s) Oral every 6 hours PRN Moderate Pain (4 - 6)  albuterol    90 MICROgram(s) HFA Inhaler 2 Puff(s) Inhalation every 4 hours PRN Bronchospasm  dextrose Oral Gel 15 Gram(s) Oral once PRN Blood Glucose LESS THAN 70 milliGRAM(s)/deciliter  heparin   Injectable 5000 Unit(s) IV Push every 6 hours PRN For aPTT less than 40  polyethylene glycol 3350 17 Gram(s) Oral daily PRN Constipation      Allergies    No Known Allergies    Intolerances        ----- LABS -----                         9.1    17.74 )-----------( 216      ( 03 Feb 2025 05:30 )             30.1     02-03    133[L]  |  101  |  45[H]  ----------------------------<  222[H]  4.7   |  20[L]  |  2.09[H]    Ca    8.2[L]      03 Feb 2025 05:30  Mg     2.2     02-03    TPro  5.4[L]  /  Alb  2.9[L]  /  TBili  0.6  /  DBili  x   /  AST  See Note  /  ALT  21  /  AlkPhos  47  02-02    PT/INR - ( 02 Feb 2025 09:51 )   PT: 14.0 sec;   INR: 1.20          PTT - ( 03 Feb 2025 05:46 )  PTT:121.6 sec  Urinalysis Basic - ( 03 Feb 2025 05:30 )    Color: x / Appearance: x / SG: x / pH: x  Gluc: 222 mg/dL / Ketone: x  / Bili: x / Urobili: x   Blood: x / Protein: x / Nitrite: x   Leuk Esterase: x / RBC: x / WBC x   Sq Epi: x / Non Sq Epi: x / Bacteria: x          ----- RADIOLOGY & ADDITIONAL TESTS -----     Reviewed

## 2025-02-03 NOTE — PROGRESS NOTE ADULT - PROBLEM SELECTOR PLAN 7
stable, continue Metoprolol Tartrate 12.5mg PO BID and Imdur ER 30mg PO daily. Improved Holding imdur   Continue with Toprol 25

## 2025-02-03 NOTE — PROGRESS NOTE ADULT - PROBLEM SELECTOR PLAN 8
Pulm following Appreciate Recs   Yoana, Hypertonic Saline to aid with Mucous plugging    N: DASH  E: Maintain K>4.0 and Mg>2.0  VTE PPx: Lovenox   Code: Full Pulm following Appreciate Recs   Duonebs, Hypertonic Saline to aid with Mucous plugging  Continue with symbicort and Montelukast     N: DASH  E: Maintain K>4.0 and Mg>2.0  VTE PPx: Lovenox   Code: Full

## 2025-02-03 NOTE — PROGRESS NOTE ADULT - PROBLEM SELECTOR PLAN 2
New Acute CHF likely in setting of NSTEMI EF at [facility] was 25%, Echo on Arrival improved to 1/31/2025 44%     ETIOLOGY: Ischemic   DIURESIS: on hold in setting of KEN.  GDMT: Toprol 12.5mg PO daily, Losartan on HOLD in setting of KEN.    Advanced CHF Consulted: pt well compensated, HF team signed off. New Acute CHF likely in setting of NSTEMI EF at [facility] was 25%, Echo on Arrival improved to 1/31/2025 44%   Echo As Above  ETIOLOGY: Ischemic   DIURESIS: on hold in setting of KNE.  GDMT: Toprol increased to 25mg PO daily, Losartan on HOLD in setting of KEN.    Advanced CHF Consulted: pt well compensated, HF team signed off.

## 2025-02-03 NOTE — DISCHARGE NOTE NURSING/CASE MANAGEMENT/SOCIAL WORK - FINANCIAL ASSISTANCE
[facility] provides services at a reduced cost to those who are determined to be eligible through [facility]’s financial assistance program. Information regarding [facility]’s financial assistance program can be found by going to [email or web address] or by calling [phone].

## 2025-02-03 NOTE — PROGRESS NOTE ADULT - ASSESSMENT
79 yr old F with PMHx of HTN, hyperlipidemia, DM, hypothyroidism, GERD, asthma, CAD s/p prior PCIs at [facility] 11/2023 who presented to [facility] 1/24/25 with chest pain and URI symptoms x 2-3 days, R/I NSTEMI, s/p diagnostic cath@[facility] revealing 3VD and newly reduced EF, initially transferred to [facility] under Dr. 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 Partida.      79 yr old F with PMHx of HTN, hyperlipidemia, DM, hypothyroidism, GERD, asthma, CAD s/p prior PCIs at [facility] 11/2023 who presented to [facility] 1/24/25 with chest pain and URI symptoms x 2-3 days, R/I NSTEMI, s/p diagnostic cath@[facility] revealing 3VD and newly reduced EF, initially transferred to [facility] under Dr. 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 Course complicated by KEN with peak Creatinine 2.7 now downtrending to 2.09 likely in setting of Hypovolemia s/p IV fluids, Medicine/Pulm Consulted given CT findings and Leukocytosis, Infectious work up negative treated for empiric coverage with Vanc/Zosyn now DC with plan for potential

## 2025-02-03 NOTE — PROGRESS NOTE ADULT - SUBJECTIVE AND OBJECTIVE BOX
OVERNIGHT EVENTS:  No acute events overnight.    SUBJECTIVE / INTERVAL HPI: Patient seen and examined at bedside. Reports Chest Tightness overnight around midnight 2 am. Reports it feels slightly better this morning but still with some tightness. Otherwise does have pain in her left groin that started this morning when she lifts her leg.   Denies dizziness/lightheadedness, palpitations    12 point ROS otherwise negative    VITAL SIGNS:  Vital Signs Last 24 Hrs  T(C): 36.8 (03 Feb 2025 08:30), Max: 36.8 (02 Feb 2025 16:28)  T(F): 98.2 (03 Feb 2025 08:30), Max: 98.2 (02 Feb 2025 16:28)  HR: 78 (03 Feb 2025 10:00) (72 - 105)  BP: 124/57 (03 Feb 2025 10:00) (93/54 - 124/57)  BP(mean): 81 (03 Feb 2025 10:00) (59 - 81)  RR: 18 (03 Feb 2025 10:00) (16 - 19)  SpO2: 95% (03 Feb 2025 10:00) (93% - 100%)    Parameters below as of 03 Feb 2025 10:00  Patient On (Oxygen Delivery Method): room air          I&O's Summary    02 Feb 2025 07:01  -  03 Feb 2025 07:00  --------------------------------------------------------  IN: 1948 mL / OUT: 950 mL / NET: 998 mL          PHYSICAL EXAM:    General: sitting in bed appearing tired and with some discomfort   HEENT: conjunctiva clear; MMM  Neck: supple, no JVD  Cardiovascular: RRR, no murmurs  Respiratory: CTA B/L  Gastrointestinal: soft, NT/ND, +BS  Extremities: WWP, no edema or cyanosis  Vascular: Peripheral pulses palpable 2+ bilaterally/ carotid 2+ b/l, no bruits; radial 2+ b/l; femoral 2+ b/l no bruits; DP/PT 2+ b/l  Neurological: AAOx3, no focal deficits    MEDICATIONS:  MEDICATIONS  (STANDING):  albuterol/ipratropium for Nebulization 3 milliLiter(s) Nebulizer every 6 hours  aspirin enteric coated 81 milliGRAM(s) Oral daily  atorvastatin 40 milliGRAM(s) Oral at bedtime  clopidogrel Tablet 75 milliGRAM(s) Oral daily  dextrose 5%. 1000 milliLiter(s) (100 mL/Hr) IV Continuous <Continuous>  dextrose 5%. 1000 milliLiter(s) (50 mL/Hr) IV Continuous <Continuous>  dextrose 50% Injectable 25 Gram(s) IV Push once  dextrose 50% Injectable 12.5 Gram(s) IV Push once  dextrose 50% Injectable 25 Gram(s) IV Push once  glucagon  Injectable 1 milliGRAM(s) IntraMuscular once  heparin  Infusion. 1200 Unit(s)/Hr (12 mL/Hr) IV Continuous <Continuous>  insulin glargine Injectable (LANTUS) 15 Unit(s) SubCutaneous at bedtime  insulin lispro (ADMELOG) corrective regimen sliding scale   SubCutaneous Before meals and at bedtime  insulin lispro Injectable (ADMELOG) 5 Unit(s) SubCutaneous three times a day before meals  isosorbide   mononitrate ER Tablet (IMDUR) 30 milliGRAM(s) Oral daily  levothyroxine 75 MICROGram(s) Oral daily  metoprolol tartrate 12.5 milliGRAM(s) Oral every 12 hours  mupirocin 2% Nasal 1 Application(s) Both Nostrils two times a day  pantoprazole    Tablet 40 milliGRAM(s) Oral before breakfast  senna 2 Tablet(s) Oral at bedtime  sodium chloride 0.9% lock flush 3 milliLiter(s) IV Push every 8 hours  sodium chloride 3%  Inhalation 4 milliLiter(s) Inhalation every 12 hours    MEDICATIONS  (PRN):  acetaminophen     Tablet .. 650 milliGRAM(s) Oral every 6 hours PRN Moderate Pain (4 - 6)  albuterol    90 MICROgram(s) HFA Inhaler 2 Puff(s) Inhalation every 4 hours PRN Bronchospasm  dextrose Oral Gel 15 Gram(s) Oral once PRN Blood Glucose LESS THAN 70 milliGRAM(s)/deciliter  heparin   Injectable 5000 Unit(s) IV Push every 6 hours PRN For aPTT less than 40  polyethylene glycol 3350 17 Gram(s) Oral daily PRN Constipation      LABS:                        9.1    17.74 )-----------( 216      ( 03 Feb 2025 05:30 )             30.1       02-03    133[L]  |  101  |  45[H]  ----------------------------<  222[H]  4.7   |  20[L]  |  2.09[H]    Ca    8.2[L]      03 Feb 2025 05:30  Mg     2.2     02-03    TPro  5.4[L]  /  Alb  2.9[L]  /  TBili  0.6  /  DBili  x   /  AST  See Note  /  ALT  21  /  AlkPhos  47  02-02      PT/INR - ( 02 Feb 2025 09:51 )   PT: 14.0 sec;   INR: 1.20          PTT - ( 03 Feb 2025 05:46 )  PTT:121.6 sec    CARDIAC MARKERS ( 03 Feb 2025 01:22 )  x     / x     / x     / x     / 3.0 ng/mL  CARDIAC MARKERS ( 01 Feb 2025 14:37 )  x     / x     / x     / x     / 1.8 ng/mL        TELEMETRY:    RADIOLOGY & ADDITIONAL TESTS: Reviewed. OVERNIGHT EVENTS:  No acute events overnight.    SUBJECTIVE / INTERVAL HPI: Patient seen and examined at bedside. Reports Chest Tightness overnight around midnight 2 am. Reports it feels slightly better this morning but still with some tightness. Otherwise does have pain in her left groin that started this morning when she lifts her leg.   Denies dizziness/lightheadedness, palpitations    12 point ROS otherwise negative    VITAL SIGNS:  Vital Signs Last 24 Hrs  T(C): 36.8 (03 Feb 2025 08:30), Max: 36.8 (02 Feb 2025 16:28)  T(F): 98.2 (03 Feb 2025 08:30), Max: 98.2 (02 Feb 2025 16:28)  HR: 78 (03 Feb 2025 10:00) (72 - 105)  BP: 124/57 (03 Feb 2025 10:00) (93/54 - 124/57)  BP(mean): 81 (03 Feb 2025 10:00) (59 - 81)  RR: 18 (03 Feb 2025 10:00) (16 - 19)  SpO2: 95% (03 Feb 2025 10:00) (93% - 100%)    Parameters below as of 03 Feb 2025 10:00  Patient On (Oxygen Delivery Method): room air          I&O's Summary    02 Feb 2025 07:01  -  03 Feb 2025 07:00  --------------------------------------------------------  IN: 1948 mL / OUT: 950 mL / NET: 998 mL            General: sitting in bed appearing tired and with some discomfort   HEENT: conjunctiva clear; MMM  Neck: supple, no JVD  Cardiovascular: RRR, no murmurs  Respiratory: CTA B/L  Gastrointestinal: soft, NT/ND, +BS  Extremities: WWP, no edema or cyanosis  Vascular: Peripheral pulses palpable 2+ bilaterally/ carotid 2+ b/l, no bruits; radial 2+ b/l; femoral 2+ b/l no bruits; DP/PT 2+ b/l  Neurological: AAOx3, no focal deficits    MEDICATIONS:  MEDICATIONS  (STANDING):  albuterol/ipratropium for Nebulization 3 milliLiter(s) Nebulizer every 6 hours  aspirin enteric coated 81 milliGRAM(s) Oral daily  atorvastatin 40 milliGRAM(s) Oral at bedtime  clopidogrel Tablet 75 milliGRAM(s) Oral daily  dextrose 5%. 1000 milliLiter(s) (100 mL/Hr) IV Continuous <Continuous>  dextrose 5%. 1000 milliLiter(s) (50 mL/Hr) IV Continuous <Continuous>  dextrose 50% Injectable 25 Gram(s) IV Push once  dextrose 50% Injectable 12.5 Gram(s) IV Push once  dextrose 50% Injectable 25 Gram(s) IV Push once  glucagon  Injectable 1 milliGRAM(s) IntraMuscular once  heparin  Infusion. 1200 Unit(s)/Hr (12 mL/Hr) IV Continuous <Continuous>  insulin glargine Injectable (LANTUS) 15 Unit(s) SubCutaneous at bedtime  insulin lispro (ADMELOG) corrective regimen sliding scale   SubCutaneous Before meals and at bedtime  insulin lispro Injectable (ADMELOG) 5 Unit(s) SubCutaneous three times a day before meals  isosorbide   mononitrate ER Tablet (IMDUR) 30 milliGRAM(s) Oral daily  levothyroxine 75 MICROGram(s) Oral daily  metoprolol tartrate 12.5 milliGRAM(s) Oral every 12 hours  mupirocin 2% Nasal 1 Application(s) Both Nostrils two times a day  pantoprazole    Tablet 40 milliGRAM(s) Oral before breakfast  senna 2 Tablet(s) Oral at bedtime  sodium chloride 0.9% lock flush 3 milliLiter(s) IV Push every 8 hours  sodium chloride 3%  Inhalation 4 milliLiter(s) Inhalation every 12 hours    MEDICATIONS  (PRN):  acetaminophen     Tablet .. 650 milliGRAM(s) Oral every 6 hours PRN Moderate Pain (4 - 6)  albuterol    90 MICROgram(s) HFA Inhaler 2 Puff(s) Inhalation every 4 hours PRN Bronchospasm  dextrose Oral Gel 15 Gram(s) Oral once PRN Blood Glucose LESS THAN 70 milliGRAM(s)/deciliter  heparin   Injectable 5000 Unit(s) IV Push every 6 hours PRN For aPTT less than 40  polyethylene glycol 3350 17 Gram(s) Oral daily PRN Constipation      LABS:                        9.1    17.74 )-----------( 216      ( 03 Feb 2025 05:30 )             30.1       02-03    133[L]  |  101  |  45[H]  ----------------------------<  222[H]  4.7   |  20[L]  |  2.09[H]    Ca    8.2[L]      03 Feb 2025 05:30  Mg     2.2     02-03    TPro  5.4[L]  /  Alb  2.9[L]  /  TBili  0.6  /  DBili  x   /  AST  See Note  /  ALT  21  /  AlkPhos  47  02-02      PT/INR - ( 02 Feb 2025 09:51 )   PT: 14.0 sec;   INR: 1.20          PTT - ( 03 Feb 2025 05:46 )  PTT:121.6 sec    CARDIAC MARKERS ( 03 Feb 2025 01:22 )  x     / x     / x     / x     / 3.0 ng/mL  CARDIAC MARKERS ( 01 Feb 2025 14:37 )  x     / x     / x     / x     / 1.8 ng/mL        TELEMETRY:    RADIOLOGY & ADDITIONAL TESTS: Reviewed.

## 2025-02-03 NOTE — PROGRESS NOTE ADULT - PROBLEM SELECTOR PLAN 5
KEN on CKD Stage 3 (baseline Cr ~1.3-1.5 from prior Labs)   --Cr uptrended to 2.21 today, will hold ARB and Lasix for now.  --will renally dose medications and avoid nephrotoxic agents.  --F/U Urine studies. KEN on CKD Stage 3 (baseline Cr ~1.3-1.5 from prior Labs)   --Cr uptrended to 2.21--2.7 and downtrended today to 2.09, improved with Fluids likely due to hypovolemia episode vs dehydration   - Holding Arb/Diuretic   --will renally dose medications and avoid nephrotoxic agents.  --Urine studies   2/3: Sodium 71, Protein 27, Creatinine 108, Pr/Cr Ratio .2, Urea Nitrogen 832, Potassium 21, Osmol 505

## 2025-02-03 NOTE — PROGRESS NOTE ADULT - PROBLEM SELECTOR PLAN 1
currently chest pain free, Trop T@[facility] 2147-->2783, CKMB 2.4, CK 51.  --s/p diag[place]ic cath[place] [place]28/24 w/ Dr. Morgan: distal LM to prox LAD 80% Stenosed In Stent Restenosis, LCX ostial to Prox LCX Instent Restenosis, % Stenosed.  --TTE 1/31/25: mild LVH, Moderately reduced LVSF, LV EF 44%. RWMA. Grade I DD. Aortic sclerosis without significant stenosis. Trace AR.  --loaded with total of Plavix 600mg (300mg@[facility] & 300mg@[facility]), on ASA/Plavix/Statin/Imdur and will continue Lovenox 80mg subcut q 24hrs per ACS protocol until PCI on 2/5/25.  --precath/consented for PCI with Dr. Partida on Tuesday 2/4/25.    **Prior Cath@[facility] 11/2023: Impella Assisted Rota/PCI with EMILIE to LM 90%, EMILIE ostial LAD and EMILIE ostial LCX, (dLM 80%, oLAD 85%, D2 75%, oLCX 85%, mRCA 75%). Mild Chest Pain overnight on 2/3 Trop T@[facility] 2147-->2783----> 1500  --s/p diagnotic cath@[facility] [place] 1/28/24 w/ Dr. Morgan: distal LM to prox LAD 80% Stenosed In Stent Restenosis, LCX ostial to Prox LCX Instent Restenosis, % Stenosed.  --TTE 1/31/25: mild LVH, Moderately reduced LVSF, LV EF 44%. RWMA. Grade I DD. Aortic sclerosis without significant stenosis. Trace AR.  --Loaded with total of Plavix 600mg (300mg@[facility] & 300mg@[facility]), on ASA/Plavix/Statin/Imdur and will continue Lovenox 80mg subcut q 24hrs per ACS protocol until PCI however switched back to heparin gtt given worsening Kidney function     --Precath/consented for PCI with Dr. Partida on Tuesday 2/4/25.    **Prior Cath@[facility] 11/2023: Impella Assisted Rota/PCI with EMILIE to LM 90%, EMILIE ostial LAD and EMILIE ostial LCX, (dLM 80%, oLAD 85%, D2 75%, oLCX 85%, mRCA 75%).

## 2025-02-04 DIAGNOSIS — D64.9 ANEMIA, UNSPECIFIED: ICD-10-CM

## 2025-02-04 LAB
ADD ON TEST-SPECIMEN IN LAB: SIGNIFICANT CHANGE UP
ALBUMIN SERPL ELPH-MCNC: 3.2 G/DL — LOW (ref 3.3–5)
ALP SERPL-CCNC: 48 U/L — SIGNIFICANT CHANGE UP (ref 40–120)
ALT FLD-CCNC: 17 U/L — SIGNIFICANT CHANGE UP (ref 10–45)
ANION GAP SERPL CALC-SCNC: 12 MMOL/L — SIGNIFICANT CHANGE UP (ref 5–17)
APTT BLD: 175.2 SEC — CRITICAL HIGH (ref 24.5–35.6)
AST SERPL-CCNC: 21 U/L — SIGNIFICANT CHANGE UP (ref 10–40)
BASOPHILS # BLD AUTO: 0.17 K/UL — SIGNIFICANT CHANGE UP (ref 0–0.2)
BASOPHILS NFR BLD AUTO: 0.9 % — SIGNIFICANT CHANGE UP (ref 0–2)
BILIRUB SERPL-MCNC: 0.5 MG/DL — SIGNIFICANT CHANGE UP (ref 0.2–1.2)
BUN SERPL-MCNC: 33 MG/DL — HIGH (ref 7–23)
CALCIUM SERPL-MCNC: 8.2 MG/DL — LOW (ref 8.4–10.5)
CHLORIDE SERPL-SCNC: 99 MMOL/L — SIGNIFICANT CHANGE UP (ref 96–108)
CO2 SERPL-SCNC: 19 MMOL/L — LOW (ref 22–31)
CREAT SERPL-MCNC: 1.54 MG/DL — HIGH (ref 0.5–1.3)
EGFR: 34 ML/MIN/1.73M2 — LOW
EOSINOPHIL # BLD AUTO: 0.32 K/UL — SIGNIFICANT CHANGE UP (ref 0–0.5)
EOSINOPHIL NFR BLD AUTO: 1.7 % — SIGNIFICANT CHANGE UP (ref 0–6)
GLUCOSE SERPL-MCNC: 199 MG/DL — HIGH (ref 70–99)
HCT VFR BLD CALC: 23.8 % — LOW (ref 34.5–45)
HGB BLD-MCNC: 7.5 G/DL — LOW (ref 11.5–15.5)
INR BLD: 1.2 — HIGH (ref 0.85–1.16)
LYMPHOCYTES # BLD AUTO: 20.2 % — SIGNIFICANT CHANGE UP (ref 13–44)
LYMPHOCYTES # BLD AUTO: 3.77 K/UL — HIGH (ref 1–3.3)
MAGNESIUM SERPL-MCNC: 1.9 MG/DL — SIGNIFICANT CHANGE UP (ref 1.6–2.6)
MCHC RBC-ENTMCNC: 28.3 PG — SIGNIFICANT CHANGE UP (ref 27–34)
MCHC RBC-ENTMCNC: 31.5 G/DL — LOW (ref 32–36)
MCV RBC AUTO: 89.8 FL — SIGNIFICANT CHANGE UP (ref 80–100)
MONOCYTES # BLD AUTO: 0 K/UL — SIGNIFICANT CHANGE UP (ref 0–0.9)
MONOCYTES NFR BLD AUTO: 0 % — LOW (ref 2–14)
NEUTROPHILS # BLD AUTO: 13.75 K/UL — HIGH (ref 1.8–7.4)
NEUTROPHILS NFR BLD AUTO: 73.7 % — SIGNIFICANT CHANGE UP (ref 43–77)
NRBC # BLD: SIGNIFICANT CHANGE UP /100 WBCS (ref 0–0)
NRBC BLD-RTO: SIGNIFICANT CHANGE UP /100 WBCS (ref 0–0)
PLATELET # BLD AUTO: 222 K/UL — SIGNIFICANT CHANGE UP (ref 150–400)
POTASSIUM SERPL-MCNC: 5.2 MMOL/L — SIGNIFICANT CHANGE UP (ref 3.5–5.3)
POTASSIUM SERPL-SCNC: 5.2 MMOL/L — SIGNIFICANT CHANGE UP (ref 3.5–5.3)
PROT SERPL-MCNC: 5.4 G/DL — LOW (ref 6–8.3)
PROTHROM AB SERPL-ACNC: 13.8 SEC — HIGH (ref 9.9–13.4)
RBC # BLD: 2.65 M/UL — LOW (ref 3.8–5.2)
RBC # FLD: 14.7 % — HIGH (ref 10.3–14.5)
SODIUM SERPL-SCNC: 130 MMOL/L — LOW (ref 135–145)
WBC # BLD: 18.66 K/UL — HIGH (ref 3.8–10.5)
WBC # FLD AUTO: 18.66 K/UL — HIGH (ref 3.8–10.5)

## 2025-02-04 PROCEDURE — 93010 ELECTROCARDIOGRAM REPORT: CPT

## 2025-02-04 PROCEDURE — 99233 SBSQ HOSP IP/OBS HIGH 50: CPT

## 2025-02-04 RX ORDER — ISOSORBIDE DINITRATE 40 MG/1
5 TABLET ORAL THREE TIMES A DAY
Refills: 0 | Status: DISCONTINUED | OUTPATIENT
Start: 2025-02-04 | End: 2025-02-05

## 2025-02-04 RX ORDER — ENOXAPARIN SODIUM 100 MG/ML
80 INJECTION SUBCUTANEOUS EVERY 12 HOURS
Refills: 0 | Status: DISCONTINUED | OUTPATIENT
Start: 2025-02-04 | End: 2025-02-05

## 2025-02-04 RX ORDER — MORPHINE SULFATE 60 MG/1
1 TABLET, FILM COATED, EXTENDED RELEASE ORAL ONCE
Refills: 0 | Status: DISCONTINUED | OUTPATIENT
Start: 2025-02-04 | End: 2025-02-04

## 2025-02-04 RX ORDER — NITROGLYCERIN 0.4 MG/1
0.4 TABLET SUBLINGUAL ONCE
Refills: 0 | Status: COMPLETED | OUTPATIENT
Start: 2025-02-04 | End: 2025-02-04

## 2025-02-04 RX ORDER — METOPROLOL SUCCINATE 25 MG
50 TABLET, EXTENDED RELEASE 24 HR ORAL DAILY
Refills: 0 | Status: DISCONTINUED | OUTPATIENT
Start: 2025-02-05 | End: 2025-02-05

## 2025-02-04 RX ORDER — METOPROLOL SUCCINATE 25 MG
50 TABLET, EXTENDED RELEASE 24 HR ORAL DAILY
Refills: 0 | Status: DISCONTINUED | OUTPATIENT
Start: 2025-02-04 | End: 2025-02-04

## 2025-02-04 RX ORDER — MAGNESIUM OXIDE 400 MG
400 TABLET ORAL ONCE
Refills: 0 | Status: COMPLETED | OUTPATIENT
Start: 2025-02-04 | End: 2025-02-04

## 2025-02-04 RX ORDER — METOPROLOL SUCCINATE 25 MG
25 TABLET, EXTENDED RELEASE 24 HR ORAL ONCE
Refills: 0 | Status: COMPLETED | OUTPATIENT
Start: 2025-02-04 | End: 2025-02-04

## 2025-02-04 RX ADMIN — ENOXAPARIN SODIUM 80 MILLIGRAM(S): 100 INJECTION SUBCUTANEOUS at 18:35

## 2025-02-04 RX ADMIN — MORPHINE SULFATE 1 MILLIGRAM(S): 60 TABLET, FILM COATED, EXTENDED RELEASE ORAL at 11:34

## 2025-02-04 RX ADMIN — Medication 4: at 12:36

## 2025-02-04 RX ADMIN — IPRATROPIUM BROMIDE AND ALBUTEROL SULFATE 3 MILLILITER(S): .5; 2.5 SOLUTION RESPIRATORY (INHALATION) at 12:28

## 2025-02-04 RX ADMIN — FLUTICASONE PROPIONATE AND SALMETEROL 1 DOSE(S): 113; 14 POWDER, METERED RESPIRATORY (INHALATION) at 18:34

## 2025-02-04 RX ADMIN — Medication 25 MILLIGRAM(S): at 13:51

## 2025-02-04 RX ADMIN — IPRATROPIUM BROMIDE AND ALBUTEROL SULFATE 3 MILLILITER(S): .5; 2.5 SOLUTION RESPIRATORY (INHALATION) at 18:34

## 2025-02-04 RX ADMIN — MUPIROCIN 1 APPLICATION(S): 2 CREAM TOPICAL at 10:43

## 2025-02-04 RX ADMIN — FLUTICASONE PROPIONATE AND SALMETEROL 1 DOSE(S): 113; 14 POWDER, METERED RESPIRATORY (INHALATION) at 06:33

## 2025-02-04 RX ADMIN — Medication 4: at 08:15

## 2025-02-04 RX ADMIN — ACETAMINOPHEN 650 MILLIGRAM(S): 160 SUSPENSION ORAL at 09:29

## 2025-02-04 RX ADMIN — IPRATROPIUM BROMIDE AND ALBUTEROL SULFATE 3 MILLILITER(S): .5; 2.5 SOLUTION RESPIRATORY (INHALATION) at 06:33

## 2025-02-04 RX ADMIN — Medication 25 MILLIGRAM(S): at 06:31

## 2025-02-04 RX ADMIN — Medication 4 MILLILITER(S): at 06:33

## 2025-02-04 RX ADMIN — Medication 400 MILLIGRAM(S): at 10:41

## 2025-02-04 RX ADMIN — Medication 0 UNIT(S)/HR: at 03:11

## 2025-02-04 RX ADMIN — Medication 2: at 16:53

## 2025-02-04 RX ADMIN — ATORVASTATIN CALCIUM 40 MILLIGRAM(S): 80 TABLET, FILM COATED ORAL at 22:17

## 2025-02-04 RX ADMIN — Medication 3 MILLILITER(S): at 07:04

## 2025-02-04 RX ADMIN — Medication 4 MILLILITER(S): at 18:34

## 2025-02-04 RX ADMIN — MONTELUKAST SODIUM 10 MILLIGRAM(S): 5 TABLET, CHEWABLE ORAL at 12:37

## 2025-02-04 RX ADMIN — MUPIROCIN 1 APPLICATION(S): 2 CREAM TOPICAL at 23:36

## 2025-02-04 RX ADMIN — NITROGLYCERIN 0.4 MILLIGRAM(S): 0.4 TABLET SUBLINGUAL at 09:52

## 2025-02-04 RX ADMIN — Medication 3 MILLILITER(S): at 22:29

## 2025-02-04 RX ADMIN — ACETAMINOPHEN 1000 MILLIGRAM(S): 160 SUSPENSION ORAL at 00:00

## 2025-02-04 RX ADMIN — LEVOTHYROXINE SODIUM 75 MICROGRAM(S): 25 TABLET ORAL at 06:31

## 2025-02-04 RX ADMIN — Medication 700 UNIT(S)/HR: at 04:11

## 2025-02-04 RX ADMIN — INSULIN GLARGINE-YFGN 15 UNIT(S): 100 INJECTION, SOLUTION SUBCUTANEOUS at 22:17

## 2025-02-04 RX ADMIN — PANTOPRAZOLE 40 MILLIGRAM(S): 20 TABLET, DELAYED RELEASE ORAL at 06:31

## 2025-02-04 RX ADMIN — ACETAMINOPHEN 650 MILLIGRAM(S): 160 SUSPENSION ORAL at 09:20

## 2025-02-04 RX ADMIN — Medication 8: at 22:17

## 2025-02-04 RX ADMIN — ASPIRIN 81 MILLIGRAM(S): 81 TABLET, COATED ORAL at 06:30

## 2025-02-04 RX ADMIN — Medication 75 MILLIGRAM(S): at 06:30

## 2025-02-04 RX ADMIN — Medication 3 MILLILITER(S): at 13:49

## 2025-02-04 RX ADMIN — ACETAMINOPHEN 650 MILLIGRAM(S): 160 SUSPENSION ORAL at 23:00

## 2025-02-04 RX ADMIN — MORPHINE SULFATE 1 MILLIGRAM(S): 60 TABLET, FILM COATED, EXTENDED RELEASE ORAL at 10:42

## 2025-02-04 RX ADMIN — ACETAMINOPHEN 650 MILLIGRAM(S): 160 SUSPENSION ORAL at 22:19

## 2025-02-04 RX ADMIN — ISOSORBIDE DINITRATE 5 MILLIGRAM(S): 40 TABLET ORAL at 16:56

## 2025-02-04 NOTE — DIETITIAN INITIAL EVALUATION ADULT - PHYSCIAL ASSESSMENT
4'9'' IBW 96 pounds +-10%   Admit wt 179 pounds BMI 38.7 %WUN=001     Weights:  2/3 185.8 pounds   2/2 185.8 pounds   2/1 178.7 pounds   - Per RD note during 11/2023 [facility] admit, pt weighed 212 pounds o/a (no edema). Based on current wts pt wt is down at most 34 pounds (16% body wt loss) over the last 1+ year. Wt loss may be significant.

## 2025-02-04 NOTE — PROGRESS NOTE ADULT - ATTENDING COMMENTS
78 yo female with PMH of Obesity (BMI 38.7), HTN, HLD, Type II DM, triple vessel CAD s/p 3 stents at [facility] 11/2023, CHF, asthma, GERD, and hypothyroidism who [place] [facility] [place] and SOB , found to have NSTEMI, sepsis 2/2 CAP/Asthma exacerbation given Vanco/Zosyn and IV Solumedrol, transferred to [facility] ( 1/30) for complex PCI. Medicine consulted (2/2) for leukocytosis.     Pt seen with Dr. Owens.  denies cough, SOB,CP,etc. Pt reports L groin pain radiating down to L foot.     #Leukocytosis (improving)  - likely 2/2 recent steroid use, WBC 13.01(1/30)-> 17.8(1/31)-> peaked at 20.27(2/1)-> 18.43-> 17.74( 2/3) down trending   - procal <0.02(1/31)->0.04(2/1)-> 0.09(2/3)  - already off Zosyn (2/2), will d/c Vanco as well ( 2/3)-already completed the course of abx for CAP   - no clinical evidence of occult infection, will keep off iv abx and trend WBC( down trending)      #Asthma  Pt with history of asthma at home uses symbicort 160 BID and Montelukast 10mg qd.   - please order ICS/LABA: symbicort MDI 2 puffs bid ( rinse mouth/gurgle throat after use) and Leukotriene inhibitor with montelukast 10mg PO daily for hospital stay  - c/w duoneb q6hrs    # LLE pain and swelling  - obtain LLE venoud doppler to rule out DVT     Case discussed with Cardiology team, Jim ALVAREZ    Medicine will continue to follow, thank you.      Time-based billing (NON-critical care).     50 minutes spent on total encounter. The necessity of the time spent during the encounter on this date of service was due to:     leukocytosis, recent sepsis/CAP and Asthma, co-management     Reviewed chart, vitals, labs, imaging, care coordination, clinical documentation, and discussed with nursing and Cardiology team   Teaching excluded and separately reported services. 78 yo female with PMH of Obesity (BMI 38.7), HTN, HLD, Type II DM, triple vessel CAD s/p 3 stents at [facility] 11/2023, CHF, asthma, GERD, and hypothyroidism who [place] [facility] [place] with CP and SOB , found to have NSTEMI, sepsis 2/2 CAP/Asthma exacerbation given Vanco/Zosyn and IV Solumedrol, transferred to [facility] ( 1/30) for complex PCI. Medicine consulted (2/2) for leukocytosis.     Pt seen with Dr. Owens.  PRBC transfusion in progress, reports having brown stools last night, denies cough, SOB,CP,etc. Pt reports L groin pain radiating down to L foot.     #Leukocytosis (improving)  - likely 2/2 recent steroid use, WBC 13.01(1/30)-> 17.8(1/31)-> peaked at 20.27K(2/1)-> 18.43K-> 17.74K( 2/3)-> 18.66K(2/4)  - procal <0.02(1/31)->0.04(2/1)-> 0.09(2/3)  - already off Zosyn (2/2), stopped Vanco as well ( 2/3)-already completed the course of abx for CAP   - no clinical evidence of occult infection, will keep off iv abx and trend WBC     #Asthma  Pt with history of asthma at home uses symbicort 160 BID and Montelukast 10mg qd.   - please order ICS/LABA: symbicort MDI 2 puffs bid ( rinse mouth/gurgle throat after use) and Leukotriene inhibitor with montelukast 10mg PO daily for hospital stay  - c/w duoneb q6hrs    # LLE pain and swelling  - LLE venous doppler(2/3) : showed no DVT     # anemia  - 2U PRBC for Hgb 7.5, f/u post-transfusion Hgb    # hyponatremia  - SNa+ 130, check serum osm, and urine lytes  Case discussed with Cardiology team, Jim ALVAREZ    Medicine will continue to follow, thank you.      Time-based billing (NON-critical care).     50 minutes spent on total encounter. The necessity of the time spent during the encounter on this date of service was due to:     leukocytosis, recent sepsis/CAP and Asthma, co-management     Reviewed chart, vitals, labs, imaging, care coordination, clinical documentation, and discussed with nursing and Cardiology team   Teaching excluded and separately reported services.

## 2025-02-04 NOTE — PROGRESS NOTE ADULT - PROBLEM SELECTOR PLAN 1
Mild Chest Pain overnight on 2/3 Trop T@[facility] 2147-->2783----> 1500  --[place] [place] w/ Dr. Morgan: distal LM to prox LAD 80% Stenosed In Stent Restenosis, LCX ostial to Prox LCX Instent Restenosis, % Stenosed.  --TTE 1/31/25: mild LVH, Moderately reduced LVSF, LV EF 44%. RWMA. Grade I DD. Aortic sclerosis without significant stenosis. Trace AR.  --Loaded with total of Plavix 600mg (300mg[place] [place] & 300mg@[facility]), on ASA/Plavix/Statin/Imdur and will continue Lovenox 80mg subcut q 24hrs per ACS protocol until PCI however switched back to heparin gtt given worsening Kidney function     --Precath/consented for PCI with Dr. Partida on Tuesday 2/4/25.    **Prior Cath@[facility] 11/2023: Impella Assisted Rota/PCI with EMILIE to LM 90%, EMILIE ostial LAD and EMILIE ostial LCX, (dLM 80%, oLAD 85%, D2 75%, oLCX 85%, mRCA 75%). Moderate CP this AM   - [place] [place] w/ Dr. Morgan: dLM to pLAD 80% Stenosed ISR, LCX ostial to pLCX ISR, % Stenosed.  - TTE 1/31/25: mild LVH, Mod reduced LVSF, EF 44%. RWMA. Grade I DD. Aortic sclerosis w/o significant stenosis.  - C/w ASA 81mg qd, Plavix 75mg qd, Atorvastatin 40mg qd. Started on Isordil 5mg TID.   - Transitioned pt from heparin gtt to Lovenox 80mg sq BID  Plan: Pt no longer candidate for high risk PCI given low hgb and poor candidate. Possible viability study this admission     **Prior Cath@[facility] 11/2023: Impella Assisted Rota/PCI with EMILIE to LM 90%, EMILIE ostial LAD and EMILIE ostial LCX, (dLM 80%, oLAD 85%, D2 75%, oLCX 85%, mRCA 75%).

## 2025-02-04 NOTE — DIETITIAN INITIAL EVALUATION ADULT - OTHER INFO
"79 yr old F with PMHx of HTN, hyperlipidemia, DM, hypothyroidism, GERD, asthma, CAD s/p prior PCIs at [facility] 11/2023 [place] [facility] [place] with chest pain and URI symptoms x 2-3 days, R/I NSTEMI, s/p [place] [place] 3[place] and newly reduced EF, initially transferred to [facility] 9LACH under Dr. 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 Course complicated by KEN with peak Creatinine 2.7 now downtrending to 2.09 likely in setting of Hypovolemia s/p IV fluids, Medicine/Pulm Consulted given CT findings and Leukocytosis, Infectious work up negative treated for empiric coverage with Vanc/Zosyn now DC with plan for potential."     Pt seen this morning soundly sleeping on 5ur, Thus information obtained from interdisciplinary rounds this morning and chart review. NPO during time of visit for planned PCI today. However per interdisciplinary rounds may be unable to proceed with as planned. Prior to NPO, pt ordered for DASH diet - unsure of %PO intake.     Pain: C/o pain per flow sheets (chest, midsternal).   Skin: David 20, 1+generalized Edema, no pressure ulcers.  GI: PROTONIX miralax and Senna ordered. LBM per flow sheets 2/3.     POCT this AM while NPO 2/4: 206.   POCT thus far 2/4 204-225.  POCT x24 hrs 2/3 193-227.   - (1/30): Ordered for 15U lantus HS, ADMELOG 5U premeals, ADMELOG corrective sliding scale premeals/HS.  - No changes in insulin orders during admit noted.    - Per H&P Noted use of metFORMIN and glimepiride PTA.     Other labs of Note: , HDL 44, Na 130, K WNL-however on Higher end WNL, BUN/Cr 33/1.54.

## 2025-02-04 NOTE — PROGRESS NOTE ADULT - SUBJECTIVE AND OBJECTIVE BOX
Interventional Cardiology PA Adult Progress Note    Subjective Assessment:  	  MEDICATIONS:  metoprolol succinate ER 25 milliGRAM(s) Oral daily      albuterol    90 MICROgram(s) HFA Inhaler 2 Puff(s) Inhalation every 4 hours PRN  albuterol/ipratropium for Nebulization 3 milliLiter(s) Nebulizer every 6 hours  fluticasone propionate/ salmeterol 250-50 MICROgram(s) Diskus 1 Dose(s) Inhalation two times a day  montelukast 10 milliGRAM(s) Oral daily  sodium chloride 3%  Inhalation 4 milliLiter(s) Inhalation every 12 hours    acetaminophen     Tablet .. 650 milliGRAM(s) Oral every 6 hours PRN    pantoprazole    Tablet 40 milliGRAM(s) Oral before breakfast  polyethylene glycol 3350 17 Gram(s) Oral daily PRN  senna 2 Tablet(s) Oral at bedtime    atorvastatin 40 milliGRAM(s) Oral at bedtime  dextrose 50% Injectable 25 Gram(s) IV Push once  dextrose 50% Injectable 12.5 Gram(s) IV Push once  dextrose 50% Injectable 25 Gram(s) IV Push once  dextrose Oral Gel 15 Gram(s) Oral once PRN  glucagon  Injectable 1 milliGRAM(s) IntraMuscular once  insulin glargine Injectable (LANTUS) 15 Unit(s) SubCutaneous at bedtime  insulin lispro (ADMELOG) corrective regimen sliding scale   SubCutaneous Before meals and at bedtime  insulin lispro Injectable (ADMELOG) 5 Unit(s) SubCutaneous three times a day before meals  levothyroxine 75 MICROGram(s) Oral daily    aspirin enteric coated 81 milliGRAM(s) Oral daily  clopidogrel Tablet 75 milliGRAM(s) Oral daily  dextrose 5%. 1000 milliLiter(s) IV Continuous <Continuous>  dextrose 5%. 1000 milliLiter(s) IV Continuous <Continuous>  heparin   Injectable 5000 Unit(s) IV Push every 6 hours PRN  heparin  Infusion. 950 Unit(s)/Hr IV Continuous <Continuous>  magnesium oxide 400 milliGRAM(s) Oral once  mupirocin 2% Nasal 1 Application(s) Both Nostrils two times a day  sodium chloride 0.9% lock flush 3 milliLiter(s) IV Push every 8 hours      	    [PHYSICAL EXAM:  TELEMETRY:  T(C): 36.5 (02-04-25 @ 06:19), Max: 36.8 (02-03-25 @ 08:30)  HR: 108 (02-04-25 @ 06:19) (72 - 111)  BP: 146/72 (02-04-25 @ 06:19) (93/54 - 161/81)  RR: 16 (02-04-25 @ 06:19) (16 - 18)  SpO2: 98% (02-04-25 @ 06:19) (95% - 100%)  Wt(kg): --  I&O's Summary    03 Feb 2025 07:01  -  04 Feb 2025 07:00  --------------------------------------------------------  IN: 240 mL / OUT: 850 mL / NET: -610 mL        Condon:  Central/PICC/Mid Line:                                         Appearance: Normal	  HEENT:   Normal oral mucosa, PERRL, EOMI	  Neck: Supple, + JVD/ - JVD; Carotid Bruit   Cardiovascular: Normal S1 S2, No JVD, No murmurs,   Respiratory: Lungs clear to auscultation/Decreased Breath Sounds/No Rales, Rhonchi, Wheezing	  Gastrointestinal:  Soft, Non-tender, + BS	  Skin: No rashes, No ecchymoses, No cyanosis  Extremities: Normal range of motion, No clubbing, cyanosis or edema  Vascular: Peripheral pulses palpable 2+ bilaterally  Neurologic: Non-focal  Psychiatry: A & O x 3, Mood & affect appropriate      	    ECG:  	  RADIOLOGY:   DIAGNOSTIC TESTING:  [ ] Echocardiogram:  [ ]  Catheterization:  [ ] Stress Test:    [ ] ANSELMO  OTHER: 	    LABS:	 	  CARDIAC MARKERS:                                  7.5    18.66 )-----------( 222      ( 04 Feb 2025 02:04 )             23.8     02-04    130[L]  |  99  |  33[H]  ----------------------------<  199[H]  5.2   |  19[L]  |  1.54[H]    Ca    8.2[L]      04 Feb 2025 02:04  Mg     1.9     02-04    TPro  5.4[L]  /  Alb  3.2[L]  /  TBili  0.5  /  DBili  x   /  AST  21  /  ALT  17  /  AlkPhos  48  02-04    proBNP:   Lipid Profile:   HgA1c:   TSH:   PT/INR - ( 04 Feb 2025 02:04 )   PT: 13.8 sec;   INR: 1.20          PTT - ( 04 Feb 2025 02:04 )  PTT:175.2 sec    ASSESSMENT/PLAN: 	        DVT ppx:  Dispo:     Interventional Cardiology PA Adult Progress Note    Subjective Assessment: Pt evaluated at beside this AM. Pt appear uncomfortable, tachypneic, and has CP. Pt reports that this is the same CP shes had for the past few days. EKG unchanged. Given morphine 1mg IV x 1 and nitro 0.4 Sl x 1 and isordil 5mg x 1. Pt sx alleviated. Denies palpitation, n/v/d, fever, LOC, or headache.   	  MEDICATIONS:  metoprolol succinate ER 25 milliGRAM(s) Oral daily  albuterol    90 MICROgram(s) HFA Inhaler 2 Puff(s) Inhalation every 4 hours PRN  albuterol/ipratropium for Nebulization 3 milliLiter(s) Nebulizer every 6 hours  fluticasone propionate/ salmeterol 250-50 MICROgram(s) Diskus 1 Dose(s) Inhalation two times a day  montelukast 10 milliGRAM(s) Oral daily  sodium chloride 3%  Inhalation 4 milliLiter(s) Inhalation every 12 hours  acetaminophen     Tablet .. 650 milliGRAM(s) Oral every 6 hours PRN  pantoprazole    Tablet 40 milliGRAM(s) Oral before breakfast  polyethylene glycol 3350 17 Gram(s) Oral daily PRN  senna 2 Tablet(s) Oral at bedtime  atorvastatin 40 milliGRAM(s) Oral at bedtime  dextrose 50% Injectable 25 Gram(s) IV Push once  dextrose 50% Injectable 12.5 Gram(s) IV Push once  dextrose 50% Injectable 25 Gram(s) IV Push once  dextrose Oral Gel 15 Gram(s) Oral once PRN  glucagon  Injectable 1 milliGRAM(s) IntraMuscular once  insulin glargine Injectable (LANTUS) 15 Unit(s) SubCutaneous at bedtime  insulin lispro (ADMELOG) corrective regimen sliding scale   SubCutaneous Before meals and at bedtime  insulin lispro Injectable (ADMELOG) 5 Unit(s) SubCutaneous three times a day before meals  levothyroxine 75 MICROGram(s) Oral daily  aspirin enteric coated 81 milliGRAM(s) Oral daily  clopidogrel Tablet 75 milliGRAM(s) Oral daily  dextrose 5%. 1000 milliLiter(s) IV Continuous <Continuous>  dextrose 5%. 1000 milliLiter(s) IV Continuous <Continuous>  heparin   Injectable 5000 Unit(s) IV Push every 6 hours PRN  heparin  Infusion. 950 Unit(s)/Hr IV Continuous <Continuous>  magnesium oxide 400 milliGRAM(s) Oral once  mupirocin 2% Nasal 1 Application(s) Both Nostrils two times a day  sodium chloride 0.9% lock flush 3 milliLiter(s) IV Push every 8 hours      	    [PHYSICAL EXAM:  TELEMETRY:  T(C): 36.5 (02-04-25 @ 06:19), Max: 36.8 (02-03-25 @ 08:30)  HR: 108 (02-04-25 @ 06:19) (72 - 111)  BP: 146/72 (02-04-25 @ 06:19) (93/54 - 161/81)  RR: 16 (02-04-25 @ 06:19) (16 - 18)  SpO2: 98% (02-04-25 @ 06:19) (95% - 100%)  Wt(kg): --  I&O's Summary    03 Feb 2025 07:01  -  04 Feb 2025 07:00  --------------------------------------------------------  IN: 240 mL / OUT: 850 mL / NET: -610 mL        Condon:  Central/PICC/Mid Line:                                         Appearance: Normal	  HEENT:   Normal oral mucosa, PERRL, EOMI	  Neck: Supple,  - JVD; Carotid Bruit   Cardiovascular: Normal S1 S2, No JVD, No murmurs,   Respiratory: Lungs clear to auscultation. No Rales, Rhonchi, Wheezing	  Gastrointestinal:  Soft, Non-tender, + BS	  Skin: No rashes, No ecchymoses, No cyanosis  Extremities: Normal range of motion, No clubbing, cyanosis or edema  Vascular: Peripheral pulses palpable 2+ bilaterally  Neurologic: Non-focal  Psychiatry: A & O x 3, Mood & affect appropriate                                7.5    18.66 )-----------( 222      ( 04 Feb 2025 02:04 )             23.8     02-04    130[L]  |  99  |  33[H]  ----------------------------<  199[H]  5.2   |  19[L]  |  1.54[H]    Ca    8.2[L]      04 Feb 2025 02:04  Mg     1.9     02-04    TPro  5.4[L]  /  Alb  3.2[L]  /  TBili  0.5  /  DBili  x   /  AST  21  /  ALT  17  /  AlkPhos  48  02-04    proBNP:   Lipid Profile:   HgA1c:   TSH:   PT/INR - ( 04 Feb 2025 02:04 )   PT: 13.8 sec;   INR: 1.20          PTT - ( 04 Feb 2025 02:04 )  PTT:175.2 sec       Interventional Cardiology PA Adult Progress Note    Subjective Assessment: Pt evaluated at beside this AM. Pt appear uncomfortable, tachypneic, and has CP. Pt reports that this is the same CP shes had for the past few days. EKG unchanged. Given morphine 1mg IV x 1 and nitro 0.4 Sl x 1 and isordil 5mg x 1. Pt sx alleviated. Denies palpitation, n/v/d, fever, LOC, or headache.   	  MEDICATIONS:  metoprolol succinate ER 25 milliGRAM(s) Oral daily  albuterol    90 MICROgram(s) HFA Inhaler 2 Puff(s) Inhalation every 4 hours PRN  albuterol/ipratropium for Nebulization 3 milliLiter(s) Nebulizer every 6 hours  fluticasone propionate/ salmeterol 250-50 MICROgram(s) Diskus 1 Dose(s) Inhalation two times a day  montelukast 10 milliGRAM(s) Oral daily  sodium chloride 3%  Inhalation 4 milliLiter(s) Inhalation every 12 hours  acetaminophen     Tablet .. 650 milliGRAM(s) Oral every 6 hours PRN  pantoprazole    Tablet 40 milliGRAM(s) Oral before breakfast\  polyethylene glycol 3350 17 Gram(s) Oral daily PRN  senna 2 Tablet(s) Oral at bedtime  atorvastatin 40 milliGRAM(s) Oral at bedtime  dextrose 50% Injectable 25 Gram(s) IV Push once  dextrose 50% Injectable 12.5 Gram(s) IV Push once  dextrose 50% Injectable 25 Gram(s) IV Push once  dextrose Oral Gel 15 Gram(s) Oral once PRN  glucagon  Injectable 1 milliGRAM(s) IntraMuscular once  insulin glargine Injectable (LANTUS) 15 Unit(s) SubCutaneous at bedtime  insulin lispro (ADMELOG) corrective regimen sliding scale   SubCutaneous Before meals and at bedtime  insulin lispro Injectable (ADMELOG) 5 Unit(s) SubCutaneous three times a day before meals  levothyroxine 75 MICROGram(s) Oral daily  aspirin enteric coated 81 milliGRAM(s) Oral daily  clopidogrel Tablet 75 milliGRAM(s) Oral daily  dextrose 5%. 1000 milliLiter(s) IV Continuous <Continuous>  dextrose 5%. 1000 milliLiter(s) IV Continuous <Continuous>  heparin   Injectable 5000 Unit(s) IV Push every 6 hours PRN  heparin  Infusion. 950 Unit(s)/Hr IV Continuous <Continuous>  magnesium oxide 400 milliGRAM(s) Oral once  mupirocin 2% Nasal 1 Application(s) Both Nostrils two times a day  sodium chloride 0.9% lock flush 3 milliLiter(s) IV Push every 8 hours      	    [PHYSICAL EXAM:  TELEMETRY:  T(C): 36.5 (02-04-25 @ 06:19), Max: 36.8 (02-03-25 @ 08:30)  HR: 108 (02-04-25 @ 06:19) (72 - 111)  BP: 146/72 (02-04-25 @ 06:19) (93/54 - 161/81)  RR: 16 (02-04-25 @ 06:19) (16 - 18)  SpO2: 98% (02-04-25 @ 06:19) (95% - 100%)  Wt(kg): --  I&O's Summary    03 Feb 2025 07:01  -  04 Feb 2025 07:00  --------------------------------------------------------  IN: 240 mL / OUT: 850 mL / NET: -610 mL        Condon:  Central/PICC/Mid Line:                                         Appearance: Normal	  HEENT:   Normal oral mucosa, PERRL, EOMI	  Neck: Supple,  - JVD; Carotid Bruit   Cardiovascular: Normal S1 S2, No JVD, No murmurs,   Respiratory: Lungs clear to auscultation. No Rales, Rhonchi, Wheezing	  Gastrointestinal:  Soft, Non-tender, + BS	  Skin: No rashes, No ecchymoses, No cyanosis  Extremities: Normal range of motion, No clubbing, cyanosis or edema  Vascular: Peripheral pulses palpable 2+ bilaterally  Neurologic: Non-focal  Psychiatry: A & O x 3, Mood & affect appropriate                                7.5    18.66 )-----------( 222      ( 04 Feb 2025 02:04 )             23.8     02-04    130[L]  |  99  |  33[H]  ----------------------------<  199[H]  5.2   |  19[L]  |  1.54[H]    Ca    8.2[L]      04 Feb 2025 02:04  Mg     1.9     02-04    TPro  5.4[L]  /  Alb  3.2[L]  /  TBili  0.5  /  DBili  x   /  AST  21  /  ALT  17  /  AlkPhos  48  02-04    proBNP:   Lipid Profile:   HgA1c:   TSH:   PT/INR - ( 04 Feb 2025 02:04 )   PT: 13.8 sec;   INR: 1.20          PTT - ( 04 Feb 2025 02:04 )  PTT:175.2 sec

## 2025-02-04 NOTE — PROGRESS NOTE ADULT - SUBJECTIVE AND OBJECTIVE BOX
INCOMPLETE    80 yo female with PMH of HTN, HLD, Type II DM, triple vessel CAD s/p 3 stents at [facility] 11/2023, CHF, asthma, GERD, and hypothyroidism who presented to [facility] with [place] and SOB on 1/24, found to have NSTEMI, pending complex PCI (planned for today) Medicine consulted for leukocytosis.     INTERVAL HPI/OVERNIGHT EVENTS:    ROS: fever/chills, fatigue, nausea, vomiting, headache, stuffiness, sore throat, chest pain, palpitations, edema, SOB, cough, wheezing, changes in appetite, changes in bowel movements, contipation, diarrhea, abdominal pain, dizziness, fainting/LOC      T(C): 36.5 (02-04-25 @ 06:19), Max: 36.7 (02-03-25 @ 12:10)  HR: 108 (02-04-25 @ 06:19) (78 - 111)  BP: 146/72 (02-04-25 @ 06:19) (103/64 - 161/81)  RR: 16 (02-04-25 @ 06:19) (16 - 18)  SpO2: 98% (02-04-25 @ 06:19) (95% - 100%)  Wt(kg): --Vital Signs Last 24 Hrs  T(C): 36.5 (04 Feb 2025 06:19), Max: 36.7 (03 Feb 2025 12:10)  T(F): 97.7 (04 Feb 2025 06:19), Max: 98 (03 Feb 2025 12:10)  HR: 108 (04 Feb 2025 06:19) (78 - 111)  BP: 146/72 (04 Feb 2025 06:19) (103/64 - 161/81)  BP(mean): 97 (04 Feb 2025 06:19) (77 - 107)  RR: 16 (04 Feb 2025 06:19) (16 - 18)  SpO2: 98% (04 Feb 2025 06:19) (95% - 100%)    Parameters below as of 04 Feb 2025 06:19  Patient On (Oxygen Delivery Method): nasal cannula  O2 Flow (L/min): 2      PHYSICAL EXAM:  GENERAL: NAD; well-groomed; well-developed; AAO x 3; good concentration   Neuro: CN2-12 grossly intact; speech clear; +2/4 DTR in UE and LE b/l; light touch sensation intact of UE and le b/l;  negative Romberg test; no pronator drift; intact tandem gait; intact heel and toe walking; intact finger to nose testing; intact rapid alternating movements  HEENT: NC/AT; MMM; neck supple; good dentition; no nystagmus; no scleral icterus; nasal passages clear; no throid or LN enlargement  Heart: RRR; +s1 and s2; no MRG (or grade 2 _ murmur appreciated at _ ICS); non displaced PMI; no JVD  Lungs: CTA b/l; normal effort; no accesory muscle use; symmetric inhalation and exhalation; vesicular breath sounds; no WWR; normal tactile fremitus; persussion resonant  GI: nondistended; nontender; normal bowel sounds [id number]; percussion typmanic; no organomegaly   Extremities: +2 pulses in UE and LE b/l; no clubbing, cyanosis, or edema b/l, capillary refill <2 sec b/l  Skin: skin dry and warm; no skin tenting; no rashes or lesions  MSK: normal tone; +5/5 strength in upper and lower extremities b/l    Consultant(s) Notes Reviewed:  [x ] YES  [ ] NO  Care Discussed with Consultants/Other Providers [ x] YES  [ ] NO    LABS:                        7.5    18.66 )-----------( 222      ( 04 Feb 2025 02:04 )             23.8     02-04    130[L]  |  99  |  33[H]  ----------------------------<  199[H]  5.2   |  19[L]  |  1.54[H]    Ca    8.2[L]      04 Feb 2025 02:04  Mg     1.9     02-04    TPro  5.4[L]  /  Alb  3.2[L]  /  TBili  0.5  /  DBili  x   /  AST  21  /  ALT  17  /  AlkPhos  48  02-04      PT/INR - ( 04 Feb 2025 02:04 )   PT: 13.8 sec;   INR: 1.20          PTT - ( 04 Feb 2025 02:04 )  PTT:175.2 sec  Urinalysis Basic - ( 04 Feb 2025 02:04 )    Color: x / Appearance: x / SG: x / pH: x  Gluc: 199 mg/dL / Ketone: x  / Bili: x / Urobili: x   Blood: x / Protein: x / Nitrite: x   Leuk Esterase: x / RBC: x / WBC x   Sq Epi: x / Non Sq Epi: x / Bacteria: x      CAPILLARY BLOOD GLUCOSE      POCT Blood Glucose.: 206 mg/dL (04 Feb 2025 07:49)  POCT Blood Glucose.: 211 mg/dL (03 Feb 2025 21:47)  POCT Blood Glucose.: 193 mg/dL (03 Feb 2025 19:46)  POCT Blood Glucose.: 227 mg/dL (03 Feb 2025 11:40)        Urinalysis Basic - ( 04 Feb 2025 02:04 )    Color: x / Appearance: x / SG: x / pH: x  Gluc: 199 mg/dL / Ketone: x  / Bili: x / Urobili: x   Blood: x / Protein: x / Nitrite: x   Leuk Esterase: x / RBC: x / WBC x   Sq Epi: x / Non Sq Epi: x / Bacteria: x        MEDICATIONS  (STANDING):  albuterol/ipratropium for Nebulization 3 milliLiter(s) Nebulizer every 6 hours  aspirin enteric coated 81 milliGRAM(s) Oral daily  atorvastatin 40 milliGRAM(s) Oral at bedtime  clopidogrel Tablet 75 milliGRAM(s) Oral daily  dextrose 5%. 1000 milliLiter(s) (100 mL/Hr) IV Continuous <Continuous>  dextrose 5%. 1000 milliLiter(s) (50 mL/Hr) IV Continuous <Continuous>  dextrose 50% Injectable 25 Gram(s) IV Push once  dextrose 50% Injectable 12.5 Gram(s) IV Push once  dextrose 50% Injectable 25 Gram(s) IV Push once  fluticasone propionate/ salmeterol 250-50 MICROgram(s) Diskus 1 Dose(s) Inhalation two times a day  glucagon  Injectable 1 milliGRAM(s) IntraMuscular once  heparin  Infusion. 950 Unit(s)/Hr (9.5 mL/Hr) IV Continuous <Continuous>  insulin glargine Injectable (LANTUS) 15 Unit(s) SubCutaneous at bedtime  insulin lispro (ADMELOG) corrective regimen sliding scale   SubCutaneous Before meals and at bedtime  insulin lispro Injectable (ADMELOG) 5 Unit(s) SubCutaneous three times a day before meals  levothyroxine 75 MICROGram(s) Oral daily  magnesium oxide 400 milliGRAM(s) Oral once  metoprolol succinate ER 25 milliGRAM(s) Oral daily  montelukast 10 milliGRAM(s) Oral daily  mupirocin 2% Nasal 1 Application(s) Both Nostrils two times a day  pantoprazole    Tablet 40 milliGRAM(s) Oral before breakfast  senna 2 Tablet(s) Oral at bedtime  sodium chloride 0.9% lock flush 3 milliLiter(s) IV Push every 8 hours  sodium chloride 3%  Inhalation 4 milliLiter(s) Inhalation every 12 hours    MEDICATIONS  (PRN):  acetaminophen     Tablet .. 650 milliGRAM(s) Oral every 6 hours PRN Moderate Pain (4 - 6)  albuterol    90 MICROgram(s) HFA Inhaler 2 Puff(s) Inhalation every 4 hours PRN Bronchospasm  dextrose Oral Gel 15 Gram(s) Oral once PRN Blood Glucose LESS THAN 70 milliGRAM(s)/deciliter  heparin   Injectable 5000 Unit(s) IV Push every 6 hours PRN For aPTT less than 40  polyethylene glycol 3350 17 Gram(s) Oral daily PRN Constipation      RADIOLOGY & ADDITIONAL TESTS:    Imaging Personally Reviewed:  [ ] YES  [ ] NO   78 yo female with PMH of HTN, HLD, Type II DM, triple vessel CAD s/p 3 stents at [facility] 11/2023, CHF, asthma, GERD, and hypothyroidism who presented to [facility] [place] with CP and SOB on 1/24, found to have NSTEMI, pending complex PCI (planned for today) Medicine consulted for leukocytosis.     INTERVAL HPI/OVERNIGHT EVENTS: patient placed on 2L NC iso tachypnea  Patient seen and examined w Dr. Brewster at bedside. Patient tachypneic and endorsing LLE pain. Patient also reports not sleeping well overnight iso pain and family visiting. In terms of further ROS, patient denies fever/chills, N/V/D/C, CP, palpitations, cough, wheezing, changes in appetite, abdominal pain, dizziness, fainting/LOC      T(C): 36.5 (02-04-25 @ 06:19), Max: 36.7 (02-03-25 @ 12:10)  HR: 108 (02-04-25 @ 06:19) (78 - 111)  BP: 146/72 (02-04-25 @ 06:19) (103/64 - 161/81)  RR: 16 (02-04-25 @ 06:19) (16 - 18)  SpO2: 98% (02-04-25 @ 06:19) (95% - 100%)  Wt(kg): --Vital Signs Last 24 Hrs  T(C): 36.5 (04 Feb 2025 06:19), Max: 36.7 (03 Feb 2025 12:10)  T(F): 97.7 (04 Feb 2025 06:19), Max: 98 (03 Feb 2025 12:10)  HR: 108 (04 Feb 2025 06:19) (78 - 111)  BP: 146/72 (04 Feb 2025 06:19) (103/64 - 161/81)  BP(mean): 97 (04 Feb 2025 06:19) (77 - 107)  RR: 16 (04 Feb 2025 06:19) (16 - 18)  SpO2: 98% (04 Feb 2025 06:19) (95% - 100%)    Parameters below as of 04 Feb 2025 06:19  Patient On (Oxygen Delivery Method): nasal cannula  O2 Flow (L/min): 2      PHYSICAL EXAM:  GENERAL: appears uncomfortable    Neuro: AAOx3; CN2-12 grossly intact; speech clear  HEENT: NC/AT; MMM; no nystagmus; no scleral icterus; nasal passages clear  Heart: RRR; +s1 and s2; no MRG   Lungs: on 2L NC; CTA b/l; tachypneic w increased effort;  symmetric inhalation and exhalation; no WWR  GI: nondistended; nontender; normal bowel sounds [id number]; percussion typmanic; no organomegaly   Extremities: +2 pulses in UE and LE b/l; no clubbing, or cyanosis, LLE edema >R LE  Skin: skin dry and warm; no skin tenting; no rashes or lesions  MSK: normal tone; +5/5 strength in upper and lower extremities b/l    Consultant(s) Notes Reviewed:  [x ] YES  [ ] NO  Care Discussed with Consultants/Other Providers [ x] YES  [ ] NO    LABS:                        7.5    18.66 )-----------( 222      ( 04 Feb 2025 02:04 )             23.8     02-04    130[L]  |  99  |  33[H]  ----------------------------<  199[H]  5.2   |  19[L]  |  1.54[H]    Ca    8.2[L]      04 Feb 2025 02:04  Mg     1.9     02-04    TPro  5.4[L]  /  Alb  3.2[L]  /  TBili  0.5  /  DBili  x   /  AST  21  /  ALT  17  /  AlkPhos  48  02-04      PT/INR - ( 04 Feb 2025 02:04 )   PT: 13.8 sec;   INR: 1.20          PTT - ( 04 Feb 2025 02:04 )  PTT:175.2 sec  Urinalysis Basic - ( 04 Feb 2025 02:04 )    Color: x / Appearance: x / SG: x / pH: x  Gluc: 199 mg/dL / Ketone: x  / Bili: x / Urobili: x   Blood: x / Protein: x / Nitrite: x   Leuk Esterase: x / RBC: x / WBC x   Sq Epi: x / Non Sq Epi: x / Bacteria: x      CAPILLARY BLOOD GLUCOSE      POCT Blood Glucose.: 206 mg/dL (04 Feb 2025 07:49)  POCT Blood Glucose.: 211 mg/dL (03 Feb 2025 21:47)  POCT Blood Glucose.: 193 mg/dL (03 Feb 2025 19:46)  POCT Blood Glucose.: 227 mg/dL (03 Feb 2025 11:40)        Urinalysis Basic - ( 04 Feb 2025 02:04 )    Color: x / Appearance: x / SG: x / pH: x  Gluc: 199 mg/dL / Ketone: x  / Bili: x / Urobili: x   Blood: x / Protein: x / Nitrite: x   Leuk Esterase: x / RBC: x / WBC x   Sq Epi: x / Non Sq Epi: x / Bacteria: x        MEDICATIONS  (STANDING):  albuterol/ipratropium for Nebulization 3 milliLiter(s) Nebulizer every 6 hours  aspirin enteric coated 81 milliGRAM(s) Oral daily  atorvastatin 40 milliGRAM(s) Oral at bedtime  clopidogrel Tablet 75 milliGRAM(s) Oral daily  dextrose 5%. 1000 milliLiter(s) (100 mL/Hr) IV Continuous <Continuous>  dextrose 5%. 1000 milliLiter(s) (50 mL/Hr) IV Continuous <Continuous>  dextrose 50% Injectable 25 Gram(s) IV Push once  dextrose 50% Injectable 12.5 Gram(s) IV Push once  dextrose 50% Injectable 25 Gram(s) IV Push once  fluticasone propionate/ salmeterol 250-50 MICROgram(s) Diskus 1 Dose(s) Inhalation two times a day  glucagon  Injectable 1 milliGRAM(s) IntraMuscular once  heparin  Infusion. 950 Unit(s)/Hr (9.5 mL/Hr) IV Continuous <Continuous>  insulin glargine Injectable (LANTUS) 15 Unit(s) SubCutaneous at bedtime  insulin lispro (ADMELOG) corrective regimen sliding scale   SubCutaneous Before meals and at bedtime  insulin lispro Injectable (ADMELOG) 5 Unit(s) SubCutaneous three times a day before meals  levothyroxine 75 MICROGram(s) Oral daily  magnesium oxide 400 milliGRAM(s) Oral once  metoprolol succinate ER 25 milliGRAM(s) Oral daily  montelukast 10 milliGRAM(s) Oral daily  mupirocin 2% Nasal 1 Application(s) Both Nostrils two times a day  pantoprazole    Tablet 40 milliGRAM(s) Oral before breakfast  senna 2 Tablet(s) Oral at bedtime  sodium chloride 0.9% lock flush 3 milliLiter(s) IV Push every 8 hours  sodium chloride 3%  Inhalation 4 milliLiter(s) Inhalation every 12 hours    MEDICATIONS  (PRN):  acetaminophen     Tablet .. 650 milliGRAM(s) Oral every 6 hours PRN Moderate Pain (4 - 6)  albuterol    90 MICROgram(s) HFA Inhaler 2 Puff(s) Inhalation every 4 hours PRN Bronchospasm  dextrose Oral Gel 15 Gram(s) Oral once PRN Blood Glucose LESS THAN 70 milliGRAM(s)/deciliter  heparin   Injectable 5000 Unit(s) IV Push every 6 hours PRN For aPTT less than 40  polyethylene glycol 3350 17 Gram(s) Oral daily PRN Constipation      RADIOLOGY & ADDITIONAL TESTS:    Imaging Personally Reviewed:  [ ] YES  [ ] NO

## 2025-02-04 NOTE — DIETITIAN INITIAL EVALUATION ADULT - OTHER CALCULATIONS
Upper-IBW used to calculate energy needs due to pt's current body weight exceeding 120% of IBW   Adjusted for age/BMI, HF/Renal -- Fluids per team

## 2025-02-04 NOTE — PROGRESS NOTE ADULT - PROBLEM SELECTOR PLAN 6
A1C 6.9  --will continue FS's and ICS PRN. A1C 6.9  - C/w FS's and ICS PRN. KEN on CKD Stage 3 (baseline Cr ~1.3-1.5 from prior Labs)   - Today 1.54, improved with Fluids likely due to hypovolemia episode vs dehydration   - Urine studies: 2/3: Na 71, Protein 27, Creatinine 108, Pr/Cr Ratio .2, Urea Nitrogen 832, K 21, Osmol 505

## 2025-02-04 NOTE — DIETITIAN INITIAL EVALUATION ADULT - DIET TYPE
Pt requesting to change thiamine IVPB to oral. Pt also refusing new IV because she is a hard stick. s/p 2 IV try by RN. Diet to be advanced in 24-48hrs as medically feasible; DASHTLC, Consistent Carbohydrate diet.   - Monitor %PO intake. Add oral nutrition supplements PRN Pending %PO intake.  - Monitor for Diet tolerance. Should pt be noted with s/s of issues swallowing, recommend NPO/SLP.

## 2025-02-04 NOTE — DIETITIAN INITIAL EVALUATION ADULT - ADD RECOMMEND
1. Monitor Skin, Wt.  2. Close assessment of Glucose/POCT.  - Once pt has transitioned to Consistent Carbohydrate Diet, Adjust medications accordingly based on Glucose/POCT readings and patterns.  3. GI/Pain per team. Optimize regime PRN.   4. RD to remain available for additional nutrition interventions as needed.

## 2025-02-04 NOTE — PROGRESS NOTE ADULT - ASSESSMENT
79 yr old F with PMHx of HTN, hyperlipidemia, DM, hypothyroidism, GERD, asthma, CAD s/p prior PCIs at [facility] 11/2023 who presented to [facility] [place] [facility] 1/24/25 with chest pain and URI symptoms x 2-3 days, R/I NSTEMI, s/p [email or web address] revealing 3VD and newly reduced EF, initially transferred to [facility] 9LACH under Dr. 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 Course complicated by KEN with peak Creatinine 2.7 now downtrending to 2.09 likely in setting of Hypovolemia s/p IV fluids, Medicine/Pulm Consulted given CT findings and Leukocytosis, Infectious work up negative treated for empiric coverage with Vanc/Zosyn now DC with plan for potential    79 yr old F with PMHx of HTN, hyperlipidemia, DM, hypothyroidism, GERD, asthma, CAD s/p prior PCIs at [facility] 11/2023 who presented to [facility] 1/24/25 with chest pain and URI symptoms x 2-3 days, R/I NSTEMI, s/p [email or web address] revealing 3VD and newly reduced EF, initially transferred to [facility] 9[facility] under Dr. 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 Course complicated by KEN with peak Creatinine 2.7 now downtrending to 2.09 likely in setting of Hypovolemia s/p IV fluids, Medicine/Pulm Consulted given CT findings and Leukocytosis, Infectious work up negative treated for empiric coverage with Vanc/Zosyn now DC with plan for potential viability study.   79 yr old F with PMHx of HTN, hyperlipidemia, DM, hypothyroidism, GERD, asthma, CAD s/p prior PCIs at [facility] 11/2023 who presented to [facility] 1/24/25 with chest pain and URI symptoms x 2-3 days, R/I NSTEMI, s/p [email or web address] revealing 3VD and newly reduced EF, initially transferred to [facility] 9[facility] under Dr. 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 Course complicated by KEN with peak Creatinine 2.7 now downtrending likely in setting of Hypovolemia s/p IV fluids. Course further c/b anemia pt now s/p 2u prbc on 2/4. Medicine/Pulm Consulted given CT findings and Leukocytosis, Infectious work up negative treated for empiric coverage with Vanc/Zosyn (completed). Plan for potential viability study.

## 2025-02-04 NOTE — PROGRESS NOTE ADULT - PROBLEM SELECTOR PLAN 3
CT Chest 1/31/2025 Persistent small large airways inflammation with interval increase in mucous plugging with near complete obstruction of the lateral segmental bronchus to the RML, RLL bronchus and anteromedial basal segmental bronchus to the LLL. Associated groundglass opacities are identified and early multifocal pneumonia possibly postobstructive cannot be excluded.       Pulm Consulted: low suspicion for PNA, noted to have appearance of endobronchial material in superior segment RLL bronchus.   --Recommended outpatient pulm follow up w PFTs (to diagnosis obstructive lung disease) and consideration of repeat imaging.   --WBC on Admission 13--->17.87->20.27, today 17 MRSA swab + at [facility]   --UA normal, Blood Cultures 1/31 NGTD, Procal normal.   --Medicine Following appreciate gerald MCCLAIN'ed Tanna and Yasmine    **OF note during prior admission 11/2023: had issues of Elevated Leukocytosis and Fever ID was consulted was attributed to Post op fever. Infectious work-up was negative at the time. Pulm Consulted: low suspicion for PNA, noted to have appearance of endobronchial material in superior segment RLL bronchus.   - CT Chest 1/31/2025 Persistent small large airways inflammation with interval increase in mucous plugging with near complete obstruction of the lateral segmental bronchus to the RML, RLL bronchus and anteromedial basal segmental bronchus to the LLL. Associated groundglass opacities are identified and early multifocal pneumonia possibly postobstructive cannot be excluded.   - Recommended outpatient pulm follow up w PFTs (to diagnosis obstructive lung disease) and consideration of repeat imaging.   - WBC on Admission 13--->17.87->20.27, today 17 MRSA swab + at [facility].   - UA normal, Blood Cultures 1/31 NGTD, Procal normal.   - Medicine Following appreciate gerald MCCLAIN'ed Tanna and Yasmine    **OF note during prior admission 11/2023: had issues of Elevated Leukocytosis and Fever ID was consulted was attributed to Post op fever. Infectious work-up was negative at the time. Pt is s/p 2u prbc 2/4/25 AM  - Hgb on admission 10.1. Steadily downtrending   - Hgb this AM 7.5  - F/u AM CBC

## 2025-02-04 NOTE — PROGRESS NOTE ADULT - ASSESSMENT
INCOMPLETE  78 yo female with PMH of HTN, HLD, Type II DM, triple vessel CAD s/p 3 stents at [facility] 11/2023, CHF, asthma, GERD, and hypothyroidism who presented to [facility] [place] [place] with CP and SOB on 1/24, found to have NSTEMI, pending PCI scheduled for today w Dr. Partida. Medicine consulted for leukocytosis.     #Leukocytosis   Pt presenting with leukocytosis without clear source of infection. Completed course of ABx at [facility] [place] [place] inaddition to IV steroids. Zosyn stopped yesterday and last dose of vanc this AM. White count down trending and procal minimally elevated. Presentation of leukocytosis likely reactive and as result of steroid use. Very low concern for active infection  WBC today 18.66 (17.44 day prior)  As above, patient received IV Solumedrol for asthma exacerbation and IV Vanc/Zosyn for PNA at [facility] [place] [place].   - Discontinue all antibiotics and monitor     #Asthma  Pt with history of asthma at home uses symbicort 160 BID and Montelukast 10mg qd.   - please order symbicort and montelukast for hospital stay  - c/w duoneb q6hrs    # LE pain and swelling  Pt with new onset left thigh/groin pain. Worsens with flexion and movement of the leg. Currently on full AC heparin gtt.  - obtain LE dopplers to r/o DVTs. Although will not acutely  but would be beneficial for long term management if positive  - PT consult    78 yo female with PMH of HTN, HLD, Type II DM, triple vessel CAD s/p 3 stents at [facility] 11/2023, CHF, asthma, GERD, and hypothyroidism who presented to [facility] with CP and SOB on 1/24, found to have NSTEMI, pending PCI possible today w Dr. Partida. Medicine consulted for leukocytosis.     #Leukocytosis   Pt presenting with leukocytosis without clear source of infection. Completed course of ABx at [facility] inaddition to IV steroids. Zosyn stopped yesterday and last dose of vanc this AM. Presentation of leukocytosis likely reactive and as result of steroid use. Very low concern for active infection  WBC today 18.66 (17.44 day prior). Patient with new onset tachypnea- patient without rhonchi of PE. Patient reports LLE pain. On PE, no rash or signs of cellulitis  As above, patient received IV Solumedrol for asthma exacerbation and IV Vanc/Zosyn for PNA at [facility].   Plan  - monitor off abx as patient not febrile  - CXR to ensure no development of infiltrate  - please treat patient for pain     #Asthma  Pt with history of asthma at home uses symbicort 160 BID and Montelukast 10mg qd.   - c/w symbicort (advair).   - ensure patient receives montelukast daily  - c/w duoneb q6hrs     # LE pain and swelling  Pt with new onset left thigh/groin pain. Worsens with flexion and movement of the leg. Currently on full AC heparin gtt.  UE and LLE doppler US without evidence of acute DVT  Plan  - lidocaine patch   - tylenol q6 hrs prn for pain  - PT consult      Plan discussed with Dr. Brewster. Primary team updated   78 yo female with PMH of HTN, HLD, Type II DM, triple vessel CAD s/p 3 stents at [facility] 11/2023, CHF, asthma, GERD, and hypothyroidism who presented to [facility] [place] with CP and SOB on 1/24, found to have NSTEMI, pending PCI possible today w Dr. Partida. Medicine consulted for leukocytosis.     #Leukocytosis   Pt presenting with leukocytosis without clear source of infection. Completed course of ABx at [facility] [place] inaddition to IV steroids. Zosyn stopped yesterday and last dose of vanc this AM. Presentation of leukocytosis likely reactive and as result of steroid use. Very low concern for active infection  WBC today 18.66 (17.44 day prior). Patient with new onset tachypnea- patient without rhonchi of PE. Patient reports LLE pain. On PE, no rash or signs of cellulitis  As above, patient received IV Solumedrol for asthma exacerbation and IV Vanc/Zosyn for PNA at [facility] [place] [facility].   Plan  - monitor off abx as patient not febrile  - CXR to ensure no development of infiltrate  - please treat patient for pain     #Asthma  Pt with history of asthma at home uses symbicort 160 BID and Montelukast 10mg qd.   - c/w symbicort (advair).   - ensure patient receives montelukast daily  - c/w duoneb q6hrs     # LE pain and swelling  Pt with new onset left thigh/groin pain. Worsens with flexion and movement of the leg. Currently on full AC heparin gtt.  UE and LLE doppler US without evidence of acute DVT  Plan  - lidocaine patch   - tylenol q6 hrs prn for pain  - PT consult     #DM2  Patient with known diagnosis of DM2  1/30/25 a1c 6.9  Patient currently on 15u Lantus and 5 u premeal.   Patient with fasting blood sugar 225, 206  Premeal: 160-190   Plan  - increase lantus to 20u at bedtime  -increase premeal lispro insulin to 8 u   -c/w moderate ISF-25 lispro sliding scale AC+qHS  - monitor FSG q 6 hrs  - ensure diet includes consistent carb diet wout snacks    #anemia  Patient with acute drop in Hgb this AM (7.5 this AM, down from 9.1 yesterday). Patient denies any hemoptysis, melena, dark stools, BRBPR, hematuria, or pulled IV lines causing blood loss.   Patient receiving blood this AM  Differential includes iron deficiency vs AOCD vs vitamin deficiency.  Plan  - order reticulocyte index  - if RI elevated- order LDH, bilirubin, and haptoglobin  - if RI decreased- order ferritin, TIBC, iron total, folate, B12  - follow up post transfusion CBC  - daily CBC  - maintain active T&S; 2 large bore IVs    #hyponatremia   Patient with Na 130 this AM (132 corrected for hypoglycemia)  Patient currently not on diuretics w renal function improving  Most likely Hypotonic hyponatremia iso HF vs SIADH 2/2 pain  Plan  - order urine studies (including urine osmolality, urine Na)  - control pain (pending possible PCI, lidocaine patch and tylenol prn)    #KEN  KEN on CKD Stage 3 (baseline Cr ~1.3-1.5 from prior Labs)   Patient with improving Cr, currently 33/1.54 w 2/2 BUN/Cr 55/2.78  Most likely iso hypovolemia vs diuretic use vs dehydration  Plan  - trend BUN/Cr  - strict I's and O's  - avoid nephrotoxic drugs (NSAIDs, ACEI/ARBS, diuretics), renally dose meds      Plan discussed with Dr. Brewster. Primary team updated

## 2025-02-04 NOTE — DIETITIAN INITIAL EVALUATION ADULT - PERTINENT MEDS FT
MEDICATIONS  (STANDING):  albuterol/ipratropium for Nebulization 3 milliLiter(s) Nebulizer every 6 hours  aspirin enteric coated 81 milliGRAM(s) Oral daily  atorvastatin 40 milliGRAM(s) Oral at bedtime  clopidogrel Tablet 75 milliGRAM(s) Oral daily  dextrose 5%. 1000 milliLiter(s) (50 mL/Hr) IV Continuous <Continuous>  dextrose 5%. 1000 milliLiter(s) (100 mL/Hr) IV Continuous <Continuous>  dextrose 50% Injectable 25 Gram(s) IV Push once  dextrose 50% Injectable 12.5 Gram(s) IV Push once  dextrose 50% Injectable 25 Gram(s) IV Push once  fluticasone propionate/ salmeterol 250-50 MICROgram(s) Diskus 1 Dose(s) Inhalation two times a day  glucagon  Injectable 1 milliGRAM(s) IntraMuscular once  heparin  Infusion. 950 Unit(s)/Hr (9.5 mL/Hr) IV Continuous <Continuous>  insulin glargine Injectable (LANTUS) 15 Unit(s) SubCutaneous at bedtime  insulin lispro (ADMELOG) corrective regimen sliding scale   SubCutaneous Before meals and at bedtime  insulin lispro Injectable (ADMELOG) 5 Unit(s) SubCutaneous three times a day before meals  isosorbide   dinitrate Tablet (ISORDIL) 5 milliGRAM(s) Oral three times a day  levothyroxine 75 MICROGram(s) Oral daily  montelukast 10 milliGRAM(s) Oral daily  mupirocin 2% Nasal 1 Application(s) Both Nostrils two times a day  pantoprazole    Tablet 40 milliGRAM(s) Oral before breakfast  senna 2 Tablet(s) Oral at bedtime  sodium chloride 0.9% lock flush 3 milliLiter(s) IV Push every 8 hours  sodium chloride 3%  Inhalation 4 milliLiter(s) Inhalation every 12 hours    MEDICATIONS  (PRN):  acetaminophen     Tablet .. 650 milliGRAM(s) Oral every 6 hours PRN Moderate Pain (4 - 6)  albuterol    90 MICROgram(s) HFA Inhaler 2 Puff(s) Inhalation every 4 hours PRN Bronchospasm  dextrose Oral Gel 15 Gram(s) Oral once PRN Blood Glucose LESS THAN 70 milliGRAM(s)/deciliter  heparin   Injectable 5000 Unit(s) IV Push every 6 hours PRN For aPTT less than 40  polyethylene glycol 3350 17 Gram(s) Oral daily PRN Constipation

## 2025-02-04 NOTE — PROGRESS NOTE ADULT - NS ATTEND AMEND GEN_ALL_CORE FT
Mr. Petersen is a 79F w HTN, CAD (PCI in 2023), HFrEF (LVEF 44%), hypothyroid, presented initially to [place] with fevers and cough treated with CTX and also ruled in for NSTEMI. Found to have distal LM and prox LAD ISR as well as ostial to prox LCx restenosis and RCA total occlusion and sent to [facility] for CABG evaluation. Declined as CABG evaluation and pending optimization for potential percutaneous intervention. Complicated by fevers treated course with Abx- work up negative. Anemia- work up pending and KEN.     Exam:  NAD  JVP Normal  Lungs decreased at bases but otherwise clear  WWP, no edema    - ICM- EF mild reduced. With severe multivessel and LM disease. Discussed with Dr. Partida, will further optimize and may need viability study.   - CAD- switch to lovenox, dapt. PPI  - Anemia- Iron studies, 2UPRBC.   - KEN- s/p diuresis and sepsis likely over diuresed. Hold diuretics. Remain euvolemic. Getting blood today monitor volume  -Leukocytosis- Received course at [facility]. Empiric abx trialed but stopped.

## 2025-02-04 NOTE — PROGRESS NOTE ADULT - PROBLEM SELECTOR PLAN 5
KEN on CKD Stage 3 (baseline Cr ~1.3-1.5 from prior Labs)   --Cr uptrended to 2.21--2.7 and downtrended today to 2.09, improved with Fluids likely due to hypovolemia episode vs dehydration   - Holding Arb/Diuretic   --will renally dose medications and avoid nephrotoxic agents.  --Urine studies   2/3: Sodium 71, Protein 27, Creatinine 108, Pr/Cr Ratio .2, Urea Nitrogen 832, Potassium 21, Osmol 505 KEN on CKD Stage 3 (baseline Cr ~1.3-1.5 from prior Labs)   - Today 1.54, improved with Fluids likely due to hypovolemia episode vs dehydration   - Urine studies: 2/3: Na 71, Protein 27, Creatinine 108, Pr/Cr Ratio .2, Urea Nitrogen 832, K 21, Osmol 505 - C/w Levothyroxine 75mcg PO daily.  - TSH 1.390

## 2025-02-04 NOTE — DIETITIAN INITIAL EVALUATION ADULT - PERTINENT LABORATORY DATA
02-04    130[L]  |  99  |  33[H]  ----------------------------<  199[H]  5.2   |  19[L]  |  1.54[H]    Ca    8.2[L]      04 Feb 2025 02:04  Mg     1.9     02-04    TPro  5.4[L]  /  Alb  3.2[L]  /  TBili  0.5  /  DBili  x   /  AST  21  /  ALT  17  /  AlkPhos  48  02-04  POCT Blood Glucose.: 204 mg/dL (02-04-25 @ 12:33)  A1C with Estimated Average Glucose Result: 6.9 % (01-31-25 @ 06:54)  A1C with Estimated Average Glucose Result: 7.0 % (01-30-25 @ 21:24)

## 2025-02-04 NOTE — PROGRESS NOTE ADULT - PROBLEM SELECTOR PLAN 4
continue HOME MED: Levothyroxine 75mcg PO daily.  --TSH 1.390 - C/w Levothyroxine 75mcg PO daily.  - TSH 1.390 Pulm Consulted: low suspicion for PNA, noted to have appearance of endobronchial material in superior segment RLL bronchus.   - CT Chest 1/31/2025 Persistent small large airways inflammation with interval increase in mucous plugging with near complete obstruction of the lateral segmental bronchus to the RML, RLL bronchus and anteromedial basal segmental bronchus to the LLL. Associated groundglass opacities are identified and early multifocal pneumonia possibly postobstructive cannot be excluded.   - Recommended outpatient pulm follow up w PFTs (to diagnosis obstructive lung disease) and consideration of repeat imaging.   - WBC on Admission 13--->17.87->20.27, today 17 MRSA swab + at [facility] [place].   - UA normal, Blood Cultures 1/31 NGTD, Procal normal.   - Medicine Following appreciate gerald MCCLAIN'ed Tanna and Yasmine    **OF note during prior admission 11/2023: had issues of Elevated Leukocytosis and Fever ID was consulted was attributed to Post op fever. Infectious work-up was negative at the time.

## 2025-02-04 NOTE — PROGRESS NOTE ADULT - PROBLEM SELECTOR PLAN 8
Pulm following Appreciate Recs   Duonebs, Hypertonic Saline to aid with Mucous plugging  Continue with symbicort and Montelukast     N: DASH  E: Maintain K>4.0 and Mg>2.0  VTE PPx: Lovenox   Code: Full Pulm following Appreciate Recs   - Duonebs, Hypertonic Saline to aid with Mucous plugging  - C/w symbicort and Montelukast     N: DASH  E: Maintain K>4.0 and Mg>2.0  VTE PPx: Lovenox   Code: Full -170s  - As above

## 2025-02-04 NOTE — PROGRESS NOTE ADULT - PROBLEM SELECTOR PLAN 9
Pulm following Appreciate Recs   - Duonebs, Hypertonic Saline to aid with Mucous plugging  - C/w symbicort and Montelukast     N: DASH  E: Maintain K>4.0 and Mg>2.0  VTE PPx: Lovenox   Code: Full

## 2025-02-04 NOTE — PROGRESS NOTE ADULT - PROBLEM SELECTOR PLAN 2
New Acute CHF likely in setting of NSTEMI EF at [facility] was 25%, Echo on Arrival improved to 1/31/2025 44%   Echo As Above  ETIOLOGY: Ischemic   DIURESIS: on hold in setting of KEN.  GDMT: Toprol increased to 25mg PO daily, Losartan on HOLD in setting of KEN.    Advanced CHF Consulted: pt well compensated, HF team signed off. New Acute CHF likely in setting of NSTEMI EF at [facility] was 25%, Echo on Arrival improved to 1/31/2025 44%   - Echo As Above  - ETIOLOGY: Ischemic   - DIURESIS: on hold in setting of KEN.  - GDMT: Toprol increased to 50mg PO daily, isordil 5mg TID. Losartan on HOLD in setting of KEN.    Advanced CHF Consulted: pt well compensated, HF team signed off.

## 2025-02-05 ENCOUNTER — RESULT REVIEW (OUTPATIENT)
Age: 80
End: 2025-02-05

## 2025-02-05 LAB
ADD ON TEST-SPECIMEN IN LAB: SIGNIFICANT CHANGE UP
ADD ON TEST-SPECIMEN IN LAB: SIGNIFICANT CHANGE UP
ALBUMIN SERPL ELPH-MCNC: 2.6 G/DL — LOW (ref 3.3–5)
ALBUMIN SERPL ELPH-MCNC: 2.6 G/DL — LOW (ref 3.3–5)
ALBUMIN SERPL ELPH-MCNC: 2.9 G/DL — LOW (ref 3.3–5)
ALBUMIN SERPL ELPH-MCNC: 3 G/DL — LOW (ref 3.3–5)
ALP SERPL-CCNC: 44 U/L — SIGNIFICANT CHANGE UP (ref 40–120)
ALP SERPL-CCNC: 49 U/L — SIGNIFICANT CHANGE UP (ref 40–120)
ALP SERPL-CCNC: 49 U/L — SIGNIFICANT CHANGE UP (ref 40–120)
ALP SERPL-CCNC: 53 U/L — SIGNIFICANT CHANGE UP (ref 40–120)
ALT FLD-CCNC: 23 U/L — SIGNIFICANT CHANGE UP (ref 10–45)
ALT FLD-CCNC: 24 U/L — SIGNIFICANT CHANGE UP (ref 10–45)
ALT FLD-CCNC: 39 U/L — SIGNIFICANT CHANGE UP (ref 10–45)
ALT FLD-CCNC: 40 U/L — SIGNIFICANT CHANGE UP (ref 10–45)
ANION GAP SERPL CALC-SCNC: 10 MMOL/L — SIGNIFICANT CHANGE UP (ref 5–17)
ANION GAP SERPL CALC-SCNC: 15 MMOL/L — SIGNIFICANT CHANGE UP (ref 5–17)
ANION GAP SERPL CALC-SCNC: 18 MMOL/L — HIGH (ref 5–17)
ANION GAP SERPL CALC-SCNC: 23 MMOL/L — HIGH (ref 5–17)
APPEARANCE UR: ABNORMAL
APTT BLD: 42.4 SEC — HIGH (ref 24.5–35.6)
APTT BLD: 44.2 SEC — HIGH (ref 24.5–35.6)
AST SERPL-CCNC: 33 U/L — SIGNIFICANT CHANGE UP (ref 10–40)
AST SERPL-CCNC: 35 U/L — SIGNIFICANT CHANGE UP (ref 10–40)
AST SERPL-CCNC: 58 U/L — HIGH (ref 10–40)
AST SERPL-CCNC: 63 U/L — HIGH (ref 10–40)
B-OH-BUTYR SERPL-SCNC: 0.3 MMOL/L — SIGNIFICANT CHANGE UP
BASOPHILS # BLD AUTO: 0 K/UL — SIGNIFICANT CHANGE UP (ref 0–0.2)
BASOPHILS NFR BLD AUTO: 0 % — SIGNIFICANT CHANGE UP (ref 0–2)
BILIRUB SERPL-MCNC: 0.5 MG/DL — SIGNIFICANT CHANGE UP (ref 0.2–1.2)
BILIRUB SERPL-MCNC: 0.5 MG/DL — SIGNIFICANT CHANGE UP (ref 0.2–1.2)
BILIRUB SERPL-MCNC: 0.7 MG/DL — SIGNIFICANT CHANGE UP (ref 0.2–1.2)
BILIRUB SERPL-MCNC: 0.8 MG/DL — SIGNIFICANT CHANGE UP (ref 0.2–1.2)
BILIRUB UR-MCNC: NEGATIVE — SIGNIFICANT CHANGE UP
BLD GP AB SCN SERPL QL: NEGATIVE — SIGNIFICANT CHANGE UP
BUN SERPL-MCNC: 26 MG/DL — HIGH (ref 7–23)
BUN SERPL-MCNC: 31 MG/DL — HIGH (ref 7–23)
CALCIUM SERPL-MCNC: 6.9 MG/DL — LOW (ref 8.4–10.5)
CALCIUM SERPL-MCNC: 7.6 MG/DL — LOW (ref 8.4–10.5)
CALCIUM SERPL-MCNC: 7.7 MG/DL — LOW (ref 8.4–10.5)
CALCIUM SERPL-MCNC: 8.5 MG/DL — SIGNIFICANT CHANGE UP (ref 8.4–10.5)
CHLORIDE SERPL-SCNC: 100 MMOL/L — SIGNIFICANT CHANGE UP (ref 96–108)
CHLORIDE SERPL-SCNC: 100 MMOL/L — SIGNIFICANT CHANGE UP (ref 96–108)
CHLORIDE SERPL-SCNC: 101 MMOL/L — SIGNIFICANT CHANGE UP (ref 96–108)
CHLORIDE SERPL-SCNC: 93 MMOL/L — LOW (ref 96–108)
CK MB CFR SERPL CALC: 4.6 NG/ML — SIGNIFICANT CHANGE UP (ref 0–6.7)
CK MB CFR SERPL CALC: 4.6 NG/ML — SIGNIFICANT CHANGE UP (ref 0–6.7)
CK SERPL-CCNC: 938 U/L — HIGH (ref 25–170)
CK SERPL-CCNC: 959 U/L — HIGH (ref 25–170)
CO2 SERPL-SCNC: 12 MMOL/L — LOW (ref 22–31)
CO2 SERPL-SCNC: 14 MMOL/L — LOW (ref 22–31)
CO2 SERPL-SCNC: 19 MMOL/L — LOW (ref 22–31)
CO2 SERPL-SCNC: 22 MMOL/L — SIGNIFICANT CHANGE UP (ref 22–31)
COLOR SPEC: YELLOW — SIGNIFICANT CHANGE UP
CREAT SERPL-MCNC: 1.52 MG/DL — HIGH (ref 0.5–1.3)
CREAT SERPL-MCNC: 1.56 MG/DL — HIGH (ref 0.5–1.3)
CREAT SERPL-MCNC: 1.61 MG/DL — HIGH (ref 0.5–1.3)
CREAT SERPL-MCNC: 1.78 MG/DL — HIGH (ref 0.5–1.3)
DIFF PNL FLD: ABNORMAL
EGFR: 29 ML/MIN/1.73M2 — LOW
EGFR: 33 ML/MIN/1.73M2 — LOW
EGFR: 34 ML/MIN/1.73M2 — LOW
EGFR: 35 ML/MIN/1.73M2 — LOW
EOSINOPHIL # BLD AUTO: 0.26 K/UL — SIGNIFICANT CHANGE UP (ref 0–0.5)
EOSINOPHIL NFR BLD AUTO: 0.9 % — SIGNIFICANT CHANGE UP (ref 0–6)
FERRITIN SERPL-MCNC: 1860 NG/ML — HIGH (ref 13–330)
FOLATE SERPL-MCNC: 19.2 NG/ML — SIGNIFICANT CHANGE UP
GLUCOSE SERPL-MCNC: 298 MG/DL — HIGH (ref 70–99)
GLUCOSE SERPL-MCNC: 343 MG/DL — HIGH (ref 70–99)
GLUCOSE SERPL-MCNC: 353 MG/DL — HIGH (ref 70–99)
GLUCOSE SERPL-MCNC: 405 MG/DL — HIGH (ref 70–99)
GLUCOSE UR QL: 100 MG/DL
HCT VFR BLD CALC: 23.8 % — LOW (ref 34.5–45)
HCT VFR BLD CALC: 23.9 % — LOW (ref 34.5–45)
HCT VFR BLD CALC: 27.4 % — LOW (ref 34.5–45)
HCT VFR BLD CALC: 27.4 % — LOW (ref 34.5–45)
HCT VFR BLD CALC: 28.6 % — LOW (ref 34.5–45)
HGB BLD-MCNC: 10.2 G/DL — LOW (ref 11.5–15.5)
HGB BLD-MCNC: 7.7 G/DL — LOW (ref 11.5–15.5)
HGB BLD-MCNC: 7.7 G/DL — LOW (ref 11.5–15.5)
HGB BLD-MCNC: 9.2 G/DL — LOW (ref 11.5–15.5)
HGB BLD-MCNC: 9.3 G/DL — LOW (ref 11.5–15.5)
INR BLD: 1.34 — HIGH (ref 0.85–1.16)
INR BLD: 1.49 — HIGH (ref 0.85–1.16)
IRON SATN MFR SERPL: 132 UG/DL — SIGNIFICANT CHANGE UP (ref 30–160)
IRON SATN MFR SERPL: 59 % — HIGH (ref 14–50)
KETONES UR-MCNC: NEGATIVE MG/DL — SIGNIFICANT CHANGE UP
LACTATE SERPL-SCNC: 1.9 MMOL/L — SIGNIFICANT CHANGE UP (ref 0.5–2)
LACTATE SERPL-SCNC: 4 MMOL/L — CRITICAL HIGH (ref 0.5–2)
LACTATE SERPL-SCNC: 6.9 MMOL/L — CRITICAL HIGH (ref 0.5–2)
LACTATE SERPL-SCNC: 9.7 MMOL/L — CRITICAL HIGH (ref 0.5–2)
LEGIONELLA AG UR QL: NEGATIVE — SIGNIFICANT CHANGE UP
LEUKOCYTE ESTERASE UR-ACNC: NEGATIVE — SIGNIFICANT CHANGE UP
LYMPHOCYTES # BLD AUTO: 1.24 K/UL — SIGNIFICANT CHANGE UP (ref 1–3.3)
LYMPHOCYTES # BLD AUTO: 4.3 % — LOW (ref 13–44)
MAGNESIUM SERPL-MCNC: 1.9 MG/DL — SIGNIFICANT CHANGE UP (ref 1.6–2.6)
MAGNESIUM SERPL-MCNC: 1.9 MG/DL — SIGNIFICANT CHANGE UP (ref 1.6–2.6)
MAGNESIUM SERPL-MCNC: 2.2 MG/DL — SIGNIFICANT CHANGE UP (ref 1.6–2.6)
MCHC RBC-ENTMCNC: 29.3 PG — SIGNIFICANT CHANGE UP (ref 27–34)
MCHC RBC-ENTMCNC: 29.5 PG — SIGNIFICANT CHANGE UP (ref 27–34)
MCHC RBC-ENTMCNC: 30.7 PG — SIGNIFICANT CHANGE UP (ref 27–34)
MCHC RBC-ENTMCNC: 30.8 PG — SIGNIFICANT CHANGE UP (ref 27–34)
MCHC RBC-ENTMCNC: 30.9 PG — SIGNIFICANT CHANGE UP (ref 27–34)
MCHC RBC-ENTMCNC: 32.2 G/DL — SIGNIFICANT CHANGE UP (ref 32–36)
MCHC RBC-ENTMCNC: 32.4 G/DL — SIGNIFICANT CHANGE UP (ref 32–36)
MCHC RBC-ENTMCNC: 33.6 G/DL — SIGNIFICANT CHANGE UP (ref 32–36)
MCHC RBC-ENTMCNC: 33.9 G/DL — SIGNIFICANT CHANGE UP (ref 32–36)
MCHC RBC-ENTMCNC: 35.7 G/DL — SIGNIFICANT CHANGE UP (ref 32–36)
MCV RBC AUTO: 86.7 FL — SIGNIFICANT CHANGE UP (ref 80–100)
MCV RBC AUTO: 87.3 FL — SIGNIFICANT CHANGE UP (ref 80–100)
MCV RBC AUTO: 90.4 FL — SIGNIFICANT CHANGE UP (ref 80–100)
MCV RBC AUTO: 91.2 FL — SIGNIFICANT CHANGE UP (ref 80–100)
MCV RBC AUTO: 95.6 FL — SIGNIFICANT CHANGE UP (ref 80–100)
MONOCYTES # BLD AUTO: 1.99 K/UL — HIGH (ref 0–0.9)
MONOCYTES NFR BLD AUTO: 6.9 % — SIGNIFICANT CHANGE UP (ref 2–14)
MRSA PCR RESULT.: POSITIVE
NEUTROPHILS # BLD AUTO: 24.57 K/UL — HIGH (ref 1.8–7.4)
NEUTROPHILS NFR BLD AUTO: 83.6 % — HIGH (ref 43–77)
NITRITE UR-MCNC: NEGATIVE — SIGNIFICANT CHANGE UP
NRBC # BLD: 3 /100 WBCS — HIGH (ref 0–0)
NRBC # BLD: 4 /100 WBCS — HIGH (ref 0–0)
NRBC # BLD: SIGNIFICANT CHANGE UP /100 WBCS (ref 0–0)
NRBC BLD-RTO: 3 /100 WBCS — HIGH (ref 0–0)
NRBC BLD-RTO: 4 /100 WBCS — HIGH (ref 0–0)
NRBC BLD-RTO: SIGNIFICANT CHANGE UP /100 WBCS (ref 0–0)
PH UR: 5.5 — SIGNIFICANT CHANGE UP (ref 5–8)
PHOSPHATE SERPL-MCNC: 7.9 MG/DL — HIGH (ref 2.5–4.5)
PLATELET # BLD AUTO: 100 K/UL — LOW (ref 150–400)
PLATELET # BLD AUTO: 111 K/UL — LOW (ref 150–400)
PLATELET # BLD AUTO: 149 K/UL — LOW (ref 150–400)
PLATELET # BLD AUTO: 187 K/UL — SIGNIFICANT CHANGE UP (ref 150–400)
PLATELET # BLD AUTO: 203 K/UL — SIGNIFICANT CHANGE UP (ref 150–400)
POTASSIUM SERPL-MCNC: 5.6 MMOL/L — HIGH (ref 3.5–5.3)
POTASSIUM SERPL-MCNC: 5.7 MMOL/L — HIGH (ref 3.5–5.3)
POTASSIUM SERPL-MCNC: 5.7 MMOL/L — HIGH (ref 3.5–5.3)
POTASSIUM SERPL-MCNC: 6.4 MMOL/L — CRITICAL HIGH (ref 3.5–5.3)
POTASSIUM SERPL-SCNC: 5.6 MMOL/L — HIGH (ref 3.5–5.3)
POTASSIUM SERPL-SCNC: 5.7 MMOL/L — HIGH (ref 3.5–5.3)
POTASSIUM SERPL-SCNC: 5.7 MMOL/L — HIGH (ref 3.5–5.3)
POTASSIUM SERPL-SCNC: 6.4 MMOL/L — CRITICAL HIGH (ref 3.5–5.3)
PROT SERPL-MCNC: 4 G/DL — LOW (ref 6–8.3)
PROT SERPL-MCNC: 4.1 G/DL — LOW (ref 6–8.3)
PROT SERPL-MCNC: 4.8 G/DL — LOW (ref 6–8.3)
PROT SERPL-MCNC: 5.3 G/DL — LOW (ref 6–8.3)
PROT UR-MCNC: 30 MG/DL
PROTHROM AB SERPL-ACNC: 15.4 SEC — HIGH (ref 9.9–13.4)
PROTHROM AB SERPL-ACNC: 17.1 SEC — HIGH (ref 9.9–13.4)
RBC # BLD: 2.5 M/UL — LOW (ref 3.8–5.2)
RBC # BLD: 2.61 M/UL — LOW (ref 3.8–5.2)
RBC # BLD: 3.03 M/UL — LOW (ref 3.8–5.2)
RBC # BLD: 3.14 M/UL — LOW (ref 3.8–5.2)
RBC # BLD: 3.3 M/UL — LOW (ref 3.8–5.2)
RBC # FLD: 14.4 % — SIGNIFICANT CHANGE UP (ref 10.3–14.5)
RBC # FLD: 14.6 % — HIGH (ref 10.3–14.5)
RBC # FLD: 14.7 % — HIGH (ref 10.3–14.5)
RBC # FLD: 14.7 % — HIGH (ref 10.3–14.5)
RBC # FLD: 14.8 % — HIGH (ref 10.3–14.5)
RH IG SCN BLD-IMP: NEGATIVE — SIGNIFICANT CHANGE UP
S AUREUS DNA NOSE QL NAA+PROBE: POSITIVE
S PNEUM AG UR QL: NEGATIVE — SIGNIFICANT CHANGE UP
SODIUM SERPL-SCNC: 128 MMOL/L — LOW (ref 135–145)
SODIUM SERPL-SCNC: 129 MMOL/L — LOW (ref 135–145)
SODIUM SERPL-SCNC: 133 MMOL/L — LOW (ref 135–145)
SODIUM SERPL-SCNC: 137 MMOL/L — SIGNIFICANT CHANGE UP (ref 135–145)
SP GR SPEC: 1.02 — SIGNIFICANT CHANGE UP (ref 1–1.03)
TIBC SERPL-MCNC: 223 UG/DL — SIGNIFICANT CHANGE UP (ref 220–430)
TROPONIN T, HIGH SENSITIVITY RESULT: 847 NG/L — CRITICAL HIGH (ref 0–51)
TROPONIN T, HIGH SENSITIVITY RESULT: 899 NG/L — CRITICAL HIGH (ref 0–51)
TROPONIN T, HIGH SENSITIVITY RESULT: 966 NG/L — CRITICAL HIGH (ref 0–51)
UIBC SERPL-MCNC: 91 UG/DL — LOW (ref 110–370)
UROBILINOGEN FLD QL: 0.2 MG/DL — SIGNIFICANT CHANGE UP (ref 0.2–1)
VIT B12 SERPL-MCNC: 1057 PG/ML — SIGNIFICANT CHANGE UP (ref 232–1245)
WBC # BLD: 28.04 K/UL — HIGH (ref 3.8–10.5)
WBC # BLD: 28.8 K/UL — HIGH (ref 3.8–10.5)
WBC # BLD: 35.61 K/UL — HIGH (ref 3.8–10.5)
WBC # BLD: 36.67 K/UL — HIGH (ref 3.8–10.5)
WBC # BLD: 40.09 K/UL — CRITICAL HIGH (ref 3.8–10.5)
WBC # FLD AUTO: 28.04 K/UL — HIGH (ref 3.8–10.5)
WBC # FLD AUTO: 28.8 K/UL — HIGH (ref 3.8–10.5)
WBC # FLD AUTO: 35.61 K/UL — HIGH (ref 3.8–10.5)
WBC # FLD AUTO: 36.67 K/UL — HIGH (ref 3.8–10.5)
WBC # FLD AUTO: 40.09 K/UL — CRITICAL HIGH (ref 3.8–10.5)

## 2025-02-05 PROCEDURE — 93451 RIGHT HEART CATH: CPT | Mod: 26

## 2025-02-05 PROCEDURE — 93308 TTE F-UP OR LMTD: CPT | Mod: 26

## 2025-02-05 PROCEDURE — 99233 SBSQ HOSP IP/OBS HIGH 50: CPT

## 2025-02-05 PROCEDURE — 99152 MOD SED SAME PHYS/QHP 5/>YRS: CPT

## 2025-02-05 PROCEDURE — 71045 X-RAY EXAM CHEST 1 VIEW: CPT | Mod: 26

## 2025-02-05 PROCEDURE — 99292 CRITICAL CARE ADDL 30 MIN: CPT

## 2025-02-05 PROCEDURE — 33967 INSERT I-AORT PERCUT DEVICE: CPT

## 2025-02-05 PROCEDURE — 99291 CRITICAL CARE FIRST HOUR: CPT

## 2025-02-05 RX ORDER — SODIUM CHLORIDE 9 G/ML
1000 INJECTION, SOLUTION INTRAVENOUS
Refills: 0 | Status: DISCONTINUED | OUTPATIENT
Start: 2025-02-05 | End: 2025-02-14

## 2025-02-05 RX ORDER — PIPERACILLIN SODIUM AND TAZOBACTAM SODIUM 2; 250 G/50ML; MG/50ML
3.38 INJECTION, POWDER, FOR SOLUTION INTRAVENOUS ONCE
Refills: 0 | Status: DISCONTINUED | OUTPATIENT
Start: 2025-02-05 | End: 2025-02-05

## 2025-02-05 RX ORDER — AMIODARONE HYDROCHLORIDE 50 MG/ML
0.5 INJECTION, SOLUTION INTRAVENOUS
Qty: 450 | Refills: 0 | Status: DISCONTINUED | OUTPATIENT
Start: 2025-02-05 | End: 2025-02-05

## 2025-02-05 RX ORDER — DM/PSEUDOEPHED/ACETAMINOPHEN 10-30-250
12.5 CAPSULE ORAL ONCE
Refills: 0 | Status: DISCONTINUED | OUTPATIENT
Start: 2025-02-05 | End: 2025-02-14

## 2025-02-05 RX ORDER — PHENYLEPHRINE HYDROCHLORIDE 10 MG/ML
0.01 INJECTION INTRAVENOUS
Qty: 40 | Refills: 0 | Status: DISCONTINUED | OUTPATIENT
Start: 2025-02-05 | End: 2025-02-06

## 2025-02-05 RX ORDER — DM/PSEUDOEPHED/ACETAMINOPHEN 10-30-250
15 CAPSULE ORAL ONCE
Refills: 0 | Status: DISCONTINUED | OUTPATIENT
Start: 2025-02-05 | End: 2025-02-14

## 2025-02-05 RX ORDER — DM/PSEUDOEPHED/ACETAMINOPHEN 10-30-250
50 CAPSULE ORAL ONCE
Refills: 0 | Status: COMPLETED | OUTPATIENT
Start: 2025-02-05 | End: 2025-02-05

## 2025-02-05 RX ORDER — NITROGLYCERIN 0.4 MG/1
10 TABLET SUBLINGUAL
Qty: 50 | Refills: 0 | Status: DISCONTINUED | OUTPATIENT
Start: 2025-02-05 | End: 2025-02-05

## 2025-02-05 RX ORDER — EPINEPHRINE 5 MG/ML
1 VIAL (ML) INJECTION ONCE
Refills: 0 | Status: COMPLETED | OUTPATIENT
Start: 2025-02-05 | End: 2025-02-05

## 2025-02-05 RX ORDER — DM/PSEUDOEPHED/ACETAMINOPHEN 10-30-250
25 CAPSULE ORAL ONCE
Refills: 0 | Status: DISCONTINUED | OUTPATIENT
Start: 2025-02-05 | End: 2025-02-14

## 2025-02-05 RX ORDER — ASPIRIN 81 MG/1
81 TABLET, COATED ORAL DAILY
Refills: 0 | Status: DISCONTINUED | OUTPATIENT
Start: 2025-02-06 | End: 2025-02-07

## 2025-02-05 RX ORDER — PIPERACILLIN SODIUM AND TAZOBACTAM SODIUM 2; 250 G/50ML; MG/50ML
3.38 INJECTION, POWDER, FOR SOLUTION INTRAVENOUS ONCE
Refills: 0 | Status: COMPLETED | OUTPATIENT
Start: 2025-02-05 | End: 2025-02-05

## 2025-02-05 RX ORDER — LEVOTHYROXINE SODIUM 25 UG/1
60 TABLET ORAL
Refills: 0 | Status: DISCONTINUED | OUTPATIENT
Start: 2025-02-06 | End: 2025-02-07

## 2025-02-05 RX ORDER — PANTOPRAZOLE 20 MG/1
40 TABLET, DELAYED RELEASE ORAL EVERY 12 HOURS
Refills: 0 | Status: DISCONTINUED | OUTPATIENT
Start: 2025-02-05 | End: 2025-02-14

## 2025-02-05 RX ORDER — ATORVASTATIN CALCIUM 80 MG/1
80 TABLET, FILM COATED ORAL AT BEDTIME
Refills: 0 | Status: DISCONTINUED | OUTPATIENT
Start: 2025-02-05 | End: 2025-02-07

## 2025-02-05 RX ORDER — PROPOFOL 10 MG/ML
10 INJECTION, EMULSION INTRAVENOUS
Qty: 1000 | Refills: 0 | Status: DISCONTINUED | OUTPATIENT
Start: 2025-02-05 | End: 2025-02-07

## 2025-02-05 RX ORDER — FENTANYL CITRATE 50 UG/ML
0.5 INJECTION INTRAMUSCULAR; INTRAVENOUS
Qty: 2500 | Refills: 0 | Status: DISCONTINUED | OUTPATIENT
Start: 2025-02-05 | End: 2025-02-07

## 2025-02-05 RX ORDER — ANTISEPTIC SURGICAL SCRUB 0.04 MG/ML
1 SOLUTION TOPICAL
Refills: 0 | Status: DISCONTINUED | OUTPATIENT
Start: 2025-02-05 | End: 2025-02-14

## 2025-02-05 RX ORDER — SODIUM ZIRCONIUM CYCLOSILICATE 5 G/5G
10 POWDER, FOR SUSPENSION ORAL ONCE
Refills: 0 | Status: COMPLETED | OUTPATIENT
Start: 2025-02-05 | End: 2025-02-05

## 2025-02-05 RX ORDER — BACTERIOSTATIC SODIUM CHLORIDE 0.9 %
250 VIAL (ML) INJECTION ONCE
Refills: 0 | Status: COMPLETED | OUTPATIENT
Start: 2025-02-05 | End: 2025-02-05

## 2025-02-05 RX ORDER — INSULIN GLARGINE-YFGN 100 [IU]/ML
20 INJECTION, SOLUTION SUBCUTANEOUS AT BEDTIME
Refills: 0 | Status: DISCONTINUED | OUTPATIENT
Start: 2025-02-05 | End: 2025-02-06

## 2025-02-05 RX ORDER — NOREPINEPHRINE BITARTRATE 1 MG/ML
0.05 INJECTION, SOLUTION, CONCENTRATE INTRAVENOUS
Qty: 16 | Refills: 0 | Status: DISCONTINUED | OUTPATIENT
Start: 2025-02-05 | End: 2025-02-08

## 2025-02-05 RX ORDER — EPINEPHRINE 5 MG/ML
1 VIAL (ML) INJECTION ONCE
Refills: 0 | Status: DISCONTINUED | OUTPATIENT
Start: 2025-02-05 | End: 2025-02-05

## 2025-02-05 RX ORDER — AMIODARONE HYDROCHLORIDE 50 MG/ML
150 INJECTION, SOLUTION INTRAVENOUS ONCE
Refills: 0 | Status: COMPLETED | OUTPATIENT
Start: 2025-02-05 | End: 2025-02-05

## 2025-02-05 RX ORDER — INSULIN LISPRO 100/ML
VIAL (ML) SUBCUTANEOUS EVERY 6 HOURS
Refills: 0 | Status: DISCONTINUED | OUTPATIENT
Start: 2025-02-05 | End: 2025-02-14

## 2025-02-05 RX ORDER — GLUCAGON 3 MG/1
1 POWDER NASAL ONCE
Refills: 0 | Status: DISCONTINUED | OUTPATIENT
Start: 2025-02-05 | End: 2025-02-14

## 2025-02-05 RX ORDER — PIPERACILLIN SODIUM AND TAZOBACTAM SODIUM 2; 250 G/50ML; MG/50ML
4.5 INJECTION, POWDER, FOR SOLUTION INTRAVENOUS EVERY 8 HOURS
Refills: 0 | Status: DISCONTINUED | OUTPATIENT
Start: 2025-02-05 | End: 2025-02-06

## 2025-02-05 RX ORDER — VANCOMYCIN HYDROCHLORIDE 50 MG/ML
1250 KIT ORAL EVERY 24 HOURS
Refills: 0 | Status: DISCONTINUED | OUTPATIENT
Start: 2025-02-05 | End: 2025-02-06

## 2025-02-05 RX ORDER — ANTISEPTIC SURGICAL SCRUB 0.04 MG/ML
15 SOLUTION TOPICAL EVERY 12 HOURS
Refills: 0 | Status: DISCONTINUED | OUTPATIENT
Start: 2025-02-05 | End: 2025-02-07

## 2025-02-05 RX ORDER — AMIODARONE HYDROCHLORIDE 50 MG/ML
1 INJECTION, SOLUTION INTRAVENOUS
Qty: 450 | Refills: 0 | Status: DISCONTINUED | OUTPATIENT
Start: 2025-02-05 | End: 2025-02-05

## 2025-02-05 RX ADMIN — PROPOFOL 4.87 MICROGRAM(S)/KG/MIN: 10 INJECTION, EMULSION INTRAVENOUS at 15:04

## 2025-02-05 RX ADMIN — Medication 5: at 18:12

## 2025-02-05 RX ADMIN — ATORVASTATIN CALCIUM 80 MILLIGRAM(S): 80 TABLET, FILM COATED ORAL at 21:45

## 2025-02-05 RX ADMIN — AMIODARONE HYDROCHLORIDE 33.3 MG/MIN: 50 INJECTION, SOLUTION INTRAVENOUS at 21:57

## 2025-02-05 RX ADMIN — PIPERACILLIN SODIUM AND TAZOBACTAM SODIUM 25 GRAM(S): 2; 250 INJECTION, POWDER, FOR SOLUTION INTRAVENOUS at 21:30

## 2025-02-05 RX ADMIN — LEVOTHYROXINE SODIUM 75 MICROGRAM(S): 25 TABLET ORAL at 06:31

## 2025-02-05 RX ADMIN — AMIODARONE HYDROCHLORIDE 600 MILLIGRAM(S): 50 INJECTION, SOLUTION INTRAVENOUS at 20:39

## 2025-02-05 RX ADMIN — ASPIRIN 81 MILLIGRAM(S): 81 TABLET, COATED ORAL at 10:05

## 2025-02-05 RX ADMIN — SODIUM ZIRCONIUM CYCLOSILICATE 10 GRAM(S): 5 POWDER, FOR SUSPENSION ORAL at 18:35

## 2025-02-05 RX ADMIN — Medication 50 MILLILITER(S): at 14:26

## 2025-02-05 RX ADMIN — VANCOMYCIN HYDROCHLORIDE 166.67 MILLIGRAM(S): KIT at 17:34

## 2025-02-05 RX ADMIN — Medication 50 MILLIGRAM(S): at 06:47

## 2025-02-05 RX ADMIN — MUPIROCIN 1 APPLICATION(S): 2 CREAM TOPICAL at 21:30

## 2025-02-05 RX ADMIN — Medication 4 MILLILITER(S): at 06:20

## 2025-02-05 RX ADMIN — NOREPINEPHRINE BITARTRATE 3.8 MICROGRAM(S)/KG/MIN: 1 INJECTION, SOLUTION, CONCENTRATE INTRAVENOUS at 15:03

## 2025-02-05 RX ADMIN — Medication 1 MILLIGRAM(S): at 20:33

## 2025-02-05 RX ADMIN — NITROGLYCERIN 3 MICROGRAM(S)/MIN: 0.4 TABLET SUBLINGUAL at 10:22

## 2025-02-05 RX ADMIN — ENOXAPARIN SODIUM 80 MILLIGRAM(S): 100 INJECTION SUBCUTANEOUS at 06:31

## 2025-02-05 RX ADMIN — Medication 6 UNIT(S)/MIN: at 15:04

## 2025-02-05 RX ADMIN — IPRATROPIUM BROMIDE AND ALBUTEROL SULFATE 3 MILLILITER(S): .5; 2.5 SOLUTION RESPIRATORY (INHALATION) at 06:20

## 2025-02-05 RX ADMIN — PANTOPRAZOLE 40 MILLIGRAM(S): 20 TABLET, DELAYED RELEASE ORAL at 06:31

## 2025-02-05 RX ADMIN — Medication 500 MILLILITER(S): at 20:19

## 2025-02-05 RX ADMIN — Medication 50 MILLILITER(S): at 18:31

## 2025-02-05 RX ADMIN — PIPERACILLIN SODIUM AND TAZOBACTAM SODIUM 200 GRAM(S): 2; 250 INJECTION, POWDER, FOR SOLUTION INTRAVENOUS at 15:34

## 2025-02-05 RX ADMIN — SODIUM ZIRCONIUM CYCLOSILICATE 10 GRAM(S): 5 POWDER, FOR SUSPENSION ORAL at 15:29

## 2025-02-05 RX ADMIN — Medication 200 GRAM(S): at 18:29

## 2025-02-05 RX ADMIN — IPRATROPIUM BROMIDE AND ALBUTEROL SULFATE 3 MILLILITER(S): .5; 2.5 SOLUTION RESPIRATORY (INHALATION) at 17:01

## 2025-02-05 RX ADMIN — INSULIN GLARGINE-YFGN 20 UNIT(S): 100 INJECTION, SOLUTION SUBCUTANEOUS at 21:55

## 2025-02-05 RX ADMIN — Medication 5 UNIT(S): at 18:31

## 2025-02-05 RX ADMIN — Medication 200 GRAM(S): at 14:23

## 2025-02-05 RX ADMIN — FLUTICASONE PROPIONATE AND SALMETEROL 1 DOSE(S): 113; 14 POWDER, METERED RESPIRATORY (INHALATION) at 06:31

## 2025-02-05 RX ADMIN — ANTISEPTIC SURGICAL SCRUB 15 MILLILITER(S): 0.04 SOLUTION TOPICAL at 17:33

## 2025-02-05 RX ADMIN — IPRATROPIUM BROMIDE AND ALBUTEROL SULFATE 3 MILLILITER(S): .5; 2.5 SOLUTION RESPIRATORY (INHALATION) at 00:38

## 2025-02-05 RX ADMIN — Medication 3 MILLILITER(S): at 15:16

## 2025-02-05 RX ADMIN — Medication 3 MILLILITER(S): at 06:43

## 2025-02-05 RX ADMIN — PANTOPRAZOLE 40 MILLIGRAM(S): 20 TABLET, DELAYED RELEASE ORAL at 17:33

## 2025-02-05 RX ADMIN — Medication 75 MILLIGRAM(S): at 10:05

## 2025-02-05 RX ADMIN — ANTISEPTIC SURGICAL SCRUB 1 APPLICATION(S): 0.04 SOLUTION TOPICAL at 15:05

## 2025-02-05 RX ADMIN — Medication 5 UNIT(S): at 14:23

## 2025-02-05 RX ADMIN — FENTANYL CITRATE 4.06 MICROGRAM(S)/KG/HR: 50 INJECTION INTRAMUSCULAR; INTRAVENOUS at 15:04

## 2025-02-05 RX ADMIN — Medication 3 MILLILITER(S): at 21:35

## 2025-02-05 NOTE — PROGRESS NOTE ADULT - PROBLEM SELECTOR PLAN 4
Pulm Consulted: low suspicion for PNA, noted to have appearance of endobronchial material in superior segment RLL bronchus.   - CT Chest 1/31/2025 Persistent small large airways inflammation with interval increase in mucous plugging with near complete obstruction of the lateral segmental bronchus to the RML, RLL bronchus and anteromedial basal segmental bronchus to the LLL. Associated groundglass opacities are identified and early multifocal pneumonia possibly postobstructive cannot be excluded.   - Recommended outpatient pulm follow up w PFTs (to diagnosis obstructive lung disease) and consideration of repeat imaging.   - WBC on Admission 13--->17.87->20.27, today 17 MRSA swab + at [facility] [place].   - UA normal, Blood Cultures 1/31 NGTD, Procal normal.   - Medicine Following appreciate gerald MCCLAIN'ed Tanna and Yasmine    **OF note during prior admission 11/2023: had issues of Elevated Leukocytosis and Fever ID was consulted was attributed to Post op fever. Infectious work-up was negative at the time.

## 2025-02-05 NOTE — PROVIDER CONTACT NOTE (CHANGE IN STATUS NOTIFICATION) - ACTION/TREATMENT ORDERED:
EKG completed. Labs drawn. Nitroglycerin gtt started. Patient now with improved chest pain at 8/10.  BP now low, SBP 90s-120. Per ANTONIO Taveras keep nitroglycerin gtt running. Repeat EKG completed. Unable to obtain repeat labs. Blood started per policy. Report given to RN in cath lab. Patient transported to cath lab on zoll monitor with nitroglycerin and blood running. ANTONIO Taveras and cardiac team at bedside throughout event.
TNG x 1.  Addendum: Pt's BP prior to tng, 144/63. 5 min later, 98/60. Verified x2. Provider made aware. To bedside again. Morphine ordered. Both TNG and morphine not effective for CP. Provider is aware. Medical mngt is only option for pt's 3v disease.

## 2025-02-05 NOTE — PROGRESS NOTE ADULT - SUBJECTIVE AND OBJECTIVE BOX
---------------- Acceptance from Cardiac Telemetry to CCU-----------------------    80 yo female with PMH of HTN, HLD, Type II DM, triple vessel CAD s/p 3 stents at [facility] 11/2023, CHF, asthma, GERD, and hypothyroidism who presented to [facility] with CP and SOB on 1/24. She was having fever, chills, a productive cough and congestion for 2-3 days prior to the chest pain. She states the pain was constant and radiated to her back/left arm with associated palpitations, SOB, orthopnea and dizziness. She felt the chest pain was similar to when she had the stents prompting her to present to the ER on 1/24.In ED she was found to be tachycardic and tachypneic. She was placed on BiPAP for respiratory distress and loaded with ASA/Plavix and placed on a heparin gtt for NSTEMI. Her nasal swab was + for MRSA and she recieved CTX 1g daily. She is s/p cardiac cath showing 3V CAD and a reduced EF of 25%. She was transferred to [facility] CTS under the service of Dr. Hurst for further workup and deemed not a candidate for CTS. Pt the transferred to cardiac tele for optimization for high risk PCI. Repeat ECHO on admission showing moderate reduced EF 44%. Course c/b KEN on CKD, and multiple episodes of hypotension requiring fluids. Also the was a concern for DVT but US came back negative. Medicine/Pulm Consulted given CT findings and Leukocytosis, Infectious work up negative treated for empiric coverage with Vanc/Zosyn (completed). Course further complicated by low hgb @ 7.5. S/p 2U of pRBC on 2/4 and 1 unit ordered prior to transfer on 2/5. Additionally pt has been c/o worsening CP w/ initial EKG changes pt started on nitro gtt. CP improved slightly but SBP dropped from 170s to 90-100s. Pt now being taken to cath lab for high risk PCI w/ balloon pump and then to CCU. While in cath lab patient became hypotensive requiring intubation, transferred to CCU for further managment.         ----------------------------------------------------  CCU Resident progress note     Subjective Assessment: Pt evaluated at beside this AM. Intubated and sedated.   	  MEDICATIONS:  isosorbide   dinitrate Tablet (ISORDIL) 5 milliGRAM(s) Oral three times a day  metoprolol succinate ER 50 milliGRAM(s) Oral daily  nitroglycerin  Infusion 10 MICROgram(s)/Min IV Continuous <Continuous>  albuterol    90 MICROgram(s) HFA Inhaler 2 Puff(s) Inhalation every 4 hours PRN  albuterol/ipratropium for Nebulization 3 milliLiter(s) Nebulizer every 6 hours  fluticasone propionate/ salmeterol 250-50 MICROgram(s) Diskus 1 Dose(s) Inhalation two times a day  montelukast 10 milliGRAM(s) Oral daily  sodium chloride 3%  Inhalation 4 milliLiter(s) Inhalation every 12 hours  acetaminophen     Tablet .. 650 milliGRAM(s) Oral every 6 hours PRN  pantoprazole    Tablet 40 milliGRAM(s) Oral before breakfast  polyethylene glycol 3350 17 Gram(s) Oral daily PRN  senna 2 Tablet(s) Oral at bedtime  atorvastatin 40 milliGRAM(s) Oral at bedtime  dextrose 50% Injectable 25 Gram(s) IV Push once  dextrose 50% Injectable 12.5 Gram(s) IV Push once  dextrose 50% Injectable 25 Gram(s) IV Push once  dextrose Oral Gel 15 Gram(s) Oral once PRN  glucagon  Injectable 1 milliGRAM(s) IntraMuscular once  insulin glargine Injectable (LANTUS) 15 Unit(s) SubCutaneous at bedtime  insulin lispro (ADMELOG) corrective regimen sliding scale   SubCutaneous Before meals and at bedtime  insulin lispro Injectable (ADMELOG) 5 Unit(s) SubCutaneous three times a day before meals  levothyroxine 75 MICROGram(s) Oral daily  aspirin enteric coated 81 milliGRAM(s) Oral daily  clopidogrel Tablet 75 milliGRAM(s) Oral daily  dextrose 5%. 1000 milliLiter(s) IV Continuous <Continuous>  dextrose 5%. 1000 milliLiter(s) IV Continuous <Continuous>  enoxaparin Injectable 80 milliGRAM(s) SubCutaneous every 12 hours  mupirocin 2% Nasal 1 Application(s) Both Nostrils two times a day  sodium chloride 0.9% lock flush 3 milliLiter(s) IV Push every 8 hours      	    [PHYSICAL EXAM:  TELEMETRY:  T(C): 36.2 (02-05-25 @ 06:16), Max: 36.7 (02-04-25 @ 11:25)  HR: 104 (02-05-25 @ 06:16) (86 - 104)  BP: 109/59 (02-05-25 @ 06:16) (98/60 - 179/71)  RR: 18 (02-05-25 @ 06:16) (18 - 20)  SpO2: 98% (02-05-25 @ 06:16) (96% - 100%)  Wt(kg): --  I&O's Summary    04 Feb 2025 07:01  -  05 Feb 2025 07:00  --------------------------------------------------------  IN: 1080 mL / OUT: 900 mL / NET: 180 mL        Condon:  Central/PICC/Mid Line:                                         Physical exam   HEENT:   Normal oral mucosa, PERRL, EOMI	  Neck: Supple, - JVD; Carotid Bruit   Cardiovascular: Normal S1 S2, No JVD, No murmurs,   Respiratory: Lungs clear to auscultation. No Rales, Rhonchi, Wheezing	  Gastrointestinal:  Soft, Non-tender, + BS	  Skin: No rashes, No ecchymoses, No cyanosis  Extremities: Normal range of motion, No clubbing, cyanosis or edema  Vascular: Peripheral pulses palpable 2+ bilaterally  Neurologic: Non-focal  Psychiatry: Intubated and sedated       	                                7.7    28.04 )-----------( 203      ( 05 Feb 2025 09:19 )             23.9     02-04    130[L]  |  99  |  33[H]  ----------------------------<  199[H]  5.2   |  19[L]  |  1.54[H]    Ca    8.2[L]      04 Feb 2025 02:04  Mg     1.9     02-04    TPro  5.4[L]  /  Alb  3.2[L]  /  TBili  0.5  /  DBili  x   /  AST  21  /  ALT  17  /  AlkPhos  48  02-04    proBNP:   Lipid Profile:   HgA1c:   TSH:   PT/INR - ( 05 Feb 2025 09:19 )   PT: 15.4 sec;   INR: 1.34          PTT - ( 05 Feb 2025 09:19 )  PTT:44.2 sec       ---------------- Acceptance from Cardiac Telemetry to CCU-----------------------    80 yo female with PMH of HTN, HLD, Type II DM, triple vessel CAD s/p 3 stents at [facility] 11/2023, CHF, asthma, GERD, and hypothyroidism who presented to [facility] with CP and SOB on 1/24. She was having fever, chills, a productive cough and congestion for 2-3 days prior to the chest pain. She states the pain was constant and radiated to her back/left arm with associated palpitations, SOB, orthopnea and dizziness. She felt the chest pain was similar to when she had the stents prompting her to present to the ER on 1/24.In ED she was found to be tachycardic and tachypneic. She was placed on BiPAP for respiratory distress and loaded with ASA/Plavix and placed on a heparin gtt for NSTEMI. Her nasal swab was + for MRSA and she recieved CTX 1g daily. She is s/p cardiac cath showing 3V CAD and a reduced EF of 25%. She was transferred to [facility] CTS under the service of Dr. Hurst for further workup and deemed not a candidate for CTS. Pt the transferred to cardiac tele for optimization for high risk PCI. Repeat ECHO on admission showing moderate reduced EF 44%. Course c/b KEN on CKD, and multiple episodes of hypotension requiring fluids. Also the was a concern for DVT but US came back negative. Medicine/Pulm Consulted given CT findings and Leukocytosis, Infectious work up negative treated for empiric coverage with Vanc/Zosyn (completed). Course further complicated by low hgb @ 7.5. S/p 2U of pRBC on 2/4 and 1 unit ordered prior to transfer on 2/5. Additionally pt has been c/o worsening CP w/ initial EKG changes pt started on nitro gtt. CP improved slightly but SBP dropped from 170s to 90-100s.  While in cath lab patient became hypotensive requiring intubation, mechanical support with IABP, transferred to CCU for further management         ----------------------------------------------------  CCU Resident progress note     Subjective Assessment: Pt evaluated at beside this AM. Intubated and sedated.   	  MEDICATIONS:  isosorbide   dinitrate Tablet (ISORDIL) 5 milliGRAM(s) Oral three times a day  metoprolol succinate ER 50 milliGRAM(s) Oral daily  nitroglycerin  Infusion 10 MICROgram(s)/Min IV Continuous <Continuous>  albuterol    90 MICROgram(s) HFA Inhaler 2 Puff(s) Inhalation every 4 hours PRN  albuterol/ipratropium for Nebulization 3 milliLiter(s) Nebulizer every 6 hours  fluticasone propionate/ salmeterol 250-50 MICROgram(s) Diskus 1 Dose(s) Inhalation two times a day  montelukast 10 milliGRAM(s) Oral daily  sodium chloride 3%  Inhalation 4 milliLiter(s) Inhalation every 12 hours  acetaminophen     Tablet .. 650 milliGRAM(s) Oral every 6 hours PRN  pantoprazole    Tablet 40 milliGRAM(s) Oral before breakfast  polyethylene glycol 3350 17 Gram(s) Oral daily PRN  senna 2 Tablet(s) Oral at bedtime  atorvastatin 40 milliGRAM(s) Oral at bedtime  dextrose 50% Injectable 25 Gram(s) IV Push once  dextrose 50% Injectable 12.5 Gram(s) IV Push once  dextrose 50% Injectable 25 Gram(s) IV Push once  dextrose Oral Gel 15 Gram(s) Oral once PRN  glucagon  Injectable 1 milliGRAM(s) IntraMuscular once  insulin glargine Injectable (LANTUS) 15 Unit(s) SubCutaneous at bedtime  insulin lispro (ADMELOG) corrective regimen sliding scale   SubCutaneous Before meals and at bedtime  insulin lispro Injectable (ADMELOG) 5 Unit(s) SubCutaneous three times a day before meals  levothyroxine 75 MICROGram(s) Oral daily  aspirin enteric coated 81 milliGRAM(s) Oral daily  clopidogrel Tablet 75 milliGRAM(s) Oral daily  dextrose 5%. 1000 milliLiter(s) IV Continuous <Continuous>  dextrose 5%. 1000 milliLiter(s) IV Continuous <Continuous>  enoxaparin Injectable 80 milliGRAM(s) SubCutaneous every 12 hours  mupirocin 2% Nasal 1 Application(s) Both Nostrils two times a day  sodium chloride 0.9% lock flush 3 milliLiter(s) IV Push every 8 hours      	    [PHYSICAL EXAM:  TELEMETRY:  T(C): 36.2 (02-05-25 @ 06:16), Max: 36.7 (02-04-25 @ 11:25)  HR: 104 (02-05-25 @ 06:16) (86 - 104)  BP: 109/59 (02-05-25 @ 06:16) (98/60 - 179/71)  RR: 18 (02-05-25 @ 06:16) (18 - 20)  SpO2: 98% (02-05-25 @ 06:16) (96% - 100%)  Wt(kg): --  I&O's Summary    04 Feb 2025 07:01  -  05 Feb 2025 07:00  --------------------------------------------------------  IN: 1080 mL / OUT: 900 mL / NET: 180 mL        Condon:  Central/PICC/Mid Line:                                         Physical exam   HEENT:   Normal oral mucosa, PERRL, EOMI	  Neck: Supple, - JVD; Carotid Bruit   Cardiovascular: Normal S1 S2, No JVD, No murmurs,   Respiratory: Lungs clear to auscultation. No Rales, Rhonchi, Wheezing	  Gastrointestinal:  Soft, Non-tender, + BS	  Skin: No rashes, No ecchymoses, No cyanosis  Extremities: Normal range of motion, No clubbing, cyanosis or edema  Vascular: Peripheral pulses palpable 2+ bilaterally  Neurologic: Non-focal  Psychiatry: Intubated and sedated       	                                7.7    28.04 )-----------( 203      ( 05 Feb 2025 09:19 )             23.9     02-04    130[L]  |  99  |  33[H]  ----------------------------<  199[H]  5.2   |  19[L]  |  1.54[H]    Ca    8.2[L]      04 Feb 2025 02:04  Mg     1.9     02-04    TPro  5.4[L]  /  Alb  3.2[L]  /  TBili  0.5  /  DBili  x   /  AST  21  /  ALT  17  /  AlkPhos  48  02-04    proBNP:   Lipid Profile:   HgA1c:   TSH:   PT/INR - ( 05 Feb 2025 09:19 )   PT: 15.4 sec;   INR: 1.34          PTT - ( 05 Feb 2025 09:19 )  PTT:44.2 sec

## 2025-02-05 NOTE — PROGRESS NOTE ADULT - PROBLEM SELECTOR PLAN 3
Pt is s/p 2u prbc 2/4/25 AM  - Hgb on admission 10.1. Steadily downtrending   - Hgb this AM 7.5  - F/u AM CBC

## 2025-02-05 NOTE — PROGRESS NOTE ADULT - PROBLEM SELECTOR PLAN 1
Moderate CP this AM   - S/p diagnotic LH @[facility] 1/28/24 w/ Dr. Morgan: dLM to pLAD 80% Stenosed ISR, LCX ostial to pLCX ISR, % Stenosed.  - TTE 1/31/25: mild LVH, Mod reduced LVSF, EF 44%. RWMA. Grade I DD. Aortic sclerosis w/o significant stenosis.  - C/w ASA 81mg qd, Plavix 75mg qd, Atorvastatin 40mg qd. Started on Isordil 5mg TID.   - Transitioned pt from heparin gtt to Lovenox 80mg sq BID  Plan: Pt no longer candidate for high risk PCI given low hgb and poor candidate. Possible viability study this admission     **Prior Cath@[facility] 11/2023: Impella Assisted Rota/PCI with EMILIE to LM 90%, EMILIE ostial LAD and EMILIE ostial LCX, (dLM 80%, oLAD 85%, D2 75%, oLCX 85%, mRCA 75%).

## 2025-02-05 NOTE — PROGRESS NOTE ADULT - PROBLEM SELECTOR PLAN 4
Pulm Consulted: low suspicion for PNA, noted to have appearance of endobronchial material in superior segment RLL bronchus.   - CT Chest 1/31/2025 Persistent small large airways inflammation with interval increase in mucous plugging with near complete obstruction of the lateral segmental bronchus to the RML, RLL bronchus and anteromedial basal segmental bronchus to the LLL. Associated groundglass opacities are identified and early multifocal pneumonia possibly postobstructive cannot be excluded.   - Recommended outpatient pulm follow up w PFTs (to diagnosis obstructive lung disease) and consideration of repeat imaging.   - WBC on Admission 13--->17.87->20.27, today 17 MRSA swab + at [facility].   - UA normal, Blood Cultures 1/31 NGTD, Procal normal.   - Medicine Following appreciate gerald MCCLAIN'ed Tanna and Yasmine    **OF note during prior admission 11/2023: had issues of Elevated Leukocytosis and Fever ID was consulted was attributed to Post op fever. Infectious work-up was negative at the time.

## 2025-02-05 NOTE — CHART NOTE - NSCHARTNOTEFT_GEN_A_CORE
PA called to bedside; patient on commode; verbalizing 10/10 chest pain and appeared as if she was going to pass out. Patient immediately placed back in bed; Trendelenburg position. SBP trend: 160-170 mmHG, HR: 100-115, RR: 22, O2: 96% 2L NC, Fingerstick: 286. STAT EKG performed revealing dynamic /more prominent ST changes in inferolateral leads. Dr. Oneal notified immediately; STAT LABS obtained; Nitro @ 10 mcg initiated with improvement in chest pain to 8/10. Critical value called to floor for lactate of 9.7. CCU Fellow presented at bedside; Consented for STAT IABP placement obtained and 1 Unit of PRBC initiated. Patient transferred to Cath Lab for procedure and will be transferred to CCU after.     Patient, daughter and son  Law Francis updated on change in status and plan of action. PA called to bedside; patient on commode; verbalizing 10/10 chest pain and appeared as if she was going to pass out. Patient immediately placed back in bed; Trendelenburg positioned. SBP trend: 160-170 mmHG, HR: 100-115, RR: 22, O2: 96% 2L NC, Fingerstick: 286. STAT EKG performed revealing dynamic /more prominent ST changes in inferolateral leads. Dr. Oneal notified immediately; STAT LABS obtained; Nitro @ 10 mcg initiated with improvement in chest pain to 8/10. Critical value called to floor for lactate of 9.7. CCU Fellow presented at bedside; Consented for STAT IABP placement obtained and 1 Unit of PRBC initiated. Patient transferred to Cath Lab for procedure and will be transferred to CCU after.     Patient, daughter and son Law Francis updated on change in status and plan of action.

## 2025-02-05 NOTE — PROGRESS NOTE ADULT - ATTENDING COMMENTS
Pt is a 78 y/o woman c HTN, HLP, DM, Hypothyroidism, GERD, asthma, CAD c 3VD and new acute systolic HF. 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 Pt was scheduled for cardiac cath as Lactate was rising and pt became hypotensive.    Pt noted with 2g Hgb drop and was getting PRBCs and went to the cath lab and had IABP placed for BP support and presumptive cardiogenic shock. [place] also placed to measure CO/CI.    Agree to admit to the CICU for shock  Lactate 9 in pt  Cont Support with Levo/Vaso  Cx pt, check hemos  Update pt's family about status

## 2025-02-05 NOTE — PROGRESS NOTE ADULT - NS ATTEND AMEND GEN_ALL_CORE FT
Ms. Petersen is a 79F w HTN, CAD (PCI in 2023), HFrEF (LVEF 44%), hypothyroid, presented initially to [place] with fevers and cough treated with CTX and also ruled in for NSTEMI. Found to have distal LM and prox LAD ISR as well as ostial to prox LCx restenosis and RCA total occlusion and sent to [facility] for CABG evaluation. Declined as CABG evaluation and pending optimization for potential percutaneous intervention. Complicated by fevers treated course with Abx- work up negative. Anemia and KEN.     Exam:  Anxious, uncomfortable  JVP normal  Decreased  Cool on exam, no edema      - ICM- EF mild reduced. With severe multivessel and LM disease  - CAD- switch to lovenox, dapt. PPI  - Anemia- Iron studies, 2UPRBC.   - EKN- s/p diuresis and sepsis likely over diuresed  -Leukocytosis- Received course at [place]. Empiric abx trialed but stopped. Ms. Petersen is a 79F w HTN, CAD (PCI in 2023), HFrEF (LVEF 44%), hypothyroid, presented initially to [place] with fevers and cough treated with CTX and also ruled in for NSTEMI. Found to have distal LM and prox LAD ISR as well as ostial to prox LCx restenosis and RCA total occlusion and sent to [facility] for CABG evaluation. Declined as CABG evaluation and pending optimization for potential percutaneous intervention. Complicated by fevers treated course with Abx- work up negative. Anemia and KEN.     Exam:  Anxious, uncomfortable  JVP normal  Decreased  Cool on exam, no edema      Decompensated this AM. Worsening chest pain, dynamic ST changes, labs noted for significant anemia, lactic acidosis. CCU consulted, brought to Cath lab for RHC+ IABP. Discussed with [facility] team, Ms. Petersen is a 79F w HTN, CAD (PCI in 2023), HFrEF (LVEF 44%), hypothyroid, presented initially to [place] with fevers and cough treated with CTX and also ruled in for NSTEMI. Found to have distal LM and prox LAD ISR as well as ostial to prox LCx restenosis and RCA total occlusion and sent to [facility] for CABG evaluation. Declined as CABG evaluation and pending optimization for potential percutaneous intervention. Complicated by fevers treated course with Abx- work up negative. Anemia and KEN.     Exam:  Anxious, uncomfortable  JVP normal  Decreased  Cool on exam, no edema      -Decompensated this AM. Worsening chest pain, dynamic ST changes, labs noted for significant anemia, lactic acidosis. CCU consulted, brought to Cath lab for RHC+ IABP. Discussed with [facility] team, given poor surgical candidate, restenosis of LM/ostial LAD+CX stents w/ RCA , high risk anatomy in setting of low EF, plan was for optimization in setting of renal dysfunction and worsening anemia.

## 2025-02-05 NOTE — PROGRESS NOTE ADULT - PROBLEM SELECTOR PLAN 1
Moderate CP this AM   - S/p diagnotic LH @[facility] 1/28/24 w/ Dr. Morgan: dLM to pLAD 80% Stenosed ISR, LCX ostial to pLCX ISR, % Stenosed.  - TTE 1/31/25: mild LVH, Mod reduced LVSF, EF 44%. RWMA. Grade I DD. Aortic sclerosis w/o significant stenosis.  - C/w ASA 81mg qd, Plavix 75mg qd, Atorvastatin 40mg qd. Started on Isordil 5mg TID.   - Transitioned pt from heparin gtt to Lovenox 80mg sq BID  Plan: Pt no longer candidate for high risk PCI given low hgb and poor candidate. Possible viability study this admission     **Prior Cath@[facility] 11/2023: Impella Assisted Rota/PCI with EMILIE to LM 90%, EMILIE ostial LAD and EMILIE ostial LCX, (dLM 80%, oLAD 85%, D2 75%, oLCX 85%, mRCA 75%).

## 2025-02-05 NOTE — PROGRESS NOTE ADULT - ASSESSMENT
79 yr old F with PMHx of HTN, hyperlipidemia, DM, hypothyroidism, GERD, asthma, CAD s/p prior PCIs at [facility] 11/2023 who presented to [facility] 1/24/25 with chest pain and URI symptoms x 2-3 days, R/I NSTEMI, s/p diagnostic cath@[facility] revealing 3VD and newly reduced EF, initially transferred to [facility] 9[facility] under Dr. 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 Course complicated by KEN with peak Creatinine 2.7 now downtrending likely in setting of Hypovolemia s/p IV fluids. Course further c/b anemia pt now s/p 2u prbc on 2/4. Medicine/Pulm Consulted given CT findings and Leukocytosis, Infectious work up negative treated for empiric coverage with Vanc/Zosyn (completed). Plan for potential viability study.

## 2025-02-05 NOTE — PROVIDER CONTACT NOTE (CRITICAL VALUE NOTIFICATION) - BACKGROUND
Pt A&Ox4, admitted with NSTEMI, plan for complex PCI on Tuesday.
NSTEMI, 3VD, KEN on CKD
Pt A&Ox4, admitted from [facility] for NSTEMI needing complex PCI.

## 2025-02-05 NOTE — PROGRESS NOTE ADULT - SUBJECTIVE AND OBJECTIVE BOX
---------------- Transferring pt from Cardiac tele to CCU-----------------------    78 yo female with PMH of HTN, HLD, Type II DM, triple vessel CAD s/p 3 stents at [facility] 11/2023, CHF, asthma, GERD, and hypothyroidism who presented to [facility] with CP and SOB on 1/24. She was having fever, chills, a productive cough and congestion for 2-3 days prior to the chest pain. She states the pain was constant and radiated to her back/left arm with associated palpitations, SOB, orthopnea and dizziness. She felt the chest pain was similar to when she had the stents prompting her to present to the ER on 1/24.In ED she was found to be tachycardic and tachypneic. She was placed on BiPAP for respiratory distress and loaded with ASA/Plavix and placed on a heparin gtt for NSTEMI. Her nasal swab was + for MRSA and she recieved CTX 1g daily. She is s/p cardiac cath showing 3V CAD and a reduced EF of 25%. She was transferred to [facility] CTS under the service of Dr. Hurst for further workup and deemed not a candidate for CTS. Pt the transferred to cardiac tele for optimization for high risk PCI. Repeat ECHO on admission showing moderate reduced EF 44%. Course c/b KEN on CKD, and multiple episodes of hypotension requiring fluids. Also the was a concern for DVT but US came back negative. Medicine/Pulm Consulted given CT findings and Leukocytosis, Infectious work up negative treated for empiric coverage with Vanc/Zosyn (completed). Course further complicated by low hgb @ 7.5. S/p 2U of pRBC on 2/4 and 1 unit ordered prior to transfer on 2/5. Additionally pt has been c/o worsening CP w/ initial EKG changes pt started on nitro gtt. CP improved slightly but SBP dropped from 170s to 90-100s. Pt now being taken to cath lab for high risk PCI w/ balloon pump and then to CCU.         ----------------------------------------------------  Interventional Cardiology PA Adult Progress Note    Subjective Assessment: Pt evaluated at beside this AM. Pt c/o CP. Denies CP, SOB, palpitation, n/v/d, fever, LOC, or headache.   	  MEDICATIONS:  isosorbide   dinitrate Tablet (ISORDIL) 5 milliGRAM(s) Oral three times a day  metoprolol succinate ER 50 milliGRAM(s) Oral daily  nitroglycerin  Infusion 10 MICROgram(s)/Min IV Continuous <Continuous>  albuterol    90 MICROgram(s) HFA Inhaler 2 Puff(s) Inhalation every 4 hours PRN  albuterol/ipratropium for Nebulization 3 milliLiter(s) Nebulizer every 6 hours  fluticasone propionate/ salmeterol 250-50 MICROgram(s) Diskus 1 Dose(s) Inhalation two times a day  montelukast 10 milliGRAM(s) Oral daily  sodium chloride 3%  Inhalation 4 milliLiter(s) Inhalation every 12 hours  acetaminophen     Tablet .. 650 milliGRAM(s) Oral every 6 hours PRN  pantoprazole    Tablet 40 milliGRAM(s) Oral before breakfast  polyethylene glycol 3350 17 Gram(s) Oral daily PRN  senna 2 Tablet(s) Oral at bedtime  atorvastatin 40 milliGRAM(s) Oral at bedtime  dextrose 50% Injectable 25 Gram(s) IV Push once  dextrose 50% Injectable 12.5 Gram(s) IV Push once  dextrose 50% Injectable 25 Gram(s) IV Push once  dextrose Oral Gel 15 Gram(s) Oral once PRN  glucagon  Injectable 1 milliGRAM(s) IntraMuscular once  insulin glargine Injectable (LANTUS) 15 Unit(s) SubCutaneous at bedtime  insulin lispro (ADMELOG) corrective regimen sliding scale   SubCutaneous Before meals and at bedtime  insulin lispro Injectable (ADMELOG) 5 Unit(s) SubCutaneous three times a day before meals  levothyroxine 75 MICROGram(s) Oral daily  aspirin enteric coated 81 milliGRAM(s) Oral daily  clopidogrel Tablet 75 milliGRAM(s) Oral daily  dextrose 5%. 1000 milliLiter(s) IV Continuous <Continuous>  dextrose 5%. 1000 milliLiter(s) IV Continuous <Continuous>  enoxaparin Injectable 80 milliGRAM(s) SubCutaneous every 12 hours  mupirocin 2% Nasal 1 Application(s) Both Nostrils two times a day  sodium chloride 0.9% lock flush 3 milliLiter(s) IV Push every 8 hours      	    [PHYSICAL EXAM:  TELEMETRY:  T(C): 36.2 (02-05-25 @ 06:16), Max: 36.7 (02-04-25 @ 11:25)  HR: 104 (02-05-25 @ 06:16) (86 - 104)  BP: 109/59 (02-05-25 @ 06:16) (98/60 - 179/71)  RR: 18 (02-05-25 @ 06:16) (18 - 20)  SpO2: 98% (02-05-25 @ 06:16) (96% - 100%)  Wt(kg): --  I&O's Summary    04 Feb 2025 07:01  -  05 Feb 2025 07:00  --------------------------------------------------------  IN: 1080 mL / OUT: 900 mL / NET: 180 mL        [place]:  Central/PICC/Mid Line:                                         Appearance: Normal	  HEENT:   Normal oral mucosa, PERRL, EOMI	  Neck: Supple, - JVD; Carotid Bruit   Cardiovascular: Normal S1 S2, No JVD, No murmurs,   Respiratory: Lungs clear to auscultation. No Rales, Rhonchi, Wheezing	  Gastrointestinal:  Soft, Non-tender, + BS	  Skin: No rashes, No ecchymoses, No cyanosis  Extremities: Normal range of motion, No clubbing, cyanosis or edema  Vascular: Peripheral pulses palpable 2+ bilaterally  Neurologic: Non-focal  Psychiatry: A & O x 3, Mood & affect appropriate      	                                7.7    28.04 )-----------( 203      ( 05 Feb 2025 09:19 )             23.9     02-04    130[L]  |  99  |  33[H]  ----------------------------<  199[H]  5.2   |  19[L]  |  1.54[H]    Ca    8.2[L]      04 Feb 2025 02:04  Mg     1.9     02-04    TPro  5.4[L]  /  Alb  3.2[L]  /  TBili  0.5  /  DBili  x   /  AST  21  /  ALT  17  /  AlkPhos  48  02-04    proBNP:   Lipid Profile:   HgA1c:   TSH:   PT/INR - ( 05 Feb 2025 09:19 )   PT: 15.4 sec;   INR: 1.34          PTT - ( 05 Feb 2025 09:19 )  PTT:44.2 sec

## 2025-02-05 NOTE — PROGRESS NOTE ADULT - ASSESSMENT
79 yr old F with PMHx of HTN, hyperlipidemia, DM, hypothyroidism, GERD, asthma, CAD s/p prior PCIs at [facility] 11/2023 who presented to [facility] 1/24/25 with chest pain and URI symptoms x 2-3 days, R/I NSTEMI, s/p diagnostic cath@[facility] revealing 3VD and newly reduced EF, initially transferred to [facility] 9LACH under Dr. 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 Course complicated by KEN with peak Creatinine 2.7 now downtrending likely in setting of Hypovolemia s/p IV fluids. Course further c/b anemia pt now s/p 2u prbc on 2/4. Medicine/Pulm Consulted given CT findings and Leukocytosis, Infectious work up negative treated for empiric coverage with Vanc/Zosyn (completed). Plan for potential viability study.   79 yr old F with PMHx of HTN, hyperlipidemia, DM, hypothyroidism, GERD, asthma, CAD s/p prior PCIs at [facility] 11/2023 who [place] [facility] [place] with chest pain and URI symptoms x 2-3 days, R/I NSTEMI, s/p diagnostic cath@[facility] revealing 3VD and newly reduced EF, initially transferred to [facility] 9LA under Dr. 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 Course complicated by KEN with peak Creatinine 2.7 now downtrending likely in setting of Hypovolemia s/p IV fluids. Course further c/b anemia pt now s/p 2u prbc on 2/4. Medicine/Pulm Consulted given CT findings and Leukocytosis, Infectious work up negative treated for empiric coverage with Vanc/Zosyn (completed). Plan for potential viability study.        IMPRESSION:  #  #  #    PLAN:    NEUROLOGY:      CARDIOVASCULAR:    #Acute Systolic CHF   New Acute CHF likely in setting of NSTEMI EF at [facility] was 25%, Echo on Arrival improved to 1/31/2025 44%   - Echo As Above  - ETIOLOGY: Ischemic   - DIURESIS: on hold in setting of KEN.  - GDMT: Toprol increased to 50mg PO daily, isordil 5mg TID. Losartan on HOLD in setting of KEN.    Advanced CHF Consulted: pt well compensated, HF team signed off.      #NSTEMI   Moderate CP this AM   - S/p diagnotic LHC @[facility] 1/28/24 w/ Dr. Morgan: dLM to pLAD 80% Stenosed ISR, LCX ostial to pLCX ISR, % Stenosed.  - TTE 1/31/25: mild LVH, Mod reduced LVSF, EF 44%. RWMA. Grade I DD. Aortic sclerosis w/o significant stenosis.  - C/w ASA 81mg qd, Plavix 75mg qd, Atorvastatin 40mg qd. Started on Isordil 5mg TID.   - Transitioned pt from heparin gtt to Lovenox 80mg sq BID  Plan: Pt no longer candidate for high risk PCI given low hgb and poor candidate. Possible viability study this admission     **Prior Cath@[facility] 11/2023: Impella Assisted Rota/PCI with EMILIE to LM 90%, EMILIE ostial LAD and EMILIE ostial LCX, (dLM 80%, oLAD 85%, D2 75%, oLCX 85%, mRCA 75%).      PULMONARY:      #AHRF   #PNA   Pulm Consulted: low suspicion for PNA, noted to have appearance of endobronchial material in superior segment RLL bronchus.   - CT Chest 1/31/2025 Persistent small large airways inflammation with interval increase in mucous plugging with near complete obstruction of the lateral segmental bronchus to the RML, RLL bronchus and anteromedial basal segmental bronchus to the LLL. Associated groundglass opacities are identified and early multifocal pneumonia possibly postobstructive cannot be excluded.   - Recommended outpatient pulm follow up w PFTs (to diagnosis obstructive lung disease) and consideration of repeat imaging.   - WBC on Admission 13--->17.87->20.27, today 17 MRSA swab + at [facility].   - UA normal, Blood Cultures 1/31 NGTD, Procal normal.   - Medicine Following appreciate recs DC'ed Vanc and Zosyn      #History of Asthma   Pulm following Appreciate Recs   - Duonebs, Hypertonic Saline to aid with Mucous plugging  - C/w symbicort and Montelukast     N: DASH  E: Maintain K>4.0 and Mg>2.0  VTE PPx: Lovenox   Code: Full      GASTROINTESTINAL:      RENAL:      INFECTIOUS DISEASE:      ENDOCRINE:      #Hypothyroidism   - C/w Levothyroxine 75mcg PO daily.  - TSH 1.390    #DM   A1C 6.9  - C/w FS's and ICS PRN.  - ISS   - FS Q6       HEME:    #Anemia   Pt is s/p 2u prbc 2/4/25 AM  - Hgb on admission 10.1. Steadily downtrending,  Hgb this AM 7.5  Plan   - F/u Iron studies (TIBC, Total Iron, Ferritin, Transferrin)   - Fecal occult blood test   - F/u B12 and Folate     FLUIDS/ELECTROLYTES/NUTRITION:  -IVF  -Monitor, Replete to K>4 and Mg>2  -Diet    PROPHYLAXIS  -DVT: HSQ  -GI- PPI qd    DISPO: MICU  FULL CODE 79 yr old F with PMHx of HTN, hyperlipidemia, DM, hypothyroidism, GERD, asthma, CAD s/p prior PCIs at [facility] 11/2023 who presented to [facility] 1/24/25 with chest pain and URI symptoms x 2-3 days, R/I NSTEMI, s/p diagnostic cath@[facility] revealing 3VD and newly reduced EF, initially transferred to [facility] 9LACH under Dr. 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 Course complicated by KEN with peak Creatinine 2.7 now downtrending likely in setting of Hypovolemia s/p IV fluids. Course further c/b anemia pt now s/p 2u prbc on 2/4. Medicine/Pulm Consulted given CT findings and Leukocytosis, Infectious work up negative treated for empiric coverage with Vanc/Zosyn (completed). Now transferred to CCU for further management of cardiogenic shock.           PLAN:    NEUROLOGY:  Intubated and Sedated  - goal Rass: -1   - continue with Fentanyl 0.5, Propofol 30     CARDIOVASCULAR:    #Cardiogenic shock   - patient's BP dropped during planned PCI requiring inotropy support as well as mechanical ventilation and IABP for afterload reduction   - also with New Acute CHF likely in setting of NSTEMI EF at [facility] was 25%, Echo on Arrival improved to 1/31/2025 44%  - Holding GDMT: Toprol increased to 50mg PO daily, isordil 5mg TID. Losartan on HOLD in setting of KEN.    Plan   - continue with Vasopressin 0.04, Nepi: 0.03,   - c/w IABP @ 1:1 for afterload reduction   - Hemos & thermos Q6         #NSTEMI   Moderate CP this AM   - S/p diagnotic [facility] @[facility] 1/28/24 w/ Dr. Morgan: dLM to pLAD 80% Stenosed ISR, LCX ostial to pLCX ISR, % Stenosed.  - TTE 1/31/25: mild LVH, Mod reduced LVSF, EF 44%. RWMA. Grade I DD. Aortic sclerosis w/o significant stenosis.  - C/w ASA 81mg qd, Plavix 75mg qd, Atorvastatin 40mg qd. Started on Isordil 5mg TID.   - Transitioned pt from heparin gtt to Lovenox 80mg sq BID  Plan: Pt no longer candidate for high risk PCI given low hgb and poor candidate. Possible viability study this admission     **Prior Cath@[facility] 11/2023: Impella Assisted Rota/PCI with EMILIE to LM 90%, EMILIE ostial LAD and EMILIE ostial LCX, (dLM 80%, oLAD 85%, D2 75%, oLCX 85%, mRCA 75%).      PULMONARY:  #AHRF   - Placed on VC-AC, Fi:02 100, PEEP: 7, VT: 400, RR 12 2/2 cardiogenic shock possibly due to PNA   - continue with mechanical ventilation     #PNA   Pulm Consulted: low suspicion for PNA, noted to have appearance of endobronchial material in superior segment RLL bronchus.   - CT Chest 1/31/2025 Persistent small large airways inflammation with interval increase in mucous plugging with near complete obstruction of the lateral segmental bronchus to the RML, RLL bronchus and anteromedial basal segmental bronchus to the LLL. Associated groundglass opacities are identified and early multifocal pneumonia possibly postobstructive cannot be excluded.   - Recommended outpatient pulm follow up w PFTs (to diagnosis obstructive lung disease) and consideration of repeat imaging.   - WBC on Admission 13--->17.87->20.27, today 17 MRSA swab + at [facility].   - UA normal, Blood Cultures 1/31 NGTD, Procal normal.     #History of Asthma   Pulm following Appreciate Recs   - Duonebs, Hypertonic Saline to aid with Mucous plugging  - C/w symbicort and Montelukast         GASTROINTESTINAL:  - NG tube in place       RENAL:  #CKD Stage 3   KEN on CKD Stage 3 (baseline Cr ~1.3-1.5 from prior Labs)   - Today 1.54, improved with Fluids likely due to hypovolemia episode vs dehydration   - Urine studies: 2/3: Na 71, Protein 27, Creatinine 108, Pr/Cr Ratio .2, Urea Nitrogen 832, K 21, Osmol 505    INFECTIOUS DISEASE:  #PNA   Pulm Consulted: low suspicion for PNA, noted to have appearance of endobronchial material in superior segment RLL bronchus.   - CT Chest 1/31/2025 Persistent small large airways inflammation with interval increase in mucous plugging with near complete obstruction of the lateral segmental bronchus to the RML, RLL bronchus and anteromedial basal segmental bronchus to the LLL. Associated groundglass opacities are identified and early multifocal pneumonia possibly postobstructive cannot be excluded.   - Recommended outpatient pulm follow up w PFTs (to diagnosis obstructive lung disease) and consideration of repeat imaging.   - WBC on Admission 13--->17.87->20.27, today 17 MRSA swab + at [facility].   - UA normal, Blood Cultures 1/31 NGTD, Procal normal.   - on arrival to CCU: ordered repeat CXR, BCX, UCX, UA, MRSA swab     Plan   - F/U BCX, UA, UCX, MRSA swab, RVP   - started on Vancomycin and Zosyn for broad spectrum coverage pending MRSA results and BCX     ENDOCRINE:  #Hypothyroidism   - C/w Levothyroxine 75mcg PO daily.  - TSH 1.390    #DM   A1C 6.9    Plan   - C/w FS's   - ISS, Lantus 15 at bedtime        HEME:    #Anemia   Pt is s/p 2u prbc 2/4/25 AM  - Hgb on admission 10.1. Steadily downtrending,  Hgb this AM 7.5  Plan   - F/u Iron studies (TIBC, Total Iron, Ferritin, Transferrin)   - Fecal occult blood test   - F/u B12 and Folate         FLUIDS/ELECTROLYTES/NUTRITION:  -Drips: Fentanyl 0.5, Vasopressin 0.04, Nepi 0.03, Propofol: 30  -Monitor, Replete to K>4 and Mg>2  -Diet    PROPHYLAXIS  -DVT: Heparin   -GI- PPI qd    DISPO: CCU  FULL CODE 79 yr old F with PMHx of HTN, hyperlipidemia, DM, hypothyroidism, GERD, asthma, CAD s/p prior PCIs at [facility] 11/2023 who [place] [facility] [place] with chest pain and URI symptoms x 2-3 days, R/I NSTEMI, s/p diagnostic cath[place] [place] 3VD and newly reduced EF, initially transferred to [facility] 9LACH under Dr. 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 Course complicated by KEN with peak Creatinine 2.7 now downtrending likely in setting of Hypovolemia s/p IV fluids. Course further c/b anemia pt now s/p 2u prbc on 2/4. Medicine/Pulm Consulted given CT findings and Leukocytosis, Infectious work up negative treated for empiric coverage with Vanc/Zosyn (completed). Now transferred to CCU for further management of cardiogenic shock.         PLAN:    NEUROLOGY:  Intubated and Sedated  - goal Rass: -1   - continue with Fentanyl 0.5, Propofol 30     CARDIOVASCULAR:    #Cardiogenic shock   - patient's BP dropped during planned PCI requiring inotropy support as well as mechanical ventilation and IABP for afterload reduction   - also with New Acute CHF likely in setting of NSTEMI EF at [facility] was 25%, Echo on Arrival improved to 1/31/2025 44%  - Holding GDMT: Toprol increased to 50mg PO daily, isordil 5mg TID. Losartan on HOLD in setting of KEN.    Plan   - continue with Vasopressin 0.04, Nepi: 0.03,   - c/w IABP @ 1:1 for afterload reduction   - Hemos & thermos Q6         #NSTEMI   Moderate CP this AM   - S/p diagnotic [facility] @[facility] [place]28[place] w/ Dr. Morgan: dLM to pLAD 80% Stenosed ISR, LCX ostial to pLCX ISR, % Stenosed.  - TTE 1/31/25: mild LVH, Mod reduced LVSF, EF 44%. RWMA. Grade I DD. Aortic sclerosis w/o significant stenosis.  - C/w ASA 81mg qd, Plavix 75mg qd, Atorvastatin 40mg qd. Started on Isordil 5mg TID.   - Transitioned pt from heparin gtt to Lovenox 80mg sq BID  Plan: Pt no longer candidate for high risk PCI given low hgb and poor candidate. Possible viability study this admission     **Prior Cath@[facility] 11/2023: Impella Assisted Rota/PCI with EMILIE to LM 90%, EMILIE ostial LAD and EMILIE ostial LCX, (dLM 80%, oLAD 85%, D2 75%, oLCX 85%, mRCA 75%).      PULMONARY:  #AHRF   - Placed on VC-AC, Fi:02 100, PEEP: 7, VT: 400, RR 12 2/2 cardiogenic shock possibly due to PNA   - continue with mechanical ventilation     #PNA   Pulm Consulted: low suspicion for PNA, noted to have appearance of endobronchial material in superior segment RLL bronchus.   - CT Chest 1/31/2025 Persistent small large airways inflammation with interval increase in mucous plugging with near complete obstruction of the lateral segmental bronchus to the RML, RLL bronchus and anteromedial basal segmental bronchus to the LLL. Associated groundglass opacities are identified and early multifocal pneumonia possibly postobstructive cannot be excluded.   - Recommended outpatient pulm follow up w PFTs (to diagnosis obstructive lung disease) and consideration of repeat imaging.   - WBC on Admission 13--->17.87->20.27, today 17 MRSA swab + [place] [facility]   - UA normal, Blood Cultures 1/31 NGTD, Procal normal.     #History of Asthma   Pulm following Appreciate Recs   - Duonebs, Hypertonic Saline to aid with Mucous plugging  - C/w symbicort and Montelukast         GASTROINTESTINAL:  - NG tube in place       RENAL:  #CKD Stage 3   KEN on CKD Stage 3 (baseline Cr ~1.3-1.5 from prior Labs)   - Today 1.54, improved with Fluids likely due to hypovolemia episode vs dehydration   - Urine studies: 2/3: Na 71, Protein 27, Creatinine 108, Pr/Cr Ratio .2, Urea Nitrogen 832, K 21, Osmol 505    INFECTIOUS DISEASE:  #PNA   Pulm Consulted: low suspicion for PNA, noted to have appearance of endobronchial material in superior segment RLL bronchus.   - CT Chest 1/31/2025 Persistent small large airways inflammation with interval increase in mucous plugging with near complete obstruction of the lateral segmental bronchus to the RML, RLL bronchus and anteromedial basal segmental bronchus to the LLL. Associated groundglass opacities are identified and early multifocal pneumonia possibly postobstructive cannot be excluded.   - Recommended outpatient pulm follow up w PFTs (to diagnosis obstructive lung disease) and consideration of repeat imaging.   - WBC on Admission 13--->17.87->20.27, today 17 [place] swab + [place] [facility]   - UA normal, Blood Cultures 1/31 NGTD, Procal normal.   - on arrival to CCU: ordered repeat CXR, BCX, UCX, UA, MRSA swab     Plan   - F/U BCX, UA, UCX, MRSA swab, RVP   - started on Vancomycin and Zosyn for broad spectrum coverage pending MRSA results and BCX     ENDOCRINE:  #Hypothyroidism   - C/w Levothyroxine 75mcg PO daily.  - TSH 1.390    #DM   A1C 6.9    Plan   - C/w FS's   - ISS,   - increased Lantus to 20 at bedtime        HEME:    #Anemia   Pt is s/p 2u prbc 2/4/25 AM  - Hgb on admission 10.1. Steadily downtrending,  Hgb this AM 7.5  Plan   - F/u Iron studies (TIBC, Total Iron, Ferritin, Transferrin)   - Fecal occult blood test   - F/u B12 and Folate         FLUIDS/ELECTROLYTES/NUTRITION:  -Drips: Fentanyl 0.5, Vasopressin 0.04, Nepi 0.03, Propofol: 30  -Monitor, Replete to K>4 and Mg>2  -Diet    PROPHYLAXIS  -DVT: SCD, hold off on heparin drip while workup done for possible GI bleed   -GI- PPI qd    DISPO: CCU  FULL CODE 79 yr old F with PMHx of HTN, hyperlipidemia, DM, hypothyroidism, GERD, asthma, CAD s/p prior PCIs at [facility] 11/2023 who [place] [facility] [place] with chest pain and URI symptoms x 2-3 days, R/I NSTEMI, s/p diagnostic cath[place] [place] 3VD and newly reduced EF, initially transferred to [facility] 9LACH under Dr. 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 Course complicated by KEN with peak Creatinine 2.7 now downtrending likely in setting of Hypovolemia s/p IV fluids. Course further c/b anemia pt now s/p 2u prbc on 2/4. Medicine/Pulm Consulted given CT findings and Leukocytosis, Infectious work up negative treated for empiric coverage with Vanc/Zosyn (completed). Now transferred to CCU for further management of cardiogenic shock.         PLAN:    NEUROLOGY:  Intubated and Sedated  - goal Rass: -1   - continue with Fentanyl 0.5, Propofol 30     CARDIOVASCULAR:    #Cardiogenic shock   - patient became hypotensive while in cath lab requiring inotropy support as well as mechanical ventilation and IABP for afterload reduction   - also with New Acute CHF likely in setting of NSTEMI EF at [facility] was 25%, Echo on Arrival improved to 1/31/2025 44%  - Holding GDMT: Toprol increased to 50mg PO daily, isordil 5mg TID. Losartan on HOLD in setting of KEN.    Plan   - continue with Vasopressin 0.04, Nepi: 0.03,   - c/w IABP @ 1:1 for afterload reduction   - Hemos & thermos Q6         #NSTEMI   Moderate CP this AM   - S/p diagnotic LHC [place] [place]28[place] w/ Dr. Morgan: dLM to pLAD 80% Stenosed ISR, LCX ostial to pLCX ISR, % Stenosed.  - TTE 1/31/25: mild LVH, Mod reduced LVSF, EF 44%. RWMA. Grade I DD. Aortic sclerosis w/o significant stenosis.  - C/w ASA 81mg qd, Plavix 75mg qd, Atorvastatin 40mg qd. Started on Isordil 5mg TID.   - Transitioned pt from heparin gtt to Lovenox 80mg sq BID  Plan: Pt no longer candidate for high risk PCI given low hgb and poor candidate. Possible viability study this admission     **Prior Cath@[facility] 11/2023: Impella Assisted Rota/PCI with EMILIE to LM 90%, EMILIE ostial LAD and EMILIE ostial LCX, (dLM 80%, oLAD 85%, D2 75%, oLCX 85%, mRCA 75%).      PULMONARY:  #AHRF   - Placed on VC-AC, Fi:02 100, PEEP: 7, VT: 400, RR 12 2/2 cardiogenic shock possibly due to PNA   - continue with mechanical ventilation     #PNA   Pulm Consulted: low suspicion for PNA, noted to have appearance of endobronchial material in superior segment RLL bronchus.   - CT Chest 1/31/2025 Persistent small large airways inflammation with interval increase in mucous plugging with near complete obstruction of the lateral segmental bronchus to the RML, RLL bronchus and anteromedial basal segmental bronchus to the LLL. Associated groundglass opacities are identified and early multifocal pneumonia possibly postobstructive cannot be excluded.   - Recommended outpatient pulm follow up w PFTs (to diagnosis obstructive lung disease) and consideration of repeat imaging.   - WBC on Admission 13--->17.87->20.27, today 17 MRSA swab + [place] [facility]   - UA normal, Blood Cultures 1/31 NGTD, Procal normal.     #History of Asthma   Pulm following Appreciate Recs   - Duonebs, Hypertonic Saline to aid with Mucous plugging  - C/w symbicort and Montelukast         GASTROINTESTINAL:  - NG tube in place       RENAL:  #CKD Stage 3   KEN on CKD Stage 3 (baseline Cr ~1.3-1.5 from prior Labs)   - Today 1.54, improved with Fluids likely due to hypovolemia episode vs dehydration   - Urine studies: 2/3: Na 71, Protein 27, Creatinine 108, Pr/Cr Ratio .2, Urea Nitrogen 832, K 21, Osmol 505    INFECTIOUS DISEASE:  #PNA   Pulm Consulted: low suspicion for PNA, noted to have appearance of endobronchial material in superior segment RLL bronchus.   - CT Chest 1/31/2025 Persistent small large airways inflammation with interval increase in mucous plugging with near complete obstruction of the lateral segmental bronchus to the RML, RLL bronchus and anteromedial basal segmental bronchus to the LLL. Associated groundglass opacities are identified and early multifocal pneumonia possibly postobstructive cannot be excluded.   - Recommended outpatient pulm follow up w PFTs (to diagnosis obstructive lung disease) and consideration of repeat imaging.   - WBC on Admission 13--->17.87->20.27, today 17 [place] [facility]   - UA normal, Blood Cultures 1/31 NGTD, Procal normal.   - on arrival to CCU: ordered repeat CXR, BCX, UCX, UA, MRSA swab     Plan   - F/U BCX, UA, UCX, MRSA swab, RVP   - started on Vancomycin and Zosyn for broad spectrum coverage pending MRSA results and BCX     ENDOCRINE:  #Hypothyroidism   - C/w Levothyroxine 75mcg PO daily.  - TSH 1.390    #DM   A1C 6.9    Plan   - C/w FS's   - ISS,   - increased Lantus to 20 at bedtime        HEME:    #Anemia   Pt is s/p 2u prbc 2/4/25 AM  - Hgb on admission 10.1. Steadily downtrending,  Hgb this AM 7.5  Plan   - F/u Iron studies (TIBC, Total Iron, Ferritin, Transferrin)   - Fecal occult blood test   - F/u B12 and Folate         FLUIDS/ELECTROLYTES/NUTRITION:  -Drips: Fentanyl 0.5, Vasopressin 0.04, Nepi 0.03, Propofol: 30  -Monitor, Replete to K>4 and Mg>2  -Diet    PROPHYLAXIS  -DVT: SCD, hold off on heparin drip while workup done for possible GI bleed   -GI- PPI qd    DISPO: CCU  FULL CODE

## 2025-02-05 NOTE — PROVIDER CONTACT NOTE (CHANGE IN STATUS NOTIFICATION) - SITUATION
Provider made aware of pt's continued CP and elevated vitals.
Patient with syncopal/ vaguel episode while being transferred to [facility]. Patient c/o 9/10 chest pain after being brought to bed

## 2025-02-05 NOTE — PROGRESS NOTE ADULT - SUBJECTIVE AND OBJECTIVE BOX
INCOMPLETE    Patient is a 79y old  Female who presents with a chief complaint of CAD (05 Feb 2025 08:15)      INTERVAL HPI/OVERNIGHT EVENTS:    ROS: fever/chills, fatigue, nausea, vomiting, headache, stuffiness, sore throat, chest pain, palpitations, edema, SOB, cough, wheezing, changes in appetite, changes in bowel movements, contipation, diarrhea, abdominal pain, dizziness, fainting/LOC      T(C): 36.2 (02-05-25 @ 06:16), Max: 36.7 (02-04-25 @ 11:25)  HR: 104 (02-05-25 @ 06:16) (86 - 104)  BP: 109/59 (02-05-25 @ 06:16) (98/60 - 187/75)  RR: 18 (02-05-25 @ 06:16) (18 - 28)  SpO2: 98% (02-05-25 @ 06:16) (96% - 100%)  Wt(kg): --Vital Signs Last 24 Hrs  T(C): 36.2 (05 Feb 2025 06:16), Max: 36.7 (04 Feb 2025 11:25)  T(F): 97.2 (05 Feb 2025 06:16), Max: 98 (04 Feb 2025 11:25)  HR: 104 (05 Feb 2025 06:16) (86 - 104)  BP: 109/59 (05 Feb 2025 06:16) (98/60 - 187/75)  BP(mean): 77 (05 Feb 2025 06:16) (77 - 107)  RR: 18 (05 Feb 2025 06:16) (18 - 28)  SpO2: 98% (05 Feb 2025 06:16) (96% - 100%)    Parameters below as of 05 Feb 2025 06:16  Patient On (Oxygen Delivery Method): nasal cannula  O2 Flow (L/min): 2      PHYSICAL EXAM:  GENERAL: appears uncomfortable    Neuro: AAOx3; CN2-12 grossly intact; speech clear  HEENT: NC/AT; MMM; no nystagmus; no scleral icterus; nasal passages clear  Heart: RRR; +s1 and s2; no MRG   Lungs: on 2L NC; CTA b/l; tachypneic w increased effort;  symmetric inhalation and exhalation; no WWR  GI: nondistended; nontender; normal bowel sounds [id number]; percussion typmanic; no organomegaly   Extremities: +2 pulses in UE and LE b/l; no clubbing, or cyanosis, LLE edema >R LE  Skin: skin dry and warm; no skin tenting; no rashes or lesions  MSK: normal tone; +5/5 strength in upper and lower extremities b/l    Consultant(s) Notes Reviewed:  [x ] YES  [ ] NO  Care Discussed with Consultants/Other Providers [ x] YES  [ ] NO    LABS:                        7.5    18.66 )-----------( 222      ( 04 Feb 2025 02:04 )             23.8     02-04    130[L]  |  99  |  33[H]  ----------------------------<  199[H]  5.2   |  19[L]  |  1.54[H]    Ca    8.2[L]      04 Feb 2025 02:04  Mg     1.9     02-04    TPro  5.4[L]  /  Alb  3.2[L]  /  TBili  0.5  /  DBili  x   /  AST  21  /  ALT  17  /  AlkPhos  48  02-04      PT/INR - ( 04 Feb 2025 02:04 )   PT: 13.8 sec;   INR: 1.20          PTT - ( 04 Feb 2025 02:04 )  PTT:175.2 sec  Urinalysis Basic - ( 04 Feb 2025 02:04 )    Color: x / Appearance: x / SG: x / pH: x  Gluc: 199 mg/dL / Ketone: x  / Bili: x / Urobili: x   Blood: x / Protein: x / Nitrite: x   Leuk Esterase: x / RBC: x / WBC x   Sq Epi: x / Non Sq Epi: x / Bacteria: x      CAPILLARY BLOOD GLUCOSE      POCT Blood Glucose.: 252 mg/dL (05 Feb 2025 07:58)  POCT Blood Glucose.: 264 mg/dL (05 Feb 2025 06:30)  POCT Blood Glucose.: 302 mg/dL (04 Feb 2025 21:39)  POCT Blood Glucose.: 197 mg/dL (04 Feb 2025 16:46)  POCT Blood Glucose.: 204 mg/dL (04 Feb 2025 12:33)  POCT Blood Glucose.: 225 mg/dL (04 Feb 2025 11:57)        Urinalysis Basic - ( 04 Feb 2025 02:04 )    Color: x / Appearance: x / SG: x / pH: x  Gluc: 199 mg/dL / Ketone: x  / Bili: x / Urobili: x   Blood: x / Protein: x / Nitrite: x   Leuk Esterase: x / RBC: x / WBC x   Sq Epi: x / Non Sq Epi: x / Bacteria: x        MEDICATIONS  (STANDING):  albuterol/ipratropium for Nebulization 3 milliLiter(s) Nebulizer every 6 hours  aspirin enteric coated 81 milliGRAM(s) Oral daily  atorvastatin 40 milliGRAM(s) Oral at bedtime  clopidogrel Tablet 75 milliGRAM(s) Oral daily  dextrose 5%. 1000 milliLiter(s) (50 mL/Hr) IV Continuous <Continuous>  dextrose 5%. 1000 milliLiter(s) (100 mL/Hr) IV Continuous <Continuous>  dextrose 50% Injectable 25 Gram(s) IV Push once  dextrose 50% Injectable 12.5 Gram(s) IV Push once  dextrose 50% Injectable 25 Gram(s) IV Push once  enoxaparin Injectable 80 milliGRAM(s) SubCutaneous every 12 hours  fluticasone propionate/ salmeterol 250-50 MICROgram(s) Diskus 1 Dose(s) Inhalation two times a day  glucagon  Injectable 1 milliGRAM(s) IntraMuscular once  insulin glargine Injectable (LANTUS) 15 Unit(s) SubCutaneous at bedtime  insulin lispro (ADMELOG) corrective regimen sliding scale   SubCutaneous Before meals and at bedtime  insulin lispro Injectable (ADMELOG) 5 Unit(s) SubCutaneous three times a day before meals  isosorbide   dinitrate Tablet (ISORDIL) 5 milliGRAM(s) Oral three times a day  levothyroxine 75 MICROGram(s) Oral daily  metoprolol succinate ER 50 milliGRAM(s) Oral daily  montelukast 10 milliGRAM(s) Oral daily  mupirocin 2% Nasal 1 Application(s) Both Nostrils two times a day  pantoprazole    Tablet 40 milliGRAM(s) Oral before breakfast  senna 2 Tablet(s) Oral at bedtime  sodium chloride 0.9% lock flush 3 milliLiter(s) IV Push every 8 hours  sodium chloride 3%  Inhalation 4 milliLiter(s) Inhalation every 12 hours    MEDICATIONS  (PRN):  acetaminophen     Tablet .. 650 milliGRAM(s) Oral every 6 hours PRN Moderate Pain (4 - 6)  albuterol    90 MICROgram(s) HFA Inhaler 2 Puff(s) Inhalation every 4 hours PRN Bronchospasm  dextrose Oral Gel 15 Gram(s) Oral once PRN Blood Glucose LESS THAN 70 milliGRAM(s)/deciliter  polyethylene glycol 3350 17 Gram(s) Oral daily PRN Constipation      RADIOLOGY & ADDITIONAL TESTS:    Imaging Personally Reviewed:  [ ] YES  [ ] NO

## 2025-02-05 NOTE — PROGRESS NOTE ADULT - PROBLEM SELECTOR PLAN 2
New Acute CHF likely in setting of NSTEMI EF at [facility] was 25%, Echo on Arrival improved to 1/31/2025 44%   - Echo As Above  - ETIOLOGY: Ischemic   - DIURESIS: on hold in setting of KEN.  - GDMT: Toprol increased to 50mg PO daily, isordil 5mg TID. Losartan on HOLD in setting of KEN.    Advanced CHF Consulted: pt well compensated, HF team signed off.

## 2025-02-05 NOTE — PROGRESS NOTE ADULT - PROBLEM SELECTOR PLAN 2
New Acute CHF likely in setting of NSTEMI EF at [facility] was 25%, Echo on Arrival improved to 1/31/2025 44%   - Echo As Above  - ETIOLOGY: Ischemic   - DIURESIS: on hold in setting of KEN.  - GDMT: Toprol increased to 50mg PO daily, isordil 5mg TID. Losartan on HOLD in setting of KEN.    Advanced CHF Consulted: pt well compensated, HF team signed off.

## 2025-02-05 NOTE — PROVIDER CONTACT NOTE (CRITICAL VALUE NOTIFICATION) - ACTION/TREATMENT ORDERED:
patient currently in cath lab.
ANTONIO Yung made aware of troponin results, no intervention required at this time.
ANTONIO Taveras aware troponin 966, cardiology team at bedside prepping to take pt to cath lab.
Follow protocol. d/c Heparin and redo labs in 1 hr
Monitor for s/s of bleeding or any other symptoms, Heparin drip regulated as per nomogram

## 2025-02-05 NOTE — PROGRESS NOTE ADULT - PROBLEM SELECTOR PLAN 6
KEN on CKD Stage 3 (baseline Cr ~1.3-1.5 from prior Labs)   - Today 1.54, improved with Fluids likely due to hypovolemia episode vs dehydration   - Urine studies: 2/3: Na 71, Protein 27, Creatinine 108, Pr/Cr Ratio .2, Urea Nitrogen 832, K 21, Osmol 505

## 2025-02-05 NOTE — PROGRESS NOTE ADULT - ASSESSMENT
INCOMPLETE  78 yo female with PMH of HTN, HLD, Type II DM, triple vessel CAD s/p 3 stents at [facility] 11/2023, CHF, asthma, GERD, and hypothyroidism who presented to [facility] with CP and SOB on 1/24, found to have NSTEMI, Pt no longer candidate for high risk PCI. Possible viability study this admission  Medicine consulted for leukocytosis.     #NSTEMI   Patient w NSTEMI, no longer a candidate for high risk PCI    #Leukocytosis   Pt presenting with leukocytosis without clear source of infection. Completed course of ABx at [facility] inaddition to IV steroids. Zosyn stopped yesterday and last dose of vanc this AM. Presentation of leukocytosis likely reactive and as result of steroid use. Very low concern for active infection  WBC today 18.66 (17.44 day prior). Patient with new onset tachypnea- patient without rhonchi of PE. Patient reports LLE pain. On PE, no rash or signs of cellulitis  As above, patient received IV Solumedrol for asthma exacerbation and IV Vanc/Zosyn for PNA at [facility].   Plan  - monitor off abx as patient not febrile  - CXR to ensure no development of infiltrate  - please treat patient for pain     #Asthma  Pt with history of asthma at home uses symbicort 160 BID and Montelukast 10mg qd.   - c/w symbicort (advair).   - ensure patient receives montelukast daily  - c/w duoneb q6hrs     # LE pain and swelling  Pt with new onset left thigh/groin pain. Worsens with flexion and movement of the leg. Currently on full AC heparin gtt.  UE and LLE doppler US without evidence of acute DVT  Plan  - lidocaine patch   - tylenol q6 hrs prn for pain  - PT consult     #DM2  Patient with known diagnosis of DM2  1/30/25 a1c 6.9  Patient currently on 15u Lantus and 5 u premeal.   Patient with fasting blood sugar 225, 206  Premeal: 160-190   Plan  - increase lantus to 20u at bedtime  -increase premeal lispro insulin to 8 u   -c/w moderate ISF-25 lispro sliding scale AC+qHS  - monitor FSG q 6 hrs  - ensure diet includes consistent carb diet wout snacks    #anemia  Patient with acute drop in Hgb this AM (7.5 this AM, down from 9.1 yesterday). Patient denies any hemoptysis, melena, dark stools, BRBPR, hematuria, or pulled IV lines causing blood loss.   Patient receiving blood this AM  Differential includes iron deficiency vs AOCD vs vitamin deficiency.  Plan  - order reticulocyte index  - if RI elevated- order LDH, bilirubin, and haptoglobin  - if RI decreased- order ferritin, TIBC, iron total, folate, B12  - follow up post transfusion CBC  - daily CBC  - maintain active T&S; 2 large bore IVs    #hyponatremia   Patient with Na 130 this AM (132 corrected for hypoglycemia)  Patient currently not on diuretics w renal function improving  Most likely Hypotonic hyponatremia iso HF vs SIADH 2/2 pain  Plan  - order urine studies (including urine osmolality, urine Na)  - control pain (pending possible PCI, lidocaine patch and tylenol prn)    #KEN  KEN on CKD Stage 3 (baseline Cr ~1.3-1.5 from prior Labs)   Patient with improving Cr, currently 33/1.54 w 2/2 BUN/Cr 55/2.78  Most likely iso hypovolemia vs diuretic use vs dehydration  Plan  - trend BUN/Cr  - strict I's and O's  - avoid nephrotoxic drugs (NSAIDs, ACEI/ARBS, diuretics), renally dose meds

## 2025-02-05 NOTE — CHART NOTE - NSCHARTNOTEFT_GEN_A_CORE
2/5 @1:30PM   Fent @0.5, Levophed @0.03, Propofol @30  BP: 94/62 (71) HR: 94 CVP: -4, SpO2: 98.1%  Mixed venous: PH : 7.21, PCO2: 52, Hb: 9.3, SvO2: 82.5  Ficks: CO: 10.5 CI: 5.7 SVR: 1009  TD: CO: 3.6 CI: 2.1 SVR: 2975 2/5 @ 6PM  Fent @1, Levophed @0.18, Propofol @20, Vaso 0.04  BP: 117/57 (82) HR: 63 CVP: 8, SpO2: 100%  Mixed venous: pH : 7.21, pCO2: 52, Hb: 9.3, SvO2: 82.5  Ficks: CO: 10.5 CI: 5.7 SVR: 1009  TD: CO: 3.6 CI: 2.1 SVR: 2975    2/5 @1:30PM   Fent @0.5, Levophed @0.03, Propofol @30  BP: 94/62 (71) HR: 94 CVP: -4, SpO2: 98.1%  Mixed venous: PH : 7.21, PCO2: 52, Hb: 9.3, SvO2: 82.5  Ficks: CO: 10.5 CI: 5.7 SVR: 1009  TD: CO: 3.6 CI: 2.1 SVR: 2975 2/5 @ 6PM  Fent @1, Levophed @0.18, Propofol @20, Vaso 0.04  BP: 117/57 (82) HR: 63 CVP: 8, SpO2: 100%  Mixed venous: pH : 7.21, pCO2: 52, Hb: 10.2, SvO2: 59  Ficks: CO: 3.5 CI: 2.0 SVR: 1691.43  TD: CO: 4.2 CI: 2.4 SVR: 1560    2/5 @1:30PM   Fent @0.5, Levophed @0.03, Propofol @30  BP: 94/62 (71) HR: 94 CVP: -4, SpO2: 98.1%  Mixed venous: PH : 7.21, PCO2: 52, Hb: 9.3, SvO2: 82.5  Ficks: CO: 10.5 CI: 5.7 SVR: 1009  TD: CO: 3.6 CI: 2.1 SVR: 2975

## 2025-02-05 NOTE — PROVIDER CONTACT NOTE (CHANGE IN STATUS NOTIFICATION) - ASSESSMENT
Patient lethargic, extremities cold to touch. patient initially hypertensive sbp in 170s. Pt c/o 9/10 chest pain.
At time of contact note, RR 28, /75, HR 93. Clammy skin.

## 2025-02-06 ENCOUNTER — RESULT REVIEW (OUTPATIENT)
Age: 80
End: 2025-02-06

## 2025-02-06 LAB
ADD ON TEST-SPECIMEN IN LAB: SIGNIFICANT CHANGE UP
ADD ON TEST-SPECIMEN IN LAB: SIGNIFICANT CHANGE UP
ALBUMIN SERPL ELPH-MCNC: 2.2 G/DL — LOW (ref 3.3–5)
ALBUMIN SERPL ELPH-MCNC: 2.2 G/DL — LOW (ref 3.3–5)
ALBUMIN SERPL ELPH-MCNC: 2.7 G/DL — LOW (ref 3.3–5)
ALP SERPL-CCNC: 51 U/L — SIGNIFICANT CHANGE UP (ref 40–120)
ALP SERPL-CCNC: 52 U/L — SIGNIFICANT CHANGE UP (ref 40–120)
ALP SERPL-CCNC: 55 U/L — SIGNIFICANT CHANGE UP (ref 40–120)
ALT FLD-CCNC: 33 U/L — SIGNIFICANT CHANGE UP (ref 10–45)
ALT FLD-CCNC: 41 U/L — SIGNIFICANT CHANGE UP (ref 10–45)
ALT FLD-CCNC: 41 U/L — SIGNIFICANT CHANGE UP (ref 10–45)
ANION GAP SERPL CALC-SCNC: 11 MMOL/L — SIGNIFICANT CHANGE UP (ref 5–17)
ANION GAP SERPL CALC-SCNC: 12 MMOL/L — SIGNIFICANT CHANGE UP (ref 5–17)
ANION GAP SERPL CALC-SCNC: 14 MMOL/L — SIGNIFICANT CHANGE UP (ref 5–17)
APTT BLD: 29.5 SEC — SIGNIFICANT CHANGE UP (ref 24.5–35.6)
AST SERPL-CCNC: 54 U/L — HIGH (ref 10–40)
AST SERPL-CCNC: 60 U/L — HIGH (ref 10–40)
AST SERPL-CCNC: 60 U/L — HIGH (ref 10–40)
BASE EXCESS BLDMV CALC-SCNC: -3.2 MMOL/L — SIGNIFICANT CHANGE UP
BASE EXCESS BLDMV CALC-SCNC: -3.5 MMOL/L — SIGNIFICANT CHANGE UP
BASE EXCESS BLDMV CALC-SCNC: -4.9 MMOL/L — SIGNIFICANT CHANGE UP
BASE EXCESS BLDMV CALC-SCNC: -5 MMOL/L — SIGNIFICANT CHANGE UP
BASOPHILS # BLD AUTO: 0 K/UL — SIGNIFICANT CHANGE UP (ref 0–0.2)
BASOPHILS # BLD AUTO: 0 K/UL — SIGNIFICANT CHANGE UP (ref 0–0.2)
BASOPHILS NFR BLD AUTO: 0 % — SIGNIFICANT CHANGE UP (ref 0–2)
BASOPHILS NFR BLD AUTO: 0 % — SIGNIFICANT CHANGE UP (ref 0–2)
BILIRUB SERPL-MCNC: 0.5 MG/DL — SIGNIFICANT CHANGE UP (ref 0.2–1.2)
BLD GP AB SCN SERPL QL: NEGATIVE — SIGNIFICANT CHANGE UP
BUN SERPL-MCNC: 31 MG/DL — HIGH (ref 7–23)
BUN SERPL-MCNC: 31 MG/DL — HIGH (ref 7–23)
BUN SERPL-MCNC: 32 MG/DL — HIGH (ref 7–23)
CALCIUM SERPL-MCNC: 7.6 MG/DL — LOW (ref 8.4–10.5)
CALCIUM SERPL-MCNC: 7.8 MG/DL — LOW (ref 8.4–10.5)
CALCIUM SERPL-MCNC: 7.9 MG/DL — LOW (ref 8.4–10.5)
CHLORIDE SERPL-SCNC: 100 MMOL/L — SIGNIFICANT CHANGE UP (ref 96–108)
CHLORIDE SERPL-SCNC: 104 MMOL/L — SIGNIFICANT CHANGE UP (ref 96–108)
CHLORIDE SERPL-SCNC: 99 MMOL/L — SIGNIFICANT CHANGE UP (ref 96–108)
CO2 BLDMV-SCNC: 20 MMOL/L — SIGNIFICANT CHANGE UP
CO2 BLDMV-SCNC: 21 MMOL/L — SIGNIFICANT CHANGE UP
CO2 BLDMV-SCNC: 23 MMOL/L — SIGNIFICANT CHANGE UP
CO2 BLDMV-SCNC: 24 MMOL/L — SIGNIFICANT CHANGE UP
CO2 SERPL-SCNC: 19 MMOL/L — LOW (ref 22–31)
CO2 SERPL-SCNC: 19 MMOL/L — LOW (ref 22–31)
CO2 SERPL-SCNC: 21 MMOL/L — LOW (ref 22–31)
COHGB MFR BLDMV: 1.4 % — SIGNIFICANT CHANGE UP
COHGB MFR BLDMV: 1.6 % — SIGNIFICANT CHANGE UP
COHGB MFR BLDMV: 1.7 % — SIGNIFICANT CHANGE UP
COHGB MFR BLDMV: 1.8 % — SIGNIFICANT CHANGE UP
CREAT SERPL-MCNC: 1.84 MG/DL — HIGH (ref 0.5–1.3)
CREAT SERPL-MCNC: 1.99 MG/DL — HIGH (ref 0.5–1.3)
CREAT SERPL-MCNC: 2.1 MG/DL — HIGH (ref 0.5–1.3)
CULTURE RESULTS: SIGNIFICANT CHANGE UP
EGFR: 24 ML/MIN/1.73M2 — LOW
EGFR: 25 ML/MIN/1.73M2 — LOW
EGFR: 28 ML/MIN/1.73M2 — LOW
EOSINOPHIL # BLD AUTO: 0 K/UL — SIGNIFICANT CHANGE UP (ref 0–0.5)
EOSINOPHIL # BLD AUTO: 0.48 K/UL — SIGNIFICANT CHANGE UP (ref 0–0.5)
EOSINOPHIL NFR BLD AUTO: 0 % — SIGNIFICANT CHANGE UP (ref 0–6)
EOSINOPHIL NFR BLD AUTO: 1.7 % — SIGNIFICANT CHANGE UP (ref 0–6)
GAS PNL BLDA: SIGNIFICANT CHANGE UP
GAS PNL BLDMV: SIGNIFICANT CHANGE UP
GLUCOSE SERPL-MCNC: 232 MG/DL — HIGH (ref 70–99)
GLUCOSE SERPL-MCNC: 332 MG/DL — HIGH (ref 70–99)
GLUCOSE SERPL-MCNC: 404 MG/DL — HIGH (ref 70–99)
HCO3 BLDMV-SCNC: 19 MMOL/L — SIGNIFICANT CHANGE UP
HCO3 BLDMV-SCNC: 20 MMOL/L — SIGNIFICANT CHANGE UP
HCO3 BLDMV-SCNC: 22 MMOL/L — SIGNIFICANT CHANGE UP
HCO3 BLDMV-SCNC: 23 MMOL/L — SIGNIFICANT CHANGE UP
HCT VFR BLD CALC: 24.2 % — LOW (ref 34.5–45)
HCT VFR BLD CALC: 25.6 % — LOW (ref 34.5–45)
HCT VFR BLD CALC: 28.6 % — LOW (ref 34.5–45)
HGB BLD-MCNC: 8.4 G/DL — LOW (ref 11.5–15.5)
HGB BLD-MCNC: 8.5 G/DL — LOW (ref 11.5–15.5)
HGB BLD-MCNC: 9.7 G/DL — LOW (ref 11.5–15.5)
HGB FLD-MCNC: 11.3 G/DL — LOW (ref 11.7–16.1)
HGB FLD-MCNC: 8.4 G/DL — LOW (ref 11.7–16.1)
HGB FLD-MCNC: 8.7 G/DL — LOW (ref 11.7–16.1)
HGB FLD-MCNC: 9.9 G/DL — LOW (ref 11.7–16.1)
INR BLD: 1.22 — HIGH (ref 0.85–1.16)
LACTATE SERPL-SCNC: 0.9 MMOL/L — SIGNIFICANT CHANGE UP (ref 0.5–2)
LACTATE SERPL-SCNC: 1.2 MMOL/L — SIGNIFICANT CHANGE UP (ref 0.5–2)
LACTATE SERPL-SCNC: 1.6 MMOL/L — SIGNIFICANT CHANGE UP (ref 0.5–2)
LYMPHOCYTES # BLD AUTO: 13.8 % — SIGNIFICANT CHANGE UP (ref 13–44)
LYMPHOCYTES # BLD AUTO: 17.7 % — SIGNIFICANT CHANGE UP (ref 13–44)
LYMPHOCYTES # BLD AUTO: 4.78 K/UL — HIGH (ref 1–3.3)
LYMPHOCYTES # BLD AUTO: 5.03 K/UL — HIGH (ref 1–3.3)
MAGNESIUM SERPL-MCNC: 1.8 MG/DL — SIGNIFICANT CHANGE UP (ref 1.6–2.6)
MAGNESIUM SERPL-MCNC: 1.9 MG/DL — SIGNIFICANT CHANGE UP (ref 1.6–2.6)
MAGNESIUM SERPL-MCNC: 2.1 MG/DL — SIGNIFICANT CHANGE UP (ref 1.6–2.6)
MCHC RBC-ENTMCNC: 29.1 PG — SIGNIFICANT CHANGE UP (ref 27–34)
MCHC RBC-ENTMCNC: 29.3 PG — SIGNIFICANT CHANGE UP (ref 27–34)
MCHC RBC-ENTMCNC: 30.5 PG — SIGNIFICANT CHANGE UP (ref 27–34)
MCHC RBC-ENTMCNC: 33.2 G/DL — SIGNIFICANT CHANGE UP (ref 32–36)
MCHC RBC-ENTMCNC: 33.9 G/DL — SIGNIFICANT CHANGE UP (ref 32–36)
MCHC RBC-ENTMCNC: 34.7 G/DL — SIGNIFICANT CHANGE UP (ref 32–36)
MCV RBC AUTO: 86.4 FL — SIGNIFICANT CHANGE UP (ref 80–100)
MCV RBC AUTO: 87.7 FL — SIGNIFICANT CHANGE UP (ref 80–100)
MCV RBC AUTO: 88 FL — SIGNIFICANT CHANGE UP (ref 80–100)
METHGB MFR BLDMV: 0.2 % — SIGNIFICANT CHANGE UP
METHGB MFR BLDMV: 0.4 % — SIGNIFICANT CHANGE UP
METHGB MFR BLDMV: 0.5 % — SIGNIFICANT CHANGE UP
METHGB MFR BLDMV: 0.6 % — SIGNIFICANT CHANGE UP
MONOCYTES # BLD AUTO: 2.02 K/UL — HIGH (ref 0–0.9)
MONOCYTES # BLD AUTO: 2.53 K/UL — HIGH (ref 0–0.9)
MONOCYTES NFR BLD AUTO: 7.1 % — SIGNIFICANT CHANGE UP (ref 2–14)
MONOCYTES NFR BLD AUTO: 7.3 % — SIGNIFICANT CHANGE UP (ref 2–14)
NEUTROPHILS # BLD AUTO: 20.61 K/UL — HIGH (ref 1.8–7.4)
NEUTROPHILS # BLD AUTO: 27.02 K/UL — HIGH (ref 1.8–7.4)
NEUTROPHILS NFR BLD AUTO: 71.7 % — SIGNIFICANT CHANGE UP (ref 43–77)
NEUTROPHILS NFR BLD AUTO: 77.1 % — HIGH (ref 43–77)
NRBC # BLD: 9 /100 WBCS — HIGH (ref 0–0)
NRBC # BLD: SIGNIFICANT CHANGE UP /100 WBCS (ref 0–0)
NRBC # BLD: SIGNIFICANT CHANGE UP /100 WBCS (ref 0–0)
NRBC BLD-RTO: 9 /100 WBCS — HIGH (ref 0–0)
NRBC BLD-RTO: SIGNIFICANT CHANGE UP /100 WBCS (ref 0–0)
NRBC BLD-RTO: SIGNIFICANT CHANGE UP /100 WBCS (ref 0–0)
O2 CT VFR BLD CALC: 36 MMHG — SIGNIFICANT CHANGE UP
O2 CT VFR BLD CALC: 40 MMHG — SIGNIFICANT CHANGE UP
O2 CT VFR BLD CALC: 49 MMHG — SIGNIFICANT CHANGE UP
O2 CT VFR BLD CALC: 52 MMHG — SIGNIFICANT CHANGE UP
O2 CT VFR BLDMV CALC: 10 ML/DL — SIGNIFICANT CHANGE UP
O2 CT VFR BLDMV CALC: 8 ML/DL — SIGNIFICANT CHANGE UP
O2 CT VFR BLDMV CALC: 9 ML/DL — SIGNIFICANT CHANGE UP
O2 CT VFR BLDMV CALC: 9 ML/DL — SIGNIFICANT CHANGE UP
OXYHGB MFR BLDMV: 53.7 % — SIGNIFICANT CHANGE UP
OXYHGB MFR BLDMV: 56.5 % — SIGNIFICANT CHANGE UP
OXYHGB MFR BLDMV: 78.2 % — SIGNIFICANT CHANGE UP
OXYHGB MFR BLDMV: 79.2 % — SIGNIFICANT CHANGE UP
PCO2 BLDMV: 31 MMHG — SIGNIFICANT CHANGE UP
PCO2 BLDMV: 34 MMHG — SIGNIFICANT CHANGE UP
PCO2 BLDMV: 39 MMHG — SIGNIFICANT CHANGE UP
PCO2 BLDMV: 45 MMHG — SIGNIFICANT CHANGE UP
PH BLDMV: 7.31 — SIGNIFICANT CHANGE UP
PH BLDMV: 7.36 — SIGNIFICANT CHANGE UP
PH BLDMV: 7.37 — SIGNIFICANT CHANGE UP
PH BLDMV: 7.4 — SIGNIFICANT CHANGE UP
PHOSPHATE SERPL-MCNC: 4.6 MG/DL — HIGH (ref 2.5–4.5)
PHOSPHATE SERPL-MCNC: 5.7 MG/DL — HIGH (ref 2.5–4.5)
PHOSPHATE SERPL-MCNC: 6.3 MG/DL — HIGH (ref 2.5–4.5)
PLATELET # BLD AUTO: 103 K/UL — LOW (ref 150–400)
PLATELET # BLD AUTO: 67 K/UL — LOW (ref 150–400)
PLATELET # BLD AUTO: 89 K/UL — LOW (ref 150–400)
POTASSIUM SERPL-MCNC: 4.4 MMOL/L — SIGNIFICANT CHANGE UP (ref 3.5–5.3)
POTASSIUM SERPL-MCNC: 5.4 MMOL/L — HIGH (ref 3.5–5.3)
POTASSIUM SERPL-MCNC: 5.8 MMOL/L — HIGH (ref 3.5–5.3)
POTASSIUM SERPL-SCNC: 4.4 MMOL/L — SIGNIFICANT CHANGE UP (ref 3.5–5.3)
POTASSIUM SERPL-SCNC: 5.4 MMOL/L — HIGH (ref 3.5–5.3)
POTASSIUM SERPL-SCNC: 5.8 MMOL/L — HIGH (ref 3.5–5.3)
PROT SERPL-MCNC: 4.1 G/DL — LOW (ref 6–8.3)
PROT SERPL-MCNC: 4.2 G/DL — LOW (ref 6–8.3)
PROT SERPL-MCNC: 4.3 G/DL — LOW (ref 6–8.3)
PROTHROM AB SERPL-ACNC: 14 SEC — HIGH (ref 9.9–13.4)
RBC # BLD: 2.75 M/UL — LOW (ref 3.8–5.2)
RBC # BLD: 2.92 M/UL — LOW (ref 3.8–5.2)
RBC # BLD: 3.31 M/UL — LOW (ref 3.8–5.2)
RBC # FLD: 14.9 % — HIGH (ref 10.3–14.5)
RBC # FLD: 15 % — HIGH (ref 10.3–14.5)
RBC # FLD: 15.3 % — HIGH (ref 10.3–14.5)
RH IG SCN BLD-IMP: NEGATIVE — SIGNIFICANT CHANGE UP
SAO2 % BLDMV: 54.7 % — SIGNIFICANT CHANGE UP
SAO2 % BLDMV: 57.7 % — SIGNIFICANT CHANGE UP
SAO2 % BLDMV: 79.9 % — SIGNIFICANT CHANGE UP
SAO2 % BLDMV: 80.7 % — SIGNIFICANT CHANGE UP
SODIUM SERPL-SCNC: 132 MMOL/L — LOW (ref 135–145)
SODIUM SERPL-SCNC: 133 MMOL/L — LOW (ref 135–145)
SODIUM SERPL-SCNC: 134 MMOL/L — LOW (ref 135–145)
SPECIMEN SOURCE: SIGNIFICANT CHANGE UP
VANCOMYCIN TROUGH SERPL-MCNC: 21.5 UG/ML — HIGH (ref 10–20)
WBC # BLD: 27.82 K/UL — HIGH (ref 3.8–10.5)
WBC # BLD: 28.39 K/UL — HIGH (ref 3.8–10.5)
WBC # BLD: 34.64 K/UL — HIGH (ref 3.8–10.5)
WBC # FLD AUTO: 27.82 K/UL — HIGH (ref 3.8–10.5)
WBC # FLD AUTO: 28.39 K/UL — HIGH (ref 3.8–10.5)
WBC # FLD AUTO: 34.64 K/UL — HIGH (ref 3.8–10.5)

## 2025-02-06 PROCEDURE — 99358 PROLONG SERVICE W/O CONTACT: CPT

## 2025-02-06 PROCEDURE — 74018 RADEX ABDOMEN 1 VIEW: CPT | Mod: 26

## 2025-02-06 PROCEDURE — G0545: CPT

## 2025-02-06 PROCEDURE — 71045 X-RAY EXAM CHEST 1 VIEW: CPT | Mod: 26

## 2025-02-06 PROCEDURE — 99291 CRITICAL CARE FIRST HOUR: CPT | Mod: GC

## 2025-02-06 PROCEDURE — 76882 US LMTD JT/FCL EVL NVASC XTR: CPT | Mod: 26,LT

## 2025-02-06 PROCEDURE — 99222 1ST HOSP IP/OBS MODERATE 55: CPT

## 2025-02-06 PROCEDURE — 93308 TTE F-UP OR LMTD: CPT | Mod: 26

## 2025-02-06 PROCEDURE — 99291 CRITICAL CARE FIRST HOUR: CPT

## 2025-02-06 PROCEDURE — 99292 CRITICAL CARE ADDL 30 MIN: CPT

## 2025-02-06 RX ORDER — SODIUM ZIRCONIUM CYCLOSILICATE 5 G/5G
10 POWDER, FOR SUSPENSION ORAL ONCE
Refills: 0 | Status: COMPLETED | OUTPATIENT
Start: 2025-02-06 | End: 2025-02-06

## 2025-02-06 RX ORDER — PIPERACILLIN SODIUM AND TAZOBACTAM SODIUM 2; 250 G/50ML; MG/50ML
4.5 INJECTION, POWDER, FOR SOLUTION INTRAVENOUS EVERY 8 HOURS
Refills: 0 | Status: DISCONTINUED | OUTPATIENT
Start: 2025-02-06 | End: 2025-02-06

## 2025-02-06 RX ORDER — MEROPENEM 500 MG/20ML
1000 INJECTION INTRAVENOUS EVERY 12 HOURS
Refills: 0 | Status: DISCONTINUED | OUTPATIENT
Start: 2025-02-06 | End: 2025-02-12

## 2025-02-06 RX ORDER — MAGNESIUM SULFATE 0.8 MEQ/ML
2 AMPUL (ML) INJECTION ONCE
Refills: 0 | Status: COMPLETED | OUTPATIENT
Start: 2025-02-06 | End: 2025-02-06

## 2025-02-06 RX ORDER — BACTERIOSTATIC SODIUM CHLORIDE 0.9 %
250 VIAL (ML) INJECTION ONCE
Refills: 0 | Status: COMPLETED | OUTPATIENT
Start: 2025-02-06 | End: 2025-02-06

## 2025-02-06 RX ORDER — INSULIN GLARGINE-YFGN 100 [IU]/ML
26 INJECTION, SOLUTION SUBCUTANEOUS AT BEDTIME
Refills: 0 | Status: DISCONTINUED | OUTPATIENT
Start: 2025-02-06 | End: 2025-02-07

## 2025-02-06 RX ORDER — MONTELUKAST SODIUM 5 MG/1
10 TABLET, CHEWABLE ORAL EVERY 24 HOURS
Refills: 0 | Status: DISCONTINUED | OUTPATIENT
Start: 2025-02-06 | End: 2025-02-07

## 2025-02-06 RX ADMIN — ASPIRIN 81 MILLIGRAM(S): 81 TABLET, COATED ORAL at 11:32

## 2025-02-06 RX ADMIN — Medication 3: at 06:11

## 2025-02-06 RX ADMIN — ANTISEPTIC SURGICAL SCRUB 15 MILLILITER(S): 0.04 SOLUTION TOPICAL at 17:00

## 2025-02-06 RX ADMIN — IPRATROPIUM BROMIDE AND ALBUTEROL SULFATE 3 MILLILITER(S): .5; 2.5 SOLUTION RESPIRATORY (INHALATION) at 23:54

## 2025-02-06 RX ADMIN — ATORVASTATIN CALCIUM 80 MILLIGRAM(S): 80 TABLET, FILM COATED ORAL at 21:20

## 2025-02-06 RX ADMIN — ANTISEPTIC SURGICAL SCRUB 15 MILLILITER(S): 0.04 SOLUTION TOPICAL at 05:32

## 2025-02-06 RX ADMIN — Medication 2: at 23:53

## 2025-02-06 RX ADMIN — PIPERACILLIN SODIUM AND TAZOBACTAM SODIUM 25 GRAM(S): 2; 250 INJECTION, POWDER, FOR SOLUTION INTRAVENOUS at 05:39

## 2025-02-06 RX ADMIN — LEVOTHYROXINE SODIUM 60 MICROGRAM(S): 25 TABLET ORAL at 06:01

## 2025-02-06 RX ADMIN — PANTOPRAZOLE 40 MILLIGRAM(S): 20 TABLET, DELAYED RELEASE ORAL at 05:33

## 2025-02-06 RX ADMIN — NOREPINEPHRINE BITARTRATE 3.8 MICROGRAM(S)/KG/MIN: 1 INJECTION, SOLUTION, CONCENTRATE INTRAVENOUS at 10:20

## 2025-02-06 RX ADMIN — MUPIROCIN 1 APPLICATION(S): 2 CREAM TOPICAL at 11:33

## 2025-02-06 RX ADMIN — IPRATROPIUM BROMIDE AND ALBUTEROL SULFATE 3 MILLILITER(S): .5; 2.5 SOLUTION RESPIRATORY (INHALATION) at 00:24

## 2025-02-06 RX ADMIN — PANTOPRAZOLE 40 MILLIGRAM(S): 20 TABLET, DELAYED RELEASE ORAL at 17:00

## 2025-02-06 RX ADMIN — Medication 25 GRAM(S): at 06:36

## 2025-02-06 RX ADMIN — IPRATROPIUM BROMIDE AND ALBUTEROL SULFATE 3 MILLILITER(S): .5; 2.5 SOLUTION RESPIRATORY (INHALATION) at 14:01

## 2025-02-06 RX ADMIN — Medication 250 MILLILITER(S): at 12:27

## 2025-02-06 RX ADMIN — Medication 4: at 01:51

## 2025-02-06 RX ADMIN — Medication 3 MILLILITER(S): at 21:18

## 2025-02-06 RX ADMIN — PIPERACILLIN SODIUM AND TAZOBACTAM SODIUM 25 GRAM(S): 2; 250 INJECTION, POWDER, FOR SOLUTION INTRAVENOUS at 14:08

## 2025-02-06 RX ADMIN — Medication 75 MILLIGRAM(S): at 11:32

## 2025-02-06 RX ADMIN — INSULIN GLARGINE-YFGN 26 UNIT(S): 100 INJECTION, SOLUTION SUBCUTANEOUS at 23:53

## 2025-02-06 RX ADMIN — MEROPENEM 100 MILLIGRAM(S): 500 INJECTION INTRAVENOUS at 15:05

## 2025-02-06 RX ADMIN — IPRATROPIUM BROMIDE AND ALBUTEROL SULFATE 3 MILLILITER(S): .5; 2.5 SOLUTION RESPIRATORY (INHALATION) at 05:35

## 2025-02-06 RX ADMIN — ANTISEPTIC SURGICAL SCRUB 1 APPLICATION(S): 0.04 SOLUTION TOPICAL at 05:30

## 2025-02-06 RX ADMIN — Medication 2: at 11:41

## 2025-02-06 RX ADMIN — PROPOFOL 4.87 MICROGRAM(S)/KG/MIN: 10 INJECTION, EMULSION INTRAVENOUS at 04:37

## 2025-02-06 RX ADMIN — Medication 3: at 17:01

## 2025-02-06 RX ADMIN — Medication 3 MILLILITER(S): at 13:34

## 2025-02-06 RX ADMIN — SODIUM ZIRCONIUM CYCLOSILICATE 10 GRAM(S): 5 POWDER, FOR SUSPENSION ORAL at 07:06

## 2025-02-06 RX ADMIN — Medication 3 MILLILITER(S): at 03:54

## 2025-02-06 RX ADMIN — MONTELUKAST SODIUM 10 MILLIGRAM(S): 5 TABLET, CHEWABLE ORAL at 13:42

## 2025-02-06 NOTE — PROGRESS NOTE ADULT - PROBLEM SELECTOR PLAN 2
New Acute CHF likely in setting of NSTEMI EF at [facility] was 25%, Echo on Arrival improved to 1/31/2025 44%   - Echo As Above  - ETIOLOGY: Ischemic   - DIURESIS: on hold in setting of KEN.  - GDMT: Toprol increased to 50mg PO daily, isordil 5mg TID. Losartan on HOLD in setting of KEN.    Advanced CHF Consulted: pt well compensated, HF team signed off.

## 2025-02-06 NOTE — CHART NOTE - NSCHARTNOTEFT_GEN_A_CORE
2/6/25 @ 12 am  Fentanyl 1, Levo 0.4, Phenyl 0.1, Propofol 20, Vaso 0.04, IABP 1:1 Augment Pressure 142  /51 (74), HR 43, SaO2 ___, Hb ____  PA 45/15 (25), 2/6/25 @ 12 am  Fentanyl 1, Levo 0.4, Phenyl 0.1, Propofol 20, Vaso 0.04, IABP 1:1 Augment Pressure 142  /51 (74), HR 43, Hb 9.2  CVP 11, PA 45/15 (25), PA sat 53.7, SaO2 97.7  Ficks: CO 3.7 CI 2.0 SVR 1362.16  TD: CO 2.6 CI 1.5 SVR 1937 2/6/25 @ 12 am  Fentanyl 1, Levo 0.4, Phenyl 0.1, Propofol 20, Vaso 0.04, IABP 1:1 Augment Pressure 79  /51 (74), HR 43, Hb 9.2  CVP 11, PA 45/15 (25), PA sat 53.7, SaO2 97.7  Ficks: CO 3.7 CI 2.0 SVR 1362.16  TD: CO 2.6 CI 1.5 SVR 1937 2/6/25 @ 5 am  Fentanyl 1, Levo 0.4, Vaso 0.04, Propofol 20, IABP 1:1 Augment Pressure 75  /53 (76), HR 45, Hb X  CVP 8, PA 37/8 (17), PA sat X, SaO2 X  Ficks: CO X CI X SVR X  TD: CO 2.6 CI 1.5 SVR 2305    2/6/25 @ 12 am  Fentanyl 1, Levo 0.4, Phenyl 0.1, Propofol 20, Vaso 0.04, IABP 1:1 Augment Pressure 79  /51 (74), HR 43, Hb 9.2  CVP 11, PA 45/15 (25), PA sat 53.7, SaO2 97.7  Ficks: CO 3.7 CI 2.0 SVR 1362.16  TD: CO 2.6 CI 1.5 SVR 1937 2/6/25 @ 5 am  Fentanyl 1, Levo 0.4, Vaso 0.04, Propofol 20, IABP 1:1 Augment Pressure 75  /53 (76), HR 45, Hb X  CVP 8, PA 37/8 (17), PA sat 57.7, SaO2 98.4  Ficks: CO X CI X SVR X  TD: CO 2.6 CI 1.5 SVR 2305    2/6/25 @ 12 am  Fentanyl 1, Levo 0.4, Phenyl 0.1, Propofol 20, Vaso 0.04, IABP 1:1 Augment Pressure 79  /51 (74), HR 43, Hb 9.2  CVP 11, PA 45/15 (25), PA sat 53.7, SaO2 97.7  Ficks: CO 3.7 CI 2.0 SVR 1362.16  TD: CO 2.6 CI 1.5 SVR 1937 2/6/25 @ 5 am  Fentanyl 1, Levo 0.4, Vaso 0.04, Propofol 20, IABP 1:1 Augment Pressure 75  /53 (76), HR 45, Hb 9.7  CVP 8, PA 37/8 (17), PA sat 57.7, SaO2 98.4  Ficks: CO 3.8 CI 2.1 SVR 1431  TD: CO 2.6 CI 1.5 SVR 2305    2/6/25 @ 12 am  Fentanyl 1, Levo 0.4, Phenyl 0.1, Propofol 20, Vaso 0.04, IABP 1:1 Augment Pressure 79  /51 (74), HR 43, Hb 9.2  CVP 11, PA 45/15 (25), PA sat 53.7, SaO2 97.7  Ficks: CO 3.7 CI 2.0 SVR 1362.16  TD: CO 2.6 CI 1.5 SVR 1937

## 2025-02-06 NOTE — CHART NOTE - NSCHARTNOTEFT_GEN_A_CORE
PALLIATIVE MEDICINE COORDINATION OF CARE DOCUMENTATION: [x] Inpatient Consult    78yo F with extensive cardiac history HTN, HLD, DM2, 3VD s/p stenting 2mo prior, CHF, multiple comorbidities pw CP/SOB cf ACS as well as fever, chills, cough, congestion on 1/24. Patient initially treated for NSTEMI and MRSA pna. Cardiac cath revealed 3VD and reduced EF (25%, which improved to 44%). HCCB KEN on CKD, hypotension, worsening angina. Ultimately, on 2/5 during planned high-risk PCI with planned IABP, patient became hypotensive, was intubated, and transferred to CCU for management of cardiogenic and septic shock.  Family was updated on patient's acute decompensation and clinical condition. GOC discussed and family elected DNR with trial of intubation; otherwise continue all aggressive interventions and escalation of care. Primary team canceling consult on 2/6.     Overall prognosis is poor. Patient intubated, in cardiogenic shock requiring multiple pressors and IABP in setting of baseline severe multi vessel CAD not amenable to further vasularization, acute on chronic HD with reduced EF, possible multifocal PNA, and KEN. Patient's rapid decline during planned high risk PCI suggests tenuous hemodynamic stability and limited reserve. Her overall clinical picture suggests a low likelihood of recovery to functional baseline prior to admission.     Recommend regular re assessment of patient's response to aggressive treatment and GOC. Plan of care should be adjusted based on patient's evolving condition and her family's understanding and acceptance of prognosis.       Non-Face-to-Face prolonged service provided that relates to (face-to-face) care that has or will occur and ongoing patient management, including one or more of the following: -Documentation review from other physicians and health care professional services -Review of medical records and diagnostic/radiology study results -Coordination with patient's support system    ************************************************************************  MEDICATION REVIEW:  MEDICATIONS  (STANDING):  albuterol/ipratropium for Nebulization 3 milliLiter(s) Nebulizer every 6 hours  aspirin  chewable 81 milliGRAM(s) Enteral Tube daily  atorvastatin 80 milliGRAM(s) Oral at bedtime  chlorhexidine 0.12% Liquid 15 milliLiter(s) Oral Mucosa every 12 hours  chlorhexidine 2% Cloths 1 Application(s) Topical <User Schedule>  clopidogrel Tablet 75 milliGRAM(s) Oral daily  dextrose 5%. 1000 milliLiter(s) (100 mL/Hr) IV Continuous <Continuous>  dextrose 50% Injectable 12.5 Gram(s) IV Push once  dextrose 50% Injectable 25 Gram(s) IV Push once  fentaNYL   Infusion. 0.5 MICROgram(s)/kG/Hr (4.06 mL/Hr) IV Continuous <Continuous>  glucagon  Injectable 1 milliGRAM(s) IntraMuscular once  insulin glargine Injectable (LANTUS) 26 Unit(s) SubCutaneous at bedtime  insulin lispro (ADMELOG) corrective regimen sliding scale   SubCutaneous every 6 hours  levothyroxine Injectable 60 MICROGram(s) IV Push <User Schedule>  montelukast 10 milliGRAM(s) Oral every 24 hours  mupirocin 2% Nasal 1 Application(s) Both Nostrils two times a day  norepinephrine Infusion 0.05 MICROgram(s)/kG/Min (3.8 mL/Hr) IV Continuous <Continuous>  pantoprazole  Injectable 40 milliGRAM(s) IV Push every 12 hours  piperacillin/tazobactam IVPB.. 4.5 Gram(s) IV Intermittent every 8 hours  propofol Infusion 10 MICROgram(s)/kG/Min (4.87 mL/Hr) IV Continuous <Continuous>  sodium chloride 0.9% lock flush 3 milliLiter(s) IV Push every 8 hours  vancomycin  IVPB 1250 milliGRAM(s) IV Intermittent every 24 hours  vasopressin Infusion 0.04 Unit(s)/Min (6 mL/Hr) IV Continuous <Continuous>    MEDICATIONS  (PRN):  albuterol    90 MICROgram(s) HFA Inhaler 2 Puff(s) Inhalation every 4 hours PRN Bronchospasm  dextrose Oral Gel 15 Gram(s) Oral once PRN Blood Glucose LESS THAN 70 milliGRAM(s)/deciliter    ------------------------------------------------------------------------  COORDINATION OF CARE:  - Palliative Care consulted for: GOC   - Patient (to be) assessed: n/a, consult canceled this morning   - Patient previously seen by Palliative Care service: NO    ADVANCE CARE PLANNING  - Code status: DNR   - MOLST reviewed in chart: Yes  - HCP/Surrogate: Emergency contact Ladan Castellano, son in law #[phone]  - [facility] documents: NONE found on [place]  - HCP/Living will/Other Advanced Directives: NONE   ------------------------------------------------------------------------  CARE PROVIDER DOCUMENTATION:  - SW/CM notes: Remains medically active      PLAN OF CARE  - Known admissions in past year: None  - Current admit date: 1/30/2025  - LOS: 7  - LACE score: 10  - Current dispo plan: TO BE DETERMINED    01-30-25 (7d)    ------------------------------------------------------------------------  - Time Spent/Chart reviewed: 31 Minutes [including time used to gather, review and transfer data]  - Start: 0495a  - End: 2276a    Prolonged services rendered, as part of this patient's care provided by Palliative Medicine, include: i. chart review for provider and ancillary service documentation, ii. pertinent diagnostics including laboratory and imaging studies, iii. medication review including PRN use, iv. admission history including previous palliative care encounters and [facility] notes, v. advance care planning documents including HCP and MOLST forms in [facility]. Part of Palliative Medicine extended evaluation and management also involves coordination of care with our IDT, the primary and consulting teams, and unit CM/SW and Hospice if eligible. Recommendations based on the information gathered and discussed are outlined in the A/P of Palliative notes. PALLIATIVE MEDICINE COORDINATION OF CARE DOCUMENTATION: [x] Inpatient Consult    80yo F with extensive cardiac history HTN, HLD, DM2, 3VD s/p stenting 2mo prior, CHF, multiple comorbidities pw CP/SOB cf ACS as well as fever, chills, cough, congestion on 1/24. Patient initially treated for NSTEMI and MRSA pna. Cardiac cath revealed 3VD and reduced EF (25%, which improved to 44%). HCCB KEN on CKD, hypotension, anemia requiring transfusions, worsening angina. On 2/5 pt had vaso vagal episode, found to have lactate of 9. Was brought to cath lab for support, sp IABP and swan. Patient became hypotensive, hypoxic, was intubated, and transferred to CCU for management of refractory cardiogenic and septic shock.  Family was updated on patient's acute decompensation and clinical condition. GOC discussed and family elected DNR with trial of intubation, Do not reintubate; otherwise continue all aggressive interventions and escalation of care.    Overall prognosis is poor. Patient intubated, in cardiogenic shock requiring multiple pressors and IABP in setting of baseline severe multi vessel CAD not candidate for further vascularization, acute on chronic HD with reduced EF, possible multifocal PNA, and KEN. Patient's rapid decline during planned high risk PCI suggests tenuous hemodynamic stability and limited reserve. Her overall clinical picture suggests a low likelihood of recovery to functional baseline prior to admission.     Recommend regular re assessment of patient's response to aggressive treatment and GOC. Plan of care should be adjusted based on patient's evolving condition and her family's understanding and acceptance of prognosis.       Non-Face-to-Face prolonged service provided that relates to (face-to-face) care that has or will occur and ongoing patient management, including one or more of the following: -Documentation review from other physicians and health care professional services -Review of medical records and diagnostic/radiology study results -Coordination with patient's support system    ************************************************************************  MEDICATION REVIEW:  MEDICATIONS  (STANDING):  albuterol/ipratropium for Nebulization 3 milliLiter(s) Nebulizer every 6 hours  aspirin  chewable 81 milliGRAM(s) Enteral Tube daily  atorvastatin 80 milliGRAM(s) Oral at bedtime  chlorhexidine 0.12% Liquid 15 milliLiter(s) Oral Mucosa every 12 hours  chlorhexidine 2% Cloths 1 Application(s) Topical <User Schedule>  clopidogrel Tablet 75 milliGRAM(s) Oral daily  dextrose 5%. 1000 milliLiter(s) (100 mL/Hr) IV Continuous <Continuous>  dextrose 50% Injectable 12.5 Gram(s) IV Push once  dextrose 50% Injectable 25 Gram(s) IV Push once  fentaNYL   Infusion. 0.5 MICROgram(s)/kG/Hr (4.06 mL/Hr) IV Continuous <Continuous>  glucagon  Injectable 1 milliGRAM(s) IntraMuscular once  insulin glargine Injectable (LANTUS) 26 Unit(s) SubCutaneous at bedtime  insulin lispro (ADMELOG) corrective regimen sliding scale   SubCutaneous every 6 hours  levothyroxine Injectable 60 MICROGram(s) IV Push <User Schedule>  montelukast 10 milliGRAM(s) Oral every 24 hours  mupirocin 2% Nasal 1 Application(s) Both Nostrils two times a day  norepinephrine Infusion 0.05 MICROgram(s)/kG/Min (3.8 mL/Hr) IV Continuous <Continuous>  pantoprazole  Injectable 40 milliGRAM(s) IV Push every 12 hours  piperacillin/tazobactam IVPB.. 4.5 Gram(s) IV Intermittent every 8 hours  propofol Infusion 10 MICROgram(s)/kG/Min (4.87 mL/Hr) IV Continuous <Continuous>  sodium chloride 0.9% lock flush 3 milliLiter(s) IV Push every 8 hours  vancomycin  IVPB 1250 milliGRAM(s) IV Intermittent every 24 hours  vasopressin Infusion 0.04 Unit(s)/Min (6 mL/Hr) IV Continuous <Continuous>    MEDICATIONS  (PRN):  albuterol    90 MICROgram(s) HFA Inhaler 2 Puff(s) Inhalation every 4 hours PRN Bronchospasm  dextrose Oral Gel 15 Gram(s) Oral once PRN Blood Glucose LESS THAN 70 milliGRAM(s)/deciliter    ------------------------------------------------------------------------  COORDINATION OF CARE:  - Palliative Care consulted for: GOC   - Patient (to be) assessed: n/a, consult canceled this morning   - Patient previously seen by Palliative Care service: NO    ADVANCE CARE PLANNING  - Code status: DNR   - MOLST reviewed in chart: Yes  - HCP/Surrogate: Emergency contact Ladan Castellano, son in law #[phone]  - [facility] documents: NONE found on [place]  - HCP/Living will/Other Advanced Directives: NONE   ------------------------------------------------------------------------  CARE PROVIDER DOCUMENTATION:  - SW/CM notes: Remains medically active      PLAN OF CARE  - Known admissions in past year: None  - Current admit date: 1/30/2025  - LOS: 7  - LACE score: 10  - Current dispo plan: TO BE DETERMINED    01-30-25 (7d)    ------------------------------------------------------------------------  - Time Spent/Chart reviewed: 31 Minutes [including time used to gather, review and transfer data]  - Start: 8460v  - End: 5206a    Prolonged services rendered, as part of this patient's care provided by Palliative Medicine, include: i. chart review for provider and ancillary service documentation, ii. pertinent diagnostics including laboratory and imaging studies, iii. medication review including PRN use, iv. admission history including previous palliative care encounters and [facility] notes, v. advance care planning documents including HCP and MOLST forms in [facility]. Part of Palliative Medicine extended evaluation and management also involves coordination of care with our IDT, the primary and consulting teams, and unit CM/SW and Hospice if eligible. Recommendations based on the information gathered and discussed are outlined in the A/P of Palliative notes. PALLIATIVE MEDICINE COORDINATION OF CARE DOCUMENTATION: [x] Inpatient Consult    80yo F with extensive cardiac history HTN, HLD, DM2, 3VD s/p stenting 2mo prior, CHF, multiple comorbidities pw CP/SOB cf ACS as well as fever, chills, cough, congestion on 1/24. Patient initially treated for NSTEMI and MRSA pna. Cardiac cath revealed 3VD and reduced EF (25%, which improved to 44%). HCCB KEN on CKD, hypotension, anemia requiring transfusions, worsening angina. On 2/5 pt had vaso vagal episode, found to have lactate of 9. Was brought to cath lab for support, sp IABP and swan. Patient became hypotensive, hypoxic, was intubated, and transferred to CCU for management of refractory cardiogenic and septic shock.  Family was updated on patient's acute decompensation and clinical condition. GOC discussed and family elected DNR with trial of intubation, Do not reintubate; otherwise continue all aggressive interventions and escalation of care.    Overall prognosis is poor. Patient intubated, in cardiogenic shock requiring multiple pressors and IABP in setting of baseline severe multi vessel CAD not candidate for further vascularization, acute on chronic HD with reduced EF, possible multifocal PNA, and KEN. Patient's rapid decline suggests tenuous hemodynamic stability and limited reserve. Etiology of anemia is u/k.  Her overall clinical picture suggests a low likelihood of recovery to functional baseline prior to admission.     Recommend regular re assessment of patient's response to aggressive treatment and GOC. Plan of care should be adjusted based on patient's evolving condition and her family's understanding and acceptance of prognosis.       Non-Face-to-Face prolonged service provided that relates to (face-to-face) care that has or will occur and ongoing patient management, including one or more of the following: -Documentation review from other physicians and health care professional services -Review of medical records and diagnostic/radiology study results -Coordination with patient's support system    ************************************************************************  MEDICATION REVIEW:  MEDICATIONS  (STANDING):  albuterol/ipratropium for Nebulization 3 milliLiter(s) Nebulizer every 6 hours  aspirin  chewable 81 milliGRAM(s) Enteral Tube daily  atorvastatin 80 milliGRAM(s) Oral at bedtime  chlorhexidine 0.12% Liquid 15 milliLiter(s) Oral Mucosa every 12 hours  chlorhexidine 2% Cloths 1 Application(s) Topical <User Schedule>  clopidogrel Tablet 75 milliGRAM(s) Oral daily  dextrose 5%. 1000 milliLiter(s) (100 mL/Hr) IV Continuous <Continuous>  dextrose 50% Injectable 12.5 Gram(s) IV Push once  dextrose 50% Injectable 25 Gram(s) IV Push once  fentaNYL   Infusion. 0.5 MICROgram(s)/kG/Hr (4.06 mL/Hr) IV Continuous <Continuous>  glucagon  Injectable 1 milliGRAM(s) IntraMuscular once  insulin glargine Injectable (LANTUS) 26 Unit(s) SubCutaneous at bedtime  insulin lispro (ADMELOG) corrective regimen sliding scale   SubCutaneous every 6 hours  levothyroxine Injectable 60 MICROGram(s) IV Push <User Schedule>  montelukast 10 milliGRAM(s) Oral every 24 hours  mupirocin 2% Nasal 1 Application(s) Both Nostrils two times a day  norepinephrine Infusion 0.05 MICROgram(s)/kG/Min (3.8 mL/Hr) IV Continuous <Continuous>  pantoprazole  Injectable 40 milliGRAM(s) IV Push every 12 hours  piperacillin/tazobactam IVPB.. 4.5 Gram(s) IV Intermittent every 8 hours  propofol Infusion 10 MICROgram(s)/kG/Min (4.87 mL/Hr) IV Continuous <Continuous>  sodium chloride 0.9% lock flush 3 milliLiter(s) IV Push every 8 hours  vancomycin  IVPB 1250 milliGRAM(s) IV Intermittent every 24 hours  vasopressin Infusion 0.04 Unit(s)/Min (6 mL/Hr) IV Continuous <Continuous>    MEDICATIONS  (PRN):  albuterol    90 MICROgram(s) HFA Inhaler 2 Puff(s) Inhalation every 4 hours PRN Bronchospasm  dextrose Oral Gel 15 Gram(s) Oral once PRN Blood Glucose LESS THAN 70 milliGRAM(s)/deciliter    ------------------------------------------------------------------------  COORDINATION OF CARE:  - Palliative Care consulted for: GOC   - Patient (to be) assessed: n/a, consult canceled this morning   - Patient previously seen by Palliative Care service: NO    ADVANCE CARE PLANNING  - Code status: DNR   - MOLST reviewed in chart: Yes  - HCP/Surrogate: Emergency contact Ladan Castellano, son in law #[phone]  - [place] documents: NONE found on [employer]  - HCP/Living will/Other Advanced Directives: NONE   ------------------------------------------------------------------------  CARE PROVIDER DOCUMENTATION:  - SW/CM notes: Remains medically active      PLAN OF CARE  - Known admissions in past year: None  - Current admit date: 1/30/2025  - LOS: 7  - LACE score: 10  - Current dispo plan: TO BE DETERMINED    01-30-25 (7d)    ------------------------------------------------------------------------  - Time Spent/Chart reviewed: 31 Minutes [including time used to gather, review and transfer data]  - Start: 8653e  - End: 9149p    Prolonged services rendered, as part of this patient's care provided by Palliative Medicine, include: i. chart review for provider and ancillary service documentation, ii. pertinent diagnostics including laboratory and imaging studies, iii. medication review including PRN use, iv. admission history including previous palliative care encounters and [place] notes, v. advance care planning documents including HCP and MOLST forms in [facility]. Part of Palliative Medicine extended evaluation and management also involves coordination of care with our IDT, the primary and consulting teams, and unit CM/SW and Hospice if eligible. Recommendations based on the information gathered and discussed are outlined in the A/P of Palliative notes.

## 2025-02-06 NOTE — PROGRESS NOTE ADULT - ATTENDING COMMENTS
Pt is a 78 y/o woman c HTN, HLP, DM, Hypothyroidism, GERD, asthma, CAD c 3VD and new acute systolic HF. 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 Pt was scheduled for cardiac cath as Lactate was rising and pt became hypotensive.    Pt noted with 2g Hgb drop and was getting PRBCs and went to the cath lab and had IABP placed for BP support and presumptive cardiogenic shock. Isaban also placed to measure CO/CI.    Pt with near code yesterday with 40/20 BP and given 1mg Epi c/b VT s/p def and started on amiodarone    afeb, 59, 106/50  MAP: 81, 100%    AC//5 12 40%  7.42/32/137/98%    Jacqueline CI 2.1  TD CI 1.5    WBC 40s --> 35  Hgb 9.7 (after 1 Unit PRBC)  Plt 103    Na 133  K 5.4  Cr 1.99  Glc 332    Lac 9 --> 1.6  BNP 12K    ECG: sinus hronda mild ST depressions & T wave inversions  CXR: improving, ET and pAC in place    - pt with acute systolic HF and shock, perhaps septic/ vasodilatory shock on IABP, Levo/Vaso  - f/u cx in pt, had been on intermittent abx would f/u cx  - start abx in pt vanc/zosyn  - CI somewhat stable, doubt cardiac etiology for shock although pt has underlying severe CAD  - pt with Hgb drop, GI bleed? anemia prob made ischemia worse which complicated shock  - f/u Cr, pt prob c non oliguric ATN  - family updated about condition and has made pt DNR/DNI

## 2025-02-06 NOTE — PROGRESS NOTE ADULT - PROBLEM SELECTOR PLAN 4
Pulm Consulted: low suspicion for PNA, noted to have appearance of endobronchial material in superior segment RLL bronchus.   - CT Chest 1/31/2025 Persistent small large airways inflammation with interval increase in mucous plugging with near complete obstruction of the lateral segmental bronchus to the RML, RLL bronchus and anteromedial basal segmental bronchus to the LLL. Associated groundglass opacities are identified and early multifocal pneumonia possibly postobstructive cannot be excluded.   - Recommended outpatient pulm follow up w PFTs (to diagnosis obstructive lung disease) and consideration of repeat imaging.   - WBC on Admission 13--->17.87->20.27, today 17 MRSA swab + at [facility].   - UA normal, Blood Cultures 1/31 NGTD, Procal normal.   - Medicine Following appreciate gerald MCCLAIN'ed Tanna and Yasmine    **OF note during prior admission 11/2023: had issues of Elevated Leukocytosis and Fever ID was consulted was attributed to Post op fever. Infectious work-up was negative at the time.

## 2025-02-06 NOTE — PROGRESS NOTE ADULT - SUBJECTIVE AND OBJECTIVE BOX
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 1000 milliLiter(s) (100 mL/Hr) IV Continuous <Continuous>  dextrose 50% Injectable 12.5 Gram(s) IV Push once  dextrose 50% Injectable 25 Gram(s) IV Push once  fentaNYL   Infusion. 0.5 MICROgram(s)/kG/Hr (4.06 mL/Hr) IV Continuous <Continuous>  glucagon  Injectable 1 milliGRAM(s) IntraMuscular once  insulin glargine Injectable (LANTUS) 26 Unit(s) SubCutaneous at bedtime  insulin lispro (ADMELOG) corrective regimen sliding scale   SubCutaneous every 6 hours  levothyroxine Injectable 60 MICROGram(s) IV Push <User Schedule>  montelukast 10 milliGRAM(s) Oral every 24 hours  mupirocin 2% Nasal 1 Application(s) Both Nostrils two times a day  norepinephrine Infusion 0.05 MICROgram(s)/kG/Min (3.8 mL/Hr) IV Continuous <Continuous>  pantoprazole  Injectable 40 milliGRAM(s) IV Push every 12 hours  piperacillin/tazobactam IVPB.. 4.5 Gram(s) IV Intermittent every 8 hours  propofol Infusion 10 MICROgram(s)/kG/Min (4.87 mL/Hr) IV Continuous <Continuous>  sodium chloride 0.9% lock flush 3 milliLiter(s) IV Push every 8 hours  vancomycin  IVPB 1250 milliGRAM(s) IV Intermittent every 24 hours  vasopressin Infusion 0.04 Unit(s)/Min (6 mL/Hr) IV Continuous <Continuous>    MEDICATIONS  (PRN):  albuterol    90 MICROgram(s) HFA Inhaler 2 Puff(s) Inhalation every 4 hours PRN Bronchospasm  dextrose Oral Gel 15 Gram(s) Oral once PRN Blood Glucose LESS THAN 70 milliGRAM(s)/deciliter    T(F): 97.6 (02-06-25 @ 10:00), Max: 100 (02-06-25 @ 05:07)  HR: 63 (02-06-25 @ 13:00) (43 - 105)  BP: 141/61 (02-06-25 @ 13:00) (89/46 - 196/134)  BP(mean): 88 (02-06-25 @ 13:00) (64 - 153)  ABP: 142/45 (02-06-25 @ 13:00) (101/30 - 232/125)  ABP(mean): 87 (02-06-25 @ 13:00) (43 - 175)  RR: 18 (02-06-25 @ 13:00) (12 - 24)  SpO2: 100% (02-06-25 @ 13:00) (95% - 100%)  CVP(mm Hg): 0 (02-06-25 @ 13:00) (-3 - 21)    I/O Detail 24H    05 Feb 2025 07:01  -  06 Feb 2025 07:00  --------------------------------------------------------  IN:    Amiodarone: 33.3 mL    FentaNYL: 157.7 mL    IV PiggyBack: 250 mL    IV PiggyBack: 100 mL    Norepinephrine: 361.7 mL    Phenylephrine: 45.4 mL    PRBCs (Packed Red Blood Cells): 350 mL    Propofol: 208.8 mL    Vasopressin: 108 mL  Total IN: 1614.9 mL    OUT:    Indwelling Catheter - Urethral (mL): 973 mL  Total OUT: 973 mL    Total NET: 641.9 mL      06 Feb 2025 07:01  -  06 Feb 2025 13:55  --------------------------------------------------------  IN:    Enteral Tube Flush: 120 mL    FentaNYL: 57.2 mL    Norepinephrine: 123 mL    Propofol: 34.7 mL    Sodium Chloride 0.9% Bolus: 250 mL    Vasopressin: 36 mL  Total IN: 620.9 mL    OUT:    Indwelling Catheter - Urethral (mL): 130 mL  Total OUT: 130 mL    Total NET: 490.9 mL          PHYSICAL EXAM:  GEN: NAD  HEENT: EOMI   RESP: CTA b/l  CV: RRR. Normal S1/S2. No m/r/g. No JVD.   ABD: abdomen soft, NT/ND; no rebound or guarding; +BSx4  EXT: No edema, warm extremities  NEURO: alert and attentive    LABS:  CBC 02-06-25 @ 12:40                        8.5    28.39 )-----------( 89                    25.6     Hgb trend: 8.5 <-- , 9.7 <-- , 9.2 <-- , 10.2 <-- , 9.3 <-- , 7.7 <-- , 7.7 <-- , 7.5 <--   WBC trend: 28.39 <-- , 34.64 <-- , 36.67 <-- , 35.61 <-- , 40.09 <-- , 28.80 <-- , 28.04 <-- , 18.66 <--       CMP 02-06-25 @ 12:40    134[L]  |  104  |  31[H]  ----------------------------<  232[H]  4.4   |  19[L]  |  2.10[H]    Ca    7.6[L]      02-06-25 @ 12:40  Phos  5.7     02-06  Mg     1.8     02-06    TPro  4.1[L]  /  Alb  2.2[L]  /  TBili  0.5  /  DBili  x   /  AST  54[H]  /  ALT  33  /  AlkPhos  51     02-06    Serum Cr (eGFR) trend: 2.10 (24) <-- , 1.99 (25) <-- , 1.84 (28) <-- , 1.78 (29) <-- , 1.56 (34) <-- , 1.52 (35) <-- , 1.56 (34) <-- , 1.61 (33) <-- , 1.54 (34) <-- , 1.79 (29) <--       PT/INR - ( 06 Feb 2025 05:20 )   PT: 14.0 sec;   INR: 1.22     PTT - ( 06 Feb 2025 05:20 ):29.5 sec    Cardiac Markers   02-06-25 @ 05:20: HS Trop T 2205[HH] / CK x     / CKMB x      02-05-25 @ 13:38: HS Trop T 847[HH] / CK x     / CKMB x      02-05-25 @ 11:40: HS Trop T 899[HH] / CK x     / CKMB 4.6    02-05-25 @ 09:19: HS Trop T 966[HH] / CK x     / CKMB 4.6          STUDIES:           INTERVAL EVENTS: intubated, sedated    PAST MEDICAL & SURGICAL HISTORY:  GERD (gastroesophageal reflux disease)    Hyperlipemia    HTN (hypertension)    Hypothyroid    DM (diabetes mellitus)    Arthritis    Obesity (BMI 30-39.9)    Glaucoma  bilateral eyes    Primary osteoarthritis of left knee    CAD (coronary artery disease)    Encounter for screening colonoscopy    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 1000 milliLiter(s) (100 mL/Hr) IV Continuous <Continuous>  dextrose 50% Injectable 12.5 Gram(s) IV Push once  dextrose 50% Injectable 25 Gram(s) IV Push once  fentaNYL   Infusion. 0.5 MICROgram(s)/kG/Hr (4.06 mL/Hr) IV Continuous <Continuous>  glucagon  Injectable 1 milliGRAM(s) IntraMuscular once  insulin glargine Injectable (LANTUS) 26 Unit(s) SubCutaneous at bedtime  insulin lispro (ADMELOG) corrective regimen sliding scale   SubCutaneous every 6 hours  levothyroxine Injectable 60 MICROGram(s) IV Push <User Schedule>  montelukast 10 milliGRAM(s) Oral every 24 hours  mupirocin 2% Nasal 1 Application(s) Both Nostrils two times a day  norepinephrine Infusion 0.05 MICROgram(s)/kG/Min (3.8 mL/Hr) IV Continuous <Continuous>  pantoprazole  Injectable 40 milliGRAM(s) IV Push every 12 hours  piperacillin/tazobactam IVPB.. 4.5 Gram(s) IV Intermittent every 8 hours  propofol Infusion 10 MICROgram(s)/kG/Min (4.87 mL/Hr) IV Continuous <Continuous>  sodium chloride 0.9% lock flush 3 milliLiter(s) IV Push every 8 hours  vancomycin  IVPB 1250 milliGRAM(s) IV Intermittent every 24 hours  vasopressin Infusion 0.04 Unit(s)/Min (6 mL/Hr) IV Continuous <Continuous>    MEDICATIONS  (PRN):  albuterol    90 MICROgram(s) HFA Inhaler 2 Puff(s) Inhalation every 4 hours PRN Bronchospasm  dextrose Oral Gel 15 Gram(s) Oral once PRN Blood Glucose LESS THAN 70 milliGRAM(s)/deciliter    T(F): 97.6 (02-06-25 @ 10:00), Max: 100 (02-06-25 @ 05:07)  HR: 63 (02-06-25 @ 13:00) (43 - 105)  BP: 141/61 (02-06-25 @ 13:00) (89/46 - 196/134)  BP(mean): 88 (02-06-25 @ 13:00) (64 - 153)  ABP: 142/45 (02-06-25 @ 13:00) (101/30 - 232/125)  ABP(mean): 87 (02-06-25 @ 13:00) (43 - 175)  RR: 18 (02-06-25 @ 13:00) (12 - 24)  SpO2: 100% (02-06-25 @ 13:00) (95% - 100%)  CVP(mm Hg): 0 (02-06-25 @ 13:00) (-3 - 21)    I/O Detail 24H    05 Feb 2025 07:01  -  06 Feb 2025 07:00  --------------------------------------------------------  IN:    Amiodarone: 33.3 mL    FentaNYL: 157.7 mL    IV PiggyBack: 250 mL    IV PiggyBack: 100 mL    Norepinephrine: 361.7 mL    Phenylephrine: 45.4 mL    PRBCs (Packed Red Blood Cells): 350 mL    Propofol: 208.8 mL    Vasopressin: 108 mL  Total IN: 1614.9 mL    OUT:    Indwelling Catheter - Urethral (mL): 973 mL  Total OUT: 973 mL    Total NET: 641.9 mL      06 Feb 2025 07:01  -  06 Feb 2025 13:55  --------------------------------------------------------  IN:    Enteral Tube Flush: 120 mL    FentaNYL: 57.2 mL    Norepinephrine: 123 mL    Propofol: 34.7 mL    Sodium Chloride 0.9% Bolus: 250 mL    Vasopressin: 36 mL  Total IN: 620.9 mL    OUT:    Indwelling Catheter - Urethral (mL): 130 mL  Total OUT: 130 mL    Total NET: 490.9 mL          PHYSICAL EXAM:  GEN: intubated  HEENT: EOMI   RESP: CTA b/l  CV: RRR. Normal S1/S2. No m/r/g. No JVD.   ABD: abdomen soft, NT/ND; no rebound or guarding; +BSx4  EXT: No edema, warm extremities  NEURO: sedated    LABS:  CBC 02-06-25 @ 12:40                        8.5    28.39 )-----------( 89                    25.6     Hgb trend: 8.5 <-- , 9.7 <-- , 9.2 <-- , 10.2 <-- , 9.3 <-- , 7.7 <-- , 7.7 <-- , 7.5 <--   WBC trend: 28.39 <-- , 34.64 <-- , 36.67 <-- , 35.61 <-- , 40.09 <-- , 28.80 <-- , 28.04 <-- , 18.66 <--       CMP 02-06-25 @ 12:40    134[L]  |  104  |  31[H]  ----------------------------<  232[H]  4.4   |  19[L]  |  2.10[H]    Ca    7.6[L]      02-06-25 @ 12:40  Phos  5.7     02-06  Mg     1.8     02-06    TPro  4.1[L]  /  Alb  2.2[L]  /  TBili  0.5  /  DBili  x   /  AST  54[H]  /  ALT  33  /  AlkPhos  51     02-06    Serum Cr (eGFR) trend: 2.10 (24) <-- , 1.99 (25) <-- , 1.84 (28) <-- , 1.78 (29) <-- , 1.56 (34) <-- , 1.52 (35) <-- , 1.56 (34) <-- , 1.61 (33) <-- , 1.54 (34) <-- , 1.79 (29) <--       PT/INR - ( 06 Feb 2025 05:20 )   PT: 14.0 sec;   INR: 1.22     PTT - ( 06 Feb 2025 05:20 ):29.5 sec    Cardiac Markers   02-06-25 @ 05:20: HS Trop T 2205[HH] / CK x     / CKMB x      02-05-25 @ 13:38: HS Trop T 847[HH] / CK x     / CKMB x      02-05-25 @ 11:40: HS Trop T 899[HH] / CK x     / CKMB 4.6    02-05-25 @ 09:19: HS Trop T 966[HH] / CK x     / CKMB 4.6          STUDIES:

## 2025-02-06 NOTE — PROGRESS NOTE ADULT - ASSESSMENT
78YO female with HTN, HLD, CAD s/p PCI 2023 (RCA, LM, Lcx) who [place] [facility] [place], found to have significant ISR of LM and Lcx and was transferred to 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 Heart failure consulted for management of shock.     Review of studies:  [facility] 2/5 (IABP was placed after): RA 1, RV 55/2, PA 60/25 (41), PA sat 35%, Edil CO 3.7/CI 2.1, Hgb 7.5  TTE 2/5L LVEF 40-45% with RWA, normal RV size and function  [facility] ([place]/28/25, [facility] 3VCAD (80% ISR of distal LM-pLAD stent, 80% ISR of pLcx stent, 100% ISR of pRCA stent)  [facility] ([place]28/[place], [facility] RA 4, PCWP 17, /62 (73)  [facility] 2023: s/p PCI x2 LM-pLAD, s/p PCI oLcx    Home meds: losartan 25mg QD, toprol 25mg QD, lasix 40mg QD, atorvastatin 80mg QD, aspirin 81mg QD    Bedside hemodynamics:  2/5 (Levo 0.04, Vaso 0.04, IABP 1:1 w augmented mean 70s): RA 8, PA 37/8 (17), PA sat 57.5, Edil CO 3.8/CI 2.1    #Mixed shock  Patient with acute decompensation, concerning for mixed shock, given profound leukocytosis and anemia, with relatively normal cardiac index.  Episode of VT overnight with nonvascularized LM, supported with a IABP, with likely ongoing ischemia.    - Pending discussion regarding revascularization, would continue IABP support. Suspect she may not be a candidate for revascularization and in that case, prognosis is guarded   - Wean pressors as needed, and given adequate perfusion markers, does not require inotropic support at this time    Case d/w attending

## 2025-02-06 NOTE — PROGRESS NOTE ADULT - PROBLEM SELECTOR PLAN 1
Moderate CP this AM   - S/[place] [facility] [place] [place] [place]28[place] w/ Dr. Morgan: dLM to pLAD 80% Stenosed ISR, LCX ostial to pLCX ISR, % Stenosed.  - TTE 1/31/25: mild LVH, Mod reduced LVSF, EF 44%. RWMA. Grade I DD. Aortic sclerosis w/o significant stenosis.  - C/w ASA 81mg qd, Plavix 75mg qd, Atorvastatin 40mg qd. Started on Isordil 5mg TID.   - Transitioned pt from heparin gtt to Lovenox 80mg sq BID  Plan: Pt no longer candidate for high risk PCI given low hgb and poor candidate. Possible viability study this admission     **Prior [email or web address] 11/2023: Impella Assisted Rota/PCI with EMILIE to LM 90%, EMILIE ostial LAD and EMILIE ostial LCX, (dLM 80%, oLAD 85%, D2 75%, oLCX 85%, mRCA 75%).

## 2025-02-06 NOTE — CONSULT NOTE ADULT - SUBJECTIVE AND OBJECTIVE BOX
INFECTIOUS DISEASES INITIAL CONSULT NOTE    HPI: 79F h/o 3v CAD s/p PCI, CHF, T2DM, HTN, HLD initially p/w CP and SOB at [facility] on 1/24.  Patient is intubated and thus history obtained from chart review.  Prior to admission, patient reported fever, chills, productive cough 2-3 days then developed CP radiating to L arm /w SOB, palpitation, orthopnea and dizziness.  Upon arrival, she was found to have KEN, hyperkkalemia, elevated lactate and trop.  CXR showed pulmonary congestion vs infiltrate. She was put on BiPAP for AHRF.  She was found to have NSTEMI so she underwent cardiac cath, which showed 3v CAD and HFrEF (EF 25%).  She was put on ceftriaxone 1g daily then vanc/zosyn for PNA (MRSA nasal swab +).  She was then transferred to CT surg service [facility] on 1/30/25 for further management.  However she was deemed not a candidate for surgical intervention so she was transferred to cardiac tele for optimization for high risk PCI.  Repeat TTE showed EF 44%.  Course c/b KEN on CKD, multiple episodes of hypotension requiring fluid.  Medicine and pulm were consulted for leukocytosis work-up and at that time didn't feel like patient having pneumoina, procal was negative and patient completed empiric vanc/zosyn on 2/2.  Patient had Hgb drop to 7.5 s/p RBC transufsion on 2/4 and 2/5. She also developed worsening CP with EKG changes so she was put on nitro gpp.  On 2/5 she became hypotensive requiring intubation with IABP so she was transferred to CCU.  WBC continues to go up and now >30, procal now 1.6.  CXR w/o consolidation and 1/31 BCx neg, repeat BCx pending.   Patient remains in shock, likely mixed cardiogenic/septic. Vanc/zosyn restarted and ID was consulted for infectious work-up.           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      History of cholecystectomy      History of back surgery      History of bilateral knee replacement            Review of Systems:   Constitutional, eyes, ENT, cardiovascular, respiratory, gastrointestinal, genitourinary, integumentary, neurological, psychiatric and heme/lymph are otherwise negative other than noted above       ANTIBIOTICS:  MEDICATIONS  (STANDING):  albuterol/ipratropium for Nebulization 3 milliLiter(s) Nebulizer every 6 hours  aspirin  chewable 81 milliGRAM(s) Enteral Tube daily  atorvastatin 80 milliGRAM(s) Oral at bedtime  chlorhexidine 0.12% Liquid 15 milliLiter(s) Oral Mucosa every 12 hours  chlorhexidine 2% Cloths 1 Application(s) Topical <User Schedule>  clopidogrel Tablet 75 milliGRAM(s) Oral daily  dextrose 5%. 1000 milliLiter(s) (100 mL/Hr) IV Continuous <Continuous>  dextrose 50% Injectable 12.5 Gram(s) IV Push once  dextrose 50% Injectable 25 Gram(s) IV Push once  fentaNYL   Infusion. 0.5 MICROgram(s)/kG/Hr (4.06 mL/Hr) IV Continuous <Continuous>  glucagon  Injectable 1 milliGRAM(s) IntraMuscular once  insulin glargine Injectable (LANTUS) 26 Unit(s) SubCutaneous at bedtime  insulin lispro (ADMELOG) corrective regimen sliding scale   SubCutaneous every 6 hours  levothyroxine Injectable 60 MICROGram(s) IV Push <User Schedule>  meropenem  IVPB 1000 milliGRAM(s) IV Intermittent every 12 hours  montelukast 10 milliGRAM(s) Oral every 24 hours  mupirocin 2% Nasal 1 Application(s) Both Nostrils two times a day  norepinephrine Infusion 0.05 MICROgram(s)/kG/Min (3.8 mL/Hr) IV Continuous <Continuous>  pantoprazole  Injectable 40 milliGRAM(s) IV Push every 12 hours  propofol Infusion 10 MICROgram(s)/kG/Min (4.87 mL/Hr) IV Continuous <Continuous>  sodium chloride 0.9% lock flush 3 milliLiter(s) IV Push every 8 hours  vasopressin Infusion 0.04 Unit(s)/Min (6 mL/Hr) IV Continuous <Continuous>    MEDICATIONS  (PRN):  albuterol    90 MICROgram(s) HFA Inhaler 2 Puff(s) Inhalation every 4 hours PRN Bronchospasm  dextrose Oral Gel 15 Gram(s) Oral once PRN Blood Glucose LESS THAN 70 milliGRAM(s)/deciliter      Allergies    No Known Allergies    Intolerances        SOCIAL HISTORY: unable to obtain     FAMILY HISTORY:  Family history of diabetes mellitus (Mother, Sibling)    Family history of hypertension (Mother, Sibling)    Family history of blindness (Grandparent)     no FH leading to current infection    Vital Signs Last 24 Hrs  T(C): 37.8 (06 Feb 2025 14:00), Max: 37.8 (06 Feb 2025 05:07)  T(F): 100 (06 Feb 2025 14:00), Max: 100 (06 Feb 2025 05:07)  HR: 67 (06 Feb 2025 15:00) (43 - 104)  BP: 132/61 (06 Feb 2025 15:00) (89/46 - 160/82)  BP(mean): 88 (06 Feb 2025 15:00) (64 - 114)  RR: 17 (06 Feb 2025 15:00) (12 - 24)  SpO2: 100% (06 Feb 2025 15:00) (95% - 100%)    Parameters below as of 06 Feb 2025 16:00  Patient On (Oxygen Delivery Method): ventilator    O2 Concentration (%): 40    02-05-25 @ 07:01  -  02-06-25 @ 07:00  --------------------------------------------------------  IN: 1614.9 mL / OUT: 973 mL / NET: 641.9 mL    02-06-25 @ 07:01  -  02-06-25 @ 16:15  --------------------------------------------------------  IN: 734.3 mL / OUT: 170 mL / NET: 564.3 mL        PHYSICAL EXAM:  Constitutional: awake, NAD  Eyes: the sclera and conjunctiva were normal.   ENT: the ears and nose were normal in appearance.  ET tube in palce  Neck: the appearance of the neck was normal and the neck was supple.   Pulmonary: no respiratory distress and lungs were clear to auscultation bilaterally.   Heart: heart rate was normal and rhythm regular, normal S1 and S2  Vascular:. there was no peripheral edema  Abdomen: normal bowel sounds, soft, non-tender        LABS:                        8.5    28.39 )-----------( 89       ( 06 Feb 2025 12:40 )             25.6     02-06    134[L]  |  104  |  31[H]  ----------------------------<  232[H]  4.4   |  19[L]  |  2.10[H]    Ca    7.6[L]      06 Feb 2025 12:40  Phos  5.7     02-06  Mg     1.8     02-06    TPro  4.1[L]  /  Alb  2.2[L]  /  TBili  0.5  /  DBili  x   /  AST  54[H]  /  ALT  33  /  AlkPhos  51  02-06    PT/INR - ( 06 Feb 2025 05:20 )   PT: 14.0 sec;   INR: 1.22          PTT - ( 06 Feb 2025 05:20 )  PTT:29.5 sec  Urinalysis Basic - ( 06 Feb 2025 12:40 )    Color: x / Appearance: x / SG: x / pH: x  Gluc: 232 mg/dL / Ketone: x  / Bili: x / Urobili: x   Blood: x / Protein: x / Nitrite: x   Leuk Esterase: x / RBC: x / WBC x   Sq Epi: x / Non Sq Epi: x / Bacteria: x        MICROBIOLOGY:  2/5 BCx p  2/5 MRSA/MSSA PCR both positive  2/5 UCx neg  2/2 RVP neg   1/31 BCx neg    RADIOLOGY & ADDITIONAL STUDIES:  CXR 2/6/25  IMPRESSION:  Endotracheal tube in satisfactory position approximately 2.3 cm proximal   to pablo. Aortic balloon at AP window. [place] catheter tip at distal right   pulmonary artery. Nasogastric tube courses into stomach. Hypoinflation.   No consolidation pleural effusion or pneumothorax.

## 2025-02-06 NOTE — CHART NOTE - NSCHARTNOTEFT_GEN_A_CORE
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 Multiple BM's noted on 2/3    Pain: 0/10 pain per flow sheets     Skin Integrity:  Edema: 2+ left leg, +1 generalized per flow sheets         02-05-25 @ 07:01  -  02-06-25 @ 07:00  --------------------------------------------------------  IN: 1614.9 mL / OUT: 973 mL / NET: 641.9 mL    02-06-25 @ 07:01  -  02-06-25 @ 13:19  --------------------------------------------------------  IN: 620.9 mL / OUT: 130 mL / NET: 490.9 mL        Labs:   02-06    133[L]  |  100  |  31[H]  ----------------------------<  332[H]  5.4[H]   |  19[L]  |  1.99[H]    Ca    7.9[L]      06 Feb 2025 05:20  Phos  5.7     02-06  Mg     1.8     02-06    TPro  4.3[L]  /  Alb  2.7[L]  /  TBili  0.5  /  DBili  x   /  AST  60[H]  /  ALT  41  /  AlkPhos  55  02-06    CAPILLARY BLOOD GLUCOSE      POCT Blood Glucose.: 246 mg/dL (06 Feb 2025 11:37)  POCT Blood Glucose.: 245 mg/dL (06 Feb 2025 10:39)  POCT Blood Glucose.: 258 mg/dL (06 Feb 2025 06:09)  POCT Blood Glucose.: 349 mg/dL (06 Feb 2025 01:49)  POCT Blood Glucose.: 379 mg/dL (05 Feb 2025 21:52)  POCT Blood Glucose.: 395 mg/dL (05 Feb 2025 18:03)  POCT Blood Glucose.: 318 mg/dL (05 Feb 2025 13:39)      Medications:  MEDICATIONS  (STANDING):  albuterol/ipratropium for Nebulization 3 milliLiter(s) Nebulizer every 6 hours  aspirin  chewable 81 milliGRAM(s) Enteral Tube daily  atorvastatin 80 milliGRAM(s) Oral at bedtime  chlorhexidine 0.12% Liquid 15 milliLiter(s) Oral Mucosa every 12 hours  chlorhexidine 2% Cloths 1 Application(s) Topical <User Schedule>  clopidogrel Tablet 75 milliGRAM(s) Oral daily  dextrose 5%. 1000 milliLiter(s) (100 mL/Hr) IV Continuous <Continuous>  dextrose 50% Injectable 12.5 Gram(s) IV Push once  dextrose 50% Injectable 25 Gram(s) IV Push once  fentaNYL   Infusion. 0.5 MICROgram(s)/kG/Hr (4.06 mL/Hr) IV Continuous <Continuous>  glucagon  Injectable 1 milliGRAM(s) IntraMuscular once  insulin glargine Injectable (LANTUS) 26 Unit(s) SubCutaneous at bedtime  insulin lispro (ADMELOG) corrective regimen sliding scale   SubCutaneous every 6 hours  levothyroxine Injectable 60 MICROGram(s) IV Push <User Schedule>  montelukast 10 milliGRAM(s) Oral every 24 hours  mupirocin 2% Nasal 1 Application(s) Both Nostrils two times a day  norepinephrine Infusion 0.05 MICROgram(s)/kG/Min (3.8 mL/Hr) IV Continuous <Continuous>  pantoprazole  Injectable 40 milliGRAM(s) IV Push every 12 hours  piperacillin/tazobactam IVPB.. 4.5 Gram(s) IV Intermittent every 8 hours  propofol Infusion 10 MICROgram(s)/kG/Min (4.87 mL/Hr) IV Continuous <Continuous>  sodium chloride 0.9% lock flush 3 milliLiter(s) IV Push every 8 hours  vancomycin  IVPB 1250 milliGRAM(s) IV Intermittent every 24 hours  vasopressin Infusion 0.04 Unit(s)/Min (6 mL/Hr) IV Continuous <Continuous>    MEDICATIONS  (PRN):  albuterol    90 MICROgram(s) HFA Inhaler 2 Puff(s) Inhalation every 4 hours PRN Bronchospasm  dextrose Oral Gel 15 Gram(s) Oral once PRN Blood Glucose LESS THAN 70 milliGRAM(s)/deciliter      Weights:  4'9'' IBW 94 pounds (42.7kg)  Admit wt 179 pounds BMI 38.7 %IBW= 190%     2/3 185.8 pounds   2/2 185.8 pounds   2/1 178.7 pounds     Recommend obtaining updated weights when medical feasible. RD to continue to monitor weights as available.     Estimated energy needs: (recalculated)  Kcal: 25-30kcal/kg   Pro: 1.2-1.5 g pro/kg   Fluids: per team     Ideal body weight (42.7kg) used to estimate needs as patient is >100% (190%) of [place] BW. Needs adjusted for advanced age, clinical course, icu status.    Subjective:   "79 yr old F with PMHx of HTN, hyperlipidemia, DM, hypothyroidism, GERD, asthma, CAD s/p prior PCIs at [facility] 11/2023 who presented to [facility] 1/24/25 with chest pain and URI symptoms x 2-3 days, R/I NSTEMI, s/p diagnostic cath@[facility] revealing 3VD and newly reduced EF, initially transferred to [facility] 9LACH under Dr. 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 Course further c/b anemia pt now s/p 2u prbc on 2/4. and  cardiogenic shock, requiring pressor support and IABP. "    Visited pt at bedside on 5EA for follow up assessment. Pt intubated/sedated on VCAC, MAP > 65, 79 at time of visit. Currently NPO at this time, NGT in place, plan for trickle enteral feeds. Meds reviewed. IV antibiotics, 26U Lantus. Insulin Sliding Scale, Synthroid, fentanyl. levophed, propofol (4.9ml/hr, provides 129 kcal), vasopressin. Nutritionally pertinent Labs 2/6 POCT ranges 245-39 past 24 hours. Na: 133 (L), K: 5.4 (H), BUN: 31 (H), Creat: 1.99 (H), Gluc: 322 (H), Phos: 5.7 (H). See Nutrition recommendations below.     Previous Nutrition Diagnosis: Inadequate Energy Intake related to intake<EER as evidenced by: NPO, meeting 0% of needs at this time.     Active [ X ]  Resolved [   ]    Goal: Pt to meet >75% of estimated nutrition needs daily per course of hospitalization via optimal route     Recommendations:  1. At current rate of propofol (10mcg/ 4.9ml/hr), recommend Nepro @ 35ml/hr x 18hrs plus 1 Liquid Protein Supplement (100kcal, 15g protein provided) via nasogastric tube. Provides: 630ml total volume, 1215kcal, 1344kcal with propofol, 66g protein, 458mL free H2O, 31kcal/kg, 25 nonprotein kcal/kg, 1.5g protein/kg ideal body weight (42.7kg). Recommend starting at 10mL/hr and advancing by [id number] to goal as tolerated. Additional free H2O per team. Monitor for signs and symptoms of intolerance; maintain aspiration precautions at all times.  >>RD to remain available for further recommendations pending propofol titration  2. Monitor electrolytes and replete prn. POC BG q6hrs   3. Pain and bowel regimens per team   4. Trend weights  5. RD to remain aligned with goals of care at all times     Education: deferred at this time     Risk Level: High [ X ] Moderate [   ] Low [   ]

## 2025-02-06 NOTE — PROGRESS NOTE ADULT - ATTENDING COMMENTS
80 YO F with a history of HF with midrange LVEF, CAD s/p multiple PCI, HTN, and DM2 who [place] [facility] [place]ina and found to have severe 3v CAD with ISR of left main stent prompting transfer to [facility] for CABG evaluation where was she was deemed not a candidate due to porceline aorta. She was euvolemic and well optimized from a HF perspective. She was awaiting PCI and acutely decompensated 2/5 likely in setting of possible bleed, infection, and acute ischemia and progressed to respiratory failure requiring intubation and shock requiring IABP. Overnight she had labile BPs and brief arrest.    At this time she appears to be in a mixed distributive/cardiogenic shock. Her LVEF is ~45% off inotropes with normal RV function and low filling pressures with marked leukocytosis and SIRS picture though also with borderline index on IABP. I suspect ongoing ischemia is a component of her current picture. It appears as this time she may not be a candidate for revascularization and if so prognosis would be highly guarded. She is currently DNR/DNI.    Would continue IABP for coronary perfusion pending continued goals of care conversations. No need for diuretics, filling pressures currently normal. Agree with weaning pressors as tolerates but currently needs at this moment for superimposed distributive process.

## 2025-02-06 NOTE — CONSULT NOTE ADULT - ASSESSMENT
79F h/o 3v CAD s/p PCI, CHF, T2DM, HTN, HLD initially p/w CP and SOB at [facility] on 1/24, transferred to [facility] on 1/30 for NSTEMI and 3v CAD management.  Course c/b KEN on CKD, recurrent hypotension, leukocytosis, now in cardiogenic/septic shock and AHRF s/p intubation and IABP.  Septic shock/leukocytosis maybe due to pneumonia although not much secretion per RN.  No diarrhea and UCx neg, no abdominal pain.  Will broaden given recent vanc/zosyn course.    - stop zosyn  - switch to meropenem 1g IV q12h (CrCl >25, if CrCl <25 then reduce to 500mg q12h)  - vanco dose by level given KEN.  Check level in 24h.  When level <20, then give vanco 1250mg x 1  - f/u BCx  - send sputum culture  - trend procal in 48h     Team 2 will follow you.  Case d/w primary team.    Anna Aguayo MD, MS  Infectious Disease attending  office phone [phone]  For any questions during evening/weekend/holiday, please page ID on call

## 2025-02-06 NOTE — CHART NOTE - NSCHARTNOTEFT_GEN_A_CORE
2/6/25 @ noon   Fentanyl 0.7, Levo 0.3, Vaso 0.04, Propofol 10, IABP 1:1 Augmented Pressure 82  BP: 128/38 (76), HR 55, Hb 8.5  CVP 0, PA 31/8 (17), PA sat 79.9, SaO2 97.6   Ficks: CO 9.9, CI 5.5, .06  TD: CO 3.7, CI 2.2, SVR 1814         2/6/25 @ 5 am  Fentanyl 1, Levo 0.4, Vaso 0.04, Propofol 20, IABP 1:1 Augment Pressure 75  /53 (76), HR 45, Hb 9.7  CVP 8, PA 37/8 (17), PA sat 57.7, SaO2 98.4  Ficks: CO 3.8 CI 2.1 SVR 1431  TD: CO 2.6 CI 1.5 SVR 2305 2/6/25 6PM   Fentanyl 0.7, Levo 0.3, Vaso 0.04, Propofol 10, IABP 1:1 Augmented Pressure 106  BP: 121/56 (80), HR 78, Hb 8.4  CVP 0, PA 21/13 (17), PA sat 80.7, SaO2 99.6  Ficks: CO 9.3, CI 5.2, .6  TD: CO 4.5, CI 2.6, SVR 1598       2/6/25 @ noon   Fentanyl 0.7, Levo 0.3, Vaso 0.04, Propofol 10, IABP 1:1 Augmented Pressure 82  BP: 128/38 (76), HR 55, Hb 8.5  CVP 0, PA 31/8 (17), PA sat 79.9, SaO2 97.6   Ficks: CO 9.9, CI 5.5, .06  TD: CO 3.7, CI 2.2, SVR 1814         2/6/25 @ 5 am  Fentanyl 1, Levo 0.4, Vaso 0.04, Propofol 20, IABP 1:1 Augment Pressure 75  /53 (76), HR 45, Hb 9.7  CVP 8, PA 37/8 (17), PA sat 57.7, SaO2 98.4  Ficks: CO 3.8 CI 2.1 SVR 1431  TD: CO 2.6 CI 1.5 SVR 2305

## 2025-02-06 NOTE — PROGRESS NOTE ADULT - SUBJECTIVE AND OBJECTIVE BOX
Patient is a 79y old  Female who presents with a chief complaint of CAD (05 Feb 2025 15:38)      Hospital Course:   80 yo female with PMH of HTN, HLD, Type II DM, triple vessel CAD s/p 3 stents at [facility] 11/2023, CHF, asthma, GERD, and hypothyroidism who presented to [facility] [place] with CP and SOB on 1/24. She was having fever, chills, a productive cough and congestion for 2-3 days prior to the chest pain. She states the pain was constant and radiated to her back/left arm with associated palpitations, SOB, orthopnea and dizziness. She felt the chest pain was similar to when she had the stents prompting her to present to the ER on 1/24.In ED she was found to be tachycardic and tachypneic. She was placed on BiPAP for respiratory distress and loaded with ASA/Plavix and placed on a heparin gtt for NSTEMI. Her nasal swab was + for MRSA and she recieved CTX 1g daily. She is s/p cardiac cath showing 3V CAD and a reduced EF of 25%. She was transferred to [facility] CTS under the service of Dr. Hurst for further workup and deemed not a candidate for CTS. Pt the transferred to cardiac tele for optimization for high risk PCI. Repeat ECHO on admission showing moderate reduced EF 44%. Course c/b KEN on CKD, and multiple episodes of hypotension requiring fluids. Also the was a concern for DVT but US came back negative. Medicine/Pulm Consulted given CT findings and Leukocytosis, Infectious work up negative treated for empiric coverage with Vanc/Zosyn (completed). Course further complicated by low hgb @ 7.5. S/p 2U of pRBC on 2/4 and 1 unit ordered prior to transfer on 2/5. Additionally pt has been c/o worsening CP w/ initial EKG changes pt started on nitro gtt. CP improved slightly but SBP dropped from 170s to 90-100s. Pt now being taken to cath lab for high risk PCI w/ balloon pump and then to CCU. While in cath lab patient became hypotensive requiring intubation, transferred to CCU for further managment.            INTERVAL HPI/OVERNIGHT EVENTS:   No overnight events:  gave epi x1. BPs increased to 200s/100s, pt went into unstable vtach, gave 200J shock, HR improved. loaded with amiodarone, started amio gtt. Hb stable. rhonda to 40s, ekg stable, d/larry amio. and weaned phenyl made DNR/DNI. mag 2 iv x1  Afebrile, hemodynamically stable     Subjective:    ICU Vital Signs Last 24 Hrs  T(C): 37.8 (06 Feb 2025 05:07), Max: 37.8 (06 Feb 2025 05:07)  T(F): 100 (06 Feb 2025 05:07), Max: 100 (06 Feb 2025 05:07)  HR: 45 (06 Feb 2025 03:00) (43 - 116)  BP: 142/53 (06 Feb 2025 03:00) (89/46 - 196/134)  BP(mean): 76 (06 Feb 2025 03:00) (64 - 153)  ABP: 128/38 (06 Feb 2025 03:00) (101/30 - 232/125)  ABP(mean): 74 (06 Feb 2025 03:00) (43 - 175)  RR: 15 (06 Feb 2025 03:00) (12 - 24)  SpO2: 98% (06 Feb 2025 03:00) (93% - 100%)    O2 Parameters below as of 06 Feb 2025 04:00  Patient On (Oxygen Delivery Method): ventilator    O2 Concentration (%): 40      I&O's Summary    04 Feb 2025 07:01  -  05 Feb 2025 07:00  --------------------------------------------------------  IN: 1080 mL / OUT: 900 mL / NET: 180 mL    05 Feb 2025 07:01  -  06 Feb 2025 06:10  --------------------------------------------------------  IN: 1490.4 mL / OUT: 884 mL / NET: 606.4 mL      Mode: AC/ CMV (Assist Control/ Continuous Mandatory Ventilation)  RR (machine): 12  TV (machine): 400  FiO2: 40  PEEP: 5  ITime: 1.1  MAP: 9.9  PIP: 21      Daily     Daily     Adult Advanced Hemodynamics Last 24 Hrs  CVP(mm Hg): 0 (06 Feb 2025 03:00) (-3 - 21)  CVP(cm H2O): --  CO: 4.2 (05 Feb 2025 18:55) (3.6 - 4.2)  CI: 2.4 (05 Feb 2025 18:55) (2.1 - 2.4)  PA: 37/8 (06 Feb 2025 03:00) (27/10 - 76/27)  PA(mean): 17 (06 Feb 2025 03:00) (16 - 43)  PCWP: --  SVR: 1560 (05 Feb 2025 18:55) (1560 - 2975)  SVRI: 5104 (05 Feb 2025 14:00) (5104 - 5104)  PVR: 209 (05 Feb 2025 18:55) (133 - 209)  PVRI: 229 (05 Feb 2025 14:00) (229 - 229)    EKG/Telemetry Events:    MEDICATIONS  (STANDING):  albuterol/ipratropium for Nebulization 3 milliLiter(s) Nebulizer every 6 hours  aspirin  chewable 81 milliGRAM(s) Enteral Tube daily  atorvastatin 80 milliGRAM(s) Oral at bedtime  chlorhexidine 0.12% Liquid 15 milliLiter(s) Oral Mucosa every 12 hours  chlorhexidine 2% Cloths 1 Application(s) Topical <User Schedule>  clopidogrel Tablet 75 milliGRAM(s) Oral daily  dextrose 5%. 1000 milliLiter(s) (100 mL/Hr) IV Continuous <Continuous>  dextrose 50% Injectable 12.5 Gram(s) IV Push once  dextrose 50% Injectable 25 Gram(s) IV Push once  fentaNYL   Infusion. 0.5 MICROgram(s)/kG/Hr (4.06 mL/Hr) IV Continuous <Continuous>  glucagon  Injectable 1 milliGRAM(s) IntraMuscular once  insulin glargine Injectable (LANTUS) 20 Unit(s) SubCutaneous at bedtime  insulin lispro (ADMELOG) corrective regimen sliding scale   SubCutaneous every 6 hours  levothyroxine Injectable 60 MICROGram(s) IV Push <User Schedule>  magnesium sulfate  IVPB 2 Gram(s) IV Intermittent once  mupirocin 2% Nasal 1 Application(s) Both Nostrils two times a day  norepinephrine Infusion 0.05 MICROgram(s)/kG/Min (3.8 mL/Hr) IV Continuous <Continuous>  pantoprazole  Injectable 40 milliGRAM(s) IV Push every 12 hours  phenylephrine    Infusion 0.01 MICROgram(s)/kG/Min (0.3 mL/Hr) IV Continuous <Continuous>  piperacillin/tazobactam IVPB.. 4.5 Gram(s) IV Intermittent every 8 hours  propofol Infusion 10 MICROgram(s)/kG/Min (4.87 mL/Hr) IV Continuous <Continuous>  sodium chloride 0.9% lock flush 3 milliLiter(s) IV Push every 8 hours  vancomycin  IVPB 1250 milliGRAM(s) IV Intermittent every 24 hours  vasopressin Infusion 0.04 Unit(s)/Min (6 mL/Hr) IV Continuous <Continuous>    MEDICATIONS  (PRN):  albuterol    90 MICROgram(s) HFA Inhaler 2 Puff(s) Inhalation every 4 hours PRN Bronchospasm  dextrose Oral Gel 15 Gram(s) Oral once PRN Blood Glucose LESS THAN 70 milliGRAM(s)/deciliter      PHYSICAL EXAM:  HEENT:   Normal oral mucosa, PERRL, EOMI	  Neck: Supple, - JVD; Carotid Bruit   Cardiovascular: Normal S1 S2, No JVD, No murmurs,   Respiratory: Lungs clear to auscultation. No Rales, Rhonchi, Wheezing	  Gastrointestinal:  Soft, Non-tender, + BS	  Skin: No rashes, No ecchymoses, No cyanosis  Extremities: Normal range of motion, No clubbing, cyanosis or edema  Vascular: Peripheral pulses palpable 2+ bilaterally  Neurologic: Non-focal  Psychiatry: Intubated and sedated     LABS:                        9.7    34.64 )-----------( 103      ( 06 Feb 2025 05:20 )             28.6     02-06    133[L]  |  100  |  31[H]  ----------------------------<  332[H]  5.4[H]   |  19[L]  |  1.99[H]    Ca    7.9[L]      06 Feb 2025 05:20  Phos  5.7     02-06  Mg     1.8     02-06    TPro  4.3[L]  /  Alb  2.7[L]  /  TBili  0.5  /  DBili  x   /  AST  60[H]  /  ALT  41  /  AlkPhos  55  02-06    LIVER FUNCTIONS - ( 06 Feb 2025 05:20 )  Alb: 2.7 g/dL / Pro: 4.3 g/dL / ALK PHOS: 55 U/L / ALT: 41 U/L / AST: 60 U/L / GGT: x           PT/INR - ( 06 Feb 2025 05:20 )   PT: 14.0 sec;   INR: 1.22          PTT - ( 06 Feb 2025 05:20 )  PTT:29.5 sec  CAPILLARY BLOOD GLUCOSE      POCT Blood Glucose.: 258 mg/dL (06 Feb 2025 06:09)  POCT Blood Glucose.: 349 mg/dL (06 Feb 2025 01:49)  POCT Blood Glucose.: 379 mg/dL (05 Feb 2025 21:52)  POCT Blood Glucose.: 395 mg/dL (05 Feb 2025 18:03)  POCT Blood Glucose.: 318 mg/dL (05 Feb 2025 13:39)  POCT Blood Glucose.: 286 mg/dL (05 Feb 2025 08:58)  POCT Blood Glucose.: 252 mg/dL (05 Feb 2025 07:58)  POCT Blood Glucose.: 264 mg/dL (05 Feb 2025 06:30)    ABG - ( 06 Feb 2025 05:13 )  pH, Arterial: 7.42  pH, Blood: x     /  pCO2: 32    /  pO2: 137   / HCO3: 21    / Base Excess: -2.8  /  SaO2: 98.4              CKMB Units: 4.6 ng/mL (02-05 @ 11:40)  CKMB Units: 4.6 ng/mL (02-05 @ 09:19)    CARDIAC MARKERS ( 05 Feb 2025 11:40 )  x     / x     / x     / x     / 4.6 ng/mL  CARDIAC MARKERS ( 05 Feb 2025 09:19 )  x     / x     / x     / x     / 4.6 ng/mL      Urinalysis Basic - ( 06 Feb 2025 05:20 )    Color: x / Appearance: x / SG: x / pH: x  Gluc: 332 mg/dL / Ketone: x  / Bili: x / Urobili: x   Blood: x / Protein: x / Nitrite: x   Leuk Esterase: x / RBC: x / WBC x   Sq Epi: x / Non Sq Epi: x / Bacteria: x          RADIOLOGY & ADDITIONAL TESTS:  CXR:        Care Discussed with Consultants/Other Providers [ x] YES  [ ] NO         Patient is a 79y old  Female who presents with a chief complaint of CAD (05 Feb 2025 15:38)      Hospital Course:   78 yo female with PMH of HTN, HLD, Type II DM, triple vessel CAD s/p 3 stents at [facility] 11/2023, CHF, asthma, GERD, and hypothyroidism who presented to [facility] [place] with CP and SOB on 1/24. She was having fever, chills, a productive cough and congestion for 2-3 days prior to the chest pain. She states the pain was constant and radiated to her back/left arm with associated palpitations, SOB, orthopnea and dizziness. She felt the chest pain was similar to when she had the stents prompting her to present to the ER on 1/24.In ED she was found to be tachycardic and tachypneic. She was placed on BiPAP for respiratory distress and loaded with ASA/Plavix and placed on a heparin gtt for NSTEMI. Her nasal swab was + for MRSA and she recieved CTX 1g daily. She is s/p cardiac cath showing 3V CAD and a reduced EF of 25%. She was transferred to [facility] CTS under the service of Dr. Hurst for further workup and deemed not a candidate for CTS. Pt the transferred to cardiac tele for optimization for high risk PCI. Repeat ECHO on admission showing moderate reduced EF 44%. Course c/b KEN on CKD, and multiple episodes of hypotension requiring fluids. Also the was a concern for DVT but US came back negative. Medicine/Pulm Consulted given CT findings and Leukocytosis, Infectious work up negative treated for empiric coverage with Vanc/Zosyn (completed). Course further complicated by low hgb @ 7.5. S/p 2U of pRBC on 2/4 and 1 unit ordered prior to transfer on 2/5. Additionally pt has been c/o worsening CP w/ initial EKG changes pt started on nitro gtt. CP improved slightly but SBP dropped from 170s to 90-100s. Pt now being taken to cath lab for high risk PCI w/ balloon pump and then to CCU. While in cath lab patient became hypotensive requiring intubation, transferred to CCU for further managment.            INTERVAL HPI/OVERNIGHT EVENTS:   No overnight events:  gave epi x1. BPs increased to 200s/100s, pt went into unstable vtach, gave 200J shock, HR improved. loaded with amiodarone, started amio gtt. Hb stable. rhonda to 40s, ekg stable, d/larry amio. and weaned phenyl made DNR/DNI. mag 2 iv x1      Subjective: Afebrile, hemodynamically stable     ICU Vital Signs Last 24 Hrs  T(C): 37.8 (06 Feb 2025 05:07), Max: 37.8 (06 Feb 2025 05:07)  T(F): 100 (06 Feb 2025 05:07), Max: 100 (06 Feb 2025 05:07)  HR: 45 (06 Feb 2025 03:00) (43 - 116)  BP: 142/53 (06 Feb 2025 03:00) (89/46 - 196/134)  BP(mean): 76 (06 Feb 2025 03:00) (64 - 153)  ABP: 128/38 (06 Feb 2025 03:00) (101/30 - 232/125)  ABP(mean): 74 (06 Feb 2025 03:00) (43 - 175)  RR: 15 (06 Feb 2025 03:00) (12 - 24)  SpO2: 98% (06 Feb 2025 03:00) (93% - 100%)    O2 Parameters below as of 06 Feb 2025 04:00  Patient On (Oxygen Delivery Method): ventilator    O2 Concentration (%): 40      I&O's Summary    04 Feb 2025 07:01  -  05 Feb 2025 07:00  --------------------------------------------------------  IN: 1080 mL / OUT: 900 mL / NET: 180 mL    05 Feb 2025 07:01  -  06 Feb 2025 06:10  --------------------------------------------------------  IN: 1490.4 mL / OUT: 884 mL / NET: 606.4 mL      Mode: AC/ CMV (Assist Control/ Continuous Mandatory Ventilation)  RR (machine): 12  TV (machine): 400  FiO2: 40  PEEP: 5  ITime: 1.1  MAP: 9.9  PIP: 21      Daily     Daily     Adult Advanced Hemodynamics Last 24 Hrs  CVP(mm Hg): 0 (06 Feb 2025 03:00) (-3 - 21)  CVP(cm H2O): --  CO: 4.2 (05 Feb 2025 18:55) (3.6 - 4.2)  CI: 2.4 (05 Feb 2025 18:55) (2.1 - 2.4)  PA: 37/8 (06 Feb 2025 03:00) (27/10 - 76/27)  PA(mean): 17 (06 Feb 2025 03:00) (16 - 43)  PCWP: --  SVR: 1560 (05 Feb 2025 18:55) (1560 - 2975)  SVRI: 5104 (05 Feb 2025 14:00) (5104 - 5104)  PVR: 209 (05 Feb 2025 18:55) (133 - 209)  PVRI: 229 (05 Feb 2025 14:00) (229 - 229)    EKG/Telemetry Events:    MEDICATIONS  (STANDING):  albuterol/ipratropium for Nebulization 3 milliLiter(s) Nebulizer every 6 hours  aspirin  chewable 81 milliGRAM(s) Enteral Tube daily  atorvastatin 80 milliGRAM(s) Oral at bedtime  chlorhexidine 0.12% Liquid 15 milliLiter(s) Oral Mucosa every 12 hours  chlorhexidine 2% Cloths 1 Application(s) Topical <User Schedule>  clopidogrel Tablet 75 milliGRAM(s) Oral daily  dextrose 5%. 1000 milliLiter(s) (100 mL/Hr) IV Continuous <Continuous>  dextrose 50% Injectable 12.5 Gram(s) IV Push once  dextrose 50% Injectable 25 Gram(s) IV Push once  fentaNYL   Infusion. 0.5 MICROgram(s)/kG/Hr (4.06 mL/Hr) IV Continuous <Continuous>  glucagon  Injectable 1 milliGRAM(s) IntraMuscular once  insulin glargine Injectable (LANTUS) 20 Unit(s) SubCutaneous at bedtime  insulin lispro (ADMELOG) corrective regimen sliding scale   SubCutaneous every 6 hours  levothyroxine Injectable 60 MICROGram(s) IV Push <User Schedule>  magnesium sulfate  IVPB 2 Gram(s) IV Intermittent once  mupirocin 2% Nasal 1 Application(s) Both Nostrils two times a day  norepinephrine Infusion 0.05 MICROgram(s)/kG/Min (3.8 mL/Hr) IV Continuous <Continuous>  pantoprazole  Injectable 40 milliGRAM(s) IV Push every 12 hours  phenylephrine    Infusion 0.01 MICROgram(s)/kG/Min (0.3 mL/Hr) IV Continuous <Continuous>  piperacillin/tazobactam IVPB.. 4.5 Gram(s) IV Intermittent every 8 hours  propofol Infusion 10 MICROgram(s)/kG/Min (4.87 mL/Hr) IV Continuous <Continuous>  sodium chloride 0.9% lock flush 3 milliLiter(s) IV Push every 8 hours  vancomycin  IVPB 1250 milliGRAM(s) IV Intermittent every 24 hours  vasopressin Infusion 0.04 Unit(s)/Min (6 mL/Hr) IV Continuous <Continuous>    MEDICATIONS  (PRN):  albuterol    90 MICROgram(s) HFA Inhaler 2 Puff(s) Inhalation every 4 hours PRN Bronchospasm  dextrose Oral Gel 15 Gram(s) Oral once PRN Blood Glucose LESS THAN 70 milliGRAM(s)/deciliter      PHYSICAL EXAM:  HEENT:  pupils 1mm,   Neck: Supple, - JVD; Carotid Bruit   Cardiovascular: Normal S1 S2,   Respiratory: anterior lung fields clear to ascultation   Gastrointestinal:  Soft, Non-tender, + BS	  Skin: No rashes, No ecchymoses, No cyanosis  Extremities: Normal range of motion, No clubbing, +edema in upper and lower extremities   Vascular: Peripheral pulses palpable 2+ bilaterally  Neurologic: RAAS: -4   Psychiatry: Intubated and sedated     LABS:                        9.7    34.64 )-----------( 103      ( 06 Feb 2025 05:20 )             28.6     02-06    133[L]  |  100  |  31[H]  ----------------------------<  332[H]  5.4[H]   |  19[L]  |  1.99[H]    Ca    7.9[L]      06 Feb 2025 05:20  Phos  5.7     02-06  Mg     1.8     02-06    TPro  4.3[L]  /  Alb  2.7[L]  /  TBili  0.5  /  DBili  x   /  AST  60[H]  /  ALT  41  /  AlkPhos  55  02-06    LIVER FUNCTIONS - ( 06 Feb 2025 05:20 )  Alb: 2.7 g/dL / Pro: 4.3 g/dL / ALK PHOS: 55 U/L / ALT: 41 U/L / AST: 60 U/L / GGT: x           PT/INR - ( 06 Feb 2025 05:20 )   PT: 14.0 sec;   INR: 1.22          PTT - ( 06 Feb 2025 05:20 )  PTT:29.5 sec  CAPILLARY BLOOD GLUCOSE      POCT Blood Glucose.: 258 mg/dL (06 Feb 2025 06:09)  POCT Blood Glucose.: 349 mg/dL (06 Feb 2025 01:49)  POCT Blood Glucose.: 379 mg/dL (05 Feb 2025 21:52)  POCT Blood Glucose.: 395 mg/dL (05 Feb 2025 18:03)  POCT Blood Glucose.: 318 mg/dL (05 Feb 2025 13:39)  POCT Blood Glucose.: 286 mg/dL (05 Feb 2025 08:58)  POCT Blood Glucose.: 252 mg/dL (05 Feb 2025 07:58)  POCT Blood Glucose.: 264 mg/dL (05 Feb 2025 06:30)    ABG - ( 06 Feb 2025 05:13 )  pH, Arterial: 7.42  pH, Blood: x     /  pCO2: 32    /  pO2: 137   / HCO3: 21    / Base Excess: -2.8  /  SaO2: 98.4              CKMB Units: 4.6 ng/mL (02-05 @ 11:40)  CKMB Units: 4.6 ng/mL (02-05 @ 09:19)    CARDIAC MARKERS ( 05 Feb 2025 11:40 )  x     / x     / x     / x     / 4.6 ng/mL  CARDIAC MARKERS ( 05 Feb 2025 09:19 )  x     / x     / x     / x     / 4.6 ng/mL      Urinalysis Basic - ( 06 Feb 2025 05:20 )    Color: x / Appearance: x / SG: x / pH: x  Gluc: 332 mg/dL / Ketone: x  / Bili: x / Urobili: x   Blood: x / Protein: x / Nitrite: x   Leuk Esterase: x / RBC: x / WBC x   Sq Epi: x / Non Sq Epi: x / Bacteria: x          RADIOLOGY & ADDITIONAL TESTS:  CXR:        Care Discussed with Consultants/Other Providers [ x] YES  [ ] NO         Patient is a 79y old  Female who presents with a chief complaint of CAD (05 Feb 2025 15:38)      Hospital Course:   80 yo female with PMH of HTN, HLD, Type II DM, triple vessel CAD s/p 3 stents at [facility] 11/2023, CHF, asthma, GERD, and hypothyroidism who presented to [facility] with CP and SOB on 1/24. She was having fever, chills, a productive cough and congestion for 2-3 days prior to the chest pain. She states the pain was constant and radiated to her back/left arm with associated palpitations, SOB, orthopnea and dizziness. She felt the chest pain was similar to when she had the stents prompting her to present to the ER on 1/24.In ED she was found to be tachycardic and tachypneic. She was placed on BiPAP for respiratory distress and loaded with ASA/Plavix and placed on a heparin gtt for NSTEMI. Her nasal swab was + for MRSA and she received CTX 1g daily. She is s/p cardiac cath showing 3V CAD and a reduced EF of 25%. She was transferred to [facility] CTS under the service of Dr. Hurst for further workup and deemed not a candidate for CTS. Pt the transferred to cardiac tele for optimization for high risk PCI. Repeat ECHO on admission showing moderate reduced EF 44%. Course c/b KEN on CKD, and multiple episodes of hypotension requiring fluids. Also the was a concern for DVT but US came back negative. Medicine/Pulm Consulted given CT findings and Leukocytosis, Infectious work up negative treated for empiric coverage with Vanc/Zosyn (completed). Course further complicated by low hgb @ 7.5. S/p 2U of pRBC on 2/4 and 1 unit ordered prior to transfer on 2/5. Additionally pt has been c/o worsening CP w/ initial EKG changes pt started on nitro gtt. CP improved slightly but SBP dropped from 170s to 90-100s. Was taken to cath lab ww/ ballon pump however patient became hypotensive,  requiring intubation, inotropic and mechanical support, transferred to CCU for further management            INTERVAL HPI/OVERNIGHT EVENTS:   No overnight events:  gave epi x1. BPs increased to 200s/100s, pt went into unstable vtach, gave 200J shock, HR improved. loaded with amiodarone, started amio gtt. Hb stable. rhonda to 40s, ekg stable, d/larry amio. and weaned phenyl made DNR/DNI. mag 2 iv x1      Subjective: Afebrile, hemodynamically stable     ICU Vital Signs Last 24 Hrs  T(C): 37.8 (06 Feb 2025 05:07), Max: 37.8 (06 Feb 2025 05:07)  T(F): 100 (06 Feb 2025 05:07), Max: 100 (06 Feb 2025 05:07)  HR: 45 (06 Feb 2025 03:00) (43 - 116)  BP: 142/53 (06 Feb 2025 03:00) (89/46 - 196/134)  BP(mean): 76 (06 Feb 2025 03:00) (64 - 153)  ABP: 128/38 (06 Feb 2025 03:00) (101/30 - 232/125)  ABP(mean): 74 (06 Feb 2025 03:00) (43 - 175)  RR: 15 (06 Feb 2025 03:00) (12 - 24)  SpO2: 98% (06 Feb 2025 03:00) (93% - 100%)    O2 Parameters below as of 06 Feb 2025 04:00  Patient On (Oxygen Delivery Method): ventilator    O2 Concentration (%): 40      I&O's Summary    04 Feb 2025 07:01  -  05 Feb 2025 07:00  --------------------------------------------------------  IN: 1080 mL / OUT: 900 mL / NET: 180 mL    05 Feb 2025 07:01  -  06 Feb 2025 06:10  --------------------------------------------------------  IN: 1490.4 mL / OUT: 884 mL / NET: 606.4 mL      Mode: AC/ CMV (Assist Control/ Continuous Mandatory Ventilation)  RR (machine): 12  TV (machine): 400  FiO2: 40  PEEP: 5  ITime: 1.1  MAP: 9.9  PIP: 21      Daily     Daily     Adult Advanced Hemodynamics Last 24 Hrs  CVP(mm Hg): 0 (06 Feb 2025 03:00) (-3 - 21)  CVP(cm H2O): --  CO: 4.2 (05 Feb 2025 18:55) (3.6 - 4.2)  CI: 2.4 (05 Feb 2025 18:55) (2.1 - 2.4)  PA: 37/8 (06 Feb 2025 03:00) (27/10 - 76/27)  PA(mean): 17 (06 Feb 2025 03:00) (16 - 43)  PCWP: --  SVR: 1560 (05 Feb 2025 18:55) (1560 - 2975)  SVRI: 5104 (05 Feb 2025 14:00) (5104 - 5104)  PVR: 209 (05 Feb 2025 18:55) (133 - 209)  PVRI: 229 (05 Feb 2025 14:00) (229 - 229)    EKG/Telemetry Events:    MEDICATIONS  (STANDING):  albuterol/ipratropium for Nebulization 3 milliLiter(s) Nebulizer every 6 hours  aspirin  chewable 81 milliGRAM(s) Enteral Tube daily  atorvastatin 80 milliGRAM(s) Oral at bedtime  chlorhexidine 0.12% Liquid 15 milliLiter(s) Oral Mucosa every 12 hours  chlorhexidine 2% Cloths 1 Application(s) Topical <User Schedule>  clopidogrel Tablet 75 milliGRAM(s) Oral daily  dextrose 5%. 1000 milliLiter(s) (100 mL/Hr) IV Continuous <Continuous>  dextrose 50% Injectable 12.5 Gram(s) IV Push once  dextrose 50% Injectable 25 Gram(s) IV Push once  fentaNYL   Infusion. 0.5 MICROgram(s)/kG/Hr (4.06 mL/Hr) IV Continuous <Continuous>  glucagon  Injectable 1 milliGRAM(s) IntraMuscular once  insulin glargine Injectable (LANTUS) 20 Unit(s) SubCutaneous at bedtime  insulin lispro (ADMELOG) corrective regimen sliding scale   SubCutaneous every 6 hours  levothyroxine Injectable 60 MICROGram(s) IV Push <User Schedule>  magnesium sulfate  IVPB 2 Gram(s) IV Intermittent once  mupirocin 2% Nasal 1 Application(s) Both Nostrils two times a day  norepinephrine Infusion 0.05 MICROgram(s)/kG/Min (3.8 mL/Hr) IV Continuous <Continuous>  pantoprazole  Injectable 40 milliGRAM(s) IV Push every 12 hours  phenylephrine    Infusion 0.01 MICROgram(s)/kG/Min (0.3 mL/Hr) IV Continuous <Continuous>  piperacillin/tazobactam IVPB.. 4.5 Gram(s) IV Intermittent every 8 hours  propofol Infusion 10 MICROgram(s)/kG/Min (4.87 mL/Hr) IV Continuous <Continuous>  sodium chloride 0.9% lock flush 3 milliLiter(s) IV Push every 8 hours  vancomycin  IVPB 1250 milliGRAM(s) IV Intermittent every 24 hours  vasopressin Infusion 0.04 Unit(s)/Min (6 mL/Hr) IV Continuous <Continuous>    MEDICATIONS  (PRN):  albuterol    90 MICROgram(s) HFA Inhaler 2 Puff(s) Inhalation every 4 hours PRN Bronchospasm  dextrose Oral Gel 15 Gram(s) Oral once PRN Blood Glucose LESS THAN 70 milliGRAM(s)/deciliter      PHYSICAL EXAM:  HEENT:  pupils 1mm,   Neck: Supple, - JVD; Carotid Bruit   Cardiovascular: Normal S1 S2,   Respiratory: anterior lung fields clear to ascultation   Gastrointestinal:  Soft, Non-tender, + BS	  Skin: No rashes, No ecchymoses, No cyanosis  Extremities: Normal range of motion, No clubbing, +edema in upper and lower extremities   Vascular: Peripheral pulses palpable 2+ bilaterally  Neurologic: RAAS: -4   Psychiatry: Intubated and sedated     LABS:                        9.7    34.64 )-----------( 103      ( 06 Feb 2025 05:20 )             28.6     02-06    133[L]  |  100  |  31[H]  ----------------------------<  332[H]  5.4[H]   |  19[L]  |  1.99[H]    Ca    7.9[L]      06 Feb 2025 05:20  Phos  5.7     02-06  Mg     1.8     02-06    TPro  4.3[L]  /  Alb  2.7[L]  /  TBili  0.5  /  DBili  x   /  AST  60[H]  /  ALT  41  /  AlkPhos  55  02-06    LIVER FUNCTIONS - ( 06 Feb 2025 05:20 )  Alb: 2.7 g/dL / Pro: 4.3 g/dL / ALK PHOS: 55 U/L / ALT: 41 U/L / AST: 60 U/L / GGT: x           PT/INR - ( 06 Feb 2025 05:20 )   PT: 14.0 sec;   INR: 1.22          PTT - ( 06 Feb 2025 05:20 )  PTT:29.5 sec  CAPILLARY BLOOD GLUCOSE      POCT Blood Glucose.: 258 mg/dL (06 Feb 2025 06:09)  POCT Blood Glucose.: 349 mg/dL (06 Feb 2025 01:49)  POCT Blood Glucose.: 379 mg/dL (05 Feb 2025 21:52)  POCT Blood Glucose.: 395 mg/dL (05 Feb 2025 18:03)  POCT Blood Glucose.: 318 mg/dL (05 Feb 2025 13:39)  POCT Blood Glucose.: 286 mg/dL (05 Feb 2025 08:58)  POCT Blood Glucose.: 252 mg/dL (05 Feb 2025 07:58)  POCT Blood Glucose.: 264 mg/dL (05 Feb 2025 06:30)    ABG - ( 06 Feb 2025 05:13 )  pH, Arterial: 7.42  pH, Blood: x     /  pCO2: 32    /  pO2: 137   / HCO3: 21    / Base Excess: -2.8  /  SaO2: 98.4              CKMB Units: 4.6 ng/mL (02-05 @ 11:40)  CKMB Units: 4.6 ng/mL (02-05 @ 09:19)    CARDIAC MARKERS ( 05 Feb 2025 11:40 )  x     / x     / x     / x     / 4.6 ng/mL  CARDIAC MARKERS ( 05 Feb 2025 09:19 )  x     / x     / x     / x     / 4.6 ng/mL      Urinalysis Basic - ( 06 Feb 2025 05:20 )    Color: x / Appearance: x / SG: x / pH: x  Gluc: 332 mg/dL / Ketone: x  / Bili: x / Urobili: x   Blood: x / Protein: x / Nitrite: x   Leuk Esterase: x / RBC: x / WBC x   Sq Epi: x / Non Sq Epi: x / Bacteria: x          RADIOLOGY & ADDITIONAL TESTS:  CXR:        Care Discussed with Consultants/Other Providers [ x] YES  [ ] NO         Patient is a 79y old  Female who presents with a chief complaint of CAD (05 Feb 2025 15:38)      Hospital Course:   80 yo female with PMH of HTN, HLD, Type II DM, triple vessel CAD s/p 3 stents at [facility] 11/2023, CHF, asthma, GERD, and hypothyroidism who presented to [facility] [place] with CP and SOB on 1/24. She was having fever, chills, a productive cough and congestion for 2-3 days prior to the chest pain. She states the pain was constant and radiated to her back/left arm with associated palpitations, SOB, orthopnea and dizziness. She felt the chest pain was similar to when she had the stents prompting her to present to the ER on 1/24.In ED she was found to be tachycardic and tachypneic. She was placed on BiPAP for respiratory distress and loaded with ASA/Plavix and placed on a heparin gtt for NSTEMI. Her nasal swab was + for MRSA and she received CTX 1g daily. She is s/p cardiac cath showing 3V CAD and a reduced EF of 25%. She was transferred to [facility] CTS under the service of Dr. Hurst for further workup and deemed not a candidate for CTS. Pt the transferred to cardiac tele for optimization for high risk PCI. Repeat ECHO on admission showing moderate reduced EF 44%. Course c/b KEN on CKD, and multiple episodes of hypotension requiring fluids. Also the was a concern for DVT but US came back negative. Medicine/Pulm Consulted given CT findings and Leukocytosis, Infectious work up negative treated for empiric coverage with Vanc/Zosyn (completed). Course further complicated by low hgb @ 7.5. S/p 2U of pRBC on 2/4 and 1 unit ordered prior to transfer on 2/5. Additionally pt has been c/o worsening CP w/ initial EKG changes pt started on nitro gtt. CP improved slightly but SBP dropped from 170s to 90-100s. Was taken to cath lab w/ balloon pump however patient became hypotensive,  requiring intubation, inotropic and mechanical support, transferred to CCU for further management            INTERVAL HPI/OVERNIGHT EVENTS:   No overnight events:  gave epi x1. BPs increased to 200s/100s, pt went into unstable vtach, gave 200J shock, HR improved. loaded with amiodarone, started amio gtt. Hb stable. rhonda to 40s, ekg stable, d/larry amio. and weaned phenyl made DNR/DNI. mag 2 iv x1      Subjective: Afebrile, hemodynamically stable     ICU Vital Signs Last 24 Hrs  T(C): 37.8 (06 Feb 2025 05:07), Max: 37.8 (06 Feb 2025 05:07)  T(F): 100 (06 Feb 2025 05:07), Max: 100 (06 Feb 2025 05:07)  HR: 45 (06 Feb 2025 03:00) (43 - 116)  BP: 142/53 (06 Feb 2025 03:00) (89/46 - 196/134)  BP(mean): 76 (06 Feb 2025 03:00) (64 - 153)  ABP: 128/38 (06 Feb 2025 03:00) (101/30 - 232/125)  ABP(mean): 74 (06 Feb 2025 03:00) (43 - 175)  RR: 15 (06 Feb 2025 03:00) (12 - 24)  SpO2: 98% (06 Feb 2025 03:00) (93% - 100%)    O2 Parameters below as of 06 Feb 2025 04:00  Patient On (Oxygen Delivery Method): ventilator    O2 Concentration (%): 40      I&O's Summary    04 Feb 2025 07:01  -  05 Feb 2025 07:00  --------------------------------------------------------  IN: 1080 mL / OUT: 900 mL / NET: 180 mL    05 Feb 2025 07:01  -  06 Feb 2025 06:10  --------------------------------------------------------  IN: 1490.4 mL / OUT: 884 mL / NET: 606.4 mL      Mode: AC/ CMV (Assist Control/ Continuous Mandatory Ventilation)  RR (machine): 12  TV (machine): 400  FiO2: 40  PEEP: 5  ITime: 1.1  MAP: 9.9  PIP: 21      Daily     Daily     Adult Advanced Hemodynamics Last 24 Hrs  CVP(mm Hg): 0 (06 Feb 2025 03:00) (-3 - 21)  CVP(cm H2O): --  CO: 4.2 (05 Feb 2025 18:55) (3.6 - 4.2)  CI: 2.4 (05 Feb 2025 18:55) (2.1 - 2.4)  PA: 37/8 (06 Feb 2025 03:00) (27/10 - 76/27)  PA(mean): 17 (06 Feb 2025 03:00) (16 - 43)  PCWP: --  SVR: 1560 (05 Feb 2025 18:55) (1560 - 2975)  SVRI: 5104 (05 Feb 2025 14:00) (5104 - 5104)  PVR: 209 (05 Feb 2025 18:55) (133 - 209)  PVRI: 229 (05 Feb 2025 14:00) (229 - 229)    EKG/Telemetry Events:    MEDICATIONS  (STANDING):  albuterol/ipratropium for Nebulization 3 milliLiter(s) Nebulizer every 6 hours  aspirin  chewable 81 milliGRAM(s) Enteral Tube daily  atorvastatin 80 milliGRAM(s) Oral at bedtime  chlorhexidine 0.12% Liquid 15 milliLiter(s) Oral Mucosa every 12 hours  chlorhexidine 2% Cloths 1 Application(s) Topical <User Schedule>  clopidogrel Tablet 75 milliGRAM(s) Oral daily  dextrose 5%. 1000 milliLiter(s) (100 mL/Hr) IV Continuous <Continuous>  dextrose 50% Injectable 12.5 Gram(s) IV Push once  dextrose 50% Injectable 25 Gram(s) IV Push once  fentaNYL   Infusion. 0.5 MICROgram(s)/kG/Hr (4.06 mL/Hr) IV Continuous <Continuous>  glucagon  Injectable 1 milliGRAM(s) IntraMuscular once  insulin glargine Injectable (LANTUS) 20 Unit(s) SubCutaneous at bedtime  insulin lispro (ADMELOG) corrective regimen sliding scale   SubCutaneous every 6 hours  levothyroxine Injectable 60 MICROGram(s) IV Push <User Schedule>  magnesium sulfate  IVPB 2 Gram(s) IV Intermittent once  mupirocin 2% Nasal 1 Application(s) Both Nostrils two times a day  norepinephrine Infusion 0.05 MICROgram(s)/kG/Min (3.8 mL/Hr) IV Continuous <Continuous>  pantoprazole  Injectable 40 milliGRAM(s) IV Push every 12 hours  phenylephrine    Infusion 0.01 MICROgram(s)/kG/Min (0.3 mL/Hr) IV Continuous <Continuous>  piperacillin/tazobactam IVPB.. 4.5 Gram(s) IV Intermittent every 8 hours  propofol Infusion 10 MICROgram(s)/kG/Min (4.87 mL/Hr) IV Continuous <Continuous>  sodium chloride 0.9% lock flush 3 milliLiter(s) IV Push every 8 hours  vancomycin  IVPB 1250 milliGRAM(s) IV Intermittent every 24 hours  vasopressin Infusion 0.04 Unit(s)/Min (6 mL/Hr) IV Continuous <Continuous>    MEDICATIONS  (PRN):  albuterol    90 MICROgram(s) HFA Inhaler 2 Puff(s) Inhalation every 4 hours PRN Bronchospasm  dextrose Oral Gel 15 Gram(s) Oral once PRN Blood Glucose LESS THAN 70 milliGRAM(s)/deciliter      PHYSICAL EXAM:  HEENT:  pupils 1mm,   Neck: Supple, - JVD; Carotid Bruit   Cardiovascular: Normal S1 S2,   Respiratory: anterior lung fields clear to ascultation   Gastrointestinal:  Soft, Non-tender, + BS	  Skin: No rashes, No ecchymoses, No cyanosis  Extremities: Normal range of motion, No clubbing, +edema in upper and lower extremities   Vascular: Peripheral pulses palpable 2+ bilaterally  Neurologic: RAAS: -4   Psychiatry: Intubated and sedated     LABS:                        9.7    34.64 )-----------( 103      ( 06 Feb 2025 05:20 )             28.6     02-06    133[L]  |  100  |  31[H]  ----------------------------<  332[H]  5.4[H]   |  19[L]  |  1.99[H]    Ca    7.9[L]      06 Feb 2025 05:20  Phos  5.7     02-06  Mg     1.8     02-06    TPro  4.3[L]  /  Alb  2.7[L]  /  TBili  0.5  /  DBili  x   /  AST  60[H]  /  ALT  41  /  AlkPhos  55  02-06    LIVER FUNCTIONS - ( 06 Feb 2025 05:20 )  Alb: 2.7 g/dL / Pro: 4.3 g/dL / ALK PHOS: 55 U/L / ALT: 41 U/L / AST: 60 U/L / GGT: x           PT/INR - ( 06 Feb 2025 05:20 )   PT: 14.0 sec;   INR: 1.22          PTT - ( 06 Feb 2025 05:20 )  PTT:29.5 sec  CAPILLARY BLOOD GLUCOSE      POCT Blood Glucose.: 258 mg/dL (06 Feb 2025 06:09)  POCT Blood Glucose.: 349 mg/dL (06 Feb 2025 01:49)  POCT Blood Glucose.: 379 mg/dL (05 Feb 2025 21:52)  POCT Blood Glucose.: 395 mg/dL (05 Feb 2025 18:03)  POCT Blood Glucose.: 318 mg/dL (05 Feb 2025 13:39)  POCT Blood Glucose.: 286 mg/dL (05 Feb 2025 08:58)  POCT Blood Glucose.: 252 mg/dL (05 Feb 2025 07:58)  POCT Blood Glucose.: 264 mg/dL (05 Feb 2025 06:30)    ABG - ( 06 Feb 2025 05:13 )  pH, Arterial: 7.42  pH, Blood: x     /  pCO2: 32    /  pO2: 137   / HCO3: 21    / Base Excess: -2.8  /  SaO2: 98.4              CKMB Units: 4.6 ng/mL (02-05 @ 11:40)  CKMB Units: 4.6 ng/mL (02-05 @ 09:19)    CARDIAC MARKERS ( 05 Feb 2025 11:40 )  x     / x     / x     / x     / 4.6 ng/mL  CARDIAC MARKERS ( 05 Feb 2025 09:19 )  x     / x     / x     / x     / 4.6 ng/mL      Urinalysis Basic - ( 06 Feb 2025 05:20 )    Color: x / Appearance: x / SG: x / pH: x  Gluc: 332 mg/dL / Ketone: x  / Bili: x / Urobili: x   Blood: x / Protein: x / Nitrite: x   Leuk Esterase: x / RBC: x / WBC x   Sq Epi: x / Non Sq Epi: x / Bacteria: x          RADIOLOGY & ADDITIONAL TESTS:  CXR:        Care Discussed with Consultants/Other Providers [ x] YES  [ ] NO

## 2025-02-06 NOTE — PROGRESS NOTE ADULT - ASSESSMENT
79 yr old F with PMHx of HTN, hyperlipidemia, DM, hypothyroidism, GERD, asthma, CAD s/p prior PCIs at [facility] 11/2023 who [place] [facility] [place] with chest pain and URI symptoms x 2-3 days, R/I NSTEMI, s/p diagnostic cath@[facility] revealing 3VD and newly reduced EF, initially transferred to [facility] 9LACH under Dr. 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 Course complicated by KEN with peak Creatinine 2.7 now downtrending likely in setting of Hypovolemia s/p IV fluids. Course further c/b anemia pt now s/p 2u prbc on 2/4. Medicine/Pulm Consulted given CT findings and Leukocytosis, Infectious work up negative treated for empiric coverage with Vanc/Zosyn (completed). Now transferred to CCU for further management of cardiogenic shock.         PLAN:    NEUROLOGY:  Intubated and Sedated  - goal Rass: -1   - continue with Fentanyl 0.5, Propofol 30     CARDIOVASCULAR:    #Cardiogenic shock   - patient's BP dropped during planned PCI requiring inotropy support as well as mechanical ventilation and IABP for afterload reduction   - also with New Acute CHF likely in setting of NSTEMI EF at [facility] was 25%, Echo on Arrival improved to 1/31/2025 44%  - Holding GDMT: Toprol increased to 50mg PO daily, isordil 5mg TID. Losartan on HOLD in setting of KEN.    Plan   - continue with Vasopressin 0.04, Nepi: 0.03,   - c/w IABP @ 1:1 for afterload reduction   - Hemos & thermos Q6         #NSTEMI   Moderate CP this AM   - S/p diagnotic [facility] @[facility] [place]28[place] w/ Dr. Morgan: dLM to pLAD 80% Stenosed ISR, LCX ostial to pLCX ISR, % Stenosed.  - TTE 1/31/25: mild LVH, Mod reduced LVSF, EF 44%. RWMA. Grade I DD. Aortic sclerosis w/o significant stenosis.  - C/w ASA 81mg qd, Plavix 75mg qd, Atorvastatin 40mg qd. Started on Isordil 5mg TID.   - Transitioned pt from heparin gtt to Lovenox 80mg sq BID  Plan: Pt no longer candidate for high risk PCI given low hgb and poor candidate. Possible viability study this admission     **Prior Cath@[facility] 11/2023: Impella Assisted Rota/PCI with EMILIE to LM 90%, EMILIE ostial LAD and EMILIE ostial LCX, (dLM 80%, oLAD 85%, D2 75%, oLCX 85%, mRCA 75%).      PULMONARY:  #AHRF   - Placed on VC-AC, Fi:02 100, PEEP: 7, VT: 400, RR 12 2/2 cardiogenic shock possibly due to PNA   - continue with mechanical ventilation     #PNA   Pulm Consulted: low suspicion for PNA, noted to have appearance of endobronchial material in superior segment RLL bronchus.   - CT Chest 1/31/2025 Persistent small large airways inflammation with interval increase in mucous plugging with near complete obstruction of the lateral segmental bronchus to the RML, RLL bronchus and anteromedial basal segmental bronchus to the LLL. Associated groundglass opacities are identified and early multifocal pneumonia possibly postobstructive cannot be excluded.   - Recommended outpatient pulm follow up w PFTs (to diagnosis obstructive lung disease) and consideration of repeat imaging.   - WBC on Admission 13--->17.87->20.27, today 17 MRSA swab + at [facility].   - UA normal, Blood Cultures 1/31 NGTD, Procal normal.     #History of Asthma   Pulm following Appreciate Recs   - Duonebs, Hypertonic Saline to aid with Mucous plugging  - C/w symbicort and Montelukast         GASTROINTESTINAL:  - NG tube in place       RENAL:  #CKD Stage 3   KEN on CKD Stage 3 (baseline Cr ~1.3-1.5 from prior Labs)   - Today 1.54, improved with Fluids likely due to hypovolemia episode vs dehydration   - Urine studies: 2/3: Na 71, Protein 27, Creatinine 108, Pr/Cr Ratio .2, Urea Nitrogen 832, K 21, Osmol 505    INFECTIOUS DISEASE:  #PNA   Pulm Consulted: low suspicion for PNA, noted to have appearance of endobronchial material in superior segment RLL bronchus.   - CT Chest 1/31/2025 Persistent small large airways inflammation with interval increase in mucous plugging with near complete obstruction of the lateral segmental bronchus to the RML, RLL bronchus and anteromedial basal segmental bronchus to the LLL. Associated groundglass opacities are identified and early multifocal pneumonia possibly postobstructive cannot be excluded.   - Recommended outpatient pulm follow up w PFTs (to diagnosis obstructive lung disease) and consideration of repeat imaging.   - WBC on Admission 13--->17.87->20.27, today 17 MRSA swab + at [facility].   - UA normal, Blood Cultures 1/31 NGTD, Procal normal.   - on arrival to CCU: ordered repeat CXR, BCX, UCX, UA, MRSA swab     Plan   - F/U BCX, UA, UCX, MRSA swab, RVP   - started on Vancomycin and Zosyn for broad spectrum coverage pending MRSA results and BCX     ENDOCRINE:  #Hypothyroidism   - C/w Levothyroxine 75mcg PO daily.  - TSH 1.390    #DM   A1C 6.9    Plan   - C/w FS's   - ISS,   - increased Lantus to 20 at bedtime        HEME:    #Anemia   Pt is s/p 2u prbc 2/4/25 AM  - Hgb on admission 10.1. Steadily downtrending,  Hgb this AM 7.5  Plan   - F/u Iron studies (TIBC, Total Iron, Ferritin, Transferrin)   - Fecal occult blood test   - F/u B12 and Folate         FLUIDS/ELECTROLYTES/NUTRITION:  -Drips: Fentanyl 0.5, Vasopressin 0.04, Nepi 0.03, Propofol: 30  -Monitor, Replete to K>4 and Mg>2  -Diet    PROPHYLAXIS  -DVT: SCD, hold off on heparin drip while workup done for possible GI bleed   -GI- PPI qd    DISPO: CCU  FULL CODE 79 yr old F with PMHx of HTN, hyperlipidemia, DM, hypothyroidism, GERD, asthma, CAD s/p prior PCIs at [facility] 11/2023 who [place] [facility] [place] with chest pain and URI symptoms x 2-3 days, R/I NSTEMI, s/p diagnostic cath[place] [place] 3VD and newly reduced EF, initially transferred to [facility] 9LACH under Dr. 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 Course complicated by KEN with peak Creatinine 2.7 now downtrending likely in setting of Hypovolemia s/p IV fluids. Course further c/b anemia pt now s/p 2u prbc on 2/4. Medicine/Pulm Consulted given CT findings and Leukocytosis, Infectious work up negative treated for empiric coverage with Vanc/Zosyn (completed). Now transferred to CCU for further management of cardiogenic shock.         PLAN:    NEUROLOGY:  Intubated and Sedated  - goal Rass: -1   - continue with Fentanyl 0.5, Propofol 30     CARDIOVASCULAR:    #Cardiogenic shock   - patient's BP dropped during planned PCI requiring inotropy support as well as mechanical ventilation and IABP for afterload reduction   - also with New Acute CHF likely in setting of NSTEMI EF at [facility] was 25%, Echo on Arrival improved to 1/31/2025 44%  - Holding GDMT: Toprol increased to 50mg PO daily, isordil 5mg TID. Losartan on HOLD in setting of KEN.    Plan   - continue with Vasopressin 0.04, Nepi: 0.03,   - c/w IABP @ 1:1 for afterload reduction   - Hemos & thermos Q6         #NSTEMI   Moderate CP this AM   - S/p diagnotic [facility] @[facility] [place] w/ Dr. Morgan: dLM to pLAD 80% Stenosed ISR, LCX ostial to pLCX ISR, % Stenosed.  - TTE 1/31/25: mild LVH, Mod reduced LVSF, EF 44%. RWMA. Grade I DD. Aortic sclerosis w/o significant stenosis.  - C/w ASA 81mg qd, Plavix 75mg qd, Atorvastatin 40mg qd. Started on Isordil 5mg TID.   - Transitioned pt from heparin gtt to Lovenox 80mg sq BID  Plan: Pt no longer candidate for high risk PCI given low hgb and poor candidate. Possible viability study this admission     **Prior Cath@[facility] 11/2023: Impella Assisted Rota/PCI with EMILIE to LM 90%, EMILIE ostial LAD and EMILIE ostial LCX, (dLM 80%, oLAD 85%, D2 75%, oLCX 85%, mRCA 75%).      PULMONARY:  #AHRF   - Placed on VC-AC, Fi:02 100, PEEP: 7, VT: 400, RR 12 2/2 cardiogenic shock possibly due to PNA   - continue with mechanical ventilation     #PNA   Pulm Consulted: low suspicion for PNA, noted to have appearance of endobronchial material in superior segment RLL bronchus.   - CT Chest 1/31/2025 Persistent small large airways inflammation with interval increase in mucous plugging with near complete obstruction of the lateral segmental bronchus to the RML, RLL bronchus and anteromedial basal segmental bronchus to the LLL. Associated groundglass opacities are identified and early multifocal pneumonia possibly postobstructive cannot be excluded.   - Recommended outpatient pulm follow up w PFTs (to diagnosis obstructive lung disease) and consideration of repeat imaging.   - WBC on Admission 13--->17.87->20.27, today 17 MRSA swab + [place] [facility]   - UA normal, Blood Cultures 1/31 NGTD, Procal normal.     #History of Asthma   Pulm following Appreciate Recs   - Duonebs, Hypertonic Saline to aid with Mucous plugging  - C/w symbicort and Montelukast         GASTROINTESTINAL:  - NG tube in place       RENAL:  #CKD Stage 3   KEN on CKD Stage 3 (baseline Cr ~1.3-1.5 from prior Labs)   - Today 1.54, improved with Fluids likely due to hypovolemia episode vs dehydration   - Urine studies: 2/3: Na 71, Protein 27, Creatinine 108, Pr/Cr Ratio .2, Urea Nitrogen 832, K 21, Osmol 505    INFECTIOUS DISEASE:  #PNA   Pulm Consulted: low suspicion for PNA, noted to have appearance of endobronchial material in superior segment RLL bronchus.   - CT Chest 1/31/2025 Persistent small large airways inflammation with interval increase in mucous plugging with near complete obstruction of the lateral segmental bronchus to the RML, RLL bronchus and anteromedial basal segmental bronchus to the LLL. Associated groundglass opacities are identified and early multifocal pneumonia possibly postobstructive cannot be excluded.   - Recommended outpatient pulm follow up w PFTs (to diagnosis obstructive lung disease) and consideration of repeat imaging.   - WBC on Admission 13--->17.87->20.27, today 17 MRSA swab + [place] [facility]   - UA normal, Blood Cultures 1/31 NGTD, Procal normal.   - on arrival to CCU: ordered repeat CXR, BCX, UCX, UA, MRSA swab   - MRSA swab positive, RVP negative     Plan   - F/U BCX, UA, UCX,    - c/w Vancomycin and Zosyn for broad spectrum coverage  - ID consulted, appreciate recs     ENDOCRINE:  #Hypothyroidism   - C/w Levothyroxine 75mcg PO daily.  - TSH 1.390    #DM   A1C 6.9    Plan   - C/w FS's   - ISS,   - increased Lantus to 27 at bedtime for elevated sugars       HEME:    #Anemia   Pt is s/p 2u prbc 2/4/25 AM  - Hgb on admission 10.1. Steadily downtrending,  Hgb this AM 7.5  Plan   - F/u Iron studies (TIBC, Total Iron, Ferritin, Transferrin)   - Fecal occult blood test   - F/u B12 and Folate         FLUIDS/ELECTROLYTES/NUTRITION:  -Drips: Fentanyl 0.5, Vasopressin 0.04, Nepi 0.03, Propofol: 30  -Monitor, Replete to K>4 and Mg>2  -Diet    PROPHYLAXIS  -DVT: SCD, hold off on heparin drip while workup done for possible GI bleed   -GI- PPI qd    DISPO: CCU  DNR/DNI  79 yr old F with PMHx of HTN, hyperlipidemia, DM, hypothyroidism, GERD, asthma, CAD s/p prior PCIs at [facility] 11/2023 who presented to [facility] 1/24/25 with chest pain and URI symptoms x 2-3 days, R/I NSTEMI, s/p diagnostic cath@[facility] revealing 3VD and newly reduced EF, initially transferred to [facility] 9LACH under Dr. 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 Course complicated by KEN with peak Creatinine 2.7 now downtrending likely in setting of Hypovolemia s/p IV fluids. Course further c/b anemia pt now s/p 2u prbc on 2/4. Medicine/Pulm Consulted given CT findings and Leukocytosis. Course complicated by cardiogenic shock, requiring pressor support and IABP.         NEUROLOGY:  Intubated and Sedated  - goal Rass: -1   - continue with Fentanyl 0.5, Propofol 30     CARDIOVASCULAR:    #Cardiogenic shock   - patient's BP dropped during planned PCI requiring inotropy support as well as mechanical ventilation and IABP for afterload reduction   - also with New Acute CHF likely in setting of NSTEMI EF at [facility] was 25%, Echo on Arrival improved to 1/31/2025 44%  - Holding GDMT: Toprol increased to 50mg PO daily, isordil 5mg TID. Losartan on HOLD in setting of KEN.    Plan   - continue with Vasopressin 0.04, Nepi: 0.03,   - c/w IABP @ 1:1 for afterload reduction   - Hemos & thermos Q6         #NSTEMI   Moderate CP this AM   - S/p diagnotic [facility] @[facility] 1/28/24 w/ Dr. Morgan: dLM to pLAD 80% Stenosed ISR, LCX ostial to pLCX ISR, % Stenosed.  - TTE 1/31/25: mild LVH, Mod reduced LVSF, EF 44%. RWMA. Grade I DD. Aortic sclerosis w/o significant stenosis.  - C/w ASA 81mg qd, Plavix 75mg qd, Atorvastatin 40mg qd. Started on Isordil 5mg TID.   - Transitioned pt from heparin gtt to Lovenox 80mg sq BID  Plan: Pt no longer candidate for high risk PCI given low hgb and poor candidate. Possible viability study this admission     **Prior Cath@[facility] 11/2023: Impella Assisted Rota/PCI with EMILIE to LM 90%, EMILIE ostial LAD and EMILIE ostial LCX, (dLM 80%, oLAD 85%, D2 75%, oLCX 85%, mRCA 75%).      PULMONARY:  #AHRF   - Placed on VC-AC, Fi:02 100, PEEP: 7, VT: 400, RR 12 2/2 cardiogenic shock possibly due to PNA   - continue with mechanical ventilation     #PNA   Pulm Consulted: low suspicion for PNA, noted to have appearance of endobronchial material in superior segment RLL bronchus.   - CT Chest 1/31/2025 Persistent small large airways inflammation with interval increase in mucous plugging with near complete obstruction of the lateral segmental bronchus to the RML, RLL bronchus and anteromedial basal segmental bronchus to the LLL. Associated groundglass opacities are identified and early multifocal pneumonia possibly postobstructive cannot be excluded.   - Recommended outpatient pulm follow up w PFTs (to diagnosis obstructive lung disease) and consideration of repeat imaging.   - WBC on Admission 13--->17.87->20.27, today 17 MRSA swab + at [facility].   - UA normal, Blood Cultures 1/31 NGTD, Procal normal.     #History of Asthma   Pulm following Appreciate Recs   - Duonebs, Hypertonic Saline to aid with Mucous plugging  - C/w symbicort and Montelukast         GASTROINTESTINAL:  - NG tube in place       RENAL:  #CKD Stage 3   KEN on CKD Stage 3 (baseline Cr ~1.3-1.5 from prior Labs)   - Today 1.54, improved with Fluids likely due to hypovolemia episode vs dehydration   - Urine studies: 2/3: Na 71, Protein 27, Creatinine 108, Pr/Cr Ratio .2, Urea Nitrogen 832, K 21, Osmol 505    INFECTIOUS DISEASE:  #PNA   Pulm Consulted: low suspicion for PNA, noted to have appearance of endobronchial material in superior segment RLL bronchus.   - CT Chest 1/31/2025 Persistent small large airways inflammation with interval increase in mucous plugging with near complete obstruction of the lateral segmental bronchus to the RML, RLL bronchus and anteromedial basal segmental bronchus to the LLL. Associated groundglass opacities are identified and early multifocal pneumonia possibly postobstructive cannot be excluded.   - Recommended outpatient pulm follow up w PFTs (to diagnosis obstructive lung disease) and consideration of repeat imaging.   - WBC on Admission 13--->17.87->20.27, today 17 MRSA swab + at [facility].   - UA normal, Blood Cultures 1/31 NGTD, Procal normal.   - on arrival to CCU: ordered repeat CXR, BCX, UCX, UA, MRSA swab   - MRSA swab positive, RVP negative   - pro-hailee elevated at 1.6    Plan   - F/U BCX, UA, UCX,    - c/w Vancomycin and Zosyn for broad spectrum coverage  - ID consulted, appreciate recs     ENDOCRINE:  #Hypothyroidism   - C/w Levothyroxine 75mcg PO daily.  - TSH 1.390    #DM   A1C 6.9    Plan   - C/w FS's   - ISS,   - increased Lantus to 26 at bedtime for elevated sugars       HEME:    #Anemia   Pt is s/p 2u prbc 2/4/25 AM  - Hgb on admission 10.1. Steadily downtrending,  Hgb this AM 7.5  Plan   - F/u Iron studies (TIBC, Total Iron, Ferritin, Transferrin)   - Fecal occult blood test   - F/u B12 and Folate         FLUIDS/ELECTROLYTES/NUTRITION:  -Drips: Fentanyl 0.5, Vasopressin 0.04, Nepi 0.03, Propofol: 30  -Monitor, Replete to K>4 and Mg>2  -Diet    PROPHYLAXIS  -DVT: SCD, hold off on heparin drip while workup done for possible GI bleed   -GI- PPI qd    DISPO: CCU  DNR/DNI  79 yr old F with PMHx of HTN, hyperlipidemia, DM, hypothyroidism, GERD, asthma, CAD s/p prior PCIs at [facility] 11/2023 who [facility] with chest pain and URI symptoms x 2-3 days, R/I NSTEMI, s/p [facility] revealing 3VD and newly reduced EF, initially transferred to [facility] 9LACH under Dr. 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 Course further c/b anemia pt now s/p 2u prbc on 2/4. and  cardiogenic shock, requiring pressor support and IABP. Transferred to CCU for further management         NEUROLOGY:  Intubated and Sedated  - goal Rass: -1   - continue with Fentanyl 0.5, Propofol 30     CARDIOVASCULAR:    #Cardiogenic shock   - patient became hypotensive requiring inotropy support as well as mechanical ventilation and IABP for afterload reduction. Echo on arrival to [facility] showing reduced EF of 44%   -  New Acute CHF likely in setting of NSTEMI [facility] was 25%, Echo on Arrival improved to 1/31/2025 44%  - Holding GDMT: Toprol increased to 50mg PO daily, isordil 5mg TID. Losartan on HOLD in setting of KEN.    Plan   - continue with Vasopressin 0.04, Nepi: 0.03,   - c/w IABP @ 1:1 for afterload reduction   - Hemos & thermos Q6         #NSTEMI   Moderate CP this AM   - S/p [facility]ic [facility] 1/28/24 w/ Dr. Morgan: dLM to pLAD 80% Stenosed ISR, LCX ostial to pLCX ISR, % Stenosed.  - TTE 1/31/25: mild LVH, Mod reduced LVSF, EF 44%. RWMA. Grade I DD. Aortic sclerosis w/o significant stenosis.  - C/w ASA 81mg qd, Plavix 75mg qd, Atorvastatin 40mg qd. Started on Isordil 5mg TID.   - Transitioned pt from heparin gtt to Lovenox 80mg sq BID  Plan: Pt no longer candidate for high risk PCI given low hgb and poor candidate. Possible viability study this admission     **Prior Cath@[facility] 11/2023: Impella Assisted Rota/PCI with EMILIE to LM 90%, EMILIE ostial LAD and EMILIE ostial LCX, (dLM 80%, oLAD 85%, D2 75%, oLCX 85%, mRCA 75%).      PULMONARY:  #AHRF   - Placed on VC-AC, Fi:02 100, PEEP: 7, VT: 400, RR 12 2/2 cardiogenic shock possibly due to PNA   - continue with mechanical ventilation     #PNA   Pulm Consulted: low suspicion for PNA, noted to have appearance of endobronchial material in superior segment RLL bronchus.   - CT Chest 1/31/2025 Persistent small large airways inflammation with interval increase in mucous plugging with near complete obstruction of the lateral segmental bronchus to the RML, RLL bronchus and anteromedial basal segmental bronchus to the LLL. Associated groundglass opacities are identified and early multifocal pneumonia possibly postobstructive cannot be excluded.   - Recommended outpatient pulm follow up w PFTs (to diagnosis obstructive lung disease) and consideration of repeat imaging.   - WBC on Admission 13--->17.87->20.27, today 17 MRSA swab + [facility].   - UA normal, Blood Cultures 1/31 NGTD, Procal normal.     #History of Asthma   Pulm following Appreciate Recs   - Duonebs, Hypertonic Saline to aid with Mucous plugging  - C/w symbicort and Montelukast         GASTROINTESTINAL:  - NG tube in place       RENAL:  #CKD Stage 3   KEN on CKD Stage 3 (baseline Cr ~1.3-1.5 from prior Labs)   - Today 1.54, improved with Fluids likely due to hypovolemia episode vs dehydration   - Urine studies: 2/3: Na 71, Protein 27, Creatinine 108, Pr/Cr Ratio .2, Urea Nitrogen 832, K 21, Osmol 505    INFECTIOUS DISEASE:  #PNA   Pulm Consulted: low suspicion for PNA, noted to have appearance of endobronchial material in superior segment RLL bronchus.   - CT Chest 1/31/2025 Persistent small large airways inflammation with interval increase in mucous plugging with near complete obstruction of the lateral segmental bronchus to the RML, RLL bronchus and anteromedial basal segmental bronchus to the LLL. Associated groundglass opacities are identified and early multifocal pneumonia possibly postobstructive cannot be excluded.   - Recommended outpatient pulm follow up w PFTs (to diagnosis obstructive lung disease) and consideration of repeat imaging.   - WBC on Admission 13--->17.87->20.27, today 17 MRSA swab + [facility].   - UA normal, Blood Cultures 1/31 NGTD, Procal normal.   - on arrival to CCU: ordered repeat CXR, BCX, UCX, UA, MRSA swab   - MRSA swab positive, RVP negative   - pro-hailee elevated at 1.6    Plan   - F/U BCX, UA, UCX,    - c/w Vancomycin and Zosyn for broad spectrum coverage  - ID consulted, appreciate recs     ENDOCRINE:  #Hypothyroidism   - C/w Levothyroxine 75mcg PO daily.  - TSH 1.390    #DM   A1C 6.9    Plan   - C/w FS's   - ISS,   - increased Lantus to 26 at bedtime for elevated sugars       HEME:    #Anemia   Pt is s/p 2u prbc 2/4/25 AM  - Hgb on admission 10.1. Steadily downtrending,  Hgb this AM 7.5  Plan   - F/u Iron studies (TIBC, Total Iron, Ferritin, Transferrin)   - Fecal occult blood test   - F/u B12 and Folate         FLUIDS/ELECTROLYTES/NUTRITION:  -Drips: Fentanyl 0.5, Vasopressin 0.04, Nepi 0.03, Propofol: 30  -Monitor, Replete to K>4 and Mg>2  -Diet    PROPHYLAXIS  -DVT: SCD, hold off on heparin drip while workup done for possible GI bleed   -GI- PPI qd    DISPO: CCU  DNR/DNI

## 2025-02-07 LAB
ADD ON TEST-SPECIMEN IN LAB: SIGNIFICANT CHANGE UP
ALBUMIN SERPL ELPH-MCNC: 2.1 G/DL — LOW (ref 3.3–5)
ALBUMIN SERPL ELPH-MCNC: 2.3 G/DL — LOW (ref 3.3–5)
ALBUMIN SERPL ELPH-MCNC: 2.4 G/DL — LOW (ref 3.3–5)
ALP SERPL-CCNC: 53 U/L — SIGNIFICANT CHANGE UP (ref 40–120)
ALP SERPL-CCNC: 56 U/L — SIGNIFICANT CHANGE UP (ref 40–120)
ALP SERPL-CCNC: 58 U/L — SIGNIFICANT CHANGE UP (ref 40–120)
ALT FLD-CCNC: 25 U/L — SIGNIFICANT CHANGE UP (ref 10–45)
ALT FLD-CCNC: 28 U/L — SIGNIFICANT CHANGE UP (ref 10–45)
ALT FLD-CCNC: 33 U/L — SIGNIFICANT CHANGE UP (ref 10–45)
ANION GAP SERPL CALC-SCNC: 10 MMOL/L — SIGNIFICANT CHANGE UP (ref 5–17)
ANION GAP SERPL CALC-SCNC: 13 MMOL/L — SIGNIFICANT CHANGE UP (ref 5–17)
ANION GAP SERPL CALC-SCNC: 14 MMOL/L — SIGNIFICANT CHANGE UP (ref 5–17)
APTT BLD: 29.7 SEC — SIGNIFICANT CHANGE UP (ref 24.5–35.6)
AST SERPL-CCNC: 43 U/L — HIGH (ref 10–40)
AST SERPL-CCNC: 48 U/L — HIGH (ref 10–40)
AST SERPL-CCNC: 56 U/L — HIGH (ref 10–40)
BASE EXCESS BLDA CALC-SCNC: -4.4 MMOL/L — LOW (ref -2–3)
BASE EXCESS BLDMV CALC-SCNC: -4.3 MMOL/L — SIGNIFICANT CHANGE UP
BASE EXCESS BLDMV CALC-SCNC: -4.4 MMOL/L — SIGNIFICANT CHANGE UP
BASOPHILS # BLD AUTO: 0 K/UL — SIGNIFICANT CHANGE UP (ref 0–0.2)
BASOPHILS # BLD AUTO: 0 K/UL — SIGNIFICANT CHANGE UP (ref 0–0.2)
BASOPHILS NFR BLD AUTO: 0 % — SIGNIFICANT CHANGE UP (ref 0–2)
BASOPHILS NFR BLD AUTO: 0 % — SIGNIFICANT CHANGE UP (ref 0–2)
BILIRUB SERPL-MCNC: 0.4 MG/DL — SIGNIFICANT CHANGE UP (ref 0.2–1.2)
BILIRUB SERPL-MCNC: 0.6 MG/DL — SIGNIFICANT CHANGE UP (ref 0.2–1.2)
BILIRUB SERPL-MCNC: 0.6 MG/DL — SIGNIFICANT CHANGE UP (ref 0.2–1.2)
BUN SERPL-MCNC: 28 MG/DL — HIGH (ref 7–23)
BUN SERPL-MCNC: 28 MG/DL — HIGH (ref 7–23)
BUN SERPL-MCNC: 31 MG/DL — HIGH (ref 7–23)
CALCIUM SERPL-MCNC: 7.2 MG/DL — LOW (ref 8.4–10.5)
CALCIUM SERPL-MCNC: 7.4 MG/DL — LOW (ref 8.4–10.5)
CALCIUM SERPL-MCNC: 7.6 MG/DL — LOW (ref 8.4–10.5)
CHLORIDE SERPL-SCNC: 100 MMOL/L — SIGNIFICANT CHANGE UP (ref 96–108)
CHLORIDE SERPL-SCNC: 102 MMOL/L — SIGNIFICANT CHANGE UP (ref 96–108)
CHLORIDE SERPL-SCNC: 102 MMOL/L — SIGNIFICANT CHANGE UP (ref 96–108)
CO2 BLDA-SCNC: 21 MMOL/L — SIGNIFICANT CHANGE UP (ref 19–24)
CO2 BLDMV-SCNC: 21 MMOL/L — SIGNIFICANT CHANGE UP
CO2 BLDMV-SCNC: 23 MMOL/L — SIGNIFICANT CHANGE UP
CO2 SERPL-SCNC: 20 MMOL/L — LOW (ref 22–31)
CO2 SERPL-SCNC: 20 MMOL/L — LOW (ref 22–31)
CO2 SERPL-SCNC: 21 MMOL/L — LOW (ref 22–31)
COHGB MFR BLDMV: 1.3 % — SIGNIFICANT CHANGE UP
COHGB MFR BLDMV: 1.8 % — SIGNIFICANT CHANGE UP
CREAT SERPL-MCNC: 2.03 MG/DL — HIGH (ref 0.5–1.3)
CREAT SERPL-MCNC: 2.05 MG/DL — HIGH (ref 0.5–1.3)
CREAT SERPL-MCNC: 2.09 MG/DL — HIGH (ref 0.5–1.3)
D DIMER BLD IA.RAPID-MCNC: 774 NG/ML DDU — HIGH
EGFR: 24 ML/MIN/1.73M2 — LOW
EGFR: 24 ML/MIN/1.73M2 — LOW
EGFR: 25 ML/MIN/1.73M2 — LOW
EOSINOPHIL # BLD AUTO: 0.79 K/UL — HIGH (ref 0–0.5)
EOSINOPHIL # BLD AUTO: 0.95 K/UL — HIGH (ref 0–0.5)
EOSINOPHIL NFR BLD AUTO: 4.4 % — SIGNIFICANT CHANGE UP (ref 0–6)
EOSINOPHIL NFR BLD AUTO: 4.5 % — SIGNIFICANT CHANGE UP (ref 0–6)
FIBRINOGEN PPP-MCNC: 317 MG/DL — SIGNIFICANT CHANGE UP (ref 200–445)
GAS PNL BLDA: SIGNIFICANT CHANGE UP
GAS PNL BLDMV: SIGNIFICANT CHANGE UP
GAS PNL BLDMV: SIGNIFICANT CHANGE UP
GLUCOSE SERPL-MCNC: 207 MG/DL — HIGH (ref 70–99)
GLUCOSE SERPL-MCNC: 207 MG/DL — HIGH (ref 70–99)
GLUCOSE SERPL-MCNC: 212 MG/DL — HIGH (ref 70–99)
GRAM STN FLD: ABNORMAL
GRAM STN FLD: ABNORMAL
HAPTOGLOB SERPL-MCNC: 124 MG/DL — SIGNIFICANT CHANGE UP (ref 34–200)
HCO3 BLDA-SCNC: 20 MMOL/L — LOW (ref 21–28)
HCO3 BLDMV-SCNC: 20 MMOL/L — SIGNIFICANT CHANGE UP
HCO3 BLDMV-SCNC: 22 MMOL/L — SIGNIFICANT CHANGE UP
HCT VFR BLD CALC: 21.7 % — LOW (ref 34.5–45)
HCT VFR BLD CALC: 22.2 % — LOW (ref 34.5–45)
HCT VFR BLD CALC: 23.3 % — LOW (ref 34.5–45)
HCT VFR BLD CALC: 24.2 % — LOW (ref 34.5–45)
HGB BLD-MCNC: 7 G/DL — CRITICAL LOW (ref 11.5–15.5)
HGB BLD-MCNC: 7.1 G/DL — LOW (ref 11.5–15.5)
HGB BLD-MCNC: 7.9 G/DL — LOW (ref 11.5–15.5)
HGB BLD-MCNC: 7.9 G/DL — LOW (ref 11.5–15.5)
HGB FLD-MCNC: 11 G/DL — LOW (ref 11.7–16.1)
HGB FLD-MCNC: 8.2 G/DL — LOW (ref 11.7–16.1)
INR BLD: 1.18 — HIGH (ref 0.85–1.16)
LACTATE SERPL-SCNC: 0.7 MMOL/L — SIGNIFICANT CHANGE UP (ref 0.5–2)
LACTATE SERPL-SCNC: 0.7 MMOL/L — SIGNIFICANT CHANGE UP (ref 0.5–2)
LDH SERPL L TO P-CCNC: 468 U/L — HIGH (ref 50–242)
LYMPHOCYTES # BLD AUTO: 0.95 K/UL — LOW (ref 1–3.3)
LYMPHOCYTES # BLD AUTO: 1.72 K/UL — SIGNIFICANT CHANGE UP (ref 1–3.3)
LYMPHOCYTES # BLD AUTO: 5.3 % — LOW (ref 13–44)
LYMPHOCYTES # BLD AUTO: 8.1 % — LOW (ref 13–44)
MAGNESIUM SERPL-MCNC: 2.1 MG/DL — SIGNIFICANT CHANGE UP (ref 1.6–2.6)
MCHC RBC-ENTMCNC: 29.2 PG — SIGNIFICANT CHANGE UP (ref 27–34)
MCHC RBC-ENTMCNC: 29.3 PG — SIGNIFICANT CHANGE UP (ref 27–34)
MCHC RBC-ENTMCNC: 29.4 PG — SIGNIFICANT CHANGE UP (ref 27–34)
MCHC RBC-ENTMCNC: 30.6 PG — SIGNIFICANT CHANGE UP (ref 27–34)
MCHC RBC-ENTMCNC: 32 G/DL — SIGNIFICANT CHANGE UP (ref 32–36)
MCHC RBC-ENTMCNC: 32.3 G/DL — SIGNIFICANT CHANGE UP (ref 32–36)
MCHC RBC-ENTMCNC: 32.6 G/DL — SIGNIFICANT CHANGE UP (ref 32–36)
MCHC RBC-ENTMCNC: 33.9 G/DL — SIGNIFICANT CHANGE UP (ref 32–36)
MCV RBC AUTO: 89.6 FL — SIGNIFICANT CHANGE UP (ref 80–100)
MCV RBC AUTO: 90.3 FL — SIGNIFICANT CHANGE UP (ref 80–100)
MCV RBC AUTO: 91.2 FL — SIGNIFICANT CHANGE UP (ref 80–100)
MCV RBC AUTO: 91.4 FL — SIGNIFICANT CHANGE UP (ref 80–100)
METHGB MFR BLDMV: 0 % — SIGNIFICANT CHANGE UP
METHGB MFR BLDMV: 0.3 % — SIGNIFICANT CHANGE UP
MONOCYTES # BLD AUTO: 1.1 K/UL — HIGH (ref 0–0.9)
MONOCYTES # BLD AUTO: 1.52 K/UL — HIGH (ref 0–0.9)
MONOCYTES NFR BLD AUTO: 6.1 % — SIGNIFICANT CHANGE UP (ref 2–14)
MONOCYTES NFR BLD AUTO: 7.2 % — SIGNIFICANT CHANGE UP (ref 2–14)
NEUTROPHILS # BLD AUTO: 15.16 K/UL — HIGH (ref 1.8–7.4)
NEUTROPHILS # BLD AUTO: 16.99 K/UL — HIGH (ref 1.8–7.4)
NEUTROPHILS NFR BLD AUTO: 80.2 % — HIGH (ref 43–77)
NEUTROPHILS NFR BLD AUTO: 84.2 % — HIGH (ref 43–77)
NRBC # BLD: 3 /100 WBCS — HIGH (ref 0–0)
NRBC # BLD: 7 /100 WBCS — HIGH (ref 0–0)
NRBC # BLD: SIGNIFICANT CHANGE UP /100 WBCS (ref 0–0)
NRBC # BLD: SIGNIFICANT CHANGE UP /100 WBCS (ref 0–0)
NRBC BLD-RTO: 3 /100 WBCS — HIGH (ref 0–0)
NRBC BLD-RTO: 7 /100 WBCS — HIGH (ref 0–0)
NRBC BLD-RTO: SIGNIFICANT CHANGE UP /100 WBCS (ref 0–0)
NRBC BLD-RTO: SIGNIFICANT CHANGE UP /100 WBCS (ref 0–0)
O2 CT VFR BLD CALC: 40 MMHG — SIGNIFICANT CHANGE UP
O2 CT VFR BLD CALC: 46 MMHG — SIGNIFICANT CHANGE UP
O2 CT VFR BLDMV CALC: 11 ML/DL — SIGNIFICANT CHANGE UP
O2 CT VFR BLDMV CALC: 7 ML/DL — SIGNIFICANT CHANGE UP
OXYHGB MFR BLDMV: 61 % — SIGNIFICANT CHANGE UP
OXYHGB MFR BLDMV: 69.4 % — SIGNIFICANT CHANGE UP
PCO2 BLDA: 35 MMHG — SIGNIFICANT CHANGE UP (ref 32–45)
PCO2 BLDMV: 32 MMHG — SIGNIFICANT CHANGE UP
PCO2 BLDMV: 42 MMHG — SIGNIFICANT CHANGE UP
PH BLDA: 7.37 — SIGNIFICANT CHANGE UP (ref 7.35–7.45)
PH BLDMV: 7.32 — SIGNIFICANT CHANGE UP
PH BLDMV: 7.4 — SIGNIFICANT CHANGE UP
PHOSPHATE SERPL-MCNC: 4.5 MG/DL — SIGNIFICANT CHANGE UP (ref 2.5–4.5)
PLATELET # BLD AUTO: 38 K/UL — LOW (ref 150–400)
PLATELET # BLD AUTO: 43 K/UL — LOW (ref 150–400)
PLATELET # BLD AUTO: 54 K/UL — LOW (ref 150–400)
PLATELET # BLD AUTO: 64 K/UL — LOW (ref 150–400)
PO2 BLDA: 156 MMHG — HIGH (ref 83–108)
POTASSIUM SERPL-MCNC: 3.5 MMOL/L — SIGNIFICANT CHANGE UP (ref 3.5–5.3)
POTASSIUM SERPL-MCNC: 4 MMOL/L — SIGNIFICANT CHANGE UP (ref 3.5–5.3)
POTASSIUM SERPL-MCNC: 4.1 MMOL/L — SIGNIFICANT CHANGE UP (ref 3.5–5.3)
POTASSIUM SERPL-SCNC: 3.5 MMOL/L — SIGNIFICANT CHANGE UP (ref 3.5–5.3)
POTASSIUM SERPL-SCNC: 4 MMOL/L — SIGNIFICANT CHANGE UP (ref 3.5–5.3)
POTASSIUM SERPL-SCNC: 4.1 MMOL/L — SIGNIFICANT CHANGE UP (ref 3.5–5.3)
PROT SERPL-MCNC: 3.8 G/DL — LOW (ref 6–8.3)
PROT SERPL-MCNC: 3.8 G/DL — LOW (ref 6–8.3)
PROT SERPL-MCNC: 4.2 G/DL — LOW (ref 6–8.3)
PROTHROM AB SERPL-ACNC: 13.5 SEC — HIGH (ref 9.9–13.4)
RBC # BLD: 2.38 M/UL — LOW (ref 3.8–5.2)
RBC # BLD: 2.43 M/UL — LOW (ref 3.8–5.2)
RBC # BLD: 2.58 M/UL — LOW (ref 3.8–5.2)
RBC # BLD: 2.7 M/UL — LOW (ref 3.8–5.2)
RBC # FLD: 14.9 % — HIGH (ref 10.3–14.5)
RBC # FLD: 15.2 % — HIGH (ref 10.3–14.5)
RBC # FLD: 15.3 % — HIGH (ref 10.3–14.5)
RBC # FLD: 15.6 % — HIGH (ref 10.3–14.5)
SAO2 % BLDA: 98.8 % — HIGH (ref 94–98)
SAO2 % BLDMV: 62 % — SIGNIFICANT CHANGE UP
SAO2 % BLDMV: 70.7 % — SIGNIFICANT CHANGE UP
SODIUM SERPL-SCNC: 133 MMOL/L — LOW (ref 135–145)
SODIUM SERPL-SCNC: 134 MMOL/L — LOW (ref 135–145)
SODIUM SERPL-SCNC: 135 MMOL/L — SIGNIFICANT CHANGE UP (ref 135–145)
SPECIMEN SOURCE: SIGNIFICANT CHANGE UP
SPECIMEN SOURCE: SIGNIFICANT CHANGE UP
VANCOMYCIN TROUGH SERPL-MCNC: 16.5 UG/ML — SIGNIFICANT CHANGE UP (ref 10–20)
WBC # BLD: 16.25 K/UL — HIGH (ref 3.8–10.5)
WBC # BLD: 18 K/UL — HIGH (ref 3.8–10.5)
WBC # BLD: 21.18 K/UL — HIGH (ref 3.8–10.5)
WBC # BLD: 26.09 K/UL — HIGH (ref 3.8–10.5)
WBC # FLD AUTO: 16.25 K/UL — HIGH (ref 3.8–10.5)
WBC # FLD AUTO: 18 K/UL — HIGH (ref 3.8–10.5)
WBC # FLD AUTO: 21.18 K/UL — HIGH (ref 3.8–10.5)
WBC # FLD AUTO: 26.09 K/UL — HIGH (ref 3.8–10.5)

## 2025-02-07 PROCEDURE — 99232 SBSQ HOSP IP/OBS MODERATE 35: CPT

## 2025-02-07 PROCEDURE — 71045 X-RAY EXAM CHEST 1 VIEW: CPT | Mod: 26

## 2025-02-07 PROCEDURE — G0545: CPT

## 2025-02-07 PROCEDURE — 93010 ELECTROCARDIOGRAM REPORT: CPT

## 2025-02-07 PROCEDURE — 99291 CRITICAL CARE FIRST HOUR: CPT

## 2025-02-07 PROCEDURE — 74176 CT ABD & PELVIS W/O CONTRAST: CPT | Mod: 26

## 2025-02-07 PROCEDURE — 73700 CT LOWER EXTREMITY W/O DYE: CPT | Mod: 26,LT

## 2025-02-07 PROCEDURE — 99292 CRITICAL CARE ADDL 30 MIN: CPT

## 2025-02-07 RX ORDER — SODIUM CHLORIDE 9 G/ML
1000 INJECTION, SOLUTION INTRAVENOUS
Refills: 0 | Status: DISCONTINUED | OUTPATIENT
Start: 2025-02-07 | End: 2025-02-10

## 2025-02-07 RX ORDER — FLUTICASONE PROPIONATE AND SALMETEROL 113; 14 UG/1; UG/1
1 POWDER, METERED RESPIRATORY (INHALATION)
Refills: 0 | Status: DISCONTINUED | OUTPATIENT
Start: 2025-02-07 | End: 2025-02-14

## 2025-02-07 RX ORDER — LEVOTHYROXINE SODIUM 25 UG/1
75 TABLET ORAL EVERY 24 HOURS
Refills: 0 | Status: DISCONTINUED | OUTPATIENT
Start: 2025-02-08 | End: 2025-02-14

## 2025-02-07 RX ORDER — MONTELUKAST SODIUM 5 MG/1
10 TABLET, CHEWABLE ORAL EVERY 24 HOURS
Refills: 0 | Status: DISCONTINUED | OUTPATIENT
Start: 2025-02-08 | End: 2025-02-14

## 2025-02-07 RX ORDER — ATORVASTATIN CALCIUM 80 MG/1
80 TABLET, FILM COATED ORAL AT BEDTIME
Refills: 0 | Status: DISCONTINUED | OUTPATIENT
Start: 2025-02-07 | End: 2025-02-14

## 2025-02-07 RX ORDER — VANCOMYCIN HYDROCHLORIDE 50 MG/ML
1250 KIT ORAL ONCE
Refills: 0 | Status: COMPLETED | OUTPATIENT
Start: 2025-02-07 | End: 2025-02-07

## 2025-02-07 RX ORDER — INSULIN GLARGINE-YFGN 100 [IU]/ML
30 INJECTION, SOLUTION SUBCUTANEOUS AT BEDTIME
Refills: 0 | Status: DISCONTINUED | OUTPATIENT
Start: 2025-02-07 | End: 2025-02-14

## 2025-02-07 RX ORDER — POTASSIUM CHLORIDE 750 MG/1
40 TABLET, EXTENDED RELEASE ORAL ONCE
Refills: 0 | Status: COMPLETED | OUTPATIENT
Start: 2025-02-07 | End: 2025-02-07

## 2025-02-07 RX ADMIN — MONTELUKAST SODIUM 10 MILLIGRAM(S): 5 TABLET, CHEWABLE ORAL at 13:56

## 2025-02-07 RX ADMIN — Medication 3 MILLILITER(S): at 05:21

## 2025-02-07 RX ADMIN — Medication 2: at 05:54

## 2025-02-07 RX ADMIN — IPRATROPIUM BROMIDE AND ALBUTEROL SULFATE 3 MILLILITER(S): .5; 2.5 SOLUTION RESPIRATORY (INHALATION) at 05:04

## 2025-02-07 RX ADMIN — Medication 75 MILLIGRAM(S): at 11:34

## 2025-02-07 RX ADMIN — PANTOPRAZOLE 40 MILLIGRAM(S): 20 TABLET, DELAYED RELEASE ORAL at 05:04

## 2025-02-07 RX ADMIN — ASPIRIN 81 MILLIGRAM(S): 81 TABLET, COATED ORAL at 11:34

## 2025-02-07 RX ADMIN — Medication 6 UNIT(S)/MIN: at 05:50

## 2025-02-07 RX ADMIN — LEVOTHYROXINE SODIUM 60 MICROGRAM(S): 25 TABLET ORAL at 05:53

## 2025-02-07 RX ADMIN — Medication 3 MILLILITER(S): at 14:30

## 2025-02-07 RX ADMIN — ANTISEPTIC SURGICAL SCRUB 15 MILLILITER(S): 0.04 SOLUTION TOPICAL at 05:05

## 2025-02-07 RX ADMIN — ANTISEPTIC SURGICAL SCRUB 1 APPLICATION(S): 0.04 SOLUTION TOPICAL at 05:05

## 2025-02-07 RX ADMIN — Medication 3 MILLILITER(S): at 21:16

## 2025-02-07 RX ADMIN — Medication 2: at 12:46

## 2025-02-07 RX ADMIN — INSULIN GLARGINE-YFGN 30 UNIT(S): 100 INJECTION, SOLUTION SUBCUTANEOUS at 22:50

## 2025-02-07 RX ADMIN — POTASSIUM CHLORIDE 40 MILLIEQUIVALENT(S): 750 TABLET, EXTENDED RELEASE ORAL at 21:15

## 2025-02-07 RX ADMIN — FENTANYL CITRATE 4.06 MICROGRAM(S)/KG/HR: 50 INJECTION INTRAMUSCULAR; INTRAVENOUS at 00:18

## 2025-02-07 RX ADMIN — FENTANYL CITRATE 4.06 MICROGRAM(S)/KG/HR: 50 INJECTION INTRAMUSCULAR; INTRAVENOUS at 00:54

## 2025-02-07 RX ADMIN — IPRATROPIUM BROMIDE AND ALBUTEROL SULFATE 3 MILLILITER(S): .5; 2.5 SOLUTION RESPIRATORY (INHALATION) at 22:57

## 2025-02-07 RX ADMIN — IPRATROPIUM BROMIDE AND ALBUTEROL SULFATE 3 MILLILITER(S): .5; 2.5 SOLUTION RESPIRATORY (INHALATION) at 14:18

## 2025-02-07 RX ADMIN — PANTOPRAZOLE 40 MILLIGRAM(S): 20 TABLET, DELAYED RELEASE ORAL at 19:25

## 2025-02-07 RX ADMIN — MEROPENEM 100 MILLIGRAM(S): 500 INJECTION INTRAVENOUS at 19:26

## 2025-02-07 RX ADMIN — IPRATROPIUM BROMIDE AND ALBUTEROL SULFATE 3 MILLILITER(S): .5; 2.5 SOLUTION RESPIRATORY (INHALATION) at 19:26

## 2025-02-07 RX ADMIN — Medication 1: at 19:44

## 2025-02-07 RX ADMIN — Medication 1: at 23:23

## 2025-02-07 RX ADMIN — VANCOMYCIN HYDROCHLORIDE 166.67 MILLIGRAM(S): KIT at 07:20

## 2025-02-07 RX ADMIN — FLUTICASONE PROPIONATE AND SALMETEROL 1 DOSE(S): 113; 14 POWDER, METERED RESPIRATORY (INHALATION) at 19:26

## 2025-02-07 RX ADMIN — MEROPENEM 100 MILLIGRAM(S): 500 INJECTION INTRAVENOUS at 05:08

## 2025-02-07 RX ADMIN — ATORVASTATIN CALCIUM 80 MILLIGRAM(S): 80 TABLET, FILM COATED ORAL at 21:15

## 2025-02-07 NOTE — PROGRESS NOTE ADULT - SUBJECTIVE AND OBJECTIVE BOX
INFECTIOUS DISEASES CONSULT FOLLOW-UP NOTE    INTERVAL HPI/OVERNIGHT EVENTS:  Tm 100  patient was exbutated, can follow commands, less pressor requirement, still wiht IABP   denied CP, abdominal pain       ROS:   limited      ANTIBIOTICS/RELEVANT:    MEDICATIONS  (STANDING):  albuterol/ipratropium for Nebulization 3 milliLiter(s) Nebulizer every 6 hours  aspirin  chewable 81 milliGRAM(s) Enteral Tube daily  atorvastatin 80 milliGRAM(s) Oral at bedtime  chlorhexidine 2% Cloths 1 Application(s) Topical <User Schedule>  clopidogrel Tablet 75 milliGRAM(s) Oral daily  dextrose 5%. 1000 milliLiter(s) (100 mL/Hr) IV Continuous <Continuous>  dextrose 50% Injectable 12.5 Gram(s) IV Push once  dextrose 50% Injectable 25 Gram(s) IV Push once  glucagon  Injectable 1 milliGRAM(s) IntraMuscular once  insulin glargine Injectable (LANTUS) 26 Unit(s) SubCutaneous at bedtime  insulin lispro (ADMELOG) corrective regimen sliding scale   SubCutaneous every 6 hours  levothyroxine Injectable 60 MICROGram(s) IV Push <User Schedule>  meropenem  IVPB 1000 milliGRAM(s) IV Intermittent every 12 hours  montelukast 10 milliGRAM(s) Oral every 24 hours  norepinephrine Infusion 0.05 MICROgram(s)/kG/Min (3.8 mL/Hr) IV Continuous <Continuous>  pantoprazole  Injectable 40 milliGRAM(s) IV Push every 12 hours  sodium chloride 0.9% lock flush 3 milliLiter(s) IV Push every 8 hours    MEDICATIONS  (PRN):  albuterol    90 MICROgram(s) HFA Inhaler 2 Puff(s) Inhalation every 4 hours PRN Bronchospasm  dextrose Oral Gel 15 Gram(s) Oral once PRN Blood Glucose LESS THAN 70 milliGRAM(s)/deciliter        Vital Signs Last 24 Hrs  T(C): 37.3 (07 Feb 2025 11:00), Max: 37.8 (06 Feb 2025 14:00)  T(F): 99.2 (07 Feb 2025 11:00), Max: 100 (06 Feb 2025 14:00)  HR: 74 (07 Feb 2025 11:00) (57 - 96)  BP: 103/54 (07 Feb 2025 11:00) (103/54 - 153/64)  BP(mean): 75 (07 Feb 2025 11:00) (67 - 96)  RR: 12 (07 Feb 2025 11:00) (12 - 23)  SpO2: 100% (07 Feb 2025 11:00) (92% - 100%)    Parameters below as of 07 Feb 2025 11:00  Patient On (Oxygen Delivery Method): nasal cannula, high flow  O2 Flow (L/min): 40  O2 Concentration (%): 40    02-06-25 @ 07:01  -  02-07-25 @ 07:00  --------------------------------------------------------  IN: 1401.2 mL / OUT: 542 mL / NET: 859.2 mL    02-07-25 @ 07:01  -  02-07-25 @ 11:58  --------------------------------------------------------  IN: 370 mL / OUT: 150 mL / NET: 220 mL      PHYSICAL EXAM:  Constitutional: awake, NAD  Eyes: the sclera and conjunctiva were normal.   ENT: the ears and nose were normal in appearance. HFNC  Neck: the appearance of the neck was normal and the neck was supple.   Pulmonary: no respiratory distress and lungs were clear to auscultation bilaterally.   Heart: heart rate was normal and rhythm regular, normal S1 and S2  Abdomen: normal bowel sounds, soft, non-tender        LABS:                        7.0    21.18 )-----------( 54       ( 07 Feb 2025 05:05 )             21.7     02-07    135  |  102  |  28[H]  ----------------------------<  207[H]  4.0   |  20[L]  |  2.09[H]    Ca    7.2[L]      07 Feb 2025 05:05  Phos  4.5     02-07  Mg     2.1     02-07    TPro  3.8[L]  /  Alb  2.3[L]  /  TBili  0.4  /  DBili  x   /  AST  48[H]  /  ALT  28  /  AlkPhos  53  02-07    PT/INR - ( 06 Feb 2025 05:20 )   PT: 14.0 sec;   INR: 1.22          PTT - ( 06 Feb 2025 05:20 )  PTT:29.5 sec  Urinalysis Basic - ( 07 Feb 2025 05:05 )    Color: x / Appearance: x / SG: x / pH: x  Gluc: 207 mg/dL / Ketone: x  / Bili: x / Urobili: x   Blood: x / Protein: x / Nitrite: x   Leuk Esterase: x / RBC: x / WBC x   Sq Epi: x / Non Sq Epi: x / Bacteria: x        MICROBIOLOGY:  2/5 BCx p  2/5 MRSA/MSSA PCR both positive  2/5 UCx neg  2/2 RVP neg   1/31 BCx neg    RADIOLOGY & ADDITIONAL STUDIES:  CXR 2/6/25  IMPRESSION:  Endotracheal tube in satisfactory position approximately 2.3 cm proximal   to pablo. Aortic balloon at AP window. [place] catheter tip at distal right   pulmonary artery. Nasogastric tube courses into stomach. Hypoinflation.   No consolidation pleural effusion or pneumothorax.

## 2025-02-07 NOTE — CONSULT NOTE ADULT - NS ATTEND AMEND GEN_ALL_CORE FT
Ms. Rosario Petersen is a 79F w/ HTN, HLD, asthma, CKD, GERD, hypothyroidism, CHF, and CAD s/p PCI (11/2023), admitted for CP, SOB and NSTEMI s/p diagnostic [facility] demonstrating 3v CAD and newly reduced EF of 25%, transferred to [facility], but deemed not a CABG candidate. Her hospital course was complicated by shock, requiring pressors and IABP via R femoral access, pneumonia, L thigh hematoma and anemia. She was on DAPT and AC this admission. Vascular surgery was consulted on 2/7/25 for the anemia (HGB 7.1/22.2 at time of consult) and thigh hematoma, which was seen on non-contrast LLE CT Scan ("large hematoma centered within the adductor musculature of the proximal left thigh, measuring 11.8 x 10.8 cm transaxially"). CTA triple phase showed no evidence of active obvious arterial extravasation or venous pooling on delayed images. Her PLT count was also 38. She otherwise feels fine with minimal L thigh pain/tenderness. Says not bothering her much. Compartments soft. Motor and sensation grossly intact. Some ecchymosis.       Today 2/8/25, she was transfused and her labs look better (WBC 14.3, H/H 8.2/24.2, PLT 80). She is off pressors, while still on IABP. LLE remains soft. Minimal tenderness/pain.       ASSESSMENT  - NSTEMI s/p diagnostic C demonstrating 3v CAD and newly reduced EF of 25%, transferred to [facility], but deemed not a CABG candidate. Her hospital course was complicated by shock, requiring pressors and IABP via R femoral access, pneumonia, L thigh hematoma and anemia  - Spontaneous large L thigh hematoma w/ anemia likely 2/2 triple therapy --> no active obvious arterial extravasation or venous pooling on CTA. Would opt for conservative management at this time      PLAN & RECOMMENDATIONS  - No acute vascular intervention  - Trend CBC and transfuse PRN; Would wait until HGB stabilizes before resuming DAPT and/or AC if possible. If requiring AC would use heparin drip since can be titrated  - Ace-wrap compression from foot to L groin  - Watch for signs of GIB as well  - Neuro vascular and thigh compartment checks  - C/w care per CCU/cardiology      Thank you,    Benito Gil MD   of Vascular Surgery  [facility] at [address] [place] [address]  Office: [phone]; Fax: [phone]  [email or web address]

## 2025-02-07 NOTE — PROGRESS NOTE ADULT - ATTENDING COMMENTS
Pt is a 80 y/o woman c HTN, HLP, DM, Hypothyroidism, GERD, asthma, CAD c 3VD and new acute systolic HF. 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 Pt was scheduled for cardiac cath as Lactate was rising and pt became hypotensive.    Pt noted with 2g Hgb drop and was getting PRBCs and went to the cath lab and had IABP placed for BP support and presumptive cardiogenic shock. [place] also placed to measure CO/CI.    Pt with near code with 40/20 BP and given 1mg Epi c/b VT s/p def and started on amiodarone, then stopped.    afeb, 86, 89/36 ABP 88 MAP: 70, 100%  Extubated    Jacqueline CI 2.1 --> 4.1  TD CI 1.5 --> 3.5    WBC 40s --> 35 --. 21  Hgb 9.7 (after 1 Unit PRBC) --> 8 --> 7  Plt 54    Na 134  K 4  Cr 1.99 --> 2.09  Glc 332 --> 207    Lac 9 --> 1.6  BNP 12K    ECG: sinus rhonda mild ST depressions & T wave inversions  CXR: improving, ET and pAC in place    - pt with acute systolic HF and shock, septic/ vasodilatory shock on IABP, Levo/Vaso weaned off  - f/u cx in pt, had been on intermittent abx would f/u cx  - abx changed per ID, much improved  - CI somewhat stable, doubt cardiac etiology for shock although pt has underlying severe CAD  - pt with Hgb drop, GI bleed? anemia prob made ischemia worse which complicated shock, Hgb dropping again thigh hematoma  - consider CT scan for thigh and infectious etiology of sepsis  - f/u Cr, pt prob c non oliguric ATN  - family updated about condition and has made pt DNR/DNI   - pt now extubated and conversant, cont to f/u resp status and GOC

## 2025-02-07 NOTE — PROGRESS NOTE ADULT - SUBJECTIVE AND OBJECTIVE BOX
Patient is a 79y old  Female who presents with a chief complaint of CAD (07 Feb 2025 11:58)    OVERNIGHT EVENTS: US LLE with small soft tissue swelling, PA cath not flushing and was readjusted, CXR shows swan in place, vanc 1250 x 1 for trough < 20, fentanyl off    SUBJECTIVE: Patient responding to verbal/physical stimuli. Improved mental status post-extubation. Answers questions non-verbally and following commands. Fatigued.     ROS: otherwise negative      T(C): 37.4 (02-07-25 @ 12:00), Max: 37.8 (02-06-25 @ 14:00)  HR: 88 (02-07-25 @ 12:00) (61 - 96)  BP: 95/43 (02-07-25 @ 12:00) (95/43 - 153/64)  RR: 13 (02-07-25 @ 12:00) (12 - 23)  SpO2: 100% (02-07-25 @ 12:00) (92% - 100%)  Wt(kg): --Vital Signs Last 24 Hrs  T(C): 37.4 (07 Feb 2025 12:00), Max: 37.8 (06 Feb 2025 14:00)  T(F): 99.3 (07 Feb 2025 12:00), Max: 100 (06 Feb 2025 14:00)  HR: 88 (07 Feb 2025 12:00) (61 - 96)  BP: 95/43 (07 Feb 2025 12:00) (95/43 - 153/64)  BP(mean): 62 (07 Feb 2025 12:00) (62 - 96)  RR: 13 (07 Feb 2025 12:00) (12 - 23)  SpO2: 100% (07 Feb 2025 12:00) (92% - 100%)    Parameters below as of 07 Feb 2025 12:00  Patient On (Oxygen Delivery Method): nasal cannula, high flow  O2 Flow (L/min): 40  O2 Concentration (%): 40    PHYSICAL EXAM:  HEENT:  pupils 3 mm, PERRL    Neck: Supple, - JVD; Carotid Bruit   Cardiovascular: Normal S1 S2,   Respiratory: anterior lung fields with bilateral rhonchi  Gastrointestinal:  Soft, Non-tender, + BS	  Skin: No rashes, No ecchymoses, No cyanosis  Extremities: Normal range of motion, No clubbing, anasarca  Vascular: Peripheral pulses palpable 2+ bilaterally  Neurologic: unable to assess orientation but patient is alert and responding to questions non-verbally    LABS:                        7.0    21.18 )-----------( 54       ( 07 Feb 2025 05:05 )             21.7     02-07    135  |  102  |  28[H]  ----------------------------<  207[H]  4.0   |  20[L]  |  2.09[H]    Ca    7.2[L]      07 Feb 2025 05:05  Phos  4.5     02-07  Mg     2.1     02-07    TPro  3.8[L]  /  Alb  2.3[L]  /  TBili  0.4  /  DBili  x   /  AST  48[H]  /  ALT  28  /  AlkPhos  53  02-07    Iron 132, TIBC 223, %Sat 59, Ferritin 1860/ 02-05-25 @ 13:39    PT/INR - ( 06 Feb 2025 05:20 )   PT: 14.0 sec;   INR: 1.22          PTT - ( 06 Feb 2025 05:20 )  PTT:29.5 sec  Urinalysis Basic - ( 07 Feb 2025 05:05 )    Color: x / Appearance: x / SG: x / pH: x  Gluc: 207 mg/dL / Ketone: x  / Bili: x / Urobili: x   Blood: x / Protein: x / Nitrite: x   Leuk Esterase: x / RBC: x / WBC x   Sq Epi: x / Non Sq Epi: x / Bacteria: x      CAPILLARY BLOOD GLUCOSE      POCT Blood Glucose.: 207 mg/dL (07 Feb 2025 12:10)  POCT Blood Glucose.: 221 mg/dL (07 Feb 2025 05:38)  POCT Blood Glucose.: 215 mg/dL (06 Feb 2025 23:34)  POCT Blood Glucose.: 261 mg/dL (06 Feb 2025 16:27)      ABG - ( 07 Feb 2025 05:05 )  pH, Arterial: 7.35  pH, Blood: x     /  pCO2: 37    /  pO2: 162   / HCO3: 20    / Base Excess: -4.7  /  SaO2: 99.5              Urinalysis Basic - ( 07 Feb 2025 05:05 )    Color: x / Appearance: x / SG: x / pH: x  Gluc: 207 mg/dL / Ketone: x  / Bili: x / Urobili: x   Blood: x / Protein: x / Nitrite: x   Leuk Esterase: x / RBC: x / WBC x   Sq Epi: x / Non Sq Epi: x / Bacteria: x        MEDICATIONS  (STANDING):  albuterol/ipratropium for Nebulization 3 milliLiter(s) Nebulizer every 6 hours  atorvastatin 80 milliGRAM(s) Oral at bedtime  chlorhexidine 2% Cloths 1 Application(s) Topical <User Schedule>  dextrose 5%. 1000 milliLiter(s) (100 mL/Hr) IV Continuous <Continuous>  dextrose 50% Injectable 12.5 Gram(s) IV Push once  dextrose 50% Injectable 25 Gram(s) IV Push once  glucagon  Injectable 1 milliGRAM(s) IntraMuscular once  insulin glargine Injectable (LANTUS) 30 Unit(s) SubCutaneous at bedtime  insulin lispro (ADMELOG) corrective regimen sliding scale   SubCutaneous every 6 hours  levothyroxine Injectable 60 MICROGram(s) IV Push <User Schedule>  meropenem  IVPB 1000 milliGRAM(s) IV Intermittent every 12 hours  montelukast 10 milliGRAM(s) Oral every 24 hours  norepinephrine Infusion 0.05 MICROgram(s)/kG/Min (3.8 mL/Hr) IV Continuous <Continuous>  pantoprazole  Injectable 40 milliGRAM(s) IV Push every 12 hours  sodium chloride 0.9% lock flush 3 milliLiter(s) IV Push every 8 hours    MEDICATIONS  (PRN):  albuterol    90 MICROgram(s) HFA Inhaler 2 Puff(s) Inhalation every 4 hours PRN Bronchospasm  dextrose Oral Gel 15 Gram(s) Oral once PRN Blood Glucose LESS THAN 70 milliGRAM(s)/deciliter      RADIOLOGY & ADDITIONAL TESTS: Reviewed

## 2025-02-07 NOTE — PROGRESS NOTE ADULT - ASSESSMENT
79F h/o 3v CAD s/p PCI, CHF, T2DM, HTN, HLD initially p/w CP and SOB at [facility] on 1/24, transferred to [facility] on 1/30 for NSTEMI and 3v CAD management.  Course c/b KEN on CKD, recurrent hypotension, leukocytosis, now in cardiogenic/septic shock and AHRF s/p intubation and IABP.  Septic shock/leukocytosis maybe due to pneumonia although not much secretion per RN.  No diarrhea and UCx neg, no abdominal pain.  Overall improving on empiric maria luisa/vanco.      - cont meropenem 1g IV q12h (CrCl >25, if CrCl <25 then reduce to 500mg q12h)  - vanco dose by level given KEN.  Check level in 24h.  When level <20, then give vanco 1250mg x 1  - f/u BCx  - CT c/a/p when patient stable    Team 2 will follow you.  Case d/w primary team.    Anna Aguayo MD, MS  Infectious Disease attending  office phone [phone]  For any questions during evening/weekend/holiday, please page ID on call

## 2025-02-07 NOTE — CONSULT NOTE ADULT - SUBJECTIVE AND OBJECTIVE BOX
Attending:  Dr. Gil    HPI:  78 yo female with PMH of HTN, HLD, Type II DM, triple vessel CAD s/p 3 stents at [facility] 11/2023, CHF, asthma, GERD, and hypothyroidism who presented to [facility] with CP and SOB on 1/24. She was having fever, chills, a productive cough and congestion for 2-3 days prior to the chest pain. She states the pain was constant and radiated to her back/left arm with associated palpitations, SOB, orthopnea and dizziness. She felt the chest pain was similar to when she had the stents prompting her to present to the ER on 1/24.In ED she was found to be tachycardic and tachypneic. Her labs were significant for elevated BUN/Cr, potassium, lactate and elevated troponin. CXR showed pulmonary congestion vs. infiltrates. She was placed on BiPAP for respiratory distress and loaded with ASA/Plavix and placed on a heparin gtt for NSTEMI. Her nasal swab was + for MRSA and she recieved CTX 1g daily. She is s/p cardiac cath showing 3V CAD and a reduced EF of 25%. She was transferred to [facility] under the service of Dr. Hurst for further workup and management.    She denies any history of CVA, she states she has a hx of asthma, she also states she had b/l vein stripping to both of her legs in the past. She denies any past surgeries or traumas to the chest.      (30 Jan 2025 14:37)    *********VASCULAR SURGERY  78yo F with pmhx HTN, HLD, T2DM, 3v CAD (s/p PCI 11/2023), CHF, asthma, CKD3, GERD, and hypothyroidism presented to [facility] with CP/SOB found to have NSTEMI, s/p diagnostic LHC demonstrating 3v CAD and newly reduced EF of 25%, transferred to [facility] CTS but deemed not candidate so transferred to cardiology and was pending potential percutaneous revascularization, course c/b anemia requiring PRBC transfusions and cardiogenic/septic shock requiring intubation (now since extubated and weaned to hi-beni), pressors and IABP placement, currently in CCU. Heart Failure team following given newly reduced EF and acute decompensation. ID following for septic shock and patient being empirically treated for pneumonia. Vascular surgery consulted for L thigh hematoma noted on CT LLE noncon.      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 1000 milliLiter(s) (100 mL/Hr) IV Continuous <Continuous>  dextrose 50% Injectable 12.5 Gram(s) IV Push once  dextrose 50% Injectable 25 Gram(s) IV Push once  fluticasone propionate/ salmeterol 250-50 MICROgram(s) Diskus 1 Dose(s) Inhalation two times a day  glucagon  Injectable 1 milliGRAM(s) IntraMuscular once  insulin glargine Injectable (LANTUS) 30 Unit(s) SubCutaneous at bedtime  insulin lispro (ADMELOG) corrective regimen sliding scale   SubCutaneous every 6 hours  meropenem  IVPB 1000 milliGRAM(s) IV Intermittent every 12 hours  norepinephrine Infusion 0.05 MICROgram(s)/kG/Min (3.8 mL/Hr) IV Continuous <Continuous>  pantoprazole  Injectable 40 milliGRAM(s) IV Push every 12 hours  sodium chloride 0.9% lock flush 3 milliLiter(s) IV Push every 8 hours    MEDICATIONS  (PRN):  albuterol    90 MICROgram(s) HFA Inhaler 2 Puff(s) Inhalation every 4 hours PRN Bronchospasm  dextrose Oral Gel 15 Gram(s) Oral once PRN Blood Glucose LESS THAN 70 milliGRAM(s)/deciliter      Allergies    No Known Allergies    Intolerances        SOCIAL HISTORY:    FAMILY HISTORY:  Family history of diabetes mellitus (Mother, Sibling)    Family history of hypertension (Mother, Sibling)    Family history of blindness (Grandparent)        Vital Signs Last 24 Hrs  T(C): 37.6 (07 Feb 2025 22:24), Max: 37.6 (07 Feb 2025 14:00)  T(F): 99.6 (07 Feb 2025 22:24), Max: 99.7 (07 Feb 2025 14:00)  HR: 94 (07 Feb 2025 22:00) (74 - 98)  BP: 90/63 (07 Feb 2025 19:21) (86/49 - 140/63)  BP(mean): 70 (07 Feb 2025 19:21) (61 - 96)  RR: 21 (07 Feb 2025 22:00) (12 - 21)  SpO2: 99% (07 Feb 2025 22:00) (92% - 100%)    Parameters below as of 07 Feb 2025 22:00  Patient On (Oxygen Delivery Method): nasal cannula, high flow  O2 Flow (L/min): 40  O2 Concentration (%): 40    I&O's Summary    06 Feb 2025 07:01  -  07 Feb 2025 07:00  --------------------------------------------------------  IN: 1401.2 mL / OUT: 542 mL / NET: 859.2 mL    07 Feb 2025 07:01  -  07 Feb 2025 22:54  --------------------------------------------------------  IN: 1008.7 mL / OUT: 580 mL / NET: 428.7 mL        Physical Exam:  General: NAD, resting comfortably  Pulmonary: No respiratory distress, nonlabored breathing  Cardiovascular: NSR  Abdominal: soft, NT, ND  Extremities: WWP. R groin with IABP in place. b/l groins soft, NT, no palpable hematoma. L knee swollen but non tender. b/l pedal edema. compartments soft b/l. motor/sensory function grossly intact b/l. no wounds or signs of skin necrosis.  Pulses: palpable fem/pop b/l, triphasic PT b/l, biphasic L DP, monophasic R DP      LABS:                        7.1    16.25 )-----------( 38       ( 07 Feb 2025 22:28 )             22.2     02-07    133[L]  |  102  |  28[H]  ----------------------------<  212[H]  3.5   |  21[L]  |  2.03[H]    Ca    7.4[L]      07 Feb 2025 12:43  Phos  4.1     02-07  Mg     2.0     02-07    TPro  3.8[L]  /  Alb  2.4[L]  /  TBili  0.6  /  DBili  x   /  AST  43[H]  /  ALT  25  /  AlkPhos  56  02-07    PT/INR - ( 07 Feb 2025 12:43 )   PT: 13.5 sec;   INR: 1.18          PTT - ( 07 Feb 2025 12:43 )  PTT:29.7 sec  Urinalysis Basic - ( 07 Feb 2025 12:43 )    Color: x / Appearance: x / SG: x / pH: x  Gluc: 212 mg/dL / Ketone: x  / Bili: x / Urobili: x   Blood: x / Protein: x / Nitrite: x   Leuk Esterase: x / RBC: x / WBC x   Sq Epi: x / Non Sq Epi: x / Bacteria: x      CAPILLARY BLOOD GLUCOSE      POCT Blood Glucose.: 165 mg/dL (07 Feb 2025 22:48)  POCT Blood Glucose.: 185 mg/dL (07 Feb 2025 19:29)  POCT Blood Glucose.: 195 mg/dL (07 Feb 2025 17:45)  POCT Blood Glucose.: 207 mg/dL (07 Feb 2025 12:10)  POCT Blood Glucose.: 221 mg/dL (07 Feb 2025 05:38)  POCT Blood Glucose.: 215 mg/dL (06 Feb 2025 23:34)    LIVER FUNCTIONS - ( 07 Feb 2025 12:43 )  Alb: 2.4 g/dL / Pro: 3.8 g/dL / ALK PHOS: 56 U/L / ALT: 25 U/L / AST: 43 U/L / GGT: x             Cultures:  Culture Results:   Commensal zuly consistent with body site (02-06 @ 14:30)        Assessment: 78yo F with pmhx HTN, HLD, T2DM, 3v CAD (s/p PCI 11/2023), CHF, asthma, CKD3, GERD, and hypothyroidism presented to [facility] with CP/SOB found to have NSTEMI, s/p diagnostic C demonstrating 3v CAD and newly reduced EF of 25%, transferred to [facility] CTS but deemed not candidate so transferred to cardiology and was pending potential percutaneous revascularization, course c/b anemia requiring PRBC transfusions and cardiogenic/septic shock requiring intubation (now since extubated and weaned to hi-beni), pressors and IABP placement, currently in CCU. Heart Failure team following given newly reduced EF and acute decompensation. ID following for septic shock and patient being empirically treated for pneumonia. Vascular surgery consulted for L thigh hematoma noted on CT LLE noncon.      Recommendations:  - Monitor Hgb with serial CBC  - discussed with Chief on call  - call x [phone] with any questions or concerns Attending:  Dr. Gil    HPI:  80 yo female with PMH of HTN, HLD, Type II DM, triple vessel CAD s/p 3 stents at [facility] 11/2023, CHF, asthma, GERD, and hypothyroidism who presented to [place] with CP and SOB on 1/24. She was having fever, chills, a productive cough and congestion for 2-3 days prior to the chest pain. She states the pain was constant and radiated to her back/left arm with associated palpitations, SOB, orthopnea and dizziness. She felt the chest pain was similar to when she had the stents prompting her to present to the ER on 1/24.In ED she was found to be tachycardic and tachypneic. Her labs were significant for elevated BUN/Cr, potassium, lactate and elevated troponin. CXR showed pulmonary congestion vs. infiltrates. She was placed on BiPAP for respiratory distress and loaded with ASA/Plavix and placed on a heparin gtt for NSTEMI. Her nasal swab was + for MRSA and she recieved CTX 1g daily. She is s/p cardiac cath showing 3V CAD and a reduced EF of 25%. She was transferred to [facility] under the service of Dr. Hurst for further workup and management.    She denies any history of CVA, she states she has a hx of asthma, she also states she had b/l vein stripping to both of her legs in the past. She denies any past surgeries or traumas to the chest.      (30 Jan 2025 14:37)    *********VASCULAR SURGERY  78yo F with pmhx HTN, HLD, T2DM, 3v CAD (s/p PCI 11/2023), CHF, asthma, CKD3, GERD, and hypothyroidism presented to [facility] with CP/SOB found to have NSTEMI, s/p diagnostic LHC demonstrating 3v CAD and newly reduced EF of 25%, transferred to [facility] CTS but deemed not candidate so transferred to cardiology and was pending potential percutaneous revascularization, course c/b anemia requiring PRBC transfusions and cardiogenic/septic shock requiring intubation (now since extubated and weaned to hi-beni), pressors and IABP placement, currently in CCU. Heart Failure team following given newly reduced EF and acute decompensation. ID following for septic shock and patient being empirically treated for pneumonia. Vascular surgery consulted for L thigh hematoma noted on CT LLE noncon.      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 1000 milliLiter(s) (100 mL/Hr) IV Continuous <Continuous>  dextrose 50% Injectable 12.5 Gram(s) IV Push once  dextrose 50% Injectable 25 Gram(s) IV Push once  fluticasone propionate/ salmeterol 250-50 MICROgram(s) Diskus 1 Dose(s) Inhalation two times a day  glucagon  Injectable 1 milliGRAM(s) IntraMuscular once  insulin glargine Injectable (LANTUS) 30 Unit(s) SubCutaneous at bedtime  insulin lispro (ADMELOG) corrective regimen sliding scale   SubCutaneous every 6 hours  meropenem  IVPB 1000 milliGRAM(s) IV Intermittent every 12 hours  norepinephrine Infusion 0.05 MICROgram(s)/kG/Min (3.8 mL/Hr) IV Continuous <Continuous>  pantoprazole  Injectable 40 milliGRAM(s) IV Push every 12 hours  sodium chloride 0.9% lock flush 3 milliLiter(s) IV Push every 8 hours    MEDICATIONS  (PRN):  albuterol    90 MICROgram(s) HFA Inhaler 2 Puff(s) Inhalation every 4 hours PRN Bronchospasm  dextrose Oral Gel 15 Gram(s) Oral once PRN Blood Glucose LESS THAN 70 milliGRAM(s)/deciliter      Allergies    No Known Allergies    Intolerances        SOCIAL HISTORY:    FAMILY HISTORY:  Family history of diabetes mellitus (Mother, Sibling)    Family history of hypertension (Mother, Sibling)    Family history of blindness (Grandparent)        Vital Signs Last 24 Hrs  T(C): 37.6 (07 Feb 2025 22:24), Max: 37.6 (07 Feb 2025 14:00)  T(F): 99.6 (07 Feb 2025 22:24), Max: 99.7 (07 Feb 2025 14:00)  HR: 94 (07 Feb 2025 22:00) (74 - 98)  BP: 90/63 (07 Feb 2025 19:21) (86/49 - 140/63)  BP(mean): 70 (07 Feb 2025 19:21) (61 - 96)  RR: 21 (07 Feb 2025 22:00) (12 - 21)  SpO2: 99% (07 Feb 2025 22:00) (92% - 100%)    Parameters below as of 07 Feb 2025 22:00  Patient On (Oxygen Delivery Method): nasal cannula, high flow  O2 Flow (L/min): 40  O2 Concentration (%): 40    I&O's Summary    06 Feb 2025 07:01  -  07 Feb 2025 07:00  --------------------------------------------------------  IN: 1401.2 mL / OUT: 542 mL / NET: 859.2 mL    07 Feb 2025 07:01  -  07 Feb 2025 22:54  --------------------------------------------------------  IN: 1008.7 mL / OUT: 580 mL / NET: 428.7 mL        Physical Exam:  General: NAD, resting comfortably  Pulmonary: No respiratory distress, nonlabored breathing  Cardiovascular: NSR  Abdominal: soft, NT, ND  Extremities: WWP. R groin with IABP in place. b/l groins soft, NT, no palpable hematoma. L knee swollen but non tender. b/l pedal edema. compartments soft b/l. motor/sensory function grossly intact b/l. no wounds or signs of skin necrosis.  Pulses: palpable fem/pop b/l, triphasic PT b/l, biphasic L DP, monophasic R DP      LABS:                        7.1    16.25 )-----------( 38       ( 07 Feb 2025 22:28 )             22.2     02-07    133[L]  |  102  |  28[H]  ----------------------------<  212[H]  3.5   |  21[L]  |  2.03[H]    Ca    7.4[L]      07 Feb 2025 12:43  Phos  4.1     02-07  Mg     2.0     02-07    TPro  3.8[L]  /  Alb  2.4[L]  /  TBili  0.6  /  DBili  x   /  AST  43[H]  /  ALT  25  /  AlkPhos  56  02-07    PT/INR - ( 07 Feb 2025 12:43 )   PT: 13.5 sec;   INR: 1.18          PTT - ( 07 Feb 2025 12:43 )  PTT:29.7 sec  Urinalysis Basic - ( 07 Feb 2025 12:43 )    Color: x / Appearance: x / SG: x / pH: x  Gluc: 212 mg/dL / Ketone: x  / Bili: x / Urobili: x   Blood: x / Protein: x / Nitrite: x   Leuk Esterase: x / RBC: x / WBC x   Sq Epi: x / Non Sq Epi: x / Bacteria: x      CAPILLARY BLOOD GLUCOSE      POCT Blood Glucose.: 165 mg/dL (07 Feb 2025 22:48)  POCT Blood Glucose.: 185 mg/dL (07 Feb 2025 19:29)  POCT Blood Glucose.: 195 mg/dL (07 Feb 2025 17:45)  POCT Blood Glucose.: 207 mg/dL (07 Feb 2025 12:10)  POCT Blood Glucose.: 221 mg/dL (07 Feb 2025 05:38)  POCT Blood Glucose.: 215 mg/dL (06 Feb 2025 23:34)    LIVER FUNCTIONS - ( 07 Feb 2025 12:43 )  Alb: 2.4 g/dL / Pro: 3.8 g/dL / ALK PHOS: 56 U/L / ALT: 25 U/L / AST: 43 U/L / GGT: x             Cultures:  Culture Results:   Commensal zuly consistent with body site (02-06 @ 14:30)        Assessment: 78yo F with pmhx HTN, HLD, T2DM, 3v CAD (s/p PCI 11/2023), CHF, asthma, CKD3, GERD, and hypothyroidism presented to [facility] with CP/SOB found to have NSTEMI, s/p diagnostic C demonstrating 3v CAD and newly reduced EF of 25%, transferred to [facility] CTS but deemed not candidate so transferred to cardiology and was pending potential percutaneous revascularization, course c/b anemia requiring PRBC transfusions and cardiogenic/septic shock requiring intubation (now since extubated and weaned to hi-beni), pressors and IABP placement, currently in CCU. Heart Failure team following given newly reduced EF and acute decompensation. ID following for septic shock and patient being empirically treated for pneumonia. Vascular surgery consulted for L thigh hematoma noted on CT LLE noncon.      Recommendations:  - Monitor Hgb with serial CBC  - discussed with Chief on call  - call x [phone] with any questions or concerns    **incomplete Attending:  Dr. Gil    HPI:  80 yo female with PMH of HTN, HLD, Type II DM, triple vessel CAD s/p 3 stents at [facility] 11/2023, CHF, asthma, GERD, and hypothyroidism who presented to [facility] with CP and SOB on 1/24. She was having fever, chills, a productive cough and congestion for 2-3 days prior to the chest pain. She states the pain was constant and radiated to her back/left arm with associated palpitations, SOB, orthopnea and dizziness. She felt the chest pain was similar to when she had the stents prompting her to present to the ER on 1/24.In ED she was found to be tachycardic and tachypneic. Her labs were significant for elevated BUN/Cr, potassium, lactate and elevated troponin. CXR showed pulmonary congestion vs. infiltrates. She was placed on BiPAP for respiratory distress and loaded with ASA/Plavix and placed on a heparin gtt for NSTEMI. Her nasal swab was + for MRSA and she recieved CTX 1g daily. She is s/p cardiac cath showing 3V CAD and a reduced EF of 25%. She was transferred to [facility] under the service of Dr. Hurst for further workup and management.    She denies any history of CVA, she states she has a hx of asthma, she also states she had b/l vein stripping to both of her legs in the past. She denies any past surgeries or traumas to the chest.      (30 Jan 2025 14:37)    *********VASCULAR SURGERY  78yo F with pmhx HTN, HLD, T2DM, 3v CAD (s/p PCI 11/2023), CHF, asthma, CKD3, GERD, and hypothyroidism presented to [facility] with CP/SOB found to have NSTEMI, s/p diagnostic LHC demonstrating 3v CAD and newly reduced EF of 25%, transferred to [facility] CTS but deemed not candidate so transferred to cardiology and was pending potential percutaneous revascularization, course c/b anemia requiring PRBC transfusions and cardiogenic/septic shock requiring intubation (now since extubated and weaned to hi-beni), pressors and IABP placement, currently in CCU. Heart Failure team following given newly reduced EF and acute decompensation. ID following for septic shock and patient being empirically treated for pneumonia. Vascular surgery consulted for L thigh hematoma noted on CT LLE noncon.      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 1000 milliLiter(s) (100 mL/Hr) IV Continuous <Continuous>  dextrose 50% Injectable 12.5 Gram(s) IV Push once  dextrose 50% Injectable 25 Gram(s) IV Push once  fluticasone propionate/ salmeterol 250-50 MICROgram(s) Diskus 1 Dose(s) Inhalation two times a day  glucagon  Injectable 1 milliGRAM(s) IntraMuscular once  insulin glargine Injectable (LANTUS) 30 Unit(s) SubCutaneous at bedtime  insulin lispro (ADMELOG) corrective regimen sliding scale   SubCutaneous every 6 hours  meropenem  IVPB 1000 milliGRAM(s) IV Intermittent every 12 hours  norepinephrine Infusion 0.05 MICROgram(s)/kG/Min (3.8 mL/Hr) IV Continuous <Continuous>  pantoprazole  Injectable 40 milliGRAM(s) IV Push every 12 hours  sodium chloride 0.9% lock flush 3 milliLiter(s) IV Push every 8 hours    MEDICATIONS  (PRN):  albuterol    90 MICROgram(s) HFA Inhaler 2 Puff(s) Inhalation every 4 hours PRN Bronchospasm  dextrose Oral Gel 15 Gram(s) Oral once PRN Blood Glucose LESS THAN 70 milliGRAM(s)/deciliter      Allergies    No Known Allergies    Intolerances        SOCIAL HISTORY:    FAMILY HISTORY:  Family history of diabetes mellitus (Mother, Sibling)    Family history of hypertension (Mother, Sibling)    Family history of blindness (Grandparent)        Vital Signs Last 24 Hrs  T(C): 37.6 (07 Feb 2025 22:24), Max: 37.6 (07 Feb 2025 14:00)  T(F): 99.6 (07 Feb 2025 22:24), Max: 99.7 (07 Feb 2025 14:00)  HR: 94 (07 Feb 2025 22:00) (74 - 98)  BP: 90/63 (07 Feb 2025 19:21) (86/49 - 140/63)  BP(mean): 70 (07 Feb 2025 19:21) (61 - 96)  RR: 21 (07 Feb 2025 22:00) (12 - 21)  SpO2: 99% (07 Feb 2025 22:00) (92% - 100%)    Parameters below as of 07 Feb 2025 22:00  Patient On (Oxygen Delivery Method): nasal cannula, high flow  O2 Flow (L/min): 40  O2 Concentration (%): 40    I&O's Summary    06 Feb 2025 07:01  -  07 Feb 2025 07:00  --------------------------------------------------------  IN: 1401.2 mL / OUT: 542 mL / NET: 859.2 mL    07 Feb 2025 07:01  -  07 Feb 2025 22:54  --------------------------------------------------------  IN: 1008.7 mL / OUT: 580 mL / NET: 428.7 mL        Physical Exam:  General: NAD, resting comfortably  Pulmonary: No respiratory distress, nonlabored breathing  Cardiovascular: NSR  Abdominal: soft, NT, ND  Extremities: WWP. b/l edema, legs appear symmetrical. R groin with IABP in place. b/l groins soft, NT, no palpable hematoma. L knee swollen but non tender. compartments soft b/l. motor/sensory function grossly intact b/l. no wounds or signs of skin necrosis.  Pulses: palpable fem/pop b/l, triphasic PT b/l, biphasic L DP, monophasic R DP      LABS:                        7.1    16.25 )-----------( 38       ( 07 Feb 2025 22:28 )             22.2     02-07    133[L]  |  102  |  28[H]  ----------------------------<  212[H]  3.5   |  21[L]  |  2.03[H]    Ca    7.4[L]      07 Feb 2025 12:43  Phos  4.1     02-07  Mg     2.0     02-07    TPro  3.8[L]  /  Alb  2.4[L]  /  TBili  0.6  /  DBili  x   /  AST  43[H]  /  ALT  25  /  AlkPhos  56  02-07    PT/INR - ( 07 Feb 2025 12:43 )   PT: 13.5 sec;   INR: 1.18          PTT - ( 07 Feb 2025 12:43 )  PTT:29.7 sec  Urinalysis Basic - ( 07 Feb 2025 12:43 )    Color: x / Appearance: x / SG: x / pH: x  Gluc: 212 mg/dL / Ketone: x  / Bili: x / Urobili: x   Blood: x / Protein: x / Nitrite: x   Leuk Esterase: x / RBC: x / WBC x   Sq Epi: x / Non Sq Epi: x / Bacteria: x      CAPILLARY BLOOD GLUCOSE      POCT Blood Glucose.: 165 mg/dL (07 Feb 2025 22:48)  POCT Blood Glucose.: 185 mg/dL (07 Feb 2025 19:29)  POCT Blood Glucose.: 195 mg/dL (07 Feb 2025 17:45)  POCT Blood Glucose.: 207 mg/dL (07 Feb 2025 12:10)  POCT Blood Glucose.: 221 mg/dL (07 Feb 2025 05:38)  POCT Blood Glucose.: 215 mg/dL (06 Feb 2025 23:34)    LIVER FUNCTIONS - ( 07 Feb 2025 12:43 )  Alb: 2.4 g/dL / Pro: 3.8 g/dL / ALK PHOS: 56 U/L / ALT: 25 U/L / AST: 43 U/L / GGT: x             Cultures:  Culture Results:   Commensal zuly consistent with body site (02-06 @ 14:30)        Assessment: 78yo F with pmhx HTN, HLD, T2DM, 3v CAD (s/p PCI 11/2023), CHF, asthma, CKD3, GERD, and hypothyroidism presented to [facility] with CP/SOB found to have NSTEMI, s/p diagnostic C demonstrating 3v CAD and newly reduced EF of 25%, transferred to [facility] CTS but deemed not candidate so transferred to cardiology and was pending potential percutaneous revascularization, course c/b anemia requiring PRBC transfusions and cardiogenic/septic shock requiring intubation (now since extubated and weaned to hi-beni), pressors and IABP placement, currently in CCU. Heart Failure team following given newly reduced EF and acute decompensation. ID following for septic shock and patient being empirically treated for pneumonia. Vascular surgery consulted for L thigh hematoma noted on CT LLE noncon. Patient denies lower extremity pain, lower extremity numbness/tingling or weakness, b/l LE with edema and compartments soft, motor/sensory function grossly intact and pulses present.      Recommendations:  - Monitor Hgb with serial CBC, wound transfuse 1U PRBC now  - Obtain CTA aorta w/ runoff  - Elevation and compression of LLE  - discussed with Chief on call and Attending  - call x [phone] with any questions or concerns   Attending:  Dr. Gil    HPI:  78 yo female with PMH of HTN, HLD, Type II DM, triple vessel CAD s/p 3 stents at [facility] 11/2023, CHF, asthma, GERD, and hypothyroidism who presented to [facility] [facility] with CP and SOB on 1/24. She was having fever, chills, a productive cough and congestion for 2-3 days prior to the chest pain. She states the pain was constant and radiated to her back/left arm with associated palpitations, SOB, orthopnea and dizziness. She felt the chest pain was similar to when she had the stents prompting her to present to the ER on 1/24.In ED she was found to be tachycardic and tachypneic. Her labs were significant for elevated BUN/Cr, potassium, lactate and elevated troponin. CXR showed pulmonary congestion vs. infiltrates. She was placed on BiPAP for respiratory distress and loaded with ASA/Plavix and placed on a heparin gtt for NSTEMI. Her nasal swab was + for MRSA and she recieved CTX 1g daily. She is s/p cardiac cath showing 3V CAD and a reduced EF of 25%. She was transferred to [facility] under the service of Dr. Hurst for further workup and management.    She denies any history of CVA, she states she has a hx of asthma, she also states she had b/l vein stripping to both of her legs in the past. She denies any past surgeries or traumas to the chest.      (30 Jan 2025 14:37)    *********VASCULAR SURGERY  80yo F with pmhx HTN, HLD, T2DM, 3v CAD (s/p PCI 11/2023), CHF, asthma, CKD3, GERD, and hypothyroidism presented to [facility] with CP/SOB found to have NSTEMI, s/p diagnostic LHC demonstrating 3v CAD and newly reduced EF of 25%, transferred to [facility] CTS but deemed not candidate so transferred to cardiology and was pending potential percutaneous revascularization, course c/b anemia requiring PRBC transfusions and cardiogenic/septic shock requiring intubation (now since extubated and weaned to hi-beni), pressors and IABP placement, currently in CCU. Heart Failure team following given newly reduced EF and acute decompensation. ID following for septic shock and patient being empirically treated for pneumonia. Vascular surgery consulted for L thigh hematoma noted on CT LLE noncon.      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 1000 milliLiter(s) (100 mL/Hr) IV Continuous <Continuous>  dextrose 50% Injectable 12.5 Gram(s) IV Push once  dextrose 50% Injectable 25 Gram(s) IV Push once  fluticasone propionate/ salmeterol 250-50 MICROgram(s) Diskus 1 Dose(s) Inhalation two times a day  glucagon  Injectable 1 milliGRAM(s) IntraMuscular once  insulin glargine Injectable (LANTUS) 30 Unit(s) SubCutaneous at bedtime  insulin lispro (ADMELOG) corrective regimen sliding scale   SubCutaneous every 6 hours  meropenem  IVPB 1000 milliGRAM(s) IV Intermittent every 12 hours  norepinephrine Infusion 0.05 MICROgram(s)/kG/Min (3.8 mL/Hr) IV Continuous <Continuous>  pantoprazole  Injectable 40 milliGRAM(s) IV Push every 12 hours  sodium chloride 0.9% lock flush 3 milliLiter(s) IV Push every 8 hours    MEDICATIONS  (PRN):  albuterol    90 MICROgram(s) HFA Inhaler 2 Puff(s) Inhalation every 4 hours PRN Bronchospasm  dextrose Oral Gel 15 Gram(s) Oral once PRN Blood Glucose LESS THAN 70 milliGRAM(s)/deciliter      Allergies    No Known Allergies    Intolerances        SOCIAL HISTORY:    FAMILY HISTORY:  Family history of diabetes mellitus (Mother, Sibling)    Family history of hypertension (Mother, Sibling)    Family history of blindness (Grandparent)        Vital Signs Last 24 Hrs  T(C): 37.6 (07 Feb 2025 22:24), Max: 37.6 (07 Feb 2025 14:00)  T(F): 99.6 (07 Feb 2025 22:24), Max: 99.7 (07 Feb 2025 14:00)  HR: 94 (07 Feb 2025 22:00) (74 - 98)  BP: 90/63 (07 Feb 2025 19:21) (86/49 - 140/63)  BP(mean): 70 (07 Feb 2025 19:21) (61 - 96)  RR: 21 (07 Feb 2025 22:00) (12 - 21)  SpO2: 99% (07 Feb 2025 22:00) (92% - 100%)    Parameters below as of 07 Feb 2025 22:00  Patient On (Oxygen Delivery Method): nasal cannula, high flow  O2 Flow (L/min): 40  O2 Concentration (%): 40    I&O's Summary    06 Feb 2025 07:01  -  07 Feb 2025 07:00  --------------------------------------------------------  IN: 1401.2 mL / OUT: 542 mL / NET: 859.2 mL    07 Feb 2025 07:01  -  07 Feb 2025 22:54  --------------------------------------------------------  IN: 1008.7 mL / OUT: 580 mL / NET: 428.7 mL        Physical Exam:  General: NAD, resting comfortably  Pulmonary: No respiratory distress, nonlabored breathing  Cardiovascular: NSR  Abdominal: soft, NT, ND  Extremities: WWP. b/l edema, and legs appear symmetrical in size. R groin with IABP in place. b/l groins soft, NT, no palpable hematoma. L groin with one small area of ecchymosis, L medial upper thigh with fullness but soft and NT. L knee swollen, non tender. Compartments soft b/l. Motor/sensory function grossly intact b/l. Able to lift b/l LE off bed. no wounds or signs of skin necrosis.  Pulses: palpable fem/pop b/l, triphasic PT b/l, biphasic L DP, monophasic R DP      LABS:                        7.1    16.25 )-----------( 38       ( 07 Feb 2025 22:28 )             22.2     02-07    133[L]  |  102  |  28[H]  ----------------------------<  212[H]  3.5   |  21[L]  |  2.03[H]    Ca    7.4[L]      07 Feb 2025 12:43  Phos  4.1     02-07  Mg     2.0     02-07    TPro  3.8[L]  /  Alb  2.4[L]  /  TBili  0.6  /  DBili  x   /  AST  43[H]  /  ALT  25  /  AlkPhos  56  02-07    PT/INR - ( 07 Feb 2025 12:43 )   PT: 13.5 sec;   INR: 1.18          PTT - ( 07 Feb 2025 12:43 )  PTT:29.7 sec  Urinalysis Basic - ( 07 Feb 2025 12:43 )    Color: x / Appearance: x / SG: x / pH: x  Gluc: 212 mg/dL / Ketone: x  / Bili: x / Urobili: x   Blood: x / Protein: x / Nitrite: x   Leuk Esterase: x / RBC: x / WBC x   Sq Epi: x / Non Sq Epi: x / Bacteria: x      CAPILLARY BLOOD GLUCOSE      POCT Blood Glucose.: 165 mg/dL (07 Feb 2025 22:48)  POCT Blood Glucose.: 185 mg/dL (07 Feb 2025 19:29)  POCT Blood Glucose.: 195 mg/dL (07 Feb 2025 17:45)  POCT Blood Glucose.: 207 mg/dL (07 Feb 2025 12:10)  POCT Blood Glucose.: 221 mg/dL (07 Feb 2025 05:38)  POCT Blood Glucose.: 215 mg/dL (06 Feb 2025 23:34)    LIVER FUNCTIONS - ( 07 Feb 2025 12:43 )  Alb: 2.4 g/dL / Pro: 3.8 g/dL / ALK PHOS: 56 U/L / ALT: 25 U/L / AST: 43 U/L / GGT: x             Cultures:  Culture Results:   Commensal zuly consistent with body site (02-06 @ 14:30)        Assessment: 80yo F with pmhx HTN, HLD, T2DM, 3v CAD (s/p PCI 11/2023), CHF, asthma, CKD3, GERD, and hypothyroidism presented to [facility] with CP/SOB found to have NSTEMI, s/p diagnostic LHC demonstrating 3v CAD and newly reduced EF of 25%, transferred to [facility] CTS but deemed not candidate so transferred to cardiology and was pending potential percutaneous revascularization, course c/b anemia requiring PRBC transfusions and cardiogenic/septic shock requiring intubation (now since extubated and weaned to hi-beni), pressors and IABP placement, currently in CCU. Heart Failure team following given newly reduced EF and acute decompensation. ID following for septic shock and patient being empirically treated for pneumonia. Vascular surgery consulted for L thigh hematoma noted on CT LLE noncon. Patient denies lower extremity pain, lower extremity numbness/tingling or weakness, b/l LE with edema, L medial upper thigh with fullness but soft and NT, compartments soft, motor/sensory function grossly intact and pulses present.    #L thigh hematoma    Recommendations:  - Monitor Hgb with serial CBC, would transfuse 1U PRBC and platelets now  - Obtain CTA aorta w/ runoff to eval for active bleeding  - Elevation and compression of LLE  - discussed with Chief on call and Attending  - call [phone] with any questions or concerns

## 2025-02-07 NOTE — CONSULT NOTE ADULT - TIME BILLING
septic shock, pneumonia, leukocytosis
chart review, patient interview/exam, review of labs/imaging, management of medical conditions, counseling/educating patient/family, discussion with consultants, documentation; excludes teaching and separately reported services
More than 50 percent of which was spent on counseling and coordination of care.
coordination of care, patient education, chart review

## 2025-02-07 NOTE — CHART NOTE - NSCHARTNOTEFT_GEN_A_CORE
2/7/25 12AM   Fentanyl 0.7, Levo 0.1, Vaso 0.04, Propofol 10, IABP 1:1 Augmented Pressure 59  BP: 113/61 (73), HR 83, Hb 7.9  CVP 5, PA 32/14 (21), PA sat 62.0, SaO2 99.1  Ficks: CO 9.3, CI 5.2, .6  TD: CO 4.5, CI 2.6, SVR 1598 2/7/25 12AM   Fentanyl 0.7, Levo 0.1, Vaso 0.04, Propofol 10, IABP 1:1 Augmented Pressure 59  BP: 113/61 (73), HR 83, Hb 7.9  CVP 5, PA 32/14 (21), PA sat 62.0, SaO2 99.1  Ficks: CO 5.1, CI 2.8, SVR 1066.67  TD: CO 5.6, CI 3.3, SVR 1384 2/7/25 5AM  Fentanyl 0.3, Levo 0.07, Vaso 0.04, Propofol 10, IABP 1:1 Augmented Pressure 71  BP: 112/42 (76), HR 82, Hb 7.0  CVP 4, PA 43/12 (22), PA sat 70.7, SaO2 99.5  Ficks: CO 7.4, CI 4.1, .38   TD: CO 6.0, CI 3.5, SVR 1199    2/7/25 12AM   Fentanyl 0.7, Levo 0.1, Vaso 0.04, Propofol 10, IABP 1:1 Augmented Pressure 59  BP: 113/61 (73), HR 83, Hb 7.9  CVP 5, PA 32/14 (21), PA sat 62.0, SaO2 99.1  Ficks: CO 5.1, CI 2.8, SVR 1066.67  TD: CO 5.6, CI 3.3, SVR 1384

## 2025-02-07 NOTE — PROGRESS NOTE ADULT - ASSESSMENT
79 yr old F with PMHx of HTN, hyperlipidemia, DM, hypothyroidism, GERD, asthma, CAD s/p prior PCIs at [facility] 11/2023 who [place] [facility] [place] with chest pain and URI symptoms x 2-3 days, R/I NSTEMI, s/p diagnostic cath[place] revealing 3VD and newly reduced EF, initially transferred to [facility] 9[facility] under Dr. 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 Course further c/b anemia pt now s/p 2u prbc on 2/4. and  cardiogenic shock, requiring pressor support and IABP. Transferred to CCU for further management         NEUROLOGY:  Extubated on high-flow but fatigued, continue to assess mental status as it improves.    CARDIOVASCULAR:    #Cardiogenic shock   - patient became hypotensive requiring inotropy support as well as mechanical ventilation and IABP for afterload reduction. Echo on arrival to [facility] showing reduced EF of 44%   -  New Acute CHF likely in setting of NSTEMI EF at [facility] was 25%, Echo on Arrival improved to 1/31/2025 44%  - Holding GDMT: Toprol increased to 50mg PO daily, isordil 5mg TID. Losartan on HOLD in setting of KEN.    Plan   - continue with levo gtt titrate as needed, waned vaso today  - c/w IABP @ 1:1 for afterload reduction, consider removing pending if patient is still eligible for revascularization procedure   - removing swan, no longer requiring hemos thermos as CI has remained appropriate        #NSTEMI   Moderate CP this AM   - S/p diagnotic [facility] 1/[place] w/ Dr. Morgan: dLM to pLAD 80% Stenosed ISR, LCX ostial to pLCX ISR, % Stenosed.  - TTE 1/31/25: mild LVH, Mod reduced LVSF, EF 44%. RWMA. Grade I DD. Aortic sclerosis w/o significant stenosis.  - Atorvastatin 40mg qd.   - Transitioned pt from heparin gtt to Lovenox 80mg sq BID, now on plavix and ASA but holding iso possible bleed  - discuss if revascularization candidate with cath team    **Prior Cath@[facility] 11/2023: Impella Assisted Rota/PCI with EMILIE to LM 90%, EMILIE ostial LAD and EMILIE ostial LCX, (dLM 80%, oLAD 85%, D2 75%, oLCX 85%, mRCA 75%).      PULMONARY:  #AHRF   - Weaned to high-flow, continue to wean to NC as tolerated    #PNA   Pulm Consulted: low suspicion for PNA, noted to have appearance of endobronchial material in superior segment RLL bronchus.   - CT Chest 1/31/2025 Persistent small large airways inflammation with interval increase in mucous plugging with near complete obstruction of the lateral segmental bronchus to the RML, RLL bronchus and anteromedial basal segmental bronchus to the LLL. Associated groundglass opacities are identified and early multifocal pneumonia possibly postobstructive cannot be excluded.   - Recommended outpatient pulm follow up w PFTs (to diagnosis obstructive lung disease) and consideration of repeat imaging.   - WBC on Admission 13--->17.87->20.27, today 17 MRSA swab + at [facility].   - UA normal, Blood Cultures 1/31 NGTD, Procal normal.     #History of Asthma   Pulm following Appreciate Recs   - Duonebs, Hypertonic Saline to aid with Mucous plugging  - C/w symbicort and Montelukast         GASTROINTESTINAL:  - NG tube in place, tube feed at goal  - Bedside dysphagia after secretions improve      RENAL:  #CKD Stage 3   KEN on CKD Stage 3 (baseline Cr ~1.3-1.5 from prior Labs)   - Today 1.54, improved with Fluids likely due to hypovolemia episode vs dehydration   - Urine studies: 2/3: Na 71, Protein 27, Creatinine 108, Pr/Cr Ratio .2, Urea Nitrogen 832, K 21, Osmol 505  - ctm, trend creatinine  - avoid nephrotoxic meds  - monitor UOP    INFECTIOUS DISEASE:  #PNA   Pulm Consulted: low suspicion for PNA, noted to have appearance of endobronchial material in superior segment RLL bronchus.   - CT Chest 1/31/2025 Persistent small large airways inflammation with interval increase in mucous plugging with near complete obstruction of the lateral segmental bronchus to the RML, RLL bronchus and anteromedial basal segmental bronchus to the LLL. Associated groundglass opacities are identified and early multifocal pneumonia possibly postobstructive cannot be excluded.   - Recommended outpatient pulm follow up w PFTs (to diagnosis obstructive lung disease) and consideration of repeat imaging.   - WBC on Admission 13--->17.87->20.27, today 17 MRSA swab + at [facility].   - UA normal, Blood Cultures 1/31 NGTD, Procal normal.   - on arrival to CCU: ordered repeat CXR, BCX, UCX, UA, MRSA swab   - MRSA swab positive, RVP negative   - pro-hailee elevated at 1.6    Plan   - F/U BCX, UA, UCX,    - c/w Vancomycin, dose by AM Level  - zosyn --> meropenem 1g BID  - ID consulted, appreciate recs     ENDOCRINE:  #Hypothyroidism   - C/w Levothyroxine 75mcg PO daily.  - TSH 1.390    #DM   A1C 6.9    Plan   - C/w FS's   - ISS,   - increased Lantus to 30 at bedtime for elevated sugars       HEME:    #Anemia   Pt is s/p 2u prbc 2/4/25 AM, 1u pRBC additional on 2/7 AM. Negative SEGUN. B12, folate, iron studies wnl  - Hgb on admission 10.1. Steadily downtrending,  Hgb this AM 7.0 (2/7)  Plan   - repeat CBC s/p 1u pRBC this AM  - CT Chest, A+P, LLE to assess for signs of bleeding        FLUIDS/ELECTROLYTES/NUTRITION:  -Drips: Nepi 0.04  -Monitor, Replete to K>4 and Mg>2  -Diet - nepro tube feeds at goal, bedside dysphagia today to advance diet. Consider FEES if failure of bedside test.     PROPHYLAXIS  -DVT: SCD, hold off plavix and ASA pending anemia workup  -GI- PPI qd, trickle feeds, pending bedside dysphagia  DISPO: CCU  DNR/DNI

## 2025-02-08 LAB
ALBUMIN SERPL ELPH-MCNC: 2.2 G/DL — LOW (ref 3.3–5)
ALBUMIN SERPL ELPH-MCNC: 2.5 G/DL — LOW (ref 3.3–5)
ALP SERPL-CCNC: 50 U/L — SIGNIFICANT CHANGE UP (ref 40–120)
ALP SERPL-CCNC: 54 U/L — SIGNIFICANT CHANGE UP (ref 40–120)
ALT FLD-CCNC: 22 U/L — SIGNIFICANT CHANGE UP (ref 10–45)
ALT FLD-CCNC: 24 U/L — SIGNIFICANT CHANGE UP (ref 10–45)
ANION GAP SERPL CALC-SCNC: 9 MMOL/L — SIGNIFICANT CHANGE UP (ref 5–17)
ANION GAP SERPL CALC-SCNC: 9 MMOL/L — SIGNIFICANT CHANGE UP (ref 5–17)
AST SERPL-CCNC: 42 U/L — HIGH (ref 10–40)
AST SERPL-CCNC: 42 U/L — HIGH (ref 10–40)
BASOPHILS # BLD AUTO: 0.03 K/UL — SIGNIFICANT CHANGE UP (ref 0–0.2)
BASOPHILS # BLD AUTO: 0.03 K/UL — SIGNIFICANT CHANGE UP (ref 0–0.2)
BASOPHILS NFR BLD AUTO: 0.2 % — SIGNIFICANT CHANGE UP (ref 0–2)
BASOPHILS NFR BLD AUTO: 0.3 % — SIGNIFICANT CHANGE UP (ref 0–2)
BILIRUB SERPL-MCNC: 0.8 MG/DL — SIGNIFICANT CHANGE UP (ref 0.2–1.2)
BILIRUB SERPL-MCNC: 1 MG/DL — SIGNIFICANT CHANGE UP (ref 0.2–1.2)
BUN SERPL-MCNC: 23 MG/DL — SIGNIFICANT CHANGE UP (ref 7–23)
BUN SERPL-MCNC: 23 MG/DL — SIGNIFICANT CHANGE UP (ref 7–23)
CALCIUM SERPL-MCNC: 7.7 MG/DL — LOW (ref 8.4–10.5)
CALCIUM SERPL-MCNC: 7.9 MG/DL — LOW (ref 8.4–10.5)
CHLORIDE SERPL-SCNC: 104 MMOL/L — SIGNIFICANT CHANGE UP (ref 96–108)
CHLORIDE SERPL-SCNC: 105 MMOL/L — SIGNIFICANT CHANGE UP (ref 96–108)
CO2 SERPL-SCNC: 22 MMOL/L — SIGNIFICANT CHANGE UP (ref 22–31)
CO2 SERPL-SCNC: 24 MMOL/L — SIGNIFICANT CHANGE UP (ref 22–31)
CREAT SERPL-MCNC: 1.78 MG/DL — HIGH (ref 0.5–1.3)
CREAT SERPL-MCNC: 1.79 MG/DL — HIGH (ref 0.5–1.3)
CULTURE RESULTS: ABNORMAL
CULTURE RESULTS: ABNORMAL
EGFR: 29 ML/MIN/1.73M2 — LOW
EGFR: 29 ML/MIN/1.73M2 — LOW
EOSINOPHIL # BLD AUTO: 0.41 K/UL — SIGNIFICANT CHANGE UP (ref 0–0.5)
EOSINOPHIL # BLD AUTO: 0.44 K/UL — SIGNIFICANT CHANGE UP (ref 0–0.5)
EOSINOPHIL NFR BLD AUTO: 2.9 % — SIGNIFICANT CHANGE UP (ref 0–6)
EOSINOPHIL NFR BLD AUTO: 3.8 % — SIGNIFICANT CHANGE UP (ref 0–6)
ERYTHROCYTE [SEDIMENTATION RATE] IN BLOOD: 28 MM/HR — HIGH
GAS PNL BLDV: SIGNIFICANT CHANGE UP
GLUCOSE SERPL-MCNC: 133 MG/DL — HIGH (ref 70–99)
GLUCOSE SERPL-MCNC: 149 MG/DL — HIGH (ref 70–99)
HCT VFR BLD CALC: 24.2 % — LOW (ref 34.5–45)
HCT VFR BLD CALC: 25.2 % — LOW (ref 34.5–45)
HCT VFR BLD CALC: 25.4 % — LOW (ref 34.5–45)
HGB BLD-MCNC: 8 G/DL — LOW (ref 11.5–15.5)
HGB BLD-MCNC: 8.1 G/DL — LOW (ref 11.5–15.5)
HGB BLD-MCNC: 8.2 G/DL — LOW (ref 11.5–15.5)
IMM GRANULOCYTES NFR BLD AUTO: 1.7 % — HIGH (ref 0–0.9)
IMM GRANULOCYTES NFR BLD AUTO: 1.9 % — HIGH (ref 0–0.9)
LACTATE SERPL-SCNC: 0.9 MMOL/L — SIGNIFICANT CHANGE UP (ref 0.5–2)
LYMPHOCYTES # BLD AUTO: 1.73 K/UL — SIGNIFICANT CHANGE UP (ref 1–3.3)
LYMPHOCYTES # BLD AUTO: 12.1 % — LOW (ref 13–44)
LYMPHOCYTES # BLD AUTO: 18.1 % — SIGNIFICANT CHANGE UP (ref 13–44)
LYMPHOCYTES # BLD AUTO: 2.11 K/UL — SIGNIFICANT CHANGE UP (ref 1–3.3)
MAGNESIUM SERPL-MCNC: 1.8 MG/DL — SIGNIFICANT CHANGE UP (ref 1.6–2.6)
MAGNESIUM SERPL-MCNC: 1.9 MG/DL — SIGNIFICANT CHANGE UP (ref 1.6–2.6)
MCHC RBC-ENTMCNC: 29.2 PG — SIGNIFICANT CHANGE UP (ref 27–34)
MCHC RBC-ENTMCNC: 29.5 PG — SIGNIFICANT CHANGE UP (ref 27–34)
MCHC RBC-ENTMCNC: 31.1 PG — SIGNIFICANT CHANGE UP (ref 27–34)
MCHC RBC-ENTMCNC: 31.5 G/DL — LOW (ref 32–36)
MCHC RBC-ENTMCNC: 32.1 G/DL — SIGNIFICANT CHANGE UP (ref 32–36)
MCHC RBC-ENTMCNC: 33.9 G/DL — SIGNIFICANT CHANGE UP (ref 32–36)
MCV RBC AUTO: 91.6 FL — SIGNIFICANT CHANGE UP (ref 80–100)
MCV RBC AUTO: 91.7 FL — SIGNIFICANT CHANGE UP (ref 80–100)
MCV RBC AUTO: 92.7 FL — SIGNIFICANT CHANGE UP (ref 80–100)
MONOCYTES # BLD AUTO: 0.87 K/UL — SIGNIFICANT CHANGE UP (ref 0–0.9)
MONOCYTES # BLD AUTO: 0.96 K/UL — HIGH (ref 0–0.9)
MONOCYTES NFR BLD AUTO: 6.7 % — SIGNIFICANT CHANGE UP (ref 2–14)
MONOCYTES NFR BLD AUTO: 7.5 % — SIGNIFICANT CHANGE UP (ref 2–14)
NEUTROPHILS # BLD AUTO: 10.92 K/UL — HIGH (ref 1.8–7.4)
NEUTROPHILS # BLD AUTO: 7.97 K/UL — HIGH (ref 1.8–7.4)
NEUTROPHILS NFR BLD AUTO: 68.4 % — SIGNIFICANT CHANGE UP (ref 43–77)
NEUTROPHILS NFR BLD AUTO: 76.4 % — SIGNIFICANT CHANGE UP (ref 43–77)
NRBC # BLD: 0 /100 WBCS — SIGNIFICANT CHANGE UP (ref 0–0)
NRBC # BLD: 1 /100 WBCS — HIGH (ref 0–0)
NRBC # BLD: 2 /100 WBCS — HIGH (ref 0–0)
NRBC BLD-RTO: 0 /100 WBCS — SIGNIFICANT CHANGE UP (ref 0–0)
NRBC BLD-RTO: 1 /100 WBCS — HIGH (ref 0–0)
NRBC BLD-RTO: 2 /100 WBCS — HIGH (ref 0–0)
PHOSPHATE SERPL-MCNC: 3.6 MG/DL — SIGNIFICANT CHANGE UP (ref 2.5–4.5)
PHOSPHATE SERPL-MCNC: 3.7 MG/DL — SIGNIFICANT CHANGE UP (ref 2.5–4.5)
PLATELET # BLD AUTO: 70 K/UL — LOW (ref 150–400)
PLATELET # BLD AUTO: 71 K/UL — LOW (ref 150–400)
PLATELET # BLD AUTO: 80 K/UL — LOW (ref 150–400)
POTASSIUM SERPL-MCNC: 3.9 MMOL/L — SIGNIFICANT CHANGE UP (ref 3.5–5.3)
POTASSIUM SERPL-MCNC: 4 MMOL/L — SIGNIFICANT CHANGE UP (ref 3.5–5.3)
POTASSIUM SERPL-SCNC: 3.9 MMOL/L — SIGNIFICANT CHANGE UP (ref 3.5–5.3)
POTASSIUM SERPL-SCNC: 4 MMOL/L — SIGNIFICANT CHANGE UP (ref 3.5–5.3)
PROCALCITONIN SERPL-MCNC: 0.41 NG/ML — HIGH (ref 0.02–0.1)
PROT SERPL-MCNC: 4.2 G/DL — LOW (ref 6–8.3)
PROT SERPL-MCNC: 4.4 G/DL — LOW (ref 6–8.3)
RBC # BLD: 2.64 M/UL — LOW (ref 3.8–5.2)
RBC # BLD: 2.74 M/UL — LOW (ref 3.8–5.2)
RBC # BLD: 2.75 M/UL — LOW (ref 3.8–5.2)
RBC # FLD: 14.9 % — HIGH (ref 10.3–14.5)
RBC # FLD: 15.1 % — HIGH (ref 10.3–14.5)
RBC # FLD: 16 % — HIGH (ref 10.3–14.5)
SODIUM SERPL-SCNC: 135 MMOL/L — SIGNIFICANT CHANGE UP (ref 135–145)
SODIUM SERPL-SCNC: 138 MMOL/L — SIGNIFICANT CHANGE UP (ref 135–145)
SPECIMEN SOURCE: SIGNIFICANT CHANGE UP
SPECIMEN SOURCE: SIGNIFICANT CHANGE UP
VANCOMYCIN TROUGH SERPL-MCNC: 23.3 UG/ML — HIGH (ref 10–20)
WBC # BLD: 11.64 K/UL — HIGH (ref 3.8–10.5)
WBC # BLD: 12.2 K/UL — HIGH (ref 3.8–10.5)
WBC # BLD: 14.3 K/UL — HIGH (ref 3.8–10.5)
WBC # FLD AUTO: 11.64 K/UL — HIGH (ref 3.8–10.5)
WBC # FLD AUTO: 12.2 K/UL — HIGH (ref 3.8–10.5)
WBC # FLD AUTO: 14.3 K/UL — HIGH (ref 3.8–10.5)

## 2025-02-08 PROCEDURE — 71045 X-RAY EXAM CHEST 1 VIEW: CPT | Mod: 26

## 2025-02-08 PROCEDURE — G0545: CPT

## 2025-02-08 PROCEDURE — 93010 ELECTROCARDIOGRAM REPORT: CPT | Mod: 1L

## 2025-02-08 PROCEDURE — 99291 CRITICAL CARE FIRST HOUR: CPT | Mod: GC

## 2025-02-08 PROCEDURE — 75635 CT ANGIO ABDOMINAL ARTERIES: CPT | Mod: 26

## 2025-02-08 PROCEDURE — 99232 SBSQ HOSP IP/OBS MODERATE 35: CPT

## 2025-02-08 PROCEDURE — 99222 1ST HOSP IP/OBS MODERATE 55: CPT

## 2025-02-08 RX ORDER — ASPIRIN 81 MG/1
81 TABLET, COATED ORAL EVERY 24 HOURS
Refills: 0 | Status: DISCONTINUED | OUTPATIENT
Start: 2025-02-08 | End: 2025-02-14

## 2025-02-08 RX ORDER — FENTANYL CITRATE 50 UG/ML
25 INJECTION INTRAMUSCULAR; INTRAVENOUS ONCE
Refills: 0 | Status: DISCONTINUED | OUTPATIENT
Start: 2025-02-08 | End: 2025-02-08

## 2025-02-08 RX ADMIN — IPRATROPIUM BROMIDE AND ALBUTEROL SULFATE 3 MILLILITER(S): .5; 2.5 SOLUTION RESPIRATORY (INHALATION) at 17:29

## 2025-02-08 RX ADMIN — MONTELUKAST SODIUM 10 MILLIGRAM(S): 5 TABLET, CHEWABLE ORAL at 14:49

## 2025-02-08 RX ADMIN — Medication 3 MILLILITER(S): at 22:24

## 2025-02-08 RX ADMIN — FLUTICASONE PROPIONATE AND SALMETEROL 1 DOSE(S): 113; 14 POWDER, METERED RESPIRATORY (INHALATION) at 05:29

## 2025-02-08 RX ADMIN — FENTANYL CITRATE 25 MICROGRAM(S): 50 INJECTION INTRAMUSCULAR; INTRAVENOUS at 20:27

## 2025-02-08 RX ADMIN — Medication 40 MILLIGRAM(S): at 11:48

## 2025-02-08 RX ADMIN — IPRATROPIUM BROMIDE AND ALBUTEROL SULFATE 3 MILLILITER(S): .5; 2.5 SOLUTION RESPIRATORY (INHALATION) at 12:54

## 2025-02-08 RX ADMIN — FLUTICASONE PROPIONATE AND SALMETEROL 1 DOSE(S): 113; 14 POWDER, METERED RESPIRATORY (INHALATION) at 18:18

## 2025-02-08 RX ADMIN — ATORVASTATIN CALCIUM 80 MILLIGRAM(S): 80 TABLET, FILM COATED ORAL at 21:36

## 2025-02-08 RX ADMIN — Medication 3 MILLILITER(S): at 06:29

## 2025-02-08 RX ADMIN — Medication 1: at 11:47

## 2025-02-08 RX ADMIN — PANTOPRAZOLE 40 MILLIGRAM(S): 20 TABLET, DELAYED RELEASE ORAL at 18:17

## 2025-02-08 RX ADMIN — ASPIRIN 81 MILLIGRAM(S): 81 TABLET, COATED ORAL at 11:48

## 2025-02-08 RX ADMIN — ANTISEPTIC SURGICAL SCRUB 1 APPLICATION(S): 0.04 SOLUTION TOPICAL at 05:57

## 2025-02-08 RX ADMIN — FENTANYL CITRATE 25 MICROGRAM(S): 50 INJECTION INTRAMUSCULAR; INTRAVENOUS at 20:52

## 2025-02-08 RX ADMIN — SODIUM CHLORIDE 50 MILLILITER(S): 9 INJECTION, SOLUTION INTRAVENOUS at 00:00

## 2025-02-08 RX ADMIN — Medication 3 MILLILITER(S): at 14:53

## 2025-02-08 RX ADMIN — MEROPENEM 100 MILLIGRAM(S): 500 INJECTION INTRAVENOUS at 18:17

## 2025-02-08 RX ADMIN — INSULIN GLARGINE-YFGN 30 UNIT(S): 100 INJECTION, SOLUTION SUBCUTANEOUS at 23:32

## 2025-02-08 RX ADMIN — PANTOPRAZOLE 40 MILLIGRAM(S): 20 TABLET, DELAYED RELEASE ORAL at 05:58

## 2025-02-08 RX ADMIN — IPRATROPIUM BROMIDE AND ALBUTEROL SULFATE 3 MILLILITER(S): .5; 2.5 SOLUTION RESPIRATORY (INHALATION) at 23:33

## 2025-02-08 RX ADMIN — IPRATROPIUM BROMIDE AND ALBUTEROL SULFATE 3 MILLILITER(S): .5; 2.5 SOLUTION RESPIRATORY (INHALATION) at 04:01

## 2025-02-08 RX ADMIN — MEROPENEM 100 MILLIGRAM(S): 500 INJECTION INTRAVENOUS at 05:58

## 2025-02-08 RX ADMIN — LEVOTHYROXINE SODIUM 75 MICROGRAM(S): 25 TABLET ORAL at 05:57

## 2025-02-08 NOTE — PROGRESS NOTE ADULT - SUBJECTIVE AND OBJECTIVE BOX
Patient is a 79y old  Female who presents with a chief complaint of CAD         INTERVAL HPI/OVERNIGHT EVENTS:   overnight events:    given k40 po for noon bmp, absolute retic count hypo. CT LLE c/f large hematoma, consulted vascular. called ortho for L knee effusion, low c/f septic arthritis, and says to call back if worsening knee pain. per vascular, will give 1u pRBC and 1u platelets, ordered CTA aorta with runoff. cta without active extravasation      Subjective:    ICU Vital Signs Last 24 Hrs  T(C): 36.9 (2025 05:00), Max: 37.6 (2025 14:00)  T(F): 98.5 (2025 05:00), Max: 99.7 (2025 14:00)  HR: 99 (2025 06:00) (74 - 99)  BP: 112/51 (2025 05:00) (86/49 - 127/50)  BP(mean): 74 (2025 05:00) (61 - 96)  ABP: 111/54 (2025 06:00) (90/32 - 140/55)  ABP(mean): 81 (2025 06:00) (70 - 109)  RR: 20 (2025 06:00) (12 - 23)  SpO2: 94% (2025 06:00) (92% - 100%)    O2 Parameters below as of 2025 07:00  Patient On (Oxygen Delivery Method): nasal cannula, high flow  O2 Flow (L/min): 40  O2 Concentration (%): 40      I&O's Summary    2025 07:01  -  2025 07:00  --------------------------------------------------------  IN: 1401.2 mL / OUT: 542 mL / NET: 859.2 mL    2025 07:01  -  2025 06:33  --------------------------------------------------------  IN: 1609.5 mL / OUT: 960 mL / NET: 649.5 mL          Daily     Daily Weight in k.1 (2025 10:00)    Adult Advanced Hemodynamics Last 24 Hrs  CVP(mm Hg): 6 (2025 06:00) (-3 - 239)  CVP(cm H2O): --  CO: --  CI: --  PA: 240/240 (2025 18:00) (23/14 - 240/240)  PA(mean): 240 (2025 18:00) (15 - 240)  PCWP: --  SVR: --  SVRI: --  PVR: --  PVRI: --    EKG/Telemetry Events:    MEDICATIONS  (STANDING):  albuterol/ipratropium for Nebulization 3 milliLiter(s) Nebulizer every 6 hours  atorvastatin 80 milliGRAM(s) Oral at bedtime  chlorhexidine 2% Cloths 1 Application(s) Topical <User Schedule>  dextrose 5%. 1000 milliLiter(s) (100 mL/Hr) IV Continuous <Continuous>  dextrose 50% Injectable 12.5 Gram(s) IV Push once  dextrose 50% Injectable 25 Gram(s) IV Push once  fluticasone propionate/ salmeterol 250-50 MICROgram(s) Diskus 1 Dose(s) Inhalation two times a day  glucagon  Injectable 1 milliGRAM(s) IntraMuscular once  insulin glargine Injectable (LANTUS) 30 Unit(s) SubCutaneous at bedtime  insulin lispro (ADMELOG) corrective regimen sliding scale   SubCutaneous every 6 hours  lactated ringers. 1000 milliLiter(s) (50 mL/Hr) IV Continuous <Continuous>  levothyroxine 75 MICROGram(s) Oral every 24 hours  meropenem  IVPB 1000 milliGRAM(s) IV Intermittent every 12 hours  montelukast 10 milliGRAM(s) Oral every 24 hours  pantoprazole  Injectable 40 milliGRAM(s) IV Push every 12 hours  sodium chloride 0.9% lock flush 3 milliLiter(s) IV Push every 8 hours    MEDICATIONS  (PRN):  albuterol    90 MICROgram(s) HFA Inhaler 2 Puff(s) Inhalation every 4 hours PRN Bronchospasm  dextrose Oral Gel 15 Gram(s) Oral once PRN Blood Glucose LESS THAN 70 milliGRAM(s)/deciliter      PHYSICAL EXAM:  HEENT:  pupils 3 mm, PERRL    Neck: Supple, - JVD; Carotid Bruit   Cardiovascular: Normal S1 S2,   Respiratory: anterior lung fields with bilateral rhonchi  Gastrointestinal:  Soft, Non-tender, + BS	  Skin: No rashes, No ecchymoses, No cyanosis  Extremities: Normal range of motion, No clubbing, anasarca  Vascular: Peripheral pulses palpable 2+ bilaterally  Neurologic: unable to assess orientation but patient is alert and responding to questions    LABS:                        7.1    16.25 )-----------( 38       ( 2025 22:28 )             22.2     02-07    133[L]  |  102  |  28[H]  ----------------------------<  212[H]  3.5   |  21[L]  |  2.03[H]    Ca    7.4[L]      2025 12:43  Phos  4.1     02-07  Mg     2.0     02-07    TPro  3.8[L]  /  Alb  2.4[L]  /  TBili  0.6  /  DBili  x   /  AST  43[H]  /  ALT  25  /  AlkPhos  56  02-07    LIVER FUNCTIONS - ( 2025 12:43 )  Alb: 2.4 g/dL / Pro: 3.8 g/dL / ALK PHOS: 56 U/L / ALT: 25 U/L / AST: 43 U/L / GGT: x           PT/INR - ( 2025 12:43 )   PT: 13.5 sec;   INR: 1.18          PTT - ( 2025 12:43 )  PTT:29.7 sec  CAPILLARY BLOOD GLUCOSE      POCT Blood Glucose.: 165 mg/dL (2025 22:48)  POCT Blood Glucose.: 185 mg/dL (2025 19:29)  POCT Blood Glucose.: 195 mg/dL (2025 17:45)  POCT Blood Glucose.: 207 mg/dL (2025 12:10)    ABG - ( 2025 12:15 )  pH, Arterial: 7.37  pH, Blood: x     /  pCO2: 35    /  pO2: 156   / HCO3: 20    / Base Excess: -4.4  /  SaO2: 98.8                    Urinalysis Basic - ( 2025 12:43 )    Color: x / Appearance: x / SG: x / pH: x  Gluc: 212 mg/dL / Ketone: x  / Bili: x / Urobili: x   Blood: x / Protein: x / Nitrite: x   Leuk Esterase: x / RBC: x / WBC x   Sq Epi: x / Non Sq Epi: x / Bacteria: x          RADIOLOGY & ADDITIONAL TESTS:  CXR:        Care Discussed with Consultants/Other Providers [ x] YES  [ ] NO           Patient is a 79y old  Female who presents with a chief complaint of CAD         INTERVAL HPI/OVERNIGHT EVENTS:   overnight events:    given k40 po for noon bmp, absolute retic count hypo. CT LLE c/f large hematoma, consulted vascular. called ortho for L knee effusion, low c/f septic arthritis, and says to call back if worsening knee pain. per vascular, will give 1u pRBC and 1u platelets, ordered CTA aorta with runoff. cta without active extravasation      Subjective:    ICU Vital Signs Last 24 Hrs  T(C): 36.9 (2025 05:00), Max: 37.6 (2025 14:00)  T(F): 98.5 (2025 05:00), Max: 99.7 (2025 14:00)  HR: 99 (2025 06:00) (74 - 99)  BP: 112/51 (2025 05:00) (86/49 - 127/50)  BP(mean): 74 (2025 05:00) (61 - 96)  ABP: 111/54 (2025 06:00) (90/32 - 140/55)  ABP(mean): 81 (2025 06:00) (70 - 109)  RR: 20 (2025 06:00) (12 - 23)  SpO2: 94% (2025 06:00) (92% - 100%)    O2 Parameters below as of 2025 07:00  Patient On (Oxygen Delivery Method): nasal cannula, high flow  O2 Flow (L/min): 40  O2 Concentration (%): 40      I&O's Summary    2025 07:01  -  2025 07:00  --------------------------------------------------------  IN: 1401.2 mL / OUT: 542 mL / NET: 859.2 mL    2025 07:01  -  2025 06:33  --------------------------------------------------------  IN: 1609.5 mL / OUT: 960 mL / NET: 649.5 mL          Daily     Daily Weight in k.1 (2025 10:00)    Adult Advanced Hemodynamics Last 24 Hrs  CVP(mm Hg): 6 (2025 06:00) (-3 - 239)  CVP(cm H2O): --  CO: --  CI: --  PA: 240/240 (2025 18:00) (23/14 - 240/240)  PA(mean): 240 (2025 18:00) (15 - 240)  PCWP: --  SVR: --  SVRI: --  PVR: --  PVRI: --    EKG/Telemetry Events: Runs of Afib 1-2 seconds     MEDICATIONS  (STANDING):  albuterol/ipratropium for Nebulization 3 milliLiter(s) Nebulizer every 6 hours  atorvastatin 80 milliGRAM(s) Oral at bedtime  chlorhexidine 2% Cloths 1 Application(s) Topical <User Schedule>  dextrose 5%. 1000 milliLiter(s) (100 mL/Hr) IV Continuous <Continuous>  dextrose 50% Injectable 12.5 Gram(s) IV Push once  dextrose 50% Injectable 25 Gram(s) IV Push once  fluticasone propionate/ salmeterol 250-50 MICROgram(s) Diskus 1 Dose(s) Inhalation two times a day  glucagon  Injectable 1 milliGRAM(s) IntraMuscular once  insulin glargine Injectable (LANTUS) 30 Unit(s) SubCutaneous at bedtime  insulin lispro (ADMELOG) corrective regimen sliding scale   SubCutaneous every 6 hours  lactated ringers. 1000 milliLiter(s) (50 mL/Hr) IV Continuous <Continuous>  levothyroxine 75 MICROGram(s) Oral every 24 hours  meropenem  IVPB 1000 milliGRAM(s) IV Intermittent every 12 hours  montelukast 10 milliGRAM(s) Oral every 24 hours  pantoprazole  Injectable 40 milliGRAM(s) IV Push every 12 hours  sodium chloride 0.9% lock flush 3 milliLiter(s) IV Push every 8 hours    MEDICATIONS  (PRN):  albuterol    90 MICROgram(s) HFA Inhaler 2 Puff(s) Inhalation every 4 hours PRN Bronchospasm  dextrose Oral Gel 15 Gram(s) Oral once PRN Blood Glucose LESS THAN 70 milliGRAM(s)/deciliter      PHYSICAL EXAM:  HEENT:  pupils 3 mm, PERRL    Neck: Supple, - JVD; Carotid Bruit   Cardiovascular: Normal S1 S2,   Respiratory: anterior lung fields with bilateral rhonchi  Gastrointestinal:  Soft, Non-tender, + BS	  Skin: No rashes, No ecchymoses, No cyanosis  Extremities: Normal range of motion, No clubbing, anasarca  Vascular: Peripheral pulses palpable 2+ bilaterally  Neurologic: unable to assess orientation but patient is alert and responding to questions    LABS:                        7.1    16.25 )-----------( 38       ( 2025 22:28 )             22.2     02-07    133[L]  |  102  |  28[H]  ----------------------------<  212[H]  3.5   |  21[L]  |  2.03[H]    Ca    7.4[L]      2025 12:43  Phos  4.1     02-07  Mg     2.0     02-07    TPro  3.8[L]  /  Alb  2.4[L]  /  TBili  0.6  /  DBili  x   /  AST  43[H]  /  ALT  25  /  AlkPhos  56  02-07    LIVER FUNCTIONS - ( 2025 12:43 )  Alb: 2.4 g/dL / Pro: 3.8 g/dL / ALK PHOS: 56 U/L / ALT: 25 U/L / AST: 43 U/L / GGT: x           PT/INR - ( 2025 12:43 )   PT: 13.5 sec;   INR: 1.18          PTT - ( 2025 12:43 )  PTT:29.7 sec  CAPILLARY BLOOD GLUCOSE      POCT Blood Glucose.: 165 mg/dL (2025 22:48)  POCT Blood Glucose.: 185 mg/dL (2025 19:29)  POCT Blood Glucose.: 195 mg/dL (2025 17:45)  POCT Blood Glucose.: 207 mg/dL (2025 12:10)    ABG - ( 2025 12:15 )  pH, Arterial: 7.37  pH, Blood: x     /  pCO2: 35    /  pO2: 156   / HCO3: 20    / Base Excess: -4.4  /  SaO2: 98.8                    Urinalysis Basic - ( 2025 12:43 )    Color: x / Appearance: x / SG: x / pH: x  Gluc: 212 mg/dL / Ketone: x  / Bili: x / Urobili: x   Blood: x / Protein: x / Nitrite: x   Leuk Esterase: x / RBC: x / WBC x   Sq Epi: x / Non Sq Epi: x / Bacteria: x          RADIOLOGY & ADDITIONAL TESTS:  CXR:        Care Discussed with Consultants/Other Providers [ x] YES  [ ] NO           Patient is a 79y old  Female who presents with a chief complaint of CAD         INTERVAL HPI/OVERNIGHT EVENTS:   overnight events:    given k40 po for noon bmp, absolute retic count hypo. CT LLE c/f large hematoma, consulted vascular. called ortho for L knee effusion, low c/f septic arthritis, and says to call back if worsening knee pain. per vascular, will give 1u pRBC and 1u platelets, ordered CTA aorta with runoff. cta without active extravasation      Subjective: Patient seen at bedside. Doing well. Feels much better than yesterday. Denies any chest pain, dizziness, headaches, shortness of breath, lower extremity pain, fevers, chills, abdominal pain.     ICU Vital Signs Last 24 Hrs  T(C): 36.9 (2025 05:00), Max: 37.6 (2025 14:00)  T(F): 98.5 (2025 05:00), Max: 99.7 (2025 14:00)  HR: 99 (2025 06:00) (74 - 99)  BP: 112/51 (2025 05:00) (86/49 - 127/50)  BP(mean): 74 (2025 05:00) (61 - 96)  ABP: 111/54 (2025 06:00) (90/32 - 140/55)  ABP(mean): 81 (2025 06:00) (70 - 109)  RR: 20 (2025 06:00) (12 - 23)  SpO2: 94% (2025 06:00) (92% - 100%)    O2 Parameters below as of 2025 07:00  Patient On (Oxygen Delivery Method): nasal cannula, high flow  O2 Flow (L/min): 40  O2 Concentration (%): 40      I&O's Summary    2025 07:01  -  2025 07:00  --------------------------------------------------------  IN: 1401.2 mL / OUT: 542 mL / NET: 859.2 mL    2025 07:01  -  2025 06:33  --------------------------------------------------------  IN: 1609.5 mL / OUT: 960 mL / NET: 649.5 mL          Daily     Daily Weight in k.1 (2025 10:00)    Adult Advanced Hemodynamics Last 24 Hrs  CVP(mm Hg): 6 (2025 06:00) (-3 - 239)  CVP(cm H2O): --  CO: --  CI: --  PA: 240/240 (2025 18:00) (23/14 - 240/240)  PA(mean): 240 (2025 18:00) (15 - 240)  PCWP: --  SVR: --  SVRI: --  PVR: --  PVRI: --    EKG/Telemetry Events: Runs of Afib 1-2 seconds     MEDICATIONS  (STANDING):  albuterol/ipratropium for Nebulization 3 milliLiter(s) Nebulizer every 6 hours  atorvastatin 80 milliGRAM(s) Oral at bedtime  chlorhexidine 2% Cloths 1 Application(s) Topical <User Schedule>  dextrose 5%. 1000 milliLiter(s) (100 mL/Hr) IV Continuous <Continuous>  dextrose 50% Injectable 12.5 Gram(s) IV Push once  dextrose 50% Injectable 25 Gram(s) IV Push once  fluticasone propionate/ salmeterol 250-50 MICROgram(s) Diskus 1 Dose(s) Inhalation two times a day  glucagon  Injectable 1 milliGRAM(s) IntraMuscular once  insulin glargine Injectable (LANTUS) 30 Unit(s) SubCutaneous at bedtime  insulin lispro (ADMELOG) corrective regimen sliding scale   SubCutaneous every 6 hours  lactated ringers. 1000 milliLiter(s) (50 mL/Hr) IV Continuous <Continuous>  levothyroxine 75 MICROGram(s) Oral every 24 hours  meropenem  IVPB 1000 milliGRAM(s) IV Intermittent every 12 hours  montelukast 10 milliGRAM(s) Oral every 24 hours  pantoprazole  Injectable 40 milliGRAM(s) IV Push every 12 hours  sodium chloride 0.9% lock flush 3 milliLiter(s) IV Push every 8 hours    MEDICATIONS  (PRN):  albuterol    90 MICROgram(s) HFA Inhaler 2 Puff(s) Inhalation every 4 hours PRN Bronchospasm  dextrose Oral Gel 15 Gram(s) Oral once PRN Blood Glucose LESS THAN 70 milliGRAM(s)/deciliter      PHYSICAL EXAM:  HEENT:  pupils 3 mm, PERRL    Neck: Supple, - JVD; Carotid Bruit   Cardiovascular: Normal S1 S2,   Respiratory: anterior lung fields with bilateral rhonchi  Gastrointestinal:  Soft, Non-tender, + BS	  Skin: No rashes, No ecchymoses, No cyanosis  Extremities: Normal range of motion, No clubbing, anasarca  Vascular: Peripheral pulses palpable 2+ bilaterally  Neurologic: unable to assess orientation but patient is alert and responding to questions    LABS:                        7.1    16.25 )-----------( 38       ( 2025 22:28 )             22.2     02-07    133[L]  |  102  |  28[H]  ----------------------------<  212[H]  3.5   |  21[L]  |  2.03[H]    Ca    7.4[L]      2025 12:43  Phos  4.1     02-07  Mg     2.0     02-07    TPro  3.8[L]  /  Alb  2.4[L]  /  TBili  0.6  /  DBili  x   /  AST  43[H]  /  ALT  25  /  AlkPhos  56  02-07    LIVER FUNCTIONS - ( 2025 12:43 )  Alb: 2.4 g/dL / Pro: 3.8 g/dL / ALK PHOS: 56 U/L / ALT: 25 U/L / AST: 43 U/L / GGT: x           PT/INR - ( 2025 12:43 )   PT: 13.5 sec;   INR: 1.18          PTT - ( 2025 12:43 )  PTT:29.7 sec  CAPILLARY BLOOD GLUCOSE      POCT Blood Glucose.: 165 mg/dL (2025 22:48)  POCT Blood Glucose.: 185 mg/dL (2025 19:29)  POCT Blood Glucose.: 195 mg/dL (2025 17:45)  POCT Blood Glucose.: 207 mg/dL (2025 12:10)    ABG - ( 2025 12:15 )  pH, Arterial: 7.37  pH, Blood: x     /  pCO2: 35    /  pO2: 156   / HCO3: 20    / Base Excess: -4.4  /  SaO2: 98.8                    Urinalysis Basic - ( 2025 12:43 )    Color: x / Appearance: x / SG: x / pH: x  Gluc: 212 mg/dL / Ketone: x  / Bili: x / Urobili: x   Blood: x / Protein: x / Nitrite: x   Leuk Esterase: x / RBC: x / WBC x   Sq Epi: x / Non Sq Epi: x / Bacteria: x          RADIOLOGY & ADDITIONAL TESTS:  CXR:        Care Discussed with Consultants/Other Providers [ x] YES  [ ] NO

## 2025-02-08 NOTE — PROGRESS NOTE ADULT - SUBJECTIVE AND OBJECTIVE BOX
INFECTIOUS DISEASES CONSULT FOLLOW-UP NOTE    INTERVAL HPI/OVERNIGHT EVENTS:  no event overnight  patient afebrile WBC improving, Cr improving   CT c/a/p showed RLL PNA, CBD dilatation, angio showed L thigh hematoma  patient c/o L thigh pain     ROS:   Constitutional, eyes, ENT, cardiovascular, respiratory, gastrointestinal, genitourinary, integumentary, neurological, psychiatric and heme/lymph are otherwise negative other than noted above       ANTIBIOTICS/RELEVANT:    MEDICATIONS  (STANDING):  albuterol/ipratropium for Nebulization 3 milliLiter(s) Nebulizer every 6 hours  aspirin enteric coated 81 milliGRAM(s) Oral every 24 hours  atorvastatin 80 milliGRAM(s) Oral at bedtime  chlorhexidine 2% Cloths 1 Application(s) Topical <User Schedule>  dextrose 5%. 1000 milliLiter(s) (100 mL/Hr) IV Continuous <Continuous>  dextrose 50% Injectable 12.5 Gram(s) IV Push once  dextrose 50% Injectable 25 Gram(s) IV Push once  fluticasone propionate/ salmeterol 250-50 MICROgram(s) Diskus 1 Dose(s) Inhalation two times a day  glucagon  Injectable 1 milliGRAM(s) IntraMuscular once  insulin glargine Injectable (LANTUS) 30 Unit(s) SubCutaneous at bedtime  insulin lispro (ADMELOG) corrective regimen sliding scale   SubCutaneous every 6 hours  lactated ringers. 1000 milliLiter(s) (50 mL/Hr) IV Continuous <Continuous>  levothyroxine 75 MICROGram(s) Oral every 24 hours  meropenem  IVPB 1000 milliGRAM(s) IV Intermittent every 12 hours  montelukast 10 milliGRAM(s) Oral every 24 hours  pantoprazole  Injectable 40 milliGRAM(s) IV Push every 12 hours  sodium chloride 0.9% lock flush 3 milliLiter(s) IV Push every 8 hours    MEDICATIONS  (PRN):  albuterol    90 MICROgram(s) HFA Inhaler 2 Puff(s) Inhalation every 4 hours PRN Bronchospasm  dextrose Oral Gel 15 Gram(s) Oral once PRN Blood Glucose LESS THAN 70 milliGRAM(s)/deciliter        Vital Signs Last 24 Hrs  T(C): 36.7 (08 Feb 2025 13:25), Max: 37.6 (07 Feb 2025 15:00)  T(F): 98 (08 Feb 2025 13:25), Max: 99.7 (07 Feb 2025 15:00)  HR: 81 (08 Feb 2025 14:00) (79 - 99)  BP: 101/54 (08 Feb 2025 11:00) (86/49 - 116/54)  BP(mean): 72 (08 Feb 2025 11:00) (64 - 78)  RR: 14 (08 Feb 2025 14:00) (12 - 24)  SpO2: 100% (08 Feb 2025 14:00) (94% - 100%)    Parameters below as of 08 Feb 2025 14:00  Patient On (Oxygen Delivery Method): nasal cannula, high flow  O2 Flow (L/min): 40  O2 Concentration (%): 40    02-07-25 @ 07:01  -  02-08-25 @ 07:00  --------------------------------------------------------  IN: 1819.5 mL / OUT: 1035 mL / NET: 784.5 mL    02-08-25 @ 07:01  -  02-08-25 @ 14:34  --------------------------------------------------------  IN: 270 mL / OUT: 585 mL / NET: -315 mL      PHYSICAL EXAM:  Constitutional: awake, NAD  Eyes: the sclera and conjunctiva were normal.   ENT: the ears and nose were normal in appearance. HFNC  Neck: the appearance of the neck was normal and the neck was supple.   Pulmonary: no respiratory distress and lungs were clear to auscultation bilaterally.   Heart: heart rate was normal and rhythm regular, normal S1 and S2  Abdomen: normal bowel sounds, soft, non-tender        LABS:                        8.1    12.20 )-----------( 71       ( 08 Feb 2025 13:35 )             25.2     02-08    135  |  104  |  23  ----------------------------<  133[H]  3.9   |  22  |  1.78[H]    Ca    7.7[L]      08 Feb 2025 05:30  Phos  3.7     02-08  Mg     1.9     02-08    TPro  4.2[L]  /  Alb  2.2[L]  /  TBili  1.0  /  DBili  x   /  AST  42[H]  /  ALT  22  /  AlkPhos  50  02-08    PT/INR - ( 07 Feb 2025 12:43 )   PT: 13.5 sec;   INR: 1.18          PTT - ( 07 Feb 2025 12:43 )  PTT:29.7 sec  Urinalysis Basic - ( 08 Feb 2025 05:30 )    Color: x / Appearance: x / SG: x / pH: x  Gluc: 133 mg/dL / Ketone: x  / Bili: x / Urobili: x   Blood: x / Protein: x / Nitrite: x   Leuk Esterase: x / RBC: x / WBC x   Sq Epi: x / Non Sq Epi: x / Bacteria: x        MICROBIOLOGY:  2/5 BCx ngtd  2/5 MRSA/MSSA PCR both positive  2/5 UCx neg  2/2 RVP neg   1/31 BCx neg    RADIOLOGY & ADDITIONAL STUDIES:  CT c/a/p reviewed

## 2025-02-08 NOTE — PROGRESS NOTE ADULT - SUBJECTIVE AND OBJECTIVE BOX
Ms. Rosario Petersen is a 79F w/ HTN, HLD, asthma, CKD, GERD, hypothyroidism, CHF, and CAD s/p PCI (11/2023), admitted for CP, SOB and NSTEMI s/p diagnostic [facility] demonstrating 3v CAD and newly reduced EF of 25%, transferred to [facility], but deemed not a CABG candidate. Her hospital course was complicated by shock, requiring pressors and IABP via R femoral access, pneumonia, L thigh hematoma and anemia. She was on DAPT and AC this admission. Vascular surgery was consulted on 2/7/25 for the anemia (HGB 7.1/22.2 at time of consult) and thigh hematoma, which was seen on non-contrast LLE CT Scan ("large hematoma centered within the adductor musculature of the proximal left thigh, measuring 11.8 x 10.8 cm transaxially"). CTA triple phase showed no evidence of active obvious arterial extravasation or venous pooling on delayed images. Her PLT count was also 38. She otherwise feels fine with minimal L thigh pain/tenderness. Says not bothering her much. Compartments soft. Motor and sensation grossly intact. Some ecchymosis.       Today 2/8/25, she was transfused and her labs look better (WBC 14.3, H/H 8.2/24.2, PLT 80). She is off pressors, while still on IABP. LLE remains soft. Minimal tenderness/pain.

## 2025-02-08 NOTE — PROGRESS NOTE ADULT - ASSESSMENT
79F h/o 3v CAD s/p PCI, CHF, T2DM, HTN, HLD initially p/w CP and SOB at [facility] on 1/24, transferred to [facility] on 1/30 for NSTEMI and 3v CAD management.  Course c/b KEN on CKD, recurrent hypotension, leukocytosis, now in cardiogenic/septic shock and AHRF s/p intubation and IABP.  CT c/a/p showed RLL PNA.  Also showed CBD dilatatoin but LFT unremarkable.  Source of septic shock likely due to PNA, overall now improving.    - cont meropenem 1g IV q12h   - vanco dose by level given KEN.  Check level in 24h.  When level <20, then give vanco 1250mg x 1  - duration of vanc/maria luisa is 7 days (2/6-2/12/25) to treat PNA    Thank you for your consult.  Please re-consult us or call us with questions.  Case d/w primary team.    Anna Aguayo MD, MS  Infectious Disease attending  office phone [phone]  For any questions during evening/weekend/holiday, please page ID on call

## 2025-02-08 NOTE — PROGRESS NOTE ADULT - ASSESSMENT
79 yr old F with PMHx of HTN, hyperlipidemia, DM, hypothyroidism, GERD, asthma, CAD s/p prior PCIs at [facility] 11/2023 who presented to [facility] 1/24/25 with chest pain and URI symptoms x 2-3 days, R/I NSTEMI, s/p diagnostic cath@[facility] revealing 3VD and newly reduced EF, initially transferred to [facility] 9LACH under Dr. 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 Course further c/b anemia pt now s/p 2u prbc on 2/4. and  cardiogenic shock, requiring pressor support and IABP. Transferred to CCU for further management         NEUROLOGY:  Extubated on high-flow but fatigued, continue to assess mental status as it improves.    CARDIOVASCULAR:    #Cardiogenic shock   - patient became hypotensive requiring inotropy support as well as mechanical ventilation and IABP for afterload reduction. Echo on arrival to [facility] showing reduced EF of 44%   -  New Acute CHF likely in setting of NSTEMI EF at [facility] was 25%, Echo on Arrival improved to 1/31/2025 44%  - Holding GDMT: Toprol increased to 50mg PO daily, isordil 5mg TID. Losartan on HOLD in setting of KEN.    Plan   - continue with levo gtt titrate as needed, waned vaso today  - c/w IABP @ 1:1 for afterload reduction, consider removing pending if patient is still eligible for revascularization procedure   - removing swan, no longer requiring hemos thermos as CI has remained appropriate        #NSTEMI   Moderate CP this AM   - S/p diagnotic [facility] @[facility] 1/28/24 w/ Dr. Morgan: dLM to pLAD 80% Stenosed ISR, LCX ostial to pLCX ISR, % Stenosed.  - TTE 1/31/25: mild LVH, Mod reduced LVSF, EF 44%. RWMA. Grade I DD. Aortic sclerosis w/o significant stenosis.  - Atorvastatin 40mg qd.   - Transitioned pt from heparin gtt to Lovenox 80mg sq BID, now on plavix and ASA but holding iso possible bleed  - discuss if revascularization candidate with cath team    **Prior Cath@[facility] 11/2023: Impella Assisted Rota/PCI with EMILIE to LM 90%, EMILIE ostial LAD and EMILIE ostial LCX, (dLM 80%, oLAD 85%, D2 75%, oLCX 85%, mRCA 75%).      PULMONARY:  #AHRF   - Weaned to high-flow, continue to wean to NC as tolerated    #PNA   Pulm Consulted: low suspicion for PNA, noted to have appearance of endobronchial material in superior segment RLL bronchus.   - CT Chest 1/31/2025 Persistent small large airways inflammation with interval increase in mucous plugging with near complete obstruction of the lateral segmental bronchus to the RML, RLL bronchus and anteromedial basal segmental bronchus to the LLL. Associated groundglass opacities are identified and early multifocal pneumonia possibly postobstructive cannot be excluded.   - Recommended outpatient pulm follow up w PFTs (to diagnosis obstructive lung disease) and consideration of repeat imaging.   - WBC on Admission 13--->17.87->20.27, today 17 MRSA swab + at [facility].   - UA normal, Blood Cultures 1/31 NGTD, Procal normal.     #History of Asthma   Pulm following Appreciate Recs   - Duonebs, Hypertonic Saline to aid with Mucous plugging  - C/w symbicort and Montelukast         GASTROINTESTINAL:  - NG tube in place, tube feed at goal  - Bedside dysphagia after secretions improve      RENAL:  #CKD Stage 3   KEN on CKD Stage 3 (baseline Cr ~1.3-1.5 from prior Labs)   - Today 1.54, improved with Fluids likely due to hypovolemia episode vs dehydration   - Urine studies: 2/3: Na 71, Protein 27, Creatinine 108, Pr/Cr Ratio .2, Urea Nitrogen 832, K 21, Osmol 505  - ctm, trend creatinine  - avoid nephrotoxic meds  - monitor UOP    INFECTIOUS DISEASE:  #PNA   Pulm Consulted: low suspicion for PNA, noted to have appearance of endobronchial material in superior segment RLL bronchus.   - CT Chest 1/31/2025 Persistent small large airways inflammation with interval increase in mucous plugging with near complete obstruction of the lateral segmental bronchus to the RML, RLL bronchus and anteromedial basal segmental bronchus to the LLL. Associated groundglass opacities are identified and early multifocal pneumonia possibly postobstructive cannot be excluded.   - Recommended outpatient pulm follow up w PFTs (to diagnosis obstructive lung disease) and consideration of repeat imaging.   - WBC on Admission 13--->17.87->20.27, today 17 MRSA swab + at [facility].   - UA normal, Blood Cultures 1/31 NGTD, Procal normal.   - on arrival to CCU: ordered repeat CXR, BCX, UCX, UA, MRSA swab   - MRSA swab positive, RVP negative   - pro-hailee elevated at 1.6    Plan   - F/U BCX, UA, UCX,    - c/w Vancomycin, dose by AM Level  - zosyn --> meropenem 1g BID  - ID consulted, appreciate recs     ENDOCRINE:  #Hypothyroidism   - C/w Levothyroxine 75mcg PO daily.  - TSH 1.390    #DM   A1C 6.9    Plan   - C/w FS's   - ISS,   - increased Lantus to 30 at bedtime for elevated sugars       HEME:    #Anemia   Pt is s/p 2u prbc 2/4/25 AM, 1u pRBC additional on 2/7 AM. Negative SEGUN. B12, folate, iron studies wnl  - Hgb on admission 10.1. Steadily downtrending,  Hgb this AM 7.0 (2/7)  Plan   - repeat CBC s/p 1u pRBC this AM  - CT Chest, A+P, LLE to assess for signs of bleeding    #LE Hematoma   CT LLE: c/f large hematoma  CT angio (2/8): large intramuscular hematoma involving the anteromedial  thigh musculature with heterogeneous attenuation, suggestive of   hemorrhagic components in various stages of evolution. No evidence of  arterial extravasation or venous pooling on delayed images.   Plan   - vascular following, appreciate reccs   - s/p 1 unit PRBC overnight on 2/7 - 2/8          FLUIDS/ELECTROLYTES/NUTRITION:  -Drips: Nepi 0.04  -Monitor, Replete to K>4 and Mg>2  -Diet - nepro tube feeds at goal, bedside dysphagia today to advance diet. Consider FEES if failure of bedside test.     PROPHYLAXIS  -DVT: SCD, hold off plavix and ASA pending anemia workup  -GI- PPI qd, trickle feeds, pending bedside dysphagia  DISPO: CCU  DNR/DNI  79 yr old F with PMHx of HTN, hyperlipidemia, DM, hypothyroidism, GERD, asthma, CAD s/p prior PCIs at [facility] 11/2023 who [place] [facility] [place] with chest pain and URI symptoms x 2-3 days, R/I NSTEMI, s/p [place] [place] 3VD and newly reduced EF, initially transferred to [facility] 9LACH under Dr. 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 Course further c/b anemia pt now s/p 2u prbc on 2/4. and  cardiogenic shock, requiring pressor support and IABP. Transferred to CCU for further management         NEUROLOGY:  Extubated on high-flow but fatigued, continue to assess mental status as it improves.    CARDIOVASCULAR:    #Cardiogenic shock   - patient became hypotensive requiring inotropy support as well as mechanical ventilation and IABP for afterload reduction. Echo on arrival to [facility] showing reduced EF of 44%   -  New Acute CHF likely in setting of NSTEMI [place] at [facility] was 25%, Echo on Arrival improved to 1/31/2025 44%  - Holding GDMT: Toprol increased to 50mg PO daily, isordil 5mg TID. Losartan on HOLD in setting of KEN.  - off of levo and Vaso as of 2/8/25    Plan   - c/w IABP @ 1:1 for afterload reduction, will decrease augmentation pressure by 1/2 today and repeat perfusions labs in afternoon   - Lasix 40mg IVP x 1   - restart ASA         #NSTEMI   Moderate CP this AM   - S/p diag[place]ic LHC [place] [place] w/ Dr. Morgan: dLM to pLAD 80% Stenosed ISR, LCX ostial to pLCX ISR, % Stenosed.  - TTE 1/31/25: mild LVH, Mod reduced LVSF, EF 44%. RWMA. Grade I DD. Aortic sclerosis w/o significant stenosis.  - Atorvastatin 40mg qd.   - Transitioned pt from heparin gtt to Lovenox 80mg sq BID, now on plavix and ASA but holding iso possible bleed  - discuss if revascularization candidate with cath team    **Prior Cath@[facility] 11/2023: Impella Assisted Rota/PCI with EMILIE to LM 90%, EMILIE ostial LAD and EMILIE ostial LCX, (dLM 80%, oLAD 85%, D2 75%, oLCX 85%, mRCA 75%).      PULMONARY:  #AHRF   - Weaned to high-flow, continue to wean to NC as tolerated    #PNA   Pulm Consulted: low suspicion for PNA, noted to have appearance of endobronchial material in superior segment RLL bronchus.   - CT Chest 1/31/2025 Persistent small large airways inflammation with interval increase in mucous plugging with near complete obstruction of the lateral segmental bronchus to the RML, RLL bronchus and anteromedial basal segmental bronchus to the LLL. Associated groundglass opacities are identified and early multifocal pneumonia possibly postobstructive cannot be excluded.   - Recommended outpatient pulm follow up w PFTs (to diagnosis obstructive lung disease) and consideration of repeat imaging.   - WBC on Admission 13--->17.87->20.27, today 17 [place] + [place] [facility]   - UA normal, Blood Cultures 1/31 NGTD, Procal normal.     #History of Asthma   Pulm following Appreciate Recs   - Duonebs, Hypertonic Saline to aid with Mucous plugging  - C/w symbicort and Montelukast         GASTROINTESTINAL:  - NG tube in place, tube feed at goal  - Bedside dysphagia after secretions improve      RENAL:  #CKD Stage 3   KEN on CKD Stage 3 (baseline Cr ~1.3-1.5 from prior Labs)   - Today 1.54, improved with Fluids likely due to hypovolemia episode vs dehydration   - Urine studies: 2/3: Na 71, Protein 27, Creatinine 108, Pr/Cr Ratio .2, Urea Nitrogen 832, K 21, Osmol 505  - ctm, trend creatinine  - avoid nephrotoxic meds  - monitor UOP    INFECTIOUS DISEASE:  #PNA   Pulm Consulted: low suspicion for PNA, noted to have appearance of endobronchial material in superior segment RLL bronchus.   - CT Chest 1/31/2025 Persistent small large airways inflammation with interval increase in mucous plugging with near complete obstruction of the lateral segmental bronchus to the RML, RLL bronchus and anteromedial basal segmental bronchus to the LLL. Associated groundglass opacities are identified and early multifocal pneumonia possibly postobstructive cannot be excluded.   - Recommended outpatient pulm follow up w PFTs (to diagnosis obstructive lung disease) and consideration of repeat imaging.   - WBC on Admission 13--->17.87->20.27, today 17 [place] + [place] [facility]   - UA normal, Blood Cultures 1/31 NGTD, Procal normal.   - on arrival to CCU: ordered repeat CXR, BCX, UCX, UA, MRSA swab   - MRSA swab positive, RVP negative   - pro-hailee elevated at 1.6    Plan   - F/U BCX, UA, UCX,    - c/w Vancomycin, dose by AM Level  - zosyn --> meropenem 1g BID  - ID consulted, appreciate recs     ENDOCRINE:  #Hypothyroidism   - C/w Levothyroxine 75mcg PO daily.  - TSH 1.390    #DM   A1C 6.9    Plan   - C/w FS's   - ISS,   - increased Lantus to 30 at bedtime for elevated sugars       HEME:    #Anemia   Pt is s/p 2u prbc 2/4/25 AM, 1u pRBC additional on 2/7 AM. Negative SEGUN. B12, folate, iron studies wnl  - Hgb on admission 10.1. Steadily downtrending,  Hgb this AM 7.0 (2/7)  Plan   - repeat CBC s/p 1u pRBC this AM  - CT Chest, A+P, LLE to assess for signs of bleeding    #LE Hematoma   CT LLE: c/f large hematoma  CT angio (2/8): large intramuscular hematoma involving the anteromedial  thigh musculature with heterogeneous attenuation, suggestive of   hemorrhagic components in various stages of evolution. No evidence of  arterial extravasation or venous pooling on delayed images.   Plan   - vascular following, appreciate reccs   - s/p 1 unit PRBC overnight on 2/7 - 2/8          FLUIDS/ELECTROLYTES/NUTRITION:  -Drips: Nepi 0.04  -Monitor, Replete to K>4 and Mg>2  -Diet - nepro tube feeds at goal, bedside dysphagia today to advance diet. Consider FEES if failure of bedside test.     PROPHYLAXIS  -DVT: SCD, hold off plavix and ASA pending anemia workup  -GI- PPI qd, trickle feeds, pending bedside dysphagia  DISPO: CCU  DNR/DNI  79 yr old F with PMHx of HTN, hyperlipidemia, DM, hypothyroidism, GERD, asthma, CAD s/p prior PCIs at [facility] 11/2023 who presented to [facility] 1/24/25 with chest pain and URI symptoms x 2-3 days, R/I NSTEMI, s/p diagnostic cath@[facility] revealing 3VD and newly reduced EF, initially transferred to [facility] 9LACH under Dr. 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 Course further c/b anemia pt now s/p 2u prbc on 2/4. and  cardiogenic shock, requiring pressor support and IABP. Transferred to CCU for further management         NEUROLOGY:  Extubated on high-flow but fatigued, continue to assess mental status as it improves.    CARDIOVASCULAR:    #Cardiogenic shock   - patient became hypotensive requiring inotropy support as well as mechanical ventilation and IABP for afterload reduction. Echo on arrival to [facility] showing reduced EF of 44%   -  New Acute CHF likely in setting of NSTEMI EF at [facility] was 25%, Echo on Arrival improved to 1/31/2025 44%  - Holding GDMT: Toprol increased to 50mg PO daily, isordil 5mg TID. Losartan on HOLD in setting of KEN.  - off of levo and Vaso as of 2/8/25    Plan   - c/w IABP @ 1:1 for afterload reduction, will decrease augmentation pressure by 1/2 today and repeat perfusions labs in afternoon   - Lasix 40mg IVP x 1   - will start ASA 81mg daily         #NSTEMI   Moderate CP this AM   - S/p diagnotic LHC @[facility] 1/28/24 w/ Dr. Morgan: dLM to pLAD 80% Stenosed ISR, LCX ostial to pLCX ISR, % Stenosed.  - TTE 1/31/25: mild LVH, Mod reduced LVSF, EF 44%. RWMA. Grade I DD. Aortic sclerosis w/o significant stenosis.  - Atorvastatin 40mg qd.   - Transitioned pt from heparin gtt to Lovenox 80mg sq BID, now on plavix and ASA but holding iso possible bleed  - discuss if revascularization candidate with cath team    **Prior Cath@[facility] 11/2023: Impella Assisted Rota/PCI with EMILIE to LM 90%, EMILIE ostial LAD and EMILIE ostial LCX, (dLM 80%, oLAD 85%, D2 75%, oLCX 85%, mRCA 75%).      PULMONARY:  #AHRF   - Weaned to high-flow, continue to wean to NC as tolerated    #PNA   Pulm Consulted: low suspicion for PNA, noted to have appearance of endobronchial material in superior segment RLL bronchus.   - CT Chest 1/31/2025 Persistent small large airways inflammation with interval increase in mucous plugging with near complete obstruction of the lateral segmental bronchus to the RML, RLL bronchus and anteromedial basal segmental bronchus to the LLL. Associated groundglass opacities are identified and early multifocal pneumonia possibly postobstructive cannot be excluded.   - Recommended outpatient pulm follow up w PFTs (to diagnosis obstructive lung disease) and consideration of repeat imaging.   - WBC on Admission 13--->17.87->20.27, today 17 MRSA swab + at [facility].   - UA normal, Blood Cultures 1/31 NGTD, Procal normal.     #History of Asthma   Pulm following Appreciate Recs   - Duonebs, Hypertonic Saline to aid with Mucous plugging  - C/w symbicort and Montelukast         GASTROINTESTINAL:  - NG tube in place, tube feed at goal  - Bedside dysphagia after secretions improve      RENAL:  #CKD Stage 3   KEN on CKD Stage 3 (baseline Cr ~1.3-1.5 from prior Labs)   - Today 1.54, improved with Fluids likely due to hypovolemia episode vs dehydration   - Urine studies: 2/3: Na 71, Protein 27, Creatinine 108, Pr/Cr Ratio .2, Urea Nitrogen 832, K 21, Osmol 505  - ctm, trend creatinine  - avoid nephrotoxic meds  - monitor UOP    INFECTIOUS DISEASE:  #PNA   Pulm Consulted: low suspicion for PNA, noted to have appearance of endobronchial material in superior segment RLL bronchus.   - CT Chest 1/31/2025 Persistent small large airways inflammation with interval increase in mucous plugging with near complete obstruction of the lateral segmental bronchus to the RML, RLL bronchus and anteromedial basal segmental bronchus to the LLL. Associated groundglass opacities are identified and early multifocal pneumonia possibly postobstructive cannot be excluded.   - Recommended outpatient pulm follow up w PFTs (to diagnosis obstructive lung disease) and consideration of repeat imaging.   - WBC on Admission 13--->17.87->20.27, today 17 MRSA swab + at [facility].   - UA normal, Blood Cultures 1/31 NGTD, Procal normal.   - on arrival to CCU: ordered repeat CXR, BCX, UCX, UA, MRSA swab   - MRSA swab positive, RVP negative   - pro-hailee elevated at 1.6    Plan   - F/U BCX, UA, UCX,    - c/w Vancomycin, dose by AM Level  - zosyn --> meropenem 1g BID  - ID consulted, appreciate recs     ENDOCRINE:  #Hypothyroidism   - C/w Levothyroxine 75mcg PO daily.  - TSH 1.390    #DM   A1C 6.9    Plan   - C/w FS's   - ISS,   - increased Lantus to 30 at bedtime for elevated sugars       HEME:    #Anemia   Pt is s/p 2u prbc 2/4/25 AM, 1u pRBC additional on 2/7 AM. Negative SEGUN. B12, folate, iron studies wnl  - Hgb on admission 10.1. Steadily downtrending,  Hgb this AM 7.0 (2/7)  - s/p 1 unit PRBC overnight on 2/7 - 2/8  Plan   - Transfuse for HGb < 7  - CT Chest, A+P, LLE to assess for signs of bleeding    #LE Hematoma   CT LLE: c/f large hematoma  CT angio (2/8): large intramuscular hematoma involving the anteromedial  thigh musculature with heterogeneous attenuation, suggestive of   hemorrhagic components in various stages of evolution. No evidence of  arterial extravasation or venous pooling on delayed images.   Possibly secondary to trauma while at [facility], reportedly tried both femoral arteries for access during angio.   Plan   - vascular following, appreciate reccs   - start ASA 81mg daiy           FLUIDS/ELECTROLYTES/NUTRITION:  -Drips: None   -Monitor, Replete to K>4 and Mg>2  -Diet - nepro tube feeds at goal, bedside dysphagia today to advance diet. Consider FEES if failure of bedside test.     PROPHYLAXIS  -DVT: SCD, hold off plavix and ASA pending anemia workup  -GI- PPI qd, soft and bite sized diet   DISPO: CCU  DNR/DNI

## 2025-02-08 NOTE — PROGRESS NOTE ADULT - ATTENDING COMMENTS
80 YO F with a history of HF with midrange LVEF, CAD s/p multiple PCI, HTN, and DM2 who presented to [facility] with unstable angina and found to have severe 3v CAD with ISR of left main stent prompting transfer to [facility] for CABG evaluation where was she was deemed not a candidate due to porceline aorta. She was euvolemic and well optimized from a HF perspective. She was awaiting PCI and acutely decompensated 2/5 likely setting of bleed leading to severe ischemia (later found to have intramuscular hematoma) leading to cardiac ischemia and mixed hemorhhagic/cardiogenic shock requiring IABP.    She is now improving after correction of hemoglobin, weaned off pressors, and extubated. Denies chest pain and EKG without ischemic changes    - Will trial IABP wean at 1/2 augmentation given inability to anticoagulate, if symptoms/vitals and central venous saturation acceptable will remove IABP  - Restart aspirin. Hold P2Y12i for now with recent severe bleed. Vascular following.   - ID following re: antibiotics, planned to continue until 2/12  - Will need to re-engage IC about possible revascularization if able to tolerate DAPT 80 YO F with a history of HF with midrange LVEF, CAD s/p multiple PCI, HTN, and DM2 who presented to [facility] with unstable angina and found to have severe 3v CAD with ISR of left main stent prompting transfer to [facility] for CABG evaluation where was she was deemed not a candidate due to porceline aorta. She was euvolemic and well optimized from a HF perspective. She was awaiting PCI and acutely decompensated 2/5 likely setting of bleed leading to severe ischemia (later found to have intramuscular hematoma) leading to cardiac ischemia and mixed hemorhhagic/cardiogenic/septic shock requiring IABP.    She is now improving after correction of hemoglobin, weaned off pressors, and extubated. She denies chest pain and EKG is without ischemic changes    - Will trial IABP wean at 1/2 augmentation given inability to anticoagulate, if symptoms/vitals and central venous saturation acceptable will remove IABP  - Restart aspirin. Hold P2Y12i for now with recent severe bleed. Vascular following. q 6 hour CBC's  - Lasix 40 mg IV once, CVP 8-10   - ID following re: antibiotics, planned to continue until 2/12 to treat pneumonia  - Will need to re-engage IC about candidacy for revascularization if able to tolerate DAPT

## 2025-02-08 NOTE — PROGRESS NOTE ADULT - ASSESSMENT
ASSESSMENT  - NSTEMI s/p diagnostic [facility] demonstrating 3v CAD and newly reduced EF of 25%, transferred to [facility], but deemed not a CABG candidate. Her hospital course was complicated by shock, requiring pressors and IABP via R femoral access, pneumonia, L thigh hematoma and anemia  - Spontaneous large L thigh hematoma w/ anemia likely 2/2 triple therapy --> no active obvious arterial extravasation or venous pooling on CTA. Would opt for conservative management at this time      PLAN & RECOMMENDATIONS  - No acute vascular intervention  - Trend CBC and transfuse PRN; Would wait until HGB stabilizes before resuming DAPT and/or AC if possible. If requiring AC would use heparin drip since can be titrated  - Ace-wrap compression from foot to L groin  - Watch for signs of GIB as well  - Neuro vascular and thigh compartment checks  - C/w care per CCU/cardiology      Thank you,    Benito Gil MD   of Vascular Surgery  [facility] at [address] [place] [address]  Office: [phone]; Fax: [phone]  [email or web address]

## 2025-02-09 LAB
ALBUMIN SERPL ELPH-MCNC: 2.5 G/DL — LOW (ref 3.3–5)
ALBUMIN SERPL ELPH-MCNC: 2.6 G/DL — LOW (ref 3.3–5)
ALP SERPL-CCNC: 49 U/L — SIGNIFICANT CHANGE UP (ref 40–120)
ALP SERPL-CCNC: 50 U/L — SIGNIFICANT CHANGE UP (ref 40–120)
ALT FLD-CCNC: 23 U/L — SIGNIFICANT CHANGE UP (ref 10–45)
ALT FLD-CCNC: 23 U/L — SIGNIFICANT CHANGE UP (ref 10–45)
ANION GAP SERPL CALC-SCNC: 12 MMOL/L — SIGNIFICANT CHANGE UP (ref 5–17)
ANION GAP SERPL CALC-SCNC: 9 MMOL/L — SIGNIFICANT CHANGE UP (ref 5–17)
AST SERPL-CCNC: 38 U/L — SIGNIFICANT CHANGE UP (ref 10–40)
AST SERPL-CCNC: 39 U/L — SIGNIFICANT CHANGE UP (ref 10–40)
BASOPHILS # BLD AUTO: 0.04 K/UL — SIGNIFICANT CHANGE UP (ref 0–0.2)
BASOPHILS NFR BLD AUTO: 0.4 % — SIGNIFICANT CHANGE UP (ref 0–2)
BILIRUB SERPL-MCNC: 0.9 MG/DL — SIGNIFICANT CHANGE UP (ref 0.2–1.2)
BILIRUB SERPL-MCNC: 1.2 MG/DL — SIGNIFICANT CHANGE UP (ref 0.2–1.2)
BUN SERPL-MCNC: 22 MG/DL — SIGNIFICANT CHANGE UP (ref 7–23)
BUN SERPL-MCNC: 23 MG/DL — SIGNIFICANT CHANGE UP (ref 7–23)
CALCIUM SERPL-MCNC: 7.4 MG/DL — LOW (ref 8.4–10.5)
CALCIUM SERPL-MCNC: 7.6 MG/DL — LOW (ref 8.4–10.5)
CHLORIDE SERPL-SCNC: 104 MMOL/L — SIGNIFICANT CHANGE UP (ref 96–108)
CHLORIDE SERPL-SCNC: 106 MMOL/L — SIGNIFICANT CHANGE UP (ref 96–108)
CO2 SERPL-SCNC: 22 MMOL/L — SIGNIFICANT CHANGE UP (ref 22–31)
CO2 SERPL-SCNC: 22 MMOL/L — SIGNIFICANT CHANGE UP (ref 22–31)
CREAT SERPL-MCNC: 1.74 MG/DL — HIGH (ref 0.5–1.3)
CREAT SERPL-MCNC: 1.76 MG/DL — HIGH (ref 0.5–1.3)
EGFR: 29 ML/MIN/1.73M2 — LOW
EGFR: 30 ML/MIN/1.73M2 — LOW
EOSINOPHIL # BLD AUTO: 0.46 K/UL — SIGNIFICANT CHANGE UP (ref 0–0.5)
EOSINOPHIL NFR BLD AUTO: 4.2 % — SIGNIFICANT CHANGE UP (ref 0–6)
GLUCOSE SERPL-MCNC: 118 MG/DL — HIGH (ref 70–99)
GLUCOSE SERPL-MCNC: 90 MG/DL — SIGNIFICANT CHANGE UP (ref 70–99)
HCT VFR BLD CALC: 24.7 % — LOW (ref 34.5–45)
HCT VFR BLD CALC: 26.6 % — LOW (ref 34.5–45)
HGB BLD-MCNC: 8 G/DL — LOW (ref 11.5–15.5)
HGB BLD-MCNC: 8.5 G/DL — LOW (ref 11.5–15.5)
IMM GRANULOCYTES NFR BLD AUTO: 1.7 % — HIGH (ref 0–0.9)
LACTATE SERPL-SCNC: 0.6 MMOL/L — SIGNIFICANT CHANGE UP (ref 0.5–2)
LYMPHOCYTES # BLD AUTO: 1.82 K/UL — SIGNIFICANT CHANGE UP (ref 1–3.3)
LYMPHOCYTES # BLD AUTO: 16.5 % — SIGNIFICANT CHANGE UP (ref 13–44)
MAGNESIUM SERPL-MCNC: 1.8 MG/DL — SIGNIFICANT CHANGE UP (ref 1.6–2.6)
MAGNESIUM SERPL-MCNC: 1.8 MG/DL — SIGNIFICANT CHANGE UP (ref 1.6–2.6)
MCHC RBC-ENTMCNC: 29.7 PG — SIGNIFICANT CHANGE UP (ref 27–34)
MCHC RBC-ENTMCNC: 30.8 PG — SIGNIFICANT CHANGE UP (ref 27–34)
MCHC RBC-ENTMCNC: 32 G/DL — SIGNIFICANT CHANGE UP (ref 32–36)
MCHC RBC-ENTMCNC: 32.4 G/DL — SIGNIFICANT CHANGE UP (ref 32–36)
MCV RBC AUTO: 93 FL — SIGNIFICANT CHANGE UP (ref 80–100)
MCV RBC AUTO: 95 FL — SIGNIFICANT CHANGE UP (ref 80–100)
MONOCYTES # BLD AUTO: 0.92 K/UL — HIGH (ref 0–0.9)
MONOCYTES NFR BLD AUTO: 8.3 % — SIGNIFICANT CHANGE UP (ref 2–14)
NEUTROPHILS # BLD AUTO: 7.63 K/UL — HIGH (ref 1.8–7.4)
NEUTROPHILS NFR BLD AUTO: 68.9 % — SIGNIFICANT CHANGE UP (ref 43–77)
NRBC # BLD: 0 /100 WBCS — SIGNIFICANT CHANGE UP (ref 0–0)
NRBC # BLD: 0 /100 WBCS — SIGNIFICANT CHANGE UP (ref 0–0)
NRBC BLD-RTO: 0 /100 WBCS — SIGNIFICANT CHANGE UP (ref 0–0)
NRBC BLD-RTO: 0 /100 WBCS — SIGNIFICANT CHANGE UP (ref 0–0)
PHOSPHATE SERPL-MCNC: 3.3 MG/DL — SIGNIFICANT CHANGE UP (ref 2.5–4.5)
PHOSPHATE SERPL-MCNC: 3.3 MG/DL — SIGNIFICANT CHANGE UP (ref 2.5–4.5)
PLATELET # BLD AUTO: 70 K/UL — LOW (ref 150–400)
PLATELET # BLD AUTO: 81 K/UL — LOW (ref 150–400)
POTASSIUM SERPL-MCNC: 3.7 MMOL/L — SIGNIFICANT CHANGE UP (ref 3.5–5.3)
POTASSIUM SERPL-MCNC: 4.3 MMOL/L — SIGNIFICANT CHANGE UP (ref 3.5–5.3)
POTASSIUM SERPL-SCNC: 3.7 MMOL/L — SIGNIFICANT CHANGE UP (ref 3.5–5.3)
POTASSIUM SERPL-SCNC: 4.3 MMOL/L — SIGNIFICANT CHANGE UP (ref 3.5–5.3)
PROT SERPL-MCNC: 4.4 G/DL — LOW (ref 6–8.3)
PROT SERPL-MCNC: 4.4 G/DL — LOW (ref 6–8.3)
RBC # BLD: 2.6 M/UL — LOW (ref 3.8–5.2)
RBC # BLD: 2.86 M/UL — LOW (ref 3.8–5.2)
RBC # FLD: 16.1 % — HIGH (ref 10.3–14.5)
RBC # FLD: 16.3 % — HIGH (ref 10.3–14.5)
SODIUM SERPL-SCNC: 137 MMOL/L — SIGNIFICANT CHANGE UP (ref 135–145)
SODIUM SERPL-SCNC: 138 MMOL/L — SIGNIFICANT CHANGE UP (ref 135–145)
VANCOMYCIN TROUGH SERPL-MCNC: 17.8 UG/ML — SIGNIFICANT CHANGE UP (ref 10–20)
WBC # BLD: 10.88 K/UL — HIGH (ref 3.8–10.5)
WBC # BLD: 11.06 K/UL — HIGH (ref 3.8–10.5)
WBC # FLD AUTO: 10.88 K/UL — HIGH (ref 3.8–10.5)
WBC # FLD AUTO: 11.06 K/UL — HIGH (ref 3.8–10.5)

## 2025-02-09 PROCEDURE — 71045 X-RAY EXAM CHEST 1 VIEW: CPT | Mod: 26

## 2025-02-09 PROCEDURE — 99291 CRITICAL CARE FIRST HOUR: CPT | Mod: GC

## 2025-02-09 PROCEDURE — 93010 ELECTROCARDIOGRAM REPORT: CPT

## 2025-02-09 RX ORDER — ACETAMINOPHEN 160 MG/5ML
650 SUSPENSION ORAL EVERY 6 HOURS
Refills: 0 | Status: DISCONTINUED | OUTPATIENT
Start: 2025-02-09 | End: 2025-02-10

## 2025-02-09 RX ORDER — VANCOMYCIN HYDROCHLORIDE 50 MG/ML
1250 KIT ORAL ONCE
Refills: 0 | Status: COMPLETED | OUTPATIENT
Start: 2025-02-09 | End: 2025-02-09

## 2025-02-09 RX ORDER — POTASSIUM CHLORIDE 750 MG/1
10 TABLET, EXTENDED RELEASE ORAL
Refills: 0 | Status: DISCONTINUED | OUTPATIENT
Start: 2025-02-09 | End: 2025-02-09

## 2025-02-09 RX ORDER — POTASSIUM CHLORIDE 750 MG/1
10 TABLET, EXTENDED RELEASE ORAL
Refills: 0 | Status: COMPLETED | OUTPATIENT
Start: 2025-02-09 | End: 2025-02-09

## 2025-02-09 RX ORDER — METOPROLOL SUCCINATE 25 MG
25 TABLET, EXTENDED RELEASE 24 HR ORAL DAILY
Refills: 0 | Status: DISCONTINUED | OUTPATIENT
Start: 2025-02-09 | End: 2025-02-14

## 2025-02-09 RX ORDER — MAGNESIUM SULFATE 0.8 MEQ/ML
2 AMPUL (ML) INJECTION ONCE
Refills: 0 | Status: COMPLETED | OUTPATIENT
Start: 2025-02-09 | End: 2025-02-09

## 2025-02-09 RX ADMIN — Medication 25 MILLIGRAM(S): at 10:33

## 2025-02-09 RX ADMIN — IPRATROPIUM BROMIDE AND ALBUTEROL SULFATE 3 MILLILITER(S): .5; 2.5 SOLUTION RESPIRATORY (INHALATION) at 17:09

## 2025-02-09 RX ADMIN — MEROPENEM 100 MILLIGRAM(S): 500 INJECTION INTRAVENOUS at 05:31

## 2025-02-09 RX ADMIN — Medication 40 MILLIGRAM(S): at 10:33

## 2025-02-09 RX ADMIN — POTASSIUM CHLORIDE 100 MILLIEQUIVALENT(S): 750 TABLET, EXTENDED RELEASE ORAL at 02:18

## 2025-02-09 RX ADMIN — Medication 25 GRAM(S): at 06:41

## 2025-02-09 RX ADMIN — Medication 1: at 11:09

## 2025-02-09 RX ADMIN — POTASSIUM CHLORIDE 100 MILLIEQUIVALENT(S): 750 TABLET, EXTENDED RELEASE ORAL at 03:01

## 2025-02-09 RX ADMIN — IPRATROPIUM BROMIDE AND ALBUTEROL SULFATE 3 MILLILITER(S): .5; 2.5 SOLUTION RESPIRATORY (INHALATION) at 05:31

## 2025-02-09 RX ADMIN — ANTISEPTIC SURGICAL SCRUB 1 APPLICATION(S): 0.04 SOLUTION TOPICAL at 05:31

## 2025-02-09 RX ADMIN — VANCOMYCIN HYDROCHLORIDE 166.67 MILLIGRAM(S): KIT at 09:47

## 2025-02-09 RX ADMIN — LEVOTHYROXINE SODIUM 75 MICROGRAM(S): 25 TABLET ORAL at 05:31

## 2025-02-09 RX ADMIN — POTASSIUM CHLORIDE 100 MILLIEQUIVALENT(S): 750 TABLET, EXTENDED RELEASE ORAL at 01:02

## 2025-02-09 RX ADMIN — PANTOPRAZOLE 40 MILLIGRAM(S): 20 TABLET, DELAYED RELEASE ORAL at 05:30

## 2025-02-09 RX ADMIN — IPRATROPIUM BROMIDE AND ALBUTEROL SULFATE 3 MILLILITER(S): .5; 2.5 SOLUTION RESPIRATORY (INHALATION) at 12:48

## 2025-02-09 RX ADMIN — ACETAMINOPHEN 650 MILLIGRAM(S): 160 SUSPENSION ORAL at 16:01

## 2025-02-09 RX ADMIN — ACETAMINOPHEN 650 MILLIGRAM(S): 160 SUSPENSION ORAL at 15:29

## 2025-02-09 RX ADMIN — MONTELUKAST SODIUM 10 MILLIGRAM(S): 5 TABLET, CHEWABLE ORAL at 13:10

## 2025-02-09 RX ADMIN — PANTOPRAZOLE 40 MILLIGRAM(S): 20 TABLET, DELAYED RELEASE ORAL at 17:15

## 2025-02-09 RX ADMIN — Medication 3 MILLILITER(S): at 05:32

## 2025-02-09 RX ADMIN — INSULIN GLARGINE-YFGN 30 UNIT(S): 100 INJECTION, SOLUTION SUBCUTANEOUS at 23:10

## 2025-02-09 RX ADMIN — ASPIRIN 81 MILLIGRAM(S): 81 TABLET, COATED ORAL at 10:33

## 2025-02-09 RX ADMIN — Medication 3 MILLILITER(S): at 13:10

## 2025-02-09 RX ADMIN — FLUTICASONE PROPIONATE AND SALMETEROL 1 DOSE(S): 113; 14 POWDER, METERED RESPIRATORY (INHALATION) at 05:31

## 2025-02-09 RX ADMIN — IPRATROPIUM BROMIDE AND ALBUTEROL SULFATE 3 MILLILITER(S): .5; 2.5 SOLUTION RESPIRATORY (INHALATION) at 23:10

## 2025-02-09 RX ADMIN — FLUTICASONE PROPIONATE AND SALMETEROL 1 DOSE(S): 113; 14 POWDER, METERED RESPIRATORY (INHALATION) at 17:15

## 2025-02-09 RX ADMIN — MEROPENEM 100 MILLIGRAM(S): 500 INJECTION INTRAVENOUS at 17:16

## 2025-02-09 RX ADMIN — ATORVASTATIN CALCIUM 80 MILLIGRAM(S): 80 TABLET, FILM COATED ORAL at 21:43

## 2025-02-09 RX ADMIN — Medication 3 MILLILITER(S): at 19:07

## 2025-02-09 NOTE — PROGRESS NOTE ADULT - ATTENDING COMMENTS
80 YO F with a history of HF with midrange LVEF, CAD s/p multiple PCI, HTN, and DM2 who presented to [facility] with unstable angina and found to have severe 3v CAD with ISR of left main stent prompting transfer to [facility] for CABG evaluation where was she was deemed not a candidate due to porceline aorta. She was euvolemic and well optimized from a HF perspective. She was awaiting PCI and acutely decompensated 2/5 likely setting of bleed leading to severe ischemia (later found to have intramuscular hematoma) leading to cardiac ischemia and mixed hemorhhagic/cardiogenic/septic shock requiring IABP.    She is now improving after correction of hemoglobin, weaned off pressors, and extubated. She denies chest pain and EKG is without ischemic changes. IABP successfully removed 2/8    - Conttinue aspirin. Hold P2Y12i for now with recent severe bleed. Vascular following.   - Lasix 40 mg IV daily, goal CVP 8-10   - Start metoprolol succinate 25 mg daily   - ID following re: antibiotics, planned to continue until 2/12 to treat pneumonia  - Will need to re-engage IC about candidacy for revascularization if able to tolerate DAPT.

## 2025-02-09 NOTE — PROGRESS NOTE ADULT - ASSESSMENT
79 yr old F with PMHx of HTN, hyperlipidemia, DM, hypothyroidism, GERD, asthma, CAD s/p prior PCIs at [facility] 11/2023 who presented to [facility] 1/24/25 with chest pain and URI symptoms x 2-3 days, R/I NSTEMI, s/p diagnostic cath@[facility] revealing 3VD and newly reduced EF, initially transferred to [facility] 9LACH under Dr. 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 Course further c/b anemia pt now s/p 2u prbc on 2/4. and  cardiogenic shock, requiring pressor support and IABP. Transferred to CCU for further management         NEUROLOGY:  Extubated on high-flow but fatigued, continue to assess mental status as it improves.    CARDIOVASCULAR:    #Cardiogenic shock   - patient became hypotensive requiring inotropy support as well as mechanical ventilation and IABP for afterload reduction. Echo on arrival to [facility] showing reduced EF of 44%   -  New Acute CHF likely in setting of NSTEMI EF at [facility] was 25%, Echo on Arrival improved to 1/31/2025 44%  - Holding GDMT: Toprol increased to 50mg PO daily, isordil 5mg TID. Losartan on HOLD in setting of KEN.  - off of levo and Vaso as of 2/8/25    Plan   - c/w IABP @ 1:1 for afterload reduction, will decrease augmentation pressure by 1/2 today and repeat perfusions labs in afternoon   - Lasix 40mg IVP x 1   - will start ASA 81mg daily         #NSTEMI   Moderate CP this AM   - S/p diagnotic LHC @[facility] 1/28/24 w/ Dr. Morgan: dLM to pLAD 80% Stenosed ISR, LCX ostial to pLCX ISR, % Stenosed.  - TTE 1/31/25: mild LVH, Mod reduced LVSF, EF 44%. RWMA. Grade I DD. Aortic sclerosis w/o significant stenosis.  - Atorvastatin 40mg qd.   - Transitioned pt from heparin gtt to Lovenox 80mg sq BID, now on plavix and ASA but holding iso possible bleed  - discuss if revascularization candidate with cath team    **Prior Cath@[facility] 11/2023: Impella Assisted Rota/PCI with EMILIE to LM 90%, EMILIE ostial LAD and EMILIE ostial LCX, (dLM 80%, oLAD 85%, D2 75%, oLCX 85%, mRCA 75%).      PULMONARY:  #AHRF   - Weaned to high-flow, continue to wean to NC as tolerated    #PNA   Pulm Consulted: low suspicion for PNA, noted to have appearance of endobronchial material in superior segment RLL bronchus.   - CT Chest 1/31/2025 Persistent small large airways inflammation with interval increase in mucous plugging with near complete obstruction of the lateral segmental bronchus to the RML, RLL bronchus and anteromedial basal segmental bronchus to the LLL. Associated groundglass opacities are identified and early multifocal pneumonia possibly postobstructive cannot be excluded.   - Recommended outpatient pulm follow up w PFTs (to diagnosis obstructive lung disease) and consideration of repeat imaging.   - WBC on Admission 13--->17.87->20.27, today 17 MRSA swab + at [facility].   - UA normal, Blood Cultures 1/31 NGTD, Procal normal.     #History of Asthma   Pulm following Appreciate Recs   - Duonebs, Hypertonic Saline to aid with Mucous plugging  - C/w symbicort and Montelukast         GASTROINTESTINAL:  - NG tube in place, tube feed at goal  - Bedside dysphagia after secretions improve      RENAL:  #CKD Stage 3   KEN on CKD Stage 3 (baseline Cr ~1.3-1.5 from prior Labs)   - Today 1.54, improved with Fluids likely due to hypovolemia episode vs dehydration   - Urine studies: 2/3: Na 71, Protein 27, Creatinine 108, Pr/Cr Ratio .2, Urea Nitrogen 832, K 21, Osmol 505  - ctm, trend creatinine  - avoid nephrotoxic meds  - monitor UOP    INFECTIOUS DISEASE:  #PNA   Pulm Consulted: low suspicion for PNA, noted to have appearance of endobronchial material in superior segment RLL bronchus.   - CT Chest 1/31/2025 Persistent small large airways inflammation with interval increase in mucous plugging with near complete obstruction of the lateral segmental bronchus to the RML, RLL bronchus and anteromedial basal segmental bronchus to the LLL. Associated groundglass opacities are identified and early multifocal pneumonia possibly postobstructive cannot be excluded.   - Recommended outpatient pulm follow up w PFTs (to diagnosis obstructive lung disease) and consideration of repeat imaging.   - WBC on Admission 13--->17.87->20.27, today 17 MRSA swab + at [facility].   - UA normal, Blood Cultures 1/31 NGTD, Procal normal.   - on arrival to CCU: ordered repeat CXR, BCX, UCX, UA, MRSA swab   - MRSA swab positive, RVP negative   - pro-hailee elevated at 1.6    Plan   - F/U BCX, UA, UCX,    - c/w Vancomycin, dose by AM Level  - zosyn --> meropenem 1g BID  - ID consulted, appreciate recs     ENDOCRINE:  #Hypothyroidism   - C/w Levothyroxine 75mcg PO daily.  - TSH 1.390    #DM   A1C 6.9    Plan   - C/w FS's   - ISS,   - increased Lantus to 30 at bedtime for elevated sugars       HEME:    #Anemia   Pt is s/p 2u prbc 2/4/25 AM, 1u pRBC additional on 2/7 AM. Negative SEGUN. B12, folate, iron studies wnl  - Hgb on admission 10.1. Steadily downtrending,  Hgb this AM 7.0 (2/7)  - s/p 1 unit PRBC overnight on 2/7 - 2/8  Plan   - Transfuse for HGb < 7  - CT Chest, A+P, LLE to assess for signs of bleeding    #LE Hematoma   CT LLE: c/f large hematoma  CT angio (2/8): large intramuscular hematoma involving the anteromedial  thigh musculature with heterogeneous attenuation, suggestive of   hemorrhagic components in various stages of evolution. No evidence of  arterial extravasation or venous pooling on delayed images.   Possibly secondary to trauma while at [facility], reportedly tried both femoral arteries for access during angio.   Plan   - vascular following, appreciate reccs   - start ASA 81mg daiy           FLUIDS/ELECTROLYTES/NUTRITION:  -Drips: None   -Monitor, Replete to K>4 and Mg>2  -Diet - nepro tube feeds at goal, bedside dysphagia today to advance diet. Consider FEES if failure of bedside test.     PROPHYLAXIS  -DVT: SCD, hold off plavix and ASA pending anemia workup  -GI- PPI qd, soft and bite sized diet   DISPO: CCU  DNR/DNI  79 yr old F with PMHx of HTN, hyperlipidemia, DM, hypothyroidism, GERD, asthma, CAD s/p prior PCIs at [facility] 11/2023 who presented to [facility] 1/24/25 with chest pain and URI symptoms x 2-3 days, R/I NSTEMI, s/p diagnostic cath@[facility] revealing 3VD and newly reduced EF, initially transferred to [facility] 9LACH under Dr. 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 Course further c/b anemia pt now s/p 2u prbc on 2/4. and  cardiogenic shock, requiring pressor support and IABP. Transferred to CCU for further management         NEUROLOGY:  Extubated on high-flow but fatigued, continue to assess mental status as it improves.    CARDIOVASCULAR:    #Cardiogenic shock   - patient became hypotensive requiring inotropy support as well as mechanical ventilation and IABP for afterload reduction. Echo on arrival to [facility] showing reduced EF of 44%   -  New Acute CHF likely in setting of NSTEMI EF at [facility] was 25%, Echo on Arrival improved to 1/31/2025 44%  - Holding GDMT: Toprol increased to 50mg PO daily, isordil 5mg TID. Losartan on HOLD in setting of KEN.  - off of levo and Vaso as of 2/8/25    Plan   - IABP removed 2/8, tolerating well   - Lasix 40mg IVP daily  - c/w ASA 81mg daily   - start toprol XL 25 mg daily        #NSTEMI   Moderate CP this AM   - S/p diagnotic [facility] @[facility] 1/28/24 w/ Dr. Morgan: dLM to pLAD 80% Stenosed ISR, LCX ostial to pLCX ISR, % Stenosed.  - TTE 1/31/25: mild LVH, Mod reduced LVSF, EF 44%. RWMA. Grade I DD. Aortic sclerosis w/o significant stenosis.  - Atorvastatin 40mg qd.   - Transitioned pt from heparin gtt to Lovenox 80mg sq BID, on ASA 81 mg but holding plavix as c/f bleed  - discuss if revascularization candidate with cath team, then consider restarting plavix as currently holding    **Prior Cath@[facility] 11/2023: Impella Assisted Rota/PCI with EMILIE to LM 90%, EMILIE ostial LAD and EMILIE ostial LCX, (dLM 80%, oLAD 85%, D2 75%, oLCX 85%, mRCA 75%).      PULMONARY:  #AHRF   - Wean NC as tolerated    #PNA   Pulm Consulted: low suspicion for PNA, noted to have appearance of endobronchial material in superior segment RLL bronchus.   - CT Chest 1/31/2025 Persistent small large airways inflammation with interval increase in mucous plugging with near complete obstruction of the lateral segmental bronchus to the RML, RLL bronchus and anteromedial basal segmental bronchus to the LLL. Associated groundglass opacities are identified and early multifocal pneumonia possibly postobstructive cannot be excluded.   - Recommended outpatient pulm follow up w PFTs (to diagnosis obstructive lung disease) and consideration of repeat imaging.   - WBC on Admission 13--->17.87->20.27, today 17 MRSA swab + at [facility].   - UA normal, Blood Cultures 1/31 NGTD, Procal normal.     #History of Asthma   Pulm following Appreciate Recs   - Duonebs, Hypertonic Saline to aid with Mucous plugging  - C/w symbicort and Montelukast         GASTROINTESTINAL:  - soft and bite sized  - daily protonix  - ctm LFTs      RENAL:  #CKD Stage 3   KEN on CKD Stage 3 (baseline Cr ~1.3-1.5 from prior Labs), improving now  - Today 1.54, improved with Fluids likely due to hypovolemia episode vs dehydration   - Urine studies: 2/3: Na 71, Protein 27, Creatinine 108, Pr/Cr Ratio .2, Urea Nitrogen 832, K 21, Osmol 505  - ctm, trend creatinine  - avoid nephrotoxic meds  - monitor UOP    INFECTIOUS DISEASE:  #PNA   Pulm Consulted: low suspicion for PNA, noted to have appearance of endobronchial material in superior segment RLL bronchus.   - CT Chest 1/31/2025 Persistent small large airways inflammation with interval increase in mucous plugging with near complete obstruction of the lateral segmental bronchus to the RML, RLL bronchus and anteromedial basal segmental bronchus to the LLL. Associated groundglass opacities are identified and early multifocal pneumonia possibly postobstructive cannot be excluded.   - Recommended outpatient pulm follow up w PFTs (to diagnosis obstructive lung disease) and consideration of repeat imaging.   - WBC on Admission 13--->17.87->20.27, today 17 MRSA swab + at [facility].   - UA normal, Blood Cultures 1/31 NGTD, Procal normal.   - on arrival to CCU: ordered repeat CXR, BCX, UCX, UA, MRSA swab   - MRSA swab positive, RVP negative   - pro-hailee previously elevated at 1.6, downtrending now    Plan   - Cultures are all no growth to date  - c/w Vancomycin, dose by AM Level  - zosyn --> meropenem 1g BID, expires 2/13  - ID consulted, appreciate recs     ENDOCRINE:  #Hypothyroidism   - C/w Levothyroxine 75mcg PO daily.  - TSH 1.390    #DM   A1C 6.9    Plan   - C/w FS's   - ISS,   - Lantus 30 at bedtime       HEME:    #Anemia   Pt is s/p 2u prbc 2/4/25 AM, 1u pRBC additional on 2/7 AM. Negative SEGUN. B12, folate, iron studies wnl  - Hgb on admission 10.1. Steadily downtrending,  Hgb this AM 7.0 (2/7)  - s/p 1 unit PRBC overnight on 2/7 - 2/8  Plan   - Transfuse for HGb < 7  - LLE hematoma likely source, treat as below    #LE Hematoma   CT LLE: c/f large hematoma  CT angio (2/8): large intramuscular hematoma involving the anteromedial  thigh musculature with heterogeneous attenuation, suggestive of   hemorrhagic components in various stages of evolution. No evidence of  arterial extravasation or venous pooling on delayed images.   Possibly secondary to trauma while at [facility], reportedly tried both femoral arteries for access during angio.   Plan   - vascular following, appreciate recs - no acute intervention, ace bandaging  - c/w ASA 81mg daily          FLUIDS/ELECTROLYTES/NUTRITION:  -Drips: None   -Monitor, Replete to K>4 and Mg>2  -Diet - soft and bite sized    PROPHYLAXIS  -DVT: SCD, hold off plavix pending possible revascularization  -GI- PPI qd, soft and bite sized diet   DISPO: CCU  DNR/DNI

## 2025-02-09 NOTE — PROGRESS NOTE ADULT - ASSESSMENT
79F w/ HTN, HLD, asthma, CKD, GERD, hypothyroidism, CHF, and CAD s/p PCI (11/2023), admitted for CP, SOB and NSTEMI s/p diagnostic C demonstrating 3v CAD and newly reduced EF of 25%, transferred to [facility], but deemed not a CABG candidate. Her hospital course was complicated by shock, requiring pressors and IABP via R femoral access, pneumonia, L thigh hematoma and anemia. She was on DAPT and AC this admission. Vascular surgery was consulted on 2/7/25 for the anemia (HGB 7.1/22.2 at time of consult) and thigh hematoma, which was seen on non-contrast LLE CT Scan ("large hematoma centered within the adductor musculature of the proximal left thigh, measuring 11.8 x 10.8 cm transaxially"). CTA triple phase showed no evidence of active obvious arterial extravasation or venous pooling on delayed images. Her PLT count was also 38. Hemoglobin stable at 8.0, VSS. IABP pulled overnight. Patient currently with minimal pain/tenderness, motor and sensation of the lower extremities grossly intact, no pulsatile mass present.    -Trend CBC, transfuse prn  -Continue ace-wrap compression from foot to L groin  -Anticoagulation per CCU  -Vascular surgery will continue to follow

## 2025-02-09 NOTE — PROGRESS NOTE ADULT - SUBJECTIVE AND OBJECTIVE BOX
Patient is a 79y old  Female who presents with a chief complaint of CAD (08 Feb 2025 14:33)      HOSPITAL COURSE:     OVERNIGHT EVENTS:    SUBJECTIVE:     ROS: otherwise negative      T(C): 36.6 (02-09-25 @ 08:52), Max: 37.4 (02-08-25 @ 18:24)  HR: 80 (02-09-25 @ 09:00) (78 - 93)  BP: 133/60 (02-09-25 @ 07:00) (101/54 - 133/93)  RR: 19 (02-09-25 @ 09:00) (12 - 24)  SpO2: 99% (02-09-25 @ 09:00) (95% - 100%)  Wt(kg): --Vital Signs Last 24 Hrs  T(C): 36.6 (09 Feb 2025 08:52), Max: 37.4 (08 Feb 2025 18:24)  T(F): 97.9 (09 Feb 2025 08:52), Max: 99.3 (08 Feb 2025 18:24)  HR: 80 (09 Feb 2025 09:00) (78 - 93)  BP: 133/60 (09 Feb 2025 07:00) (101/54 - 133/93)  BP(mean): 86 (09 Feb 2025 07:00) (72 - 107)  RR: 19 (09 Feb 2025 09:00) (12 - 24)  SpO2: 99% (09 Feb 2025 09:00) (95% - 100%)    Parameters below as of 09 Feb 2025 09:00  Patient On (Oxygen Delivery Method): nasal cannula w/ humidification  O2 Flow (L/min): 2      PHYSICAL EXAM:  Constitutional: resting comfortably in bed; NAD  Head: NC/AT  Eyes: PERRL, EOMI, anicteric sclera  ENT: no nasal discharge; MMM  Neck: supple; no JVD or thyromegaly  Respiratory: CTA B/L; no W/R/R, no retractions  Cardiac: +S1/S2; RRR; no M/R/G  Gastrointestinal: soft, NT/ND; no rebound or guarding; +BSx4  Back: spine midline, no bony tenderness or step-offs; no CVAT B/L  Extremities: WWP, no clubbing or cyanosis; no peripheral edema. Capillary refill <2 sec  Musculoskeletal: NROM x4; no joint swelling, tenderness or erythema  Vascular: 2+ radial, DP/PT pulses B/L  Dermatologic: skin warm, dry and intact; no rashes, wounds, or scars  Lymphatic: no submandibular or cervical LAD  Neurologic: AAOx3; CNII-XII grossly intact; no focal deficits  Psychiatric: affect and characteristics of appearance, verbalizations, behaviors are appropriate    LABS:                        8.0    11.06 )-----------( 70       ( 09 Feb 2025 05:30 )             24.7     02-09    137  |  106  |  22  ----------------------------<  90  4.3   |  22  |  1.74[H]    Ca    7.6[L]      09 Feb 2025 05:30  Phos  3.3     02-09  Mg     1.8     02-09    TPro  4.4[L]  /  Alb  2.5[L]  /  TBili  1.2  /  DBili  x   /  AST  38  /  ALT  23  /  AlkPhos  49  02-09      PT/INR - ( 07 Feb 2025 12:43 )   PT: 13.5 sec;   INR: 1.18          PTT - ( 07 Feb 2025 12:43 )  PTT:29.7 sec  Urinalysis Basic - ( 09 Feb 2025 05:30 )    Color: x / Appearance: x / SG: x / pH: x  Gluc: 90 mg/dL / Ketone: x  / Bili: x / Urobili: x   Blood: x / Protein: x / Nitrite: x   Leuk Esterase: x / RBC: x / WBC x   Sq Epi: x / Non Sq Epi: x / Bacteria: x      CAPILLARY BLOOD GLUCOSE      POCT Blood Glucose.: 127 mg/dL (08 Feb 2025 23:30)  POCT Blood Glucose.: 133 mg/dL (08 Feb 2025 18:09)  POCT Blood Glucose.: 155 mg/dL (08 Feb 2025 11:30)      ABG - ( 07 Feb 2025 12:15 )  pH, Arterial: 7.37  pH, Blood: x     /  pCO2: 35    /  pO2: 156   / HCO3: 20    / Base Excess: -4.4  /  SaO2: 98.8              Urinalysis Basic - ( 09 Feb 2025 05:30 )    Color: x / Appearance: x / SG: x / pH: x  Gluc: 90 mg/dL / Ketone: x  / Bili: x / Urobili: x   Blood: x / Protein: x / Nitrite: x   Leuk Esterase: x / RBC: x / WBC x   Sq Epi: x / Non Sq Epi: x / Bacteria: x        MEDICATIONS  (STANDING):  albuterol/ipratropium for Nebulization 3 milliLiter(s) Nebulizer every 6 hours  aspirin enteric coated 81 milliGRAM(s) Oral every 24 hours  atorvastatin 80 milliGRAM(s) Oral at bedtime  chlorhexidine 2% Cloths 1 Application(s) Topical <User Schedule>  dextrose 5%. 1000 milliLiter(s) (100 mL/Hr) IV Continuous <Continuous>  dextrose 50% Injectable 12.5 Gram(s) IV Push once  dextrose 50% Injectable 25 Gram(s) IV Push once  fluticasone propionate/ salmeterol 250-50 MICROgram(s) Diskus 1 Dose(s) Inhalation two times a day  glucagon  Injectable 1 milliGRAM(s) IntraMuscular once  insulin glargine Injectable (LANTUS) 30 Unit(s) SubCutaneous at bedtime  insulin lispro (ADMELOG) corrective regimen sliding scale   SubCutaneous every 6 hours  lactated ringers. 1000 milliLiter(s) (50 mL/Hr) IV Continuous <Continuous>  levothyroxine 75 MICROGram(s) Oral every 24 hours  meropenem  IVPB 1000 milliGRAM(s) IV Intermittent every 12 hours  montelukast 10 milliGRAM(s) Oral every 24 hours  pantoprazole  Injectable 40 milliGRAM(s) IV Push every 12 hours  sodium chloride 0.9% lock flush 3 milliLiter(s) IV Push every 8 hours  vancomycin  IVPB 1250 milliGRAM(s) IV Intermittent once    MEDICATIONS  (PRN):  albuterol    90 MICROgram(s) HFA Inhaler 2 Puff(s) Inhalation every 4 hours PRN Bronchospasm  dextrose Oral Gel 15 Gram(s) Oral once PRN Blood Glucose LESS THAN 70 milliGRAM(s)/deciliter      RADIOLOGY & ADDITIONAL TESTS: Reviewed   Patient is a 79y old  Female who presents with a chief complaint of CAD (08 Feb 2025 14:33)    OVERNIGHT EVENTS: balloon pump pulled at 8:40pm. perfusion labs after pulling pump WNL. groin checks WNL. repleted K. given mag 2 iv x1. AM Vanc trough subtherapeutic, therefore given additional vancomycin 1250 and added vanc trough to 2/10 AM labs    SUBJECTIVE: patient reports feeling well with no fevers, nausea, vomiting, chills, diarrhea. she reports LLE neuropathic foot pain that does not radiate, just burns at the edge of her ACE wrap bandaging.     ROS: otherwise negative      T(C): 36.6 (02-09-25 @ 08:52), Max: 37.4 (02-08-25 @ 18:24)  HR: 80 (02-09-25 @ 09:00) (78 - 93)  BP: 133/60 (02-09-25 @ 07:00) (101/54 - 133/93)  RR: 19 (02-09-25 @ 09:00) (12 - 24)  SpO2: 99% (02-09-25 @ 09:00) (95% - 100%)  Wt(kg): --Vital Signs Last 24 Hrs  T(C): 36.6 (09 Feb 2025 08:52), Max: 37.4 (08 Feb 2025 18:24)  T(F): 97.9 (09 Feb 2025 08:52), Max: 99.3 (08 Feb 2025 18:24)  HR: 80 (09 Feb 2025 09:00) (78 - 93)  BP: 133/60 (09 Feb 2025 07:00) (101/54 - 133/93)  BP(mean): 86 (09 Feb 2025 07:00) (72 - 107)  RR: 19 (09 Feb 2025 09:00) (12 - 24)  SpO2: 99% (09 Feb 2025 09:00) (95% - 100%)    Parameters below as of 09 Feb 2025 09:00  Patient On (Oxygen Delivery Method): nasal cannula w/ humidification  O2 Flow (L/min): 2      PHYSICAL EXAM:  HEENT:  pupils 3 mm, PERRL    Neck: Supple, JVD; Carotid Bruit   Cardiovascular: Normal S1 S2,   Respiratory: anterior lung fields with bilateral rhonchi  Gastrointestinal:  Soft, Non-tender, + BS	  Skin: No rashes, No ecchymoses, No cyanosis  Extremities: Normal range of motion, No clubbing, anasarca  Vascular: Peripheral pulses palpable 2+ bilaterally  Neurologic: unable to assess orientation but patient is alert and responding to questions    LABS:                        8.0    11.06 )-----------( 70       ( 09 Feb 2025 05:30 )             24.7     02-09    137  |  106  |  22  ----------------------------<  90  4.3   |  22  |  1.74[H]    Ca    7.6[L]      09 Feb 2025 05:30  Phos  3.3     02-09  Mg     1.8     02-09    TPro  4.4[L]  /  Alb  2.5[L]  /  TBili  1.2  /  DBili  x   /  AST  38  /  ALT  23  /  AlkPhos  49  02-09      PT/INR - ( 07 Feb 2025 12:43 )   PT: 13.5 sec;   INR: 1.18          PTT - ( 07 Feb 2025 12:43 )  PTT:29.7 sec  Urinalysis Basic - ( 09 Feb 2025 05:30 )    Color: x / Appearance: x / SG: x / pH: x  Gluc: 90 mg/dL / Ketone: x  / Bili: x / Urobili: x   Blood: x / Protein: x / Nitrite: x   Leuk Esterase: x / RBC: x / WBC x   Sq Epi: x / Non Sq Epi: x / Bacteria: x      CAPILLARY BLOOD GLUCOSE      POCT Blood Glucose.: 127 mg/dL (08 Feb 2025 23:30)  POCT Blood Glucose.: 133 mg/dL (08 Feb 2025 18:09)  POCT Blood Glucose.: 155 mg/dL (08 Feb 2025 11:30)      ABG - ( 07 Feb 2025 12:15 )  pH, Arterial: 7.37  pH, Blood: x     /  pCO2: 35    /  pO2: 156   / HCO3: 20    / Base Excess: -4.4  /  SaO2: 98.8              Urinalysis Basic - ( 09 Feb 2025 05:30 )    Color: x / Appearance: x / SG: x / pH: x  Gluc: 90 mg/dL / Ketone: x  / Bili: x / Urobili: x   Blood: x / Protein: x / Nitrite: x   Leuk Esterase: x / RBC: x / WBC x   Sq Epi: x / Non Sq Epi: x / Bacteria: x        MEDICATIONS  (STANDING):  albuterol/ipratropium for Nebulization 3 milliLiter(s) Nebulizer every 6 hours  aspirin enteric coated 81 milliGRAM(s) Oral every 24 hours  atorvastatin 80 milliGRAM(s) Oral at bedtime  chlorhexidine 2% Cloths 1 Application(s) Topical <User Schedule>  dextrose 5%. 1000 milliLiter(s) (100 mL/Hr) IV Continuous <Continuous>  dextrose 50% Injectable 12.5 Gram(s) IV Push once  dextrose 50% Injectable 25 Gram(s) IV Push once  fluticasone propionate/ salmeterol 250-50 MICROgram(s) Diskus 1 Dose(s) Inhalation two times a day  glucagon  Injectable 1 milliGRAM(s) IntraMuscular once  insulin glargine Injectable (LANTUS) 30 Unit(s) SubCutaneous at bedtime  insulin lispro (ADMELOG) corrective regimen sliding scale   SubCutaneous every 6 hours  lactated ringers. 1000 milliLiter(s) (50 mL/Hr) IV Continuous <Continuous>  levothyroxine 75 MICROGram(s) Oral every 24 hours  meropenem  IVPB 1000 milliGRAM(s) IV Intermittent every 12 hours  montelukast 10 milliGRAM(s) Oral every 24 hours  pantoprazole  Injectable 40 milliGRAM(s) IV Push every 12 hours  sodium chloride 0.9% lock flush 3 milliLiter(s) IV Push every 8 hours  vancomycin  IVPB 1250 milliGRAM(s) IV Intermittent once    MEDICATIONS  (PRN):  albuterol    90 MICROgram(s) HFA Inhaler 2 Puff(s) Inhalation every 4 hours PRN Bronchospasm  dextrose Oral Gel 15 Gram(s) Oral once PRN Blood Glucose LESS THAN 70 milliGRAM(s)/deciliter      RADIOLOGY & ADDITIONAL TESTS: Reviewed

## 2025-02-09 NOTE — PROGRESS NOTE ADULT - SUBJECTIVE AND OBJECTIVE BOX
Subjective: Patient seen and examined. Reports she is feeling well overall. Denies any pain in her left thigh, reports that she is experiencing some pain in her right thigh. Denies numbness or weakness of the legs. Denies dizziness, ligthheadedness.      Vital Signs Last 24 Hrs  T(C): 36.6 (09 Feb 2025 08:52), Max: 37.4 (08 Feb 2025 18:24)  T(F): 97.9 (09 Feb 2025 08:52), Max: 99.3 (08 Feb 2025 18:24)  HR: 80 (09 Feb 2025 09:00) (78 - 93)  BP: 133/60 (09 Feb 2025 07:00) (104/56 - 133/93)  BP(mean): 86 (09 Feb 2025 07:00) (72 - 107)  RR: 19 (09 Feb 2025 09:00) (12 - 24)  SpO2: 99% (09 Feb 2025 09:00) (95% - 100%)    Parameters below as of 09 Feb 2025 09:00  Patient On (Oxygen Delivery Method): nasal cannula w/ humidification  O2 Flow (L/min): 2      I&O's Summary    08 Feb 2025 07:01  -  09 Feb 2025 07:00  --------------------------------------------------------  IN: 1014 mL / OUT: 1743 mL / NET: -729 mL    09 Feb 2025 07:01  -  09 Feb 2025 11:11  --------------------------------------------------------  IN: 400 mL / OUT: 70 mL / NET: 330 mL        Physical Exam:  General Appearance:  NAD  Pulmonary: Nonlabored breathing, no respiratory distress  Cardiovascular: NSR  Abdomen: Soft, nondisteded, nontender  Extremities: WWP, minimal ecchymosis over L thigh hematoma, ace wrap placed over left leg    LABS:                        8.0    11.06 )-----------( 70       ( 09 Feb 2025 05:30 )             24.7     02-09    137  |  106  |  22  ----------------------------<  90  4.3   |  22  |  1.74[H]    Ca    7.6[L]      09 Feb 2025 05:30  Phos  3.3     02-09  Mg     1.8     02-09    TPro  4.4[L]  /  Alb  2.5[L]  /  TBili  1.2  /  DBili  x   /  AST  38  /  ALT  23  /  AlkPhos  49  02-09    PT/INR - ( 07 Feb 2025 12:43 )   PT: 13.5 sec;   INR: 1.18          PTT - ( 07 Feb 2025 12:43 )  PTT:29.7 sec  Urinalysis Basic - ( 09 Feb 2025 05:30 )    Color: x / Appearance: x / SG: x / pH: x  Gluc: 90 mg/dL / Ketone: x  / Bili: x / Urobili: x   Blood: x / Protein: x / Nitrite: x   Leuk Esterase: x / RBC: x / WBC x   Sq Epi: x / Non Sq Epi: x / Bacteria: x

## 2025-02-10 DIAGNOSIS — E03.9 HYPOTHYROIDISM, UNSPECIFIED: ICD-10-CM

## 2025-02-10 DIAGNOSIS — D64.9 ANEMIA, UNSPECIFIED: ICD-10-CM

## 2025-02-10 DIAGNOSIS — T14.8XXA OTHER INJURY OF UNSPECIFIED BODY REGION, INITIAL ENCOUNTER: ICD-10-CM

## 2025-02-10 DIAGNOSIS — I10 ESSENTIAL (PRIMARY) HYPERTENSION: ICD-10-CM

## 2025-02-10 DIAGNOSIS — I50.21 ACUTE SYSTOLIC (CONGESTIVE) HEART FAILURE: ICD-10-CM

## 2025-02-10 DIAGNOSIS — I50.22 CHRONIC SYSTOLIC (CONGESTIVE) HEART FAILURE: ICD-10-CM

## 2025-02-10 DIAGNOSIS — E11.9 TYPE 2 DIABETES MELLITUS WITHOUT COMPLICATIONS: ICD-10-CM

## 2025-02-10 DIAGNOSIS — R57.0 CARDIOGENIC SHOCK: ICD-10-CM

## 2025-02-10 DIAGNOSIS — N17.9 ACUTE KIDNEY FAILURE, UNSPECIFIED: ICD-10-CM

## 2025-02-10 DIAGNOSIS — J45.909 UNSPECIFIED ASTHMA, UNCOMPLICATED: ICD-10-CM

## 2025-02-10 DIAGNOSIS — E78.5 HYPERLIPIDEMIA, UNSPECIFIED: ICD-10-CM

## 2025-02-10 DIAGNOSIS — I21.4 NON-ST ELEVATION (NSTEMI) MYOCARDIAL INFARCTION: ICD-10-CM

## 2025-02-10 LAB
ALBUMIN SERPL ELPH-MCNC: 2.6 G/DL — LOW (ref 3.3–5)
ALP SERPL-CCNC: 49 U/L — SIGNIFICANT CHANGE UP (ref 40–120)
ALT FLD-CCNC: 21 U/L — SIGNIFICANT CHANGE UP (ref 10–45)
ANION GAP SERPL CALC-SCNC: 10 MMOL/L — SIGNIFICANT CHANGE UP (ref 5–17)
AST SERPL-CCNC: 30 U/L — SIGNIFICANT CHANGE UP (ref 10–40)
BASOPHILS # BLD AUTO: 0.04 K/UL — SIGNIFICANT CHANGE UP (ref 0–0.2)
BASOPHILS NFR BLD AUTO: 0.4 % — SIGNIFICANT CHANGE UP (ref 0–2)
BILIRUB SERPL-MCNC: 0.9 MG/DL — SIGNIFICANT CHANGE UP (ref 0.2–1.2)
BLD GP AB SCN SERPL QL: NEGATIVE — SIGNIFICANT CHANGE UP
BUN SERPL-MCNC: 26 MG/DL — HIGH (ref 7–23)
CALCIUM SERPL-MCNC: 7.7 MG/DL — LOW (ref 8.4–10.5)
CHLORIDE SERPL-SCNC: 103 MMOL/L — SIGNIFICANT CHANGE UP (ref 96–108)
CO2 SERPL-SCNC: 25 MMOL/L — SIGNIFICANT CHANGE UP (ref 22–31)
CREAT SERPL-MCNC: 1.65 MG/DL — HIGH (ref 0.5–1.3)
CULTURE RESULTS: SIGNIFICANT CHANGE UP
EGFR: 32 ML/MIN/1.73M2 — LOW
EOSINOPHIL # BLD AUTO: 0.51 K/UL — HIGH (ref 0–0.5)
EOSINOPHIL NFR BLD AUTO: 5.7 % — SIGNIFICANT CHANGE UP (ref 0–6)
GLUCOSE SERPL-MCNC: 109 MG/DL — HIGH (ref 70–99)
HCT VFR BLD CALC: 25.5 % — LOW (ref 34.5–45)
HGB BLD-MCNC: 8.2 G/DL — LOW (ref 11.5–15.5)
IMM GRANULOCYTES NFR BLD AUTO: 1.4 % — HIGH (ref 0–0.9)
LACTATE SERPL-SCNC: 0.6 MMOL/L — SIGNIFICANT CHANGE UP (ref 0.5–2)
LYMPHOCYTES # BLD AUTO: 1.33 K/UL — SIGNIFICANT CHANGE UP (ref 1–3.3)
LYMPHOCYTES # BLD AUTO: 14.7 % — SIGNIFICANT CHANGE UP (ref 13–44)
MAGNESIUM SERPL-MCNC: 2.2 MG/DL — SIGNIFICANT CHANGE UP (ref 1.6–2.6)
MCHC RBC-ENTMCNC: 30.9 PG — SIGNIFICANT CHANGE UP (ref 27–34)
MCHC RBC-ENTMCNC: 32.2 G/DL — SIGNIFICANT CHANGE UP (ref 32–36)
MCV RBC AUTO: 96.2 FL — SIGNIFICANT CHANGE UP (ref 80–100)
MONOCYTES # BLD AUTO: 0.69 K/UL — SIGNIFICANT CHANGE UP (ref 0–0.9)
MONOCYTES NFR BLD AUTO: 7.6 % — SIGNIFICANT CHANGE UP (ref 2–14)
NEUTROPHILS # BLD AUTO: 6.32 K/UL — SIGNIFICANT CHANGE UP (ref 1.8–7.4)
NEUTROPHILS NFR BLD AUTO: 70.2 % — SIGNIFICANT CHANGE UP (ref 43–77)
NRBC # BLD: 0 /100 WBCS — SIGNIFICANT CHANGE UP (ref 0–0)
NRBC BLD-RTO: 0 /100 WBCS — SIGNIFICANT CHANGE UP (ref 0–0)
PHOSPHATE SERPL-MCNC: 3.4 MG/DL — SIGNIFICANT CHANGE UP (ref 2.5–4.5)
PLATELET # BLD AUTO: 92 K/UL — LOW (ref 150–400)
POTASSIUM SERPL-MCNC: 4.1 MMOL/L — SIGNIFICANT CHANGE UP (ref 3.5–5.3)
POTASSIUM SERPL-SCNC: 4.1 MMOL/L — SIGNIFICANT CHANGE UP (ref 3.5–5.3)
PROCALCITONIN SERPL-MCNC: 0.16 NG/ML — HIGH (ref 0.02–0.1)
PROT SERPL-MCNC: 4.7 G/DL — LOW (ref 6–8.3)
RBC # BLD: 2.65 M/UL — LOW (ref 3.8–5.2)
RBC # FLD: 16 % — HIGH (ref 10.3–14.5)
RH IG SCN BLD-IMP: NEGATIVE — SIGNIFICANT CHANGE UP
SODIUM SERPL-SCNC: 138 MMOL/L — SIGNIFICANT CHANGE UP (ref 135–145)
SPECIMEN SOURCE: SIGNIFICANT CHANGE UP
VANCOMYCIN TROUGH SERPL-MCNC: 24.8 UG/ML — HIGH (ref 10–20)
VANCOMYCIN TROUGH SERPL-MCNC: 25.9 UG/ML — CRITICAL HIGH (ref 10–20)
WBC # BLD: 9.02 K/UL — SIGNIFICANT CHANGE UP (ref 3.8–10.5)
WBC # FLD AUTO: 9.02 K/UL — SIGNIFICANT CHANGE UP (ref 3.8–10.5)

## 2025-02-10 PROCEDURE — 71045 X-RAY EXAM CHEST 1 VIEW: CPT | Mod: 26

## 2025-02-10 PROCEDURE — 93010 ELECTROCARDIOGRAM REPORT: CPT

## 2025-02-10 PROCEDURE — 99291 CRITICAL CARE FIRST HOUR: CPT

## 2025-02-10 PROCEDURE — 99233 SBSQ HOSP IP/OBS HIGH 50: CPT | Mod: GC,25

## 2025-02-10 PROCEDURE — 36000 PLACE NEEDLE IN VEIN: CPT

## 2025-02-10 RX ORDER — HYDRALAZINE HCL 100 MG
10 TABLET ORAL EVERY 8 HOURS
Refills: 0 | Status: DISCONTINUED | OUTPATIENT
Start: 2025-02-10 | End: 2025-02-14

## 2025-02-10 RX ORDER — HYDROMORPHONE HYDROCHLORIDE 4 MG/ML
0.25 INJECTION, SOLUTION INTRAMUSCULAR; INTRAVENOUS; SUBCUTANEOUS ONCE
Refills: 0 | Status: DISCONTINUED | OUTPATIENT
Start: 2025-02-10 | End: 2025-02-10

## 2025-02-10 RX ORDER — ACETAMINOPHEN 160 MG/5ML
1000 SUSPENSION ORAL ONCE
Refills: 0 | Status: COMPLETED | OUTPATIENT
Start: 2025-02-10 | End: 2025-02-10

## 2025-02-10 RX ORDER — ENOXAPARIN SODIUM 100 MG/ML
40 INJECTION SUBCUTANEOUS EVERY 12 HOURS
Refills: 0 | Status: DISCONTINUED | OUTPATIENT
Start: 2025-02-10 | End: 2025-02-14

## 2025-02-10 RX ORDER — ALBUMIN HUMAN 50 G/1000ML
25 SOLUTION INTRAVENOUS ONCE
Refills: 0 | Status: COMPLETED | OUTPATIENT
Start: 2025-02-10 | End: 2025-02-10

## 2025-02-10 RX ORDER — ACETAMINOPHEN 160 MG/5ML
1000 SUSPENSION ORAL ONCE
Refills: 0 | Status: DISCONTINUED | OUTPATIENT
Start: 2025-02-10 | End: 2025-02-10

## 2025-02-10 RX ORDER — SENNOSIDES 8.6 MG
2 TABLET ORAL AT BEDTIME
Refills: 0 | Status: DISCONTINUED | OUTPATIENT
Start: 2025-02-10 | End: 2025-02-14

## 2025-02-10 RX ORDER — POLYETHYLENE GLYCOL 3350 17 G/17G
17 POWDER, FOR SOLUTION ORAL
Refills: 0 | Status: DISCONTINUED | OUTPATIENT
Start: 2025-02-10 | End: 2025-02-14

## 2025-02-10 RX ADMIN — ACETAMINOPHEN 1000 MILLIGRAM(S): 160 SUSPENSION ORAL at 00:00

## 2025-02-10 RX ADMIN — Medication 3 MILLILITER(S): at 13:08

## 2025-02-10 RX ADMIN — PANTOPRAZOLE 40 MILLIGRAM(S): 20 TABLET, DELAYED RELEASE ORAL at 18:47

## 2025-02-10 RX ADMIN — Medication 40 MILLIGRAM(S): at 05:15

## 2025-02-10 RX ADMIN — MEROPENEM 100 MILLIGRAM(S): 500 INJECTION INTRAVENOUS at 05:15

## 2025-02-10 RX ADMIN — ALBUMIN HUMAN 25 MILLILITER(S): 50 SOLUTION INTRAVENOUS at 11:25

## 2025-02-10 RX ADMIN — IPRATROPIUM BROMIDE AND ALBUTEROL SULFATE 3 MILLILITER(S): .5; 2.5 SOLUTION RESPIRATORY (INHALATION) at 05:15

## 2025-02-10 RX ADMIN — ANTISEPTIC SURGICAL SCRUB 1 APPLICATION(S): 0.04 SOLUTION TOPICAL at 05:17

## 2025-02-10 RX ADMIN — ATORVASTATIN CALCIUM 80 MILLIGRAM(S): 80 TABLET, FILM COATED ORAL at 22:11

## 2025-02-10 RX ADMIN — LEVOTHYROXINE SODIUM 75 MICROGRAM(S): 25 TABLET ORAL at 05:16

## 2025-02-10 RX ADMIN — MONTELUKAST SODIUM 10 MILLIGRAM(S): 5 TABLET, CHEWABLE ORAL at 13:32

## 2025-02-10 RX ADMIN — PANTOPRAZOLE 40 MILLIGRAM(S): 20 TABLET, DELAYED RELEASE ORAL at 05:15

## 2025-02-10 RX ADMIN — FLUTICASONE PROPIONATE AND SALMETEROL 1 DOSE(S): 113; 14 POWDER, METERED RESPIRATORY (INHALATION) at 19:58

## 2025-02-10 RX ADMIN — ACETAMINOPHEN 400 MILLIGRAM(S): 160 SUSPENSION ORAL at 23:53

## 2025-02-10 RX ADMIN — ENOXAPARIN SODIUM 40 MILLIGRAM(S): 100 INJECTION SUBCUTANEOUS at 18:50

## 2025-02-10 RX ADMIN — HYDROMORPHONE HYDROCHLORIDE 0.25 MILLIGRAM(S): 4 INJECTION, SOLUTION INTRAMUSCULAR; INTRAVENOUS; SUBCUTANEOUS at 22:40

## 2025-02-10 RX ADMIN — IPRATROPIUM BROMIDE AND ALBUTEROL SULFATE 3 MILLILITER(S): .5; 2.5 SOLUTION RESPIRATORY (INHALATION) at 13:20

## 2025-02-10 RX ADMIN — ACETAMINOPHEN 650 MILLIGRAM(S): 160 SUSPENSION ORAL at 10:19

## 2025-02-10 RX ADMIN — HYDROMORPHONE HYDROCHLORIDE 0.25 MILLIGRAM(S): 4 INJECTION, SOLUTION INTRAMUSCULAR; INTRAVENOUS; SUBCUTANEOUS at 21:40

## 2025-02-10 RX ADMIN — INSULIN GLARGINE-YFGN 30 UNIT(S): 100 INJECTION, SOLUTION SUBCUTANEOUS at 22:11

## 2025-02-10 RX ADMIN — Medication 3 MILLILITER(S): at 05:17

## 2025-02-10 RX ADMIN — MEROPENEM 100 MILLIGRAM(S): 500 INJECTION INTRAVENOUS at 18:49

## 2025-02-10 RX ADMIN — FLUTICASONE PROPIONATE AND SALMETEROL 1 DOSE(S): 113; 14 POWDER, METERED RESPIRATORY (INHALATION) at 06:19

## 2025-02-10 RX ADMIN — HYDROMORPHONE HYDROCHLORIDE 0.25 MILLIGRAM(S): 4 INJECTION, SOLUTION INTRAMUSCULAR; INTRAVENOUS; SUBCUTANEOUS at 19:15

## 2025-02-10 RX ADMIN — Medication 25 MILLIGRAM(S): at 05:15

## 2025-02-10 RX ADMIN — Medication 10 MILLIGRAM(S): at 10:19

## 2025-02-10 RX ADMIN — Medication 3 MILLILITER(S): at 21:20

## 2025-02-10 RX ADMIN — ACETAMINOPHEN 400 MILLIGRAM(S): 160 SUSPENSION ORAL at 16:30

## 2025-02-10 RX ADMIN — Medication 2 TABLET(S): at 23:53

## 2025-02-10 RX ADMIN — IPRATROPIUM BROMIDE AND ALBUTEROL SULFATE 3 MILLILITER(S): .5; 2.5 SOLUTION RESPIRATORY (INHALATION) at 19:14

## 2025-02-10 RX ADMIN — ASPIRIN 81 MILLIGRAM(S): 81 TABLET, COATED ORAL at 10:49

## 2025-02-10 NOTE — PROGRESS NOTE ADULT - ASSESSMENT
**STEPDOWN FROM CCU TO 5URIS**    79 yr old F with PMHx of HTN, hyperlipidemia, DM, hypothyroidism, GERD, asthma, CAD s/p prior PCIs at [facility] 11/2023 who [place] [facility] [place] with chest pain and URI symptoms x 2-3 days, R/I NSTEMI, s/p diagnostic cath[place] revealing 3VD and newly reduced EF, initially transferred to [facility] 9LACH under Dr. 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 Course further c/b anemia pt now s/p 2u prbc on 2/4. and  cardiogenic shock, requiring pressor support and IABP. Transferred to CCU for further management         NEUROLOGY:  Extubated on high-flow but fatigued, continue to assess mental status as it improves.    CARDIOVASCULAR:    #Cardiogenic shock   - patient became hypotensive requiring inotropy support as well as mechanical ventilation and IABP for afterload reduction. Echo on arrival to [facility] showing reduced EF of 44%   -  New Acute CHF likely in setting of NSTEMI EF at [facility] was 25%, Echo on Arrival improved to 1/31/2025 44%  - Holding GDMT: Toprol increased to 50mg PO daily, isordil 5mg TID. Losartan on HOLD in setting of KEN.  - off of levo and Vaso as of 2/8/25    Plan   - IABP removed 2/8, tolerating well   - Lasix 40mg IVP daily  - c/w ASA 81mg daily   - start toprol XL 25 mg daily  - likely stepdown today        #NSTEMI   Moderate CP this AM   - S/p diagnotic [facility] @[facility] [place]/28[place]24 w/ Dr. Morgan: dLM to pLAD 80% Stenosed ISR, LCX ostial to pLCX ISR, % Stenosed.  - TTE 1/31/25: mild LVH, Mod reduced LVSF, EF 44%. RWMA. Grade I DD. Aortic sclerosis w/o significant stenosis.  - Atorvastatin 40mg qd.   - Transitioned pt from heparin gtt to Lovenox 80mg sq BID, on ASA 81 mg but holding plavix as c/f bleed  - discuss if revascularization candidate with cath team, then consider restarting plavix as currently holding    **Prior Cath@[facility] 11/2023: Impella Assisted Rota/PCI with EMILIE to LM 90%, EMILIE ostial LAD and EMILIE ostial LCX, (dLM 80%, oLAD 85%, D2 75%, oLCX 85%, mRCA 75%).      PULMONARY:  #AHRF   - Wean NC as tolerated    #PNA   Pulm Consulted: low suspicion for PNA, noted to have appearance of endobronchial material in superior segment RLL bronchus.   - CT Chest 1/31/2025 Persistent small large airways inflammation with interval increase in mucous plugging with near complete obstruction of the lateral segmental bronchus to the RML, RLL bronchus and anteromedial basal segmental bronchus to the LLL. Associated groundglass opacities are identified and early multifocal pneumonia possibly postobstructive cannot be excluded.   - Recommended outpatient pulm follow up w PFTs (to diagnosis obstructive lung disease) and consideration of repeat imaging.   - WBC on Admission 13--->17.87->20.27, today 17 MRSA swab + at [facility].   - UA normal, Blood Cultures 1/31 NGTD, Procal normal.     #History of Asthma   Pulm following Appreciate Recs   - Duonebs, Hypertonic Saline to aid with Mucous plugging  - C/w symbicort and Montelukast         GASTROINTESTINAL:  - soft and bite sized  - daily protonix  - ctm LFTs      RENAL:  #CKD Stage 3   KEN on CKD Stage 3 (baseline Cr ~1.3-1.5 from prior Labs), improving now  - Today 1.54, improved with Fluids likely due to hypovolemia episode vs dehydration   - Urine studies: 2/3: Na 71, Protein 27, Creatinine 108, Pr/Cr Ratio .2, Urea Nitrogen 832, K 21, Osmol 505  - ctm, trend creatinine  - avoid nephrotoxic meds  - monitor UOP    INFECTIOUS DISEASE:  #PNA   Pulm Consulted: low suspicion for PNA, noted to have appearance of endobronchial material in superior segment RLL bronchus.   - CT Chest 1/31/2025 Persistent small large airways inflammation with interval increase in mucous plugging with near complete obstruction of the lateral segmental bronchus to the RML, RLL bronchus and anteromedial basal segmental bronchus to the LLL. Associated groundglass opacities are identified and early multifocal pneumonia possibly postobstructive cannot be excluded.   - Recommended outpatient pulm follow up w PFTs (to diagnosis obstructive lung disease) and consideration of repeat imaging.   - WBC on Admission 13--->17.87->20.27, today 17 MRSA swab + at [facility].   - UA normal, Blood Cultures 1/31 NGTD, Procal normal.   - on arrival to CCU: ordered repeat CXR, BCX, UCX, UA, MRSA swab   - MRSA swab positive, RVP negative   - pro-hailee previously elevated at 1.6, downtrending now    Plan   - Cultures are all no growth to date  - c/w Vancomycin, dose by AM Level  - zosyn --> meropenem 1g BID, expires 2/13  - ID consulted, appreciate recs     ENDOCRINE:  #Hypothyroidism   - C/w Levothyroxine 75mcg PO daily.  - TSH 1.390    #DM   A1C 6.9    Plan   - C/w FS's   - ISS,   - Lantus 30 at bedtime       HEME:    #Anemia   Pt is s/p 2u prbc 2/4/25 AM, 1u pRBC additional on 2/7 AM. Negative SEGUN. B12, folate, iron studies wnl  - Hgb on admission 10.1. Steadily downtrending,  Hgb this AM 7.0 (2/7)  - s/p 1 unit PRBC overnight on 2/7 - 2/8  Plan   - Transfuse for HGb < 7  - LLE hematoma likely source, treat as below    #LE Hematoma   CT LLE: c/f large hematoma  CT angio (2/8): large intramuscular hematoma involving the anteromedial  thigh musculature with heterogeneous attenuation, suggestive of   hemorrhagic components in various stages of evolution. No evidence of  arterial extravasation or venous pooling on delayed images.   Possibly secondary to trauma while at [facility], reportedly tried both femoral arteries for access during angio.   Plan   - vascular following, appreciate recs - no acute intervention, ace bandaging  - c/w ASA 81mg daily          FLUIDS/ELECTROLYTES/NUTRITION:  -Drips: None   -Monitor, Replete to K>4 and Mg>2  -Diet - soft and bite sized    PROPHYLAXIS  -DVT: SCD, hold off plavix pending possible revascularization  -GI- PPI qd, soft and bite sized diet   DISPO: CCU  DNR/DNI  **STEPDOWN FROM CCU TO 5URIS**    79 yr old F with PMHx of HTN, hyperlipidemia, DM, hypothyroidism, GERD, asthma, CAD s/p prior PCIs at [facility] 11/2023 who [place] [facility] [place] with chest pain and URI symptoms x 2-3 days, R/I NSTEMI, s/p diagnostic cath[place] [place] 3VD and newly reduced EF, initially transferred to [facility] 9LACH under Dr. 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 Course further c/b anemia pt now s/p 2u prbc on 2/4. and  cardiogenic shock, requiring pressor support and IABP. Transferred to CCU for further management       NEUROLOGY:  Extubated on high-flow but fatigued, continue to assess mental status as it improves.    CARDIOVASCULAR:    #Cardiogenic shock (resolved)  #new onset HFrEF   - patient became hypotensive requiring inotropy support as well as mechanical ventilation and IABP for afterload reduction. Echo on arrival to [facility] showing reduced EF of 44%   -  New Acute CHF likely in setting of NSTEMI EF at [facility] was 25%, Echo on Arrival improved to 1/31/2025 44%  - Holding GDMT: Toprol increased to 50mg PO daily, isordil 5mg TID. Losartan on HOLD in setting of KEN.  - off of levo and Vaso as of 2/8/25    Plan   - IABP removed 2/8, tolerating well   - Lasix 40mg IVP daily  - c/w ASA 81mg daily   - c/w toprol XL 25 mg daily  - start hydralazine 10 mg TID  - stepdown today        #NSTEMI   Moderate CP this AM   - S/p [place]not[facility] [place] [place] w/ Dr. Morgan: dLM to pLAD 80% Stenosed ISR, LCX ostial to pLCX ISR, % Stenosed.  - TTE 1/31/25: mild LVH, Mod reduced LVSF, EF 44%. RWMA. Grade I DD. Aortic sclerosis w/o significant stenosis.  - Atorvastatin 40mg qd.   - Transitioned pt from heparin gtt to Lovenox 40mg sq daily ppx dose, on ASA 81 mg but holding plavix as c/f bleed  - discuss if revascularization candidate with cath team, then consider restarting plavix as currently holding. ECG findings suggest patient could be good revascularization candidate if medically optimized otherwise.   - will need to revisit code status discussion prior to revascularization to assess if proper candidate for this procedure    **Prior Cath@[facility] 11/2023: Impella Assisted Rota/PCI with EMILIE to LM 90%, EMILIE ostial LAD and EMILIE ostial LCX, (dLM 80%, oLAD 85%, D2 75%, oLCX 85%, mRCA 75%).      PULMONARY:  #AHRF   - Wean NC as tolerated    #PNA   Pulm Consulted: low suspicion for PNA, noted to have appearance of endobronchial material in superior segment RLL bronchus.   - CT Chest 1/31/2025 Persistent small large airways inflammation with interval increase in mucous plugging with near complete obstruction of the lateral segmental bronchus to the RML, RLL bronchus and anteromedial basal segmental bronchus to the LLL. Associated groundglass opacities are identified and early multifocal pneumonia possibly postobstructive cannot be excluded.   - Recommended outpatient pulm follow up w PFTs (to diagnosis obstructive lung disease) and consideration of repeat imaging.   - WBC on Admission 13--->17.87->20.27, today 17 [place] swab + [place] [facility].   - UA normal, Blood Cultures 1/31 NGTD, Procal normal.     #History of Asthma   Pulm following Appreciate Recs   - Duonebs, Hypertonic Saline to aid with Mucous plugging  - C/w symbicort and Montelukast         GASTROINTESTINAL:  - soft and bite sized  - daily protonix  - ctm LFTs      RENAL:  #CKD Stage 3   KEN on CKD Stage 3 (baseline Cr ~1.3-1.5 from prior Labs), improving now  - Today 1.54, improved with Fluids likely due to hypovolemia episode vs dehydration   - Urine studies: 2/3: Na 71, Protein 27, Creatinine 108, Pr/Cr Ratio .2, Urea Nitrogen 832, K 21, Osmol 505  - ctm, trend creatinine  - avoid nephrotoxic meds  - monitor UOP    INFECTIOUS DISEASE:  #PNA   Pulm Consulted: low suspicion for PNA, noted to have appearance of endobronchial material in superior segment RLL bronchus.   - CT Chest 1/31/2025 Persistent small large airways inflammation with interval increase in mucous plugging with near complete obstruction of the lateral segmental bronchus to the RML, RLL bronchus and anteromedial basal segmental bronchus to the LLL. Associated groundglass opacities are identified and early multifocal pneumonia possibly postobstructive cannot be excluded.   - Recommended outpatient pulm follow up w PFTs (to diagnosis obstructive lung disease) and consideration of repeat imaging.   - WBC on Admission 13--->17.87->20.27, today 17 [place] swab + [place] [facility]   - UA normal, Blood Cultures 1/31 NGTD, Procal normal.   - on arrival to CCU: ordered repeat CXR, BCX, UCX, UA, MRSA swab   - MRSA swab positive, RVP negative   - pro-hailee previously elevated at 1.6, downtrending now    Plan   - Cultures are all no growth to date  - c/w Vancomycin, dose by AM Level  - zosyn --> meropenem 1g BID, expires 2/13  - ID consulted, appreciate recs     ENDOCRINE:  #Hypothyroidism   - C/w Levothyroxine 75mcg PO daily.  - TSH 1.390    #DM   A1C 6.9    Plan   - C/w FS's   - ISS,   - Lantus 30 at bedtime       HEME:    #Anemia   Pt is s/p 2u prbc 2/4/25 AM, 1u pRBC additional on 2/7 AM. Negative SEGUN. B12, folate, iron studies wnl  - Hgb on admission 10.1. Steadily downtrending,  Hgb this AM 7.0 (2/7)  - s/p 1 unit PRBC overnight on 2/7 - 2/8  Plan   - Transfuse for HGb < 7  - LLE hematoma likely source, treat as below    #LE Hematoma   CT LLE: c/f large hematoma  CT angio (2/8): large intramuscular hematoma involving the anteromedial  thigh musculature with heterogeneous attenuation, suggestive of   hemorrhagic components in various stages of evolution. No evidence of  arterial extravasation or venous pooling on delayed images.   Possibly secondary to trauma while at [facility], reportedly tried both femoral arteries for access during angio.   Plan   - vascular following, appreciate recs - no acute intervention, ace bandaging  - c/w ASA 81mg daily          FLUIDS/ELECTROLYTES/NUTRITION:  -Drips: None   -Monitor, Replete to K>4 and Mg>2  -Diet - soft and bite sized    PROPHYLAXIS  -DVT: lovenox 40 mg daily, ASA 81 mg, hold off plavix pending possible revascularization  -GI- PPI qd, regular diet  DISPO: CCU  DNR/DNI

## 2025-02-10 NOTE — PROGRESS NOTE ADULT - SUBJECTIVE AND OBJECTIVE BOX
Patient is a 79y old  Female who presents with a chief complaint of CAD (10 Feb 2025 05:41)      HOSPITAL COURSE:     OVERNIGHT EVENTS:    SUBJECTIVE:     ROS: otherwise negative      T(C): 36.9 (02-10-25 @ 05:50), Max: 37.3 (02-10-25 @ 01:20)  HR: 85 (02-10-25 @ 07:00) (74 - 96)  BP: 126/61 (02-10-25 @ 07:00) (108/51 - 159/67)  RR: 15 (02-10-25 @ 07:00) (15 - 24)  SpO2: 98% (02-10-25 @ 07:00) (96% - 100%)  Wt(kg): --Vital Signs Last 24 Hrs  T(C): 36.9 (10 Feb 2025 05:50), Max: 37.3 (10 Feb 2025 01:20)  T(F): 98.4 (10 Feb 2025 05:50), Max: 99.1 (10 Feb 2025 01:20)  HR: 85 (10 Feb 2025 07:00) (74 - 96)  BP: 126/61 (10 Feb 2025 07:00) (108/51 - 159/67)  BP(mean): 88 (10 Feb 2025 07:00) (73 - 96)  RR: 15 (10 Feb 2025 07:00) (15 - 24)  SpO2: 98% (10 Feb 2025 07:00) (96% - 100%)    Parameters below as of 10 Feb 2025 08:00  Patient On (Oxygen Delivery Method): room air        PHYSICAL EXAM:  Constitutional: resting comfortably in bed; NAD  Head: NC/AT  Eyes: PERRL, EOMI, anicteric sclera  ENT: no nasal discharge; MMM  Neck: supple; no JVD or thyromegaly  Respiratory: CTA B/L; no W/R/R, no retractions  Cardiac: +S1/S2; RRR; no M/R/G  Gastrointestinal: soft, NT/ND; no rebound or guarding; +BSx4  Back: spine midline, no bony tenderness or step-offs; no CVAT B/L  Extremities: WWP, no clubbing or cyanosis; no peripheral edema. Capillary refill <2 sec  Musculoskeletal: NROM x4; no joint swelling, tenderness or erythema  Vascular: 2+ radial, DP/PT pulses B/L  Dermatologic: skin warm, dry and intact; no rashes, wounds, or scars  Lymphatic: no submandibular or cervical LAD  Neurologic: AAOx3; CNII-XII grossly intact; no focal deficits  Psychiatric: affect and characteristics of appearance, verbalizations, behaviors are appropriate    LABS:                        8.2    9.02  )-----------( 92       ( 10 Feb 2025 04:25 )             25.5     02-10    138  |  103  |  26[H]  ----------------------------<  109[H]  4.1   |  25  |  1.65[H]    Ca    7.7[L]      10 Feb 2025 04:25  Phos  3.4     02-10  Mg     2.2     02-10    TPro  4.7[L]  /  Alb  2.6[L]  /  TBili  0.9  /  DBili  x   /  AST  30  /  ALT  21  /  AlkPhos  49  02-10        Urinalysis Basic - ( 10 Feb 2025 04:25 )    Color: x / Appearance: x / SG: x / pH: x  Gluc: 109 mg/dL / Ketone: x  / Bili: x / Urobili: x   Blood: x / Protein: x / Nitrite: x   Leuk Esterase: x / RBC: x / WBC x   Sq Epi: x / Non Sq Epi: x / Bacteria: x      CAPILLARY BLOOD GLUCOSE      POCT Blood Glucose.: 103 mg/dL (10 Feb 2025 06:47)  POCT Blood Glucose.: 142 mg/dL (09 Feb 2025 23:08)  POCT Blood Glucose.: 98 mg/dL (09 Feb 2025 17:08)  POCT Blood Glucose.: 162 mg/dL (09 Feb 2025 10:35)        Urinalysis Basic - ( 10 Feb 2025 04:25 )    Color: x / Appearance: x / SG: x / pH: x  Gluc: 109 mg/dL / Ketone: x  / Bili: x / Urobili: x   Blood: x / Protein: x / Nitrite: x   Leuk Esterase: x / RBC: x / WBC x   Sq Epi: x / Non Sq Epi: x / Bacteria: x        MEDICATIONS  (STANDING):  albuterol/ipratropium for Nebulization 3 milliLiter(s) Nebulizer every 6 hours  aspirin enteric coated 81 milliGRAM(s) Oral every 24 hours  atorvastatin 80 milliGRAM(s) Oral at bedtime  chlorhexidine 2% Cloths 1 Application(s) Topical <User Schedule>  dextrose 5%. 1000 milliLiter(s) (100 mL/Hr) IV Continuous <Continuous>  dextrose 50% Injectable 12.5 Gram(s) IV Push once  dextrose 50% Injectable 25 Gram(s) IV Push once  fluticasone propionate/ salmeterol 250-50 MICROgram(s) Diskus 1 Dose(s) Inhalation two times a day  furosemide   Injectable 40 milliGRAM(s) IV Push daily  glucagon  Injectable 1 milliGRAM(s) IntraMuscular once  insulin glargine Injectable (LANTUS) 30 Unit(s) SubCutaneous at bedtime  insulin lispro (ADMELOG) corrective regimen sliding scale   SubCutaneous every 6 hours  lactated ringers. 1000 milliLiter(s) (50 mL/Hr) IV Continuous <Continuous>  levothyroxine 75 MICROGram(s) Oral every 24 hours  meropenem  IVPB 1000 milliGRAM(s) IV Intermittent every 12 hours  metoprolol succinate ER 25 milliGRAM(s) Oral daily  montelukast 10 milliGRAM(s) Oral every 24 hours  pantoprazole  Injectable 40 milliGRAM(s) IV Push every 12 hours  sodium chloride 0.9% lock flush 3 milliLiter(s) IV Push every 8 hours    MEDICATIONS  (PRN):  acetaminophen     Tablet .. 650 milliGRAM(s) Oral every 6 hours PRN Moderate Pain (4 - 6)  albuterol    90 MICROgram(s) HFA Inhaler 2 Puff(s) Inhalation every 4 hours PRN Bronchospasm  dextrose Oral Gel 15 Gram(s) Oral once PRN Blood Glucose LESS THAN 70 milliGRAM(s)/deciliter      RADIOLOGY & ADDITIONAL TESTS: Reviewed   Patient is a 79y old  Female who presents with a chief complaint of CAD (10 Feb 2025 05:41)      HOSPITAL COURSE: 78 yo female with PMH of HTN, HLD, Type II DM, triple vessel CAD s/p 3 stents at [facility] 11/2023, CHF, asthma, GERD, and hypothyroidism who presented to [facility] with CP and SOB on 1/24. She was having fever, chills, a productive cough and congestion for 2-3 days prior to the chest pain. She states the pain was constant and radiated to her back/left arm with associated palpitations, SOB, orthopnea and dizziness. She felt the chest pain was similar to when she had the stents prompting her to present to the ER on 1/24.In ED she was found to be tachycardic and tachypneic. She was placed on BiPAP for respiratory distress and loaded with ASA/Plavix and placed on a heparin gtt for NSTEMI. Her nasal swab was + for MRSA and she recieved CTX 1g daily. She is s/p cardiac cath showing 3V CAD and a reduced EF of 25%. She was transferred to [facility] CTS under the service of Dr. Hurst for further workup and deemed not a candidate for CTS. Pt the transferred to cardiac tele for optimization for high risk PCI. Repeat ECHO on admission showing moderate reduced EF 44%. Course c/b KEN on CKD, and multiple episodes of hypotension requiring fluids. Also the was a concern for DVT but US came back negative. Medicine/Pulm Consulted given CT findings and Leukocytosis, Infectious work up negative treated for empiric coverage with Vanc/Zosyn (completed). Course further complicated by low hgb @ 7.5. S/p 2U of pRBC on 2/4 and 1 unit ordered prior to transfer on 2/5. Additionally pt has been c/o worsening CP w/ initial EKG changes pt started on nitro gtt. CP improved slightly but SBP dropped from 170s to 90-100s.  While in cath lab patient became hypotensive requiring intubation, mechanical support with IABP, transferred to CCU for further management. While in the CCU, patient was extubated quickly and IABP and pressors were successfully weaned without complication. Weaned to room air. Continued with vancomycin and meropenem. She was becoming increasingly anemic and required 1u pRBC. It was found to be secondary to LLE hematoma evaluated by vascular with no acute surgical intervention planned.  She was diuresed and started on toprol but rest of her GDMT was held. KEN improved daily. At this point, she was stable for stepdown to RMF.     OVERNIGHT EVENTS: naeon    SUBJECTIVE: Patient with no acute concerns this morning. No nausea, vomiting, fevers, diarrhea.     ROS: otherwise negative      T(C): 36.9 (02-10-25 @ 05:50), Max: 37.3 (02-10-25 @ 01:20)  HR: 85 (02-10-25 @ 07:00) (74 - 96)  BP: 126/61 (02-10-25 @ 07:00) (108/51 - 159/67)  RR: 15 (02-10-25 @ 07:00) (15 - 24)  SpO2: 98% (02-10-25 @ 07:00) (96% - 100%)  Wt(kg): --Vital Signs Last 24 Hrs  T(C): 36.9 (10 Feb 2025 05:50), Max: 37.3 (10 Feb 2025 01:20)  T(F): 98.4 (10 Feb 2025 05:50), Max: 99.1 (10 Feb 2025 01:20)  HR: 85 (10 Feb 2025 07:00) (74 - 96)  BP: 126/61 (10 Feb 2025 07:00) (108/51 - 159/67)  BP(mean): 88 (10 Feb 2025 07:00) (73 - 96)  RR: 15 (10 Feb 2025 07:00) (15 - 24)  SpO2: 98% (10 Feb 2025 07:00) (96% - 100%)    Parameters below as of 10 Feb 2025 08:00  Patient On (Oxygen Delivery Method): room air        PHYSICAL EXAM:  HEENT:  pupils 3 mm, PERRL    Neck: Supple, JVD; Carotid Bruit   Cardiovascular: Normal S1 S2, no m/r/g  Respiratory: anterior lung fields CTAB  Gastrointestinal:  Soft, Non-tender, + BS	  Skin: No rashes, No ecchymoses, No cyanosis  Extremities: Normal range of motion, No clubbing, anasarca  Vascular: Peripheral pulses palpable 2+ bilaterally,   Neurologic: AOx3, no focal deficits    LABS:                        8.2    9.02  )-----------( 92       ( 10 Feb 2025 04:25 )             25.5     02-10    138  |  103  |  26[H]  ----------------------------<  109[H]  4.1   |  25  |  1.65[H]    Ca    7.7[L]      10 Feb 2025 04:25  Phos  3.4     02-10  Mg     2.2     02-10    TPro  4.7[L]  /  Alb  2.6[L]  /  TBili  0.9  /  DBili  x   /  AST  30  /  ALT  21  /  AlkPhos  49  02-10        Urinalysis Basic - ( 10 Feb 2025 04:25 )    Color: x / Appearance: x / SG: x / pH: x  Gluc: 109 mg/dL / Ketone: x  / Bili: x / Urobili: x   Blood: x / Protein: x / Nitrite: x   Leuk Esterase: x / RBC: x / WBC x   Sq Epi: x / Non Sq Epi: x / Bacteria: x      CAPILLARY BLOOD GLUCOSE      POCT Blood Glucose.: 103 mg/dL (10 Feb 2025 06:47)  POCT Blood Glucose.: 142 mg/dL (09 Feb 2025 23:08)  POCT Blood Glucose.: 98 mg/dL (09 Feb 2025 17:08)  POCT Blood Glucose.: 162 mg/dL (09 Feb 2025 10:35)        Urinalysis Basic - ( 10 Feb 2025 04:25 )    Color: x / Appearance: x / SG: x / pH: x  Gluc: 109 mg/dL / Ketone: x  / Bili: x / Urobili: x   Blood: x / Protein: x / Nitrite: x   Leuk Esterase: x / RBC: x / WBC x   Sq Epi: x / Non Sq Epi: x / Bacteria: x        MEDICATIONS  (STANDING):  albuterol/ipratropium for Nebulization 3 milliLiter(s) Nebulizer every 6 hours  aspirin enteric coated 81 milliGRAM(s) Oral every 24 hours  atorvastatin 80 milliGRAM(s) Oral at bedtime  chlorhexidine 2% Cloths 1 Application(s) Topical <User Schedule>  dextrose 5%. 1000 milliLiter(s) (100 mL/Hr) IV Continuous <Continuous>  dextrose 50% Injectable 12.5 Gram(s) IV Push once  dextrose 50% Injectable 25 Gram(s) IV Push once  fluticasone propionate/ salmeterol 250-50 MICROgram(s) Diskus 1 Dose(s) Inhalation two times a day  furosemide   Injectable 40 milliGRAM(s) IV Push daily  glucagon  Injectable 1 milliGRAM(s) IntraMuscular once  insulin glargine Injectable (LANTUS) 30 Unit(s) SubCutaneous at bedtime  insulin lispro (ADMELOG) corrective regimen sliding scale   SubCutaneous every 6 hours  lactated ringers. 1000 milliLiter(s) (50 mL/Hr) IV Continuous <Continuous>  levothyroxine 75 MICROGram(s) Oral every 24 hours  meropenem  IVPB 1000 milliGRAM(s) IV Intermittent every 12 hours  metoprolol succinate ER 25 milliGRAM(s) Oral daily  montelukast 10 milliGRAM(s) Oral every 24 hours  pantoprazole  Injectable 40 milliGRAM(s) IV Push every 12 hours  sodium chloride 0.9% lock flush 3 milliLiter(s) IV Push every 8 hours    MEDICATIONS  (PRN):  acetaminophen     Tablet .. 650 milliGRAM(s) Oral every 6 hours PRN Moderate Pain (4 - 6)  albuterol    90 MICROgram(s) HFA Inhaler 2 Puff(s) Inhalation every 4 hours PRN Bronchospasm  dextrose Oral Gel 15 Gram(s) Oral once PRN Blood Glucose LESS THAN 70 milliGRAM(s)/deciliter      RADIOLOGY & ADDITIONAL TESTS: Reviewed   Patient is a 79y old  Female who presents with a chief complaint of CAD (10 Feb 2025 05:41)      HOSPITAL COURSE: 80 yo female with PMH of HTN, HLD, Type II DM, triple vessel CAD s/p 3 stents at [facility] 11/2023, CHF, asthma, GERD, and hypothyroidism who presented to [facility] with CP and SOB on 1/24. She was having fever, chills, a productive cough and congestion for 2-3 days prior to the chest pain. She states the pain was constant and radiated to her back/left arm with associated palpitations, SOB, orthopnea and dizziness. She felt the chest pain was similar to when she had the stents prompting her to present to the ER on 1/24.In ED she was found to be tachycardic and tachypneic. She was placed on BiPAP for respiratory distress and loaded with ASA/Plavix and placed on a heparin gtt for NSTEMI. Her nasal swab was + for MRSA and she recieved CTX 1g daily. She is s/p cardiac cath showing 3V CAD and a reduced EF of 25%. She was transferred to [facility] CTS under the service of Dr. Hurst for further workup and deemed not a candidate for CTS. Pt the transferred to cardiac tele for optimization for high risk PCI. Repeat ECHO on admission showing moderate reduced EF 44%. Course c/b KEN on CKD, and multiple episodes of hypotension requiring fluids. Also the was a concern for DVT but US came back negative. Medicine/Pulm Consulted given CT findings and Leukocytosis, Infectious work up negative treated for empiric coverage with Vanc/Zosyn (completed). Course further complicated by low hgb @ 7.5. S/p 2U of pRBC on 2/4 and 1 unit ordered prior to transfer on 2/5. Additionally pt has been c/o worsening CP w/ initial EKG changes pt started on nitro gtt. CP improved slightly but SBP dropped from 170s to 90-100s.  While in cath lab patient became hypotensive requiring intubation, mechanical support with IABP, transferred to CCU for further management. While in the CCU, patient was extubated quickly and IABP and pressors were successfully weaned without complication. Weaned to room air. Continued with vancomycin and meropenem with downtrending WBC and no other systemic signs of infection. She was becoming increasingly anemic and required 1u pRBC. It was found to be secondary to LLE hematoma evaluated by vascular with no acute surgical intervention planned.  She was started on daily lasix 40 mg IVP as well as on toprol and hydralalzine, but rest of her GDMT is still being held. KEN improving daily. At this point, she was stable for stepdown to RMF.     OVERNIGHT EVENTS: naeon    SUBJECTIVE: Patient with no acute concerns this morning. No nausea, vomiting, fevers, diarrhea.     ROS: otherwise negative      T(C): 36.9 (02-10-25 @ 05:50), Max: 37.3 (02-10-25 @ 01:20)  HR: 85 (02-10-25 @ 07:00) (74 - 96)  BP: 126/61 (02-10-25 @ 07:00) (108/51 - 159/67)  RR: 15 (02-10-25 @ 07:00) (15 - 24)  SpO2: 98% (02-10-25 @ 07:00) (96% - 100%)  Wt(kg): --Vital Signs Last 24 Hrs  T(C): 36.9 (10 Feb 2025 05:50), Max: 37.3 (10 Feb 2025 01:20)  T(F): 98.4 (10 Feb 2025 05:50), Max: 99.1 (10 Feb 2025 01:20)  HR: 85 (10 Feb 2025 07:00) (74 - 96)  BP: 126/61 (10 Feb 2025 07:00) (108/51 - 159/67)  BP(mean): 88 (10 Feb 2025 07:00) (73 - 96)  RR: 15 (10 Feb 2025 07:00) (15 - 24)  SpO2: 98% (10 Feb 2025 07:00) (96% - 100%)    Parameters below as of 10 Feb 2025 08:00  Patient On (Oxygen Delivery Method): room air        PHYSICAL EXAM:  HEENT:  pupils 3 mm, PERRL    Neck: Supple, JVD; Carotid Bruit   Cardiovascular: Normal S1 S2, no m/r/g  Respiratory: anterior lung fields CTAB  Gastrointestinal:  Soft, Non-tender, + BS	  Skin: No rashes, No ecchymoses, No cyanosis  Extremities: Normal range of motion, No clubbing, anasarca  Vascular: Peripheral pulses palpable 2+ bilaterally,   Neurologic: AOx3, no focal deficits    LABS:                        8.2    9.02  )-----------( 92       ( 10 Feb 2025 04:25 )             25.5     02-10    138  |  103  |  26[H]  ----------------------------<  109[H]  4.1   |  25  |  1.65[H]    Ca    7.7[L]      10 Feb 2025 04:25  Phos  3.4     02-10  Mg     2.2     02-10    TPro  4.7[L]  /  Alb  2.6[L]  /  TBili  0.9  /  DBili  x   /  AST  30  /  ALT  21  /  AlkPhos  49  02-10        Urinalysis Basic - ( 10 Feb 2025 04:25 )    Color: x / Appearance: x / SG: x / pH: x  Gluc: 109 mg/dL / Ketone: x  / Bili: x / Urobili: x   Blood: x / Protein: x / Nitrite: x   Leuk Esterase: x / RBC: x / WBC x   Sq Epi: x / Non Sq Epi: x / Bacteria: x      CAPILLARY BLOOD GLUCOSE      POCT Blood Glucose.: 103 mg/dL (10 Feb 2025 06:47)  POCT Blood Glucose.: 142 mg/dL (09 Feb 2025 23:08)  POCT Blood Glucose.: 98 mg/dL (09 Feb 2025 17:08)  POCT Blood Glucose.: 162 mg/dL (09 Feb 2025 10:35)        Urinalysis Basic - ( 10 Feb 2025 04:25 )    Color: x / Appearance: x / SG: x / pH: x  Gluc: 109 mg/dL / Ketone: x  / Bili: x / Urobili: x   Blood: x / Protein: x / Nitrite: x   Leuk Esterase: x / RBC: x / WBC x   Sq Epi: x / Non Sq Epi: x / Bacteria: x        MEDICATIONS  (STANDING):  albuterol/ipratropium for Nebulization 3 milliLiter(s) Nebulizer every 6 hours  aspirin enteric coated 81 milliGRAM(s) Oral every 24 hours  atorvastatin 80 milliGRAM(s) Oral at bedtime  chlorhexidine 2% Cloths 1 Application(s) Topical <User Schedule>  dextrose 5%. 1000 milliLiter(s) (100 mL/Hr) IV Continuous <Continuous>  dextrose 50% Injectable 12.5 Gram(s) IV Push once  dextrose 50% Injectable 25 Gram(s) IV Push once  fluticasone propionate/ salmeterol 250-50 MICROgram(s) Diskus 1 Dose(s) Inhalation two times a day  furosemide   Injectable 40 milliGRAM(s) IV Push daily  glucagon  Injectable 1 milliGRAM(s) IntraMuscular once  insulin glargine Injectable (LANTUS) 30 Unit(s) SubCutaneous at bedtime  insulin lispro (ADMELOG) corrective regimen sliding scale   SubCutaneous every 6 hours  lactated ringers. 1000 milliLiter(s) (50 mL/Hr) IV Continuous <Continuous>  levothyroxine 75 MICROGram(s) Oral every 24 hours  meropenem  IVPB 1000 milliGRAM(s) IV Intermittent every 12 hours  metoprolol succinate ER 25 milliGRAM(s) Oral daily  montelukast 10 milliGRAM(s) Oral every 24 hours  pantoprazole  Injectable 40 milliGRAM(s) IV Push every 12 hours  sodium chloride 0.9% lock flush 3 milliLiter(s) IV Push every 8 hours    MEDICATIONS  (PRN):  acetaminophen     Tablet .. 650 milliGRAM(s) Oral every 6 hours PRN Moderate Pain (4 - 6)  albuterol    90 MICROgram(s) HFA Inhaler 2 Puff(s) Inhalation every 4 hours PRN Bronchospasm  dextrose Oral Gel 15 Gram(s) Oral once PRN Blood Glucose LESS THAN 70 milliGRAM(s)/deciliter      RADIOLOGY & ADDITIONAL TESTS: Reviewed

## 2025-02-10 NOTE — PROGRESS NOTE ADULT - ATTENDING COMMENTS
Ms. Rosario Petersen is a 79F w/ HTN, HLD, asthma, CKD, GERD, hypothyroidism, CHF, and CAD s/p PCI (11/2023), admitted for CP, SOB and NSTEMI s/p diagnostic [facility] demonstrating 3v CAD and newly reduced EF of 25%, transferred to [facility], but deemed not a CABG candidate. Her hospital course was complicated by shock, requiring pressors and IABP via R femoral access, pneumonia, L thigh hematoma and anemia. She was on DAPT and AC this admission. Vascular surgery was consulted on 2/7/25 for the anemia (HGB 7.1/22.2 at time of consult) and thigh hematoma, which was seen on non-contrast LLE CT Scan ("large hematoma centered within the adductor musculature of the proximal left thigh, measuring 11.8 x 10.8 cm transaxially"). CTA triple phase showed no evidence of active obvious arterial extravasation or venous pooling on delayed images. Her PLT count was also 38. She otherwise feels fine with minimal L thigh pain/tenderness. Says not bothering her much. Compartments soft. Motor and sensation grossly intact. Some ecchymosis.       Over the weekend her H/H remained stable. IABP removed. Today 2/10/25, her H/H is 8.2/25.5 (from 8-8.5 range) on ASA. This AM has minimal pain/tenderness to L thigh, which is soft. Able to wiggle toes. Sensation intact. Has chronic L knee pain.       ASSESSMENT  - NSTEMI s/p diagnostic [facility] demonstrating 3v CAD and newly reduced EF of 25%, transferred to [facility], but deemed not a CABG candidate. Her hospital course was complicated by shock, requiring pressors and IABP via R femoral access, pneumonia, L thigh hematoma and anemia  - Spontaneous large L thigh hematoma w/ anemia likely 2/2 triple therapy --> no active obvious arterial extravasation or venous pooling on CTA. Would opt for conservative management at this time      PLAN & RECOMMENDATIONS  - No acute vascular intervention  - C/w antiplatelet and AC regimen per primary cardiology team (would be particularly cautious with AC if possible given recent hematoma)  - Ace-wrap compression from foot to L groin  - Neuro vascular and thigh compartment checks by primary team; please reconsult PRN  - C/w care per CCU/cardiology  - DNR/DNI      Thank you,    Benito Gil MD   of Vascular Surgery  [facility] at [address] [place] [address]  Office: [phone]; Fax: [phone]  [email or web address]

## 2025-02-10 NOTE — GOALS OF CARE CONVERSATION - ADVANCED CARE PLANNING - CONVERSATION DETAILS
GOC discussed with patient Ms. Petersen.  Discussion had regarding code status. At this time, patient wishes to be DNR/DNI. MOLST Form completed and order updated in chart.

## 2025-02-10 NOTE — PROGRESS NOTE ADULT - PROBLEM SELECTOR PLAN 7
satting 91% on RA   - C/w Symbicort and Montelukast to aid with mucous plugging   - pulm consulted and following

## 2025-02-10 NOTE — CHART NOTE - NSCHARTNOTEFT_GEN_A_CORE
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 Last BM noted on 2/10 per flow sheets     Pain: 3/10 (leg) per flow sheets     Skin Integrity: No Pressure Injuries per flow sheets. David Score: 13   Edema: 2+ generalized, 3+ b/l arm, leg, ankle, foot per flow sheets         02-09-25 @ 07:01  -  02-10-25 @ 07:00  --------------------------------------------------------  IN: 475 mL / OUT: 2181 mL / NET: -1706 mL    02-10-25 @ 07:01  -  02-10-25 @ 15:05  --------------------------------------------------------  IN: 240 mL / OUT: 700 mL / NET: -460 mL      Labs:   02-10    138  |  103  |  26[H]  ----------------------------<  109[H]  4.1   |  25  |  1.65[H]    Ca    7.7[L]      10 Feb 2025 04:25  Phos  3.4     02-10  Mg     2.2     02-10    TPro  4.7[L]  /  Alb  2.6[L]  /  TBili  0.9  /  DBili  x   /  AST  30  /  ALT  21  /  AlkPhos  49  02-10    CAPILLARY BLOOD GLUCOSE      POCT Blood Glucose.: 151 mg/dL (10 Feb 2025 11:40)  POCT Blood Glucose.: 103 mg/dL (10 Feb 2025 06:47)  POCT Blood Glucose.: 142 mg/dL (09 Feb 2025 23:08)  POCT Blood Glucose.: 98 mg/dL (09 Feb 2025 17:08)      Medications:  MEDICATIONS  (STANDING):  acetaminophen   IVPB .. 1000 milliGRAM(s) IV Intermittent once  albuterol/ipratropium for Nebulization 3 milliLiter(s) Nebulizer every 6 hours  aspirin enteric coated 81 milliGRAM(s) Oral every 24 hours  atorvastatin 80 milliGRAM(s) Oral at bedtime  chlorhexidine 2% Cloths 1 Application(s) Topical <User Schedule>  dextrose 5%. 1000 milliLiter(s) (100 mL/Hr) IV Continuous <Continuous>  dextrose 50% Injectable 12.5 Gram(s) IV Push once  dextrose 50% Injectable 25 Gram(s) IV Push once  enoxaparin Injectable 40 milliGRAM(s) SubCutaneous every 24 hours  fluticasone propionate/ salmeterol 250-50 MICROgram(s) Diskus 1 Dose(s) Inhalation two times a day  furosemide   Injectable 40 milliGRAM(s) IV Push daily  glucagon  Injectable 1 milliGRAM(s) IntraMuscular once  hydrALAZINE 10 milliGRAM(s) Oral every 8 hours  insulin glargine Injectable (LANTUS) 30 Unit(s) SubCutaneous at bedtime  insulin lispro (ADMELOG) corrective regimen sliding scale   SubCutaneous every 6 hours  lactated ringers. 1000 milliLiter(s) (50 mL/Hr) IV Continuous <Continuous>  levothyroxine 75 MICROGram(s) Oral every 24 hours  meropenem  IVPB 1000 milliGRAM(s) IV Intermittent every 12 hours  metoprolol succinate ER 25 milliGRAM(s) Oral daily  montelukast 10 milliGRAM(s) Oral every 24 hours  pantoprazole  Injectable 40 milliGRAM(s) IV Push every 12 hours  sodium chloride 0.9% lock flush 3 milliLiter(s) IV Push every 8 hours    MEDICATIONS  (PRN):  albuterol    90 MICROgram(s) HFA Inhaler 2 Puff(s) Inhalation every 4 hours PRN Bronchospasm  dextrose Oral Gel 15 Gram(s) Oral once PRN Blood Glucose LESS THAN 70 milliGRAM(s)/deciliter      Weight:  4'9'' IBW 94 pounds (42.7kg)  Admit wt 179 pounds BMI 38.7 %IBW= 190%   2/7 178.7 pounds   2/3 185.8 pounds   2/2 185.8 pounds   2/1 178.7 pounds     Recommend obtaining updated weights when medical feasible. RD to continue to monitor weights as available.     Estimated energy needs:   Kcal: 25-30kcal/kg   Pro: 1.2-1.5 g pro/kg   Fluids: per team     Ideal body weight (42.7kg) used to calculate energy needs due to pt's current body weight exceeds % (190%) ideal body weight. Needs adjusted for advanced age, clinical course.    Subjective:   "79 yr old F with PMHx of HTN, hyperlipidemia, DM, hypothyroidism, GERD, asthma, CAD s/p prior PCIs at [facility] 11/2023 who presented to [facility] 1/24/25 with chest pain and URI symptoms x 2-3 days, R/I NSTEMI, s/p diagnostic cath@[facility] revealing 3VD and newly reduced EF, initially transferred to [facility] 9LACH under Dr. 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 Course further c/b anemia pt now s/p 2u prbc on 2/4. and  cardiogenic shock, requiring pressor support and IABP. Transferred to CCU for further management "     Visited pt at bedside on 5EA for follow up assessment, since then transferred to 5UR. Current diet: DASH/TLC. States that her appetite has been poor, consuming ~25% of breakfast this morning. RD encouraged PO intake as able, reviewed nutrient dense options on the menu. Educated/Discussed the importance to prioritize protein at meal times. Reviewed good sources of protein on the menu. Pt amenable to oral nutrition supplement at this time. Meds reviewed. IV antibiotics, Lactated ringers, 30U Lantus, Insulin Sliding Scale, lasix, synthroid. Nutritionally pertinent Labs 2/10 POCT ranges  past 24 hours. BUN: 26 (H), Creat: 1.65 (H), Gluc: 109 (H). See Nutrition recommendations below.      Previous Nutrition Diagnosis: Inadequate Energy Intake related to intake<EER as evidenced by pt reporting consuming ~25%.     Active [  ]  Resolved [   ]  Adjusted [ X ]     Goal: Pt to meet >75% of estimated nutrition needs daily per course of hospitalization via optimal route     Recommendations:   1. Continue with current diet as tolerated  2. Recommend Ensure Max 2x/day (150 kcal, 30 g protein in each) to optimize intake.   3. Encourage PO intake and honor food preferences as able unless otherwise contraindicated.   4. Monitor PO intake, diet tolerance, weight trends, labs, and skin integrity and bowel movement regularity.   5. RDN will continue to monitor, reassess, and intervene as appropriate.     Education: Educated/Discussed on ways to promote intake. Educated/Discussed the importance to prioritize protein at meal times. Reviewed good sources of protein on the menu. Pt receptive and verbalizes understanding.     Risk Level: High [ X ] Moderate [   ] Low [   ]

## 2025-02-10 NOTE — PROGRESS NOTE ADULT - PROBLEM SELECTOR PLAN 2
VSS. Extremities warm.   -  New onsent CHF likely in setting of NSTEMI   -  EF at [facility]: 25%, TTE 1/31/25: EF 44%, mild LVH, RWMA. Grade I DD. Aortic sclerosis w/o significant stenosis.  -  GDMT: c/w toprol 25mg QD and isordil 10mg TID. Losartan on HOLD in setting of KEN.  - Diuresis: Lasix 40mg IVP QD VSS. Extremities warm. +JVD   -  New onsent CHF likely in setting of NSTEMI   -  [place] [facility]%, [place]TE 1/31/25: EF 44%, mild LVH, RWMA. Grade I DD. Aortic sclerosis w/o significant stenosis.  -  GDMT: c/w toprol 25mg QD and isordil 10mg TID. Losartan on HOLD in setting of KEN.  - Diuresis: Lasix 40mg IVP QD

## 2025-02-10 NOTE — PROGRESS NOTE ADULT - PROBLEM SELECTOR PLAN 1
Chest pain free   - Cath@[facility] 11/2023: Impella Assisted Rota/PCI with EMILIE to LM 90%, EMILIE ostial LAD and EMILIE ostial LCX, (dLM 80%, oLAD 85%, D2 75%, oLCX 85%, mRCA 75%).  - S/p [facility]ic [facility] 1[facility] w/ Dr. Morgan: dLM to pLAD 80% Stenosed ISR, LCX ostial to pLCX ISR, % Stenosed.  - TTE 1/31/25: EF 44%, mild LVH, RWMA. Grade I DD. Aortic sclerosis w/o significant stenosis.  - c/w lovenox 40mg sq daily ppx dose. s/p heparin gtt  - c/w ASA 81 mg. Currently holding plavix as 2/2 LLE hematoma   - discuss if revascularization candidate with cath team, then consider restarting plavix as currently holding. ECG findings suggest patient could be good revascularization candidate if medically optimized otherwise.   - will need to revisit code status discussion prior to revascularization to assess if proper candidate for this procedure    Cardiogenic Shock  #RESOLVED  - patient became hypotensive requiring inotropy support as well as mechanical ventilation and IABP for afterload reduction.  - IABP removed 2/8, tolerating well  - off of levo and Vaso as of 2/8/25 Chest pain free   - Cath@[facility] 11/2023: Impella Assisted Rota/PCI with EMILIE to LM 90%, EMILIE ostial LAD and EMILIE ostial LCX, (dLM 80%, oLAD 85%, D2 75%, oLCX 85%, mRCA 75%).  - [place] [place] w/ Dr. Morgan: dLM to pLAD 80% Stenosed ISR, LCX ostial to pLCX ISR, % Stenosed.  - TTE 1/31/25: EF 44%, mild LVH, RWMA. Grade I DD. Aortic sclerosis w/o significant stenosis.  - c/w lovenox 40mg sq daily ppx dose. s/p heparin gtt  - c/w ASA 81 mg. Currently holding plavix as 2/2 LLE hematoma     Cardiogenic Shock  #RESOLVED  - patient became hypotensive requiring inotropy support as well as mechanical ventilation and IABP for afterload reduction.  - IABP removed 2/8, tolerating well  - off of levo and Vaso as of 2/8/25

## 2025-02-10 NOTE — PROGRESS NOTE ADULT - SUBJECTIVE AND OBJECTIVE BOX
DAILY PROGRESS NOTE    S:  Patient examined bedside, hematoma stable/improving. Patient w/ HDS with stable hgb this AM.     O:     T(C): 37.3 (02-10-25 @ 01:20), Max: 37.3 (02-10-25 @ 01:20)  HR: 83 (02-10-25 @ 05:00) (74 - 96)  BP: 121/55 (02-10-25 @ 03:00) (108/51 - 159/67)  RR: 22 (02-10-25 @ 05:00) (15 - 24)  SpO2: 96% (02-10-25 @ 05:00) (96% - 100%)    Physical Exam:  General Appearance:  NAD  Pulmonary: Nonlabored breathing, no respiratory distress  Cardiovascular: NSR  Abdomen: Soft, nondisteded, nontender  Extremities: WWP, improving L thigh hematoma and ecchymosis, , ace wrap placed over left leg        I/O (24h)    08 Feb 2025 07:01  -  09 Feb 2025 07:00  --------------------------------------------------------  IN:    IV PiggyBack: 100 mL    IV PiggyBack: 50 mL    Lactated Ringers: 150 mL    Oral Fluid: 714 mL  Total IN: 1014 mL    OUT:    Indwelling Catheter - Urethral (mL): 1743 mL  Total OUT: 1743 mL    Total NET: -729 mL      09 Feb 2025 07:01  -  10 Feb 2025 05:43  --------------------------------------------------------  IN:    IV PiggyBack: 250 mL    IV PiggyBack: 50 mL    Lactated Ringers: 55 mL    Oral Fluid: 120 mL  Total IN: 475 mL    OUT:    Indwelling Catheter - Urethral (mL): 1831 mL  Total OUT: 1831 mL    Total NET: -1356 mL        Labs:     LABS:  cret                        8.2    9.02  )-----------( 92       ( 10 Feb 2025 04:25 )             25.5     02-10    138  |  103  |  26[H]  ----------------------------<  109[H]  4.1   |  25  |  1.65[H]    Ca    7.7[L]      10 Feb 2025 04:25  Phos  3.4     02-10  Mg     2.2     02-10    TPro  4.7[L]  /  Alb  2.6[L]  /  TBili  0.9  /  DBili  x   /  AST  30  /  ALT  21  /  AlkPhos  49  02-10             DAILY PROGRESS NOTE    S:  Patient examined bedside, hematoma stable/improving. Patient w/ HDS with stable hgb this AM.     O:     T(C): 37.3 (02-10-25 @ 01:20), Max: 37.3 (02-10-25 @ 01:20)  HR: 83 (02-10-25 @ 05:00) (74 - 96)  BP: 121/55 (02-10-25 @ 03:00) (108/51 - 159/67)  RR: 22 (02-10-25 @ 05:00) (15 - 24)  SpO2: 96% (02-10-25 @ 05:00) (96% - 100%)    Physical Exam:  General Appearance:  NAD  Pulmonary: Nonlabored breathing, no respiratory distress  Cardiovascular: NSR  Abdomen: Soft, nondisteded, nontender  Extremities: WWP, improving L thigh hematoma, ecchymoses resolved.         I/O (24h)    08 Feb 2025 07:01  -  09 Feb 2025 07:00  --------------------------------------------------------  IN:    IV PiggyBack: 100 mL    IV PiggyBack: 50 mL    Lactated Ringers: 150 mL    Oral Fluid: 714 mL  Total IN: 1014 mL    OUT:    Indwelling Catheter - Urethral (mL): 1743 mL  Total OUT: 1743 mL    Total NET: -729 mL      09 Feb 2025 07:01  -  10 Feb 2025 05:43  --------------------------------------------------------  IN:    IV PiggyBack: 250 mL    IV PiggyBack: 50 mL    Lactated Ringers: 55 mL    Oral Fluid: 120 mL  Total IN: 475 mL    OUT:    Indwelling Catheter - Urethral (mL): 1831 mL  Total OUT: 1831 mL    Total NET: -1356 mL        Labs:     LABS:  cret                        8.2    9.02  )-----------( 92       ( 10 Feb 2025 04:25 )             25.5     02-10    138  |  103  |  26[H]  ----------------------------<  109[H]  4.1   |  25  |  1.65[H]    Ca    7.7[L]      10 Feb 2025 04:25  Phos  3.4     02-10  Mg     2.2     02-10    TPro  4.7[L]  /  Alb  2.6[L]  /  TBili  0.9  /  DBili  x   /  AST  30  /  ALT  21  /  AlkPhos  49  02-10

## 2025-02-10 NOTE — PROGRESS NOTE ADULT - ASSESSMENT
79 yr old F with PMHx of HTN, hyperlipidemia, DM, hypothyroidism, GERD, asthma, CAD s/p prior PCIs ( [facility] 11/2023) who presented to [facility] 1/24/25 with chest pain and URI symptoms x 2-3 days, R/I NSTEMI, s/p diagnostic cath@[facility] revealing 3VD and newly reduced EF, initially transferred to [facility] 9LACH under Dr. 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 Course c/b anemia requiring 2u prbc on 2/4. and further c/b  cardiogenic shock, requiring pressor support and IABP. Transferred to CCU for further management. Patient now transferred to cardiac tele for further management.       CARDIOVASCULAR:    #Cardiogenic shock (resolved)  #new onset HFrEF   - patient became hypotensive requiring inotropy support as well as mechanical ventilation and IABP for afterload reduction. Echo on arrival to [facility] showing reduced EF of 44%   -  New Acute CHF likely in setting of NSTEMI EF at [facility] was 25%, Echo on Arrival improved to 1/31/2025 44%  - Holding GDMT: Toprol increased to 50mg PO daily, isordil 5mg TID. Losartan on HOLD in setting of KEN.  - off of levo and Vaso as of 2/8/25    Plan   - IABP removed 2/8, tolerating well   - Lasix 40mg IVP daily  - c/w ASA 81mg daily   - c/w toprol XL 25 mg daily  - start hydralazine 10 mg TID  - stepdown today        #NSTEMI   Moderate CP this AM   - S/p diagnotic C @[facility] 1/28/24 w/ Dr. Morgan: dLM to pLAD 80% Stenosed ISR, LCX ostial to pLCX ISR, % Stenosed.  - TTE 1/31/25: mild LVH, Mod reduced LVSF, EF 44%. RWMA. Grade I DD. Aortic sclerosis w/o significant stenosis.  - Atorvastatin 40mg qd.   - Transitioned pt from heparin gtt to Lovenox 40mg sq daily ppx dose, on ASA 81 mg but holding plavix as c/f bleed  - discuss if revascularization candidate with cath team, then consider restarting plavix as currently holding. ECG findings suggest patient could be good revascularization candidate if medically optimized otherwise.   - will need to revisit code status discussion prior to revascularization to assess if proper candidate for this procedure    **Prior Cath@[facility] 11/2023: Impella Assisted Rota/PCI with EMILIE to LM 90%, EMILIE ostial LAD and EMILIE ostial LCX, (dLM 80%, oLAD 85%, D2 75%, oLCX 85%, mRCA 75%).      PULMONARY:  #AHRF   - Wean NC as tolerated    #PNA   Pulm Consulted: low suspicion for PNA, noted to have appearance of endobronchial material in superior segment RLL bronchus.   - CT Chest 1/31/2025 Persistent small large airways inflammation with interval increase in mucous plugging with near complete obstruction of the lateral segmental bronchus to the RML, RLL bronchus and anteromedial basal segmental bronchus to the LLL. Associated groundglass opacities are identified and early multifocal pneumonia possibly postobstructive cannot be excluded.   - Recommended outpatient pulm follow up w PFTs (to diagnosis obstructive lung disease) and consideration of repeat imaging.   - WBC on Admission 13--->17.87->20.27, today 17 MRSA swab + at [facility].   - UA normal, Blood Cultures 1/31 NGTD, Procal normal.     #History of Asthma   Pulm following Appreciate Recs   - Duonebs, Hypertonic Saline to aid with Mucous plugging  - C/w symbicort and Montelukast         GASTROINTESTINAL:  - soft and bite sized  - daily protonix  - ctm LFTs      RENAL:  #CKD Stage 3   KEN on CKD Stage 3 (baseline Cr ~1.3-1.5 from prior Labs), improving now  - Today 1.54, improved with Fluids likely due to hypovolemia episode vs dehydration   - Urine studies: 2/3: Na 71, Protein 27, Creatinine 108, Pr/Cr Ratio .2, Urea Nitrogen 832, K 21, Osmol 505  - ctm, trend creatinine  - avoid nephrotoxic meds  - monitor UOP    INFECTIOUS DISEASE:  #PNA   Pulm Consulted: low suspicion for PNA, noted to have appearance of endobronchial material in superior segment RLL bronchus.   - CT Chest 1/31/2025 Persistent small large airways inflammation with interval increase in mucous plugging with near complete obstruction of the lateral segmental bronchus to the RML, RLL bronchus and anteromedial basal segmental bronchus to the LLL. Associated groundglass opacities are identified and early multifocal pneumonia possibly postobstructive cannot be excluded.   - Recommended outpatient pulm follow up w PFTs (to diagnosis obstructive lung disease) and consideration of repeat imaging.   - WBC on Admission 13--->17.87->20.27, today 17 MRSA swab + at [facility].   - UA normal, Blood Cultures 1/31 NGTD, Procal normal.   - on arrival to CCU: ordered repeat CXR, BCX, UCX, UA, MRSA swab   - MRSA swab positive, RVP negative   - pro-hailee previously elevated at 1.6, downtrending now    Plan   - Cultures are all no growth to date  - c/w Vancomycin, dose by AM Level  - zosyn --> meropenem 1g BID, expires 2/13  - ID consulted, appreciate recs     ENDOCRINE:  #Hypothyroidism   - C/w Levothyroxine 75mcg PO daily.  - TSH 1.390    #DM   A1C 6.9    Plan   - C/w FS's   - ISS,   - Lantus 30 at bedtime       HEME:    #Anemia   Pt is s/p 2u prbc 2/4/25 AM, 1u pRBC additional on 2/7 AM. Negative SEGUN. B12, folate, iron studies wnl  - Hgb on admission 10.1. Steadily downtrending,  Hgb this AM 7.0 (2/7)  - s/p 1 unit PRBC overnight on 2/7 - 2/8  Plan   - Transfuse for HGb < 7  - LLE hematoma likely source, treat as below    #LE Hematoma   CT LLE: c/f large hematoma  CT angio (2/8): large intramuscular hematoma involving the anteromedial  thigh musculature with heterogeneous attenuation, suggestive of   hemorrhagic components in various stages of evolution. No evidence of  arterial extravasation or venous pooling on delayed images.   Possibly secondary to trauma while at [facility], reportedly tried both femoral arteries for access during angio.   Plan   - vascular following, appreciate recs - no acute intervention, ace bandaging  - c/w ASA 81mg daily          FLUIDS/ELECTROLYTES/NUTRITION:  -Drips: None   -Monitor, Replete to K>4 and Mg>2  -Diet - soft and bite sized    PROPHYLAXIS  -DVT: lovenox 40 mg daily, ASA 81 mg, hold off plavix pending possible revascularization  -GI- PPI qd, regular diet  DISPO: CCU  DNR/DNI  79 yr old F with PMHx of HTN, hyperlipidemia, DM, hypothyroidism, GERD, asthma, CAD s/p prior PCIs ( [facility] 11/2023) who presented to [facility] 1/24/25 with chest pain and URI symptoms x 2-3 days, R/I NSTEMI, s/p diagnostic cath@[facility] revealing 3VD and newly reduced EF, initially transferred to [facility] 9LACH under Dr. 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 Course c/b anemia requiring 2u prbc on 2/4. and further c/b  cardiogenic shock, requiring pressor support and IABP. Transferred to CCU for further management. Patient now transferred to cardiac tele for further management.

## 2025-02-10 NOTE — PROGRESS NOTE ADULT - PROBLEM SELECTOR PLAN 4
- Hgb on admission 10.1. H/H today 8.2/25.5. LLE hematoma likely source, treat as below  - s/p 2u prbc 2/4/25 AM, 1u pRBC on 2/7 AM.   - Negative SEGUN. B12, folate, iron studies wnl  - Maintain active T&S (next, 2/13/25)  - Transfuse for HGb < 7

## 2025-02-10 NOTE — PROGRESS NOTE ADULT - SUBJECTIVE AND OBJECTIVE BOX
TRANSFER NOTE CCU --> 5URIS       Hospital course: 80 yo F with PMHx of HTN, HLD, Type II DM, triple vessel CAD s/p 3 stents at [facility] 11/2023, CHF, asthma, GERD, and hypothyroidism who presented to [facility] with CP and SOB on 1/24. She was having fever, chills, a productive cough and congestion for 2-3 days prior to the chest pain. She states the pain was constant and radiated to her back/left arm with associated palpitations, SOB, orthopnea and dizziness. She felt the chest pain was similar to when she had the stents prompting her to present to the ER on 1/24. In ED she was found to be tachycardic and tachypneic. She was placed on BiPAP for respiratory distress, loaded with ASA/Plavix and placed on a heparin gtt for NSTEMI. Her nasal swab was + for MRSA and she recieved CTX 1g daily. She is s/p cardiac cath showing 3V CAD and a reduced EF of 25%. She was transferred to [facility] for CTS eval under the service of Dr. Hurst. 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 Pt then transferred to cardiac tele for optimization for high risk PCI. Repeat ECHO on admission showing moderate reduced EF 44%. Course c/b KEN on CKD, and multiple episodes of hypotension requiring fluids. Furthermore, there was a concern for DVT but US came back negative. Medicine/Pulm Consulted given CT findings and Leukocytosis, Infectious work up negative treated for empiric coverage with Vanc/Zosyn (completed). Course further complicated by low hgb @ 7.5. S/p 2U of pRBC on 2/4 and 1 unit ordered prior to transfer on 2/5. Additionally pt has been c/o worsening CP w/ initial EKG changes pt started on nitro gtt. CP improved slightly but SBP dropped from 170s to 90-100s.  While in cath lab patient became hypotensive requiring intubation, mechanical support with IABP, transferred to CCU for further management. While in the CCU, patient was extubated quickly and IABP and pressors were successfully weaned without complication. Weaned to room air. Continued with vancomycin and meropenem with downtrending WBC and no other systemic signs of infection. She was becoming increasingly anemic and required 1u pRBC. It was found to be secondary to LLE hematoma evaluated by vascular with no acute surgical intervention planned.  She was started on daily lasix 40 mg IVP as well as on toprol and hydralalzine, but rest of her GDMT is still being held. KEN improving daily. Patient now hemodynamically stable and ready for stepdown to cardiac tele     Subjective: Patient seen and examined at bedside this morning. She endorsed feeling much better and denied any chest pain, SOB, lightheadedness, dizziness, palpitations, N/V/D, chills, fever or night sweats.  Remainder ROS otherwise negative.        VITAL SIGNS:  T(C): 36.6 (02-10-25 @ 09:00), Max: 37.3 (02-10-25 @ 01:20)  HR: 90 (02-10-25 @ 11:00) (74 - 96)  BP: 131/56 (02-10-25 @ 11:00) (108/51 - 154/67)  RR: 19 (02-10-25 @ 11:00) (15 - 24)  SpO2: 91% (02-10-25 @ 11:00) (88% - 100%)  Wt(kg): --    I&O's Summary    09 Feb 2025 07:01  -  10 Feb 2025 07:00  --------------------------------------------------------  IN: 475 mL / OUT: 2181 mL / NET: -1706 mL    10 Feb 2025 07:01  -  10 Feb 2025 11:45  --------------------------------------------------------  IN: 0 mL / OUT: 700 mL / NET: -700 mL          PHYSICAL EXAM:    General: A/ox 3, No acute Distress  Neck: Supple, + JVD, Carotid bruit   Cardiac: S1 S2, No M/R/G  Pulmonary: CTAB, Breathing unlabored, No Rhonchi/Rales/Wheezing  Abdomen: Soft, Non -tender, +BS x 4 quads  Vascular: Peripheral pulses palpable 2+ bilaterally,   Extremities: No Rashes, anasarca appreciated bilaterally   Neuro: A/o x 3, No focal deficits          LABS:                          8.2    9.02  )-----------( 92       ( 10 Feb 2025 04:25 )             25.5                              02-10    138  |  103  |  26[H]  ----------------------------<  109[H]  4.1   |  25  |  1.65[H]    Ca    7.7[L]      10 Feb 2025 04:25  Phos  3.4     02-10  Mg     2.2     02-10    TPro  4.7[L]  /  Alb  2.6[L]  /  TBili  0.9  /  DBili  x   /  AST  30  /  ALT  21  /  AlkPhos  49  02-10    LIVER FUNCTIONS - ( 10 Feb 2025 04:25 )  Alb: 2.6 g/dL / Pro: 4.7 g/dL / ALK PHOS: 49 U/L / ALT: 21 U/L / AST: 30 U/L / GGT: x                                   CAPILLARY BLOOD GLUCOSE      POCT Blood Glucose.: 151 mg/dL (10 Feb 2025 11:40)  POCT Blood Glucose.: 103 mg/dL (10 Feb 2025 06:47)  POCT Blood Glucose.: 142 mg/dL (09 Feb 2025 23:08)  POCT Blood Glucose.: 98 mg/dL (09 Feb 2025 17:08)            Allergies:  No Known Allergies    MEDICATIONS  (STANDING):  albuterol/ipratropium for Nebulization 3 milliLiter(s) Nebulizer every 6 hours  aspirin enteric coated 81 milliGRAM(s) Oral every 24 hours  atorvastatin 80 milliGRAM(s) Oral at bedtime  chlorhexidine 2% Cloths 1 Application(s) Topical <User Schedule>  dextrose 5%. 1000 milliLiter(s) (100 mL/Hr) IV Continuous <Continuous>  dextrose 50% Injectable 12.5 Gram(s) IV Push once  dextrose 50% Injectable 25 Gram(s) IV Push once  enoxaparin Injectable 40 milliGRAM(s) SubCutaneous every 24 hours  fluticasone propionate/ salmeterol 250-50 MICROgram(s) Diskus 1 Dose(s) Inhalation two times a day  furosemide   Injectable 40 milliGRAM(s) IV Push daily  glucagon  Injectable 1 milliGRAM(s) IntraMuscular once  hydrALAZINE 10 milliGRAM(s) Oral every 8 hours  insulin glargine Injectable (LANTUS) 30 Unit(s) SubCutaneous at bedtime  insulin lispro (ADMELOG) corrective regimen sliding scale   SubCutaneous every 6 hours  lactated ringers. 1000 milliLiter(s) (50 mL/Hr) IV Continuous <Continuous>  levothyroxine 75 MICROGram(s) Oral every 24 hours  meropenem  IVPB 1000 milliGRAM(s) IV Intermittent every 12 hours  metoprolol succinate ER 25 milliGRAM(s) Oral daily  montelukast 10 milliGRAM(s) Oral every 24 hours  pantoprazole  Injectable 40 milliGRAM(s) IV Push every 12 hours  sodium chloride 0.9% lock flush 3 milliLiter(s) IV Push every 8 hours    MEDICATIONS  (PRN):  acetaminophen     Tablet .. 650 milliGRAM(s) Oral every 6 hours PRN Moderate Pain (4 - 6)  albuterol    90 MICROgram(s) HFA Inhaler 2 Puff(s) Inhalation every 4 hours PRN Bronchospasm  dextrose Oral Gel 15 Gram(s) Oral once PRN Blood Glucose LESS THAN 70 milliGRAM(s)/deciliter        DIAGNOSTIC TESTS:

## 2025-02-10 NOTE — PROGRESS NOTE ADULT - PROBLEM SELECTOR PLAN 5
CT LLE 2/7/25: c/f large hematoma  CT angio (2/8): large intramuscular hematoma involving the anteromedial  thigh musculature with heterogeneous attenuation, suggestive of hemorrhagic components in various stages of evolution. No evidence of  arterial extravasation or venous pooling on delayed images. Possibly secondary to trauma while at [facility], reportedly tried both femoral arteries for access during angio.   - vascular following: no acute intervention at this time. ace bandaging  - c/w ASA 81mg daily CT LLE 2/7/25: c/f large hematoma  CT angio (2/8): large intramuscular hematoma involving the anteromedial  thigh musculature with heterogeneous attenuation, suggestive of hemorrhagic components in various stages of evolution. No evidence of  arterial extravasation or venous pooling on delayed images. Possibly secondary to trauma while at [facility], reportedly tried both femoral arteries for access during angio.   - vascular following: no acute intervention at this time. rec ace-wrap compression from foot to L groin  - c/w ASA 81mg daily

## 2025-02-10 NOTE — PROGRESS NOTE ADULT - PROBLEM SELECTOR PLAN 6
#CKD Stage 3   KEN on CKD Stage 3 (baseline Cr ~1.3-1.5 from prior Labs),  - Today 1.65, improved with Fluids likely due to hypovolemia episode vs dehydration   - Urine studies: 2/3: Na 71, Protein 27, Creatinine 108, Pr/Cr Ratio .2, Urea Nitrogen 832, K 21, Osmol 505  - avoid nephrotoxic meds  - monitor UOP

## 2025-02-10 NOTE — PROGRESS NOTE ADULT - PROBLEM SELECTOR PLAN 3
WBC on Admission 13 today 9. [place] [facility]   - CT Chest 1/31/2025 Persistent small large airways inflammation with interval increase in mucous plugging with near complete obstruction of the lateral segmental bronchus to the RML, RLL bronchus and anteromedial basal segmental bronchus to the LLL. Associated ground glass opacities identified and early multifocal pneumonia   - CXR 2/9/25 revealing congestion/infiltrates   -  Blood Cultures 1/31 NGTD, UA normal, Procal previously elevated. Now 0.16  -  c/w Vancomycin, dose by AM Level  -  c/w meropenem 1g BID, last dose 2/13. s/p zosyn  - Pulm and ID consulted and following   - Recommended outpatient pulm follow up w PFTs (to diagnosis obstructive lung disease) WBC on Admission 13 today 9. MRSA swab + at [facility] [place] [facility]. Afebrile   - CT A/P 2/7: Increased patchy opacification right lower lobe and trace right pleural effusion. Findings are likely infectious.  - CXR 2/9/25 revealing congestion/infiltrates   -  Blood Cultures 1/31 NGTD, UA normal, Procal previously elevated. Now 0.16  -  c/w meropenem 1g IV q12h (last dose 2/12)  -  vanco dose by level given KEN.  Check daily level. When level <20, give vanco 1250mg x 1 (last dose 2/12)  - Pulm and ID consulted and following   - Recommended outpatient pulm follow up w PFTs (to diagnosis obstructive lung disease) WBC on Admission 13 today 9. MRSA swab + at [facility]. Afebrile   - CT A/P 2/7: Increased patchy opacification right lower lobe and trace right pleural effusion. Findings are likely infectious.  - CXR 2/9/25 revealing congestion/infiltrates   -  Blood Cultures 1/31 NGTD, UA normal, Procal previously elevated. Now 0.16  -  c/w meropenem 1g IV q12h (last dose 2/12)  -  vanco dose by level given KEN.  Check daily level. When level <20, give vanco 1250mg x 1 (last dose 2/12)  - Pulm and ID consulted and following

## 2025-02-10 NOTE — PROGRESS NOTE ADULT - TIME BILLING
septic shock, pneumonia, leukocytosis.
More than 50 percent of which was spent on counseling and coordination of care.
septic shock, PNA
leukocytosis, recent sepsis/CAP and Asthma, co-management     Reviewed chart, vitals, labs, imaging, care coordination, clinical documentation, and discussed with nursing and Cardiology team   Teaching excluded and separately reported services
More than 50 percent of which was spent on counseling and coordination of care.

## 2025-02-10 NOTE — PROGRESS NOTE ADULT - PROBLEM SELECTOR PLAN 9
- C/w Levothyroxine 75mcg PO daily.  - TSH 1.390    Fluids: None   Electrolytes: replete as necessary, K>4, Mg>2  Nutrition: DASH TLC  DVT ppx: lovenox 40mg SubQ   Code: DNR/DNI  Disposition: Admit to cardiac/telemetry

## 2025-02-10 NOTE — PROGRESS NOTE ADULT - ASSESSMENT
A/P:    79F w/ HTN, HLD, asthma, CKD, GERD, hypothyroidism, CHF, and CAD s/p PCI (11/2023), admitted for CP, SOB and NSTEMI s/p diagnostic C demonstrating 3v CAD and newly reduced EF of 25%, transferred to [facility], but deemed not a CABG candidate. Her hospital course was complicated by shock, requiring pressors and IABP via R femoral access, pneumonia, L thigh hematoma and anemia. She was on DAPT and AC this admission. Vascular surgery was consulted on 2/7/25 for the anemia (HGB 7.1/22.2 at time of consult) and thigh hematoma, which was seen on non-contrast LLE CT Scan ("large hematoma centered within the adductor musculature of the proximal left thigh, measuring 11.8 x 10.8 cm transaxially"). CTA triple phase showed no evidence of active obvious arterial extravasation or venous pooling on delayed images. Her PLT count was also 38. Hemoglobin stable at 8.0, VSS. IABP pulled overnight. Patient currently with minimal pain/tenderness, motor and sensation of the lower extremities grossly intact, no pulsatile mass present.    -Trend CBC, transfuse prn  -Continue ace-wrap compression from foot to L groin  -Anticoagulation per CCU  -Vascular surgery will continue to follow

## 2025-02-10 NOTE — PROGRESS NOTE ADULT - ATTENDING COMMENTS
This is a 80 y/o F with a hx of CAD s/p prior complex Impella supported PCI of LM/LAD/LCx bifurcation and HFrecEF from 25 -> 45%, who presented as a transfer from [facility] on 1/25 with NSTEMI. Patient had ISR of LM/LAD/LCx prior stents thus was transferred for consideration of CABG, however due to porcelain aorta was deemed not a CABG candidate. As she was awaiting high-risk PCI, patient developed acute blood loss anemia and hemorrhagic shock, which resulted in global ischemia and subsequent flash pulmonary edema requiring emergent IABP/intubation. Patient was resucitated with multiple transfusions and source of bleeding noted to be left thigh hematoma (spontaneous) which was managed with d/c antiplatelets/anticoagulation and manual compression. Patient's mixed shock state was managed medically and ultimately had successful wean/removal of IABP, and successful extubation as well.    Of note, patient was made DNR/DNI during this time given complexity of mult-organ involvement.     1. Hemorrhagic shock 2/2 left thigh bleed, resolved  2. Acute blood loss anemia  3. Cardiogenic shock 2/2 to global ischemia i/s/o severe anemia, resolved  4. S/p IABP and inotropic removal   5. Acute systolic heart failure  6. Unrevascularized high risk obstructive CAD/NSTEMI   7. Acute on chronic kidney disease    - C/w aspirin, reintroduce lovenox as DVT ppx  - Hold plavix 7 days   - C/w metoprolol and start hydralazine for afterload reduction  - C/w daily Lasix IV 40 mg for diuresis  - Give 1 dose concentrated albumin   - Repeat TTE reviewed   - Consider re-engaging about conversation about code status and re-attempt at revascularization, however would favor medical optimization and PT/OT first   - Ok for transfer to floors, discussed with accepting physician Dr. Oneal who agrees    Jonish Paz MD  Interventional Cardiology

## 2025-02-10 NOTE — CHART NOTE - NSCHARTNOTESELECT_GEN_ALL_CORE
Hemos/Event Note
Hemos/Thermos/Event Note
Nutrition Services
Surgical candidacy/Event Note
Event Note
Hemos/Thermos/Event Note
Hemos/Thermos/Event Note
Nutrition Services
Palliative/Event Note
Vein map/Event Note

## 2025-02-11 DIAGNOSIS — R52 PAIN, UNSPECIFIED: ICD-10-CM

## 2025-02-11 DIAGNOSIS — Z51.5 ENCOUNTER FOR PALLIATIVE CARE: ICD-10-CM

## 2025-02-11 DIAGNOSIS — R53.81 OTHER MALAISE: ICD-10-CM

## 2025-02-11 DIAGNOSIS — Z71.89 OTHER SPECIFIED COUNSELING: ICD-10-CM

## 2025-02-11 LAB
ALBUMIN SERPL ELPH-MCNC: 2.9 G/DL — LOW (ref 3.3–5)
ALP SERPL-CCNC: 53 U/L — SIGNIFICANT CHANGE UP (ref 40–120)
ALT FLD-CCNC: 21 U/L — SIGNIFICANT CHANGE UP (ref 10–45)
ANION GAP SERPL CALC-SCNC: 11 MMOL/L — SIGNIFICANT CHANGE UP (ref 5–17)
AST SERPL-CCNC: 32 U/L — SIGNIFICANT CHANGE UP (ref 10–40)
BASOPHILS # BLD AUTO: 0.07 K/UL — SIGNIFICANT CHANGE UP (ref 0–0.2)
BASOPHILS NFR BLD AUTO: 0.8 % — SIGNIFICANT CHANGE UP (ref 0–2)
BILIRUB SERPL-MCNC: 1.8 MG/DL — HIGH (ref 0.2–1.2)
BUN SERPL-MCNC: 27 MG/DL — HIGH (ref 7–23)
CALCIUM SERPL-MCNC: 8.1 MG/DL — LOW (ref 8.4–10.5)
CHLORIDE SERPL-SCNC: 100 MMOL/L — SIGNIFICANT CHANGE UP (ref 96–108)
CO2 SERPL-SCNC: 24 MMOL/L — SIGNIFICANT CHANGE UP (ref 22–31)
CREAT SERPL-MCNC: 1.78 MG/DL — HIGH (ref 0.5–1.3)
EGFR: 29 ML/MIN/1.73M2 — LOW
EOSINOPHIL # BLD AUTO: 0.77 K/UL — HIGH (ref 0–0.5)
EOSINOPHIL NFR BLD AUTO: 8.7 % — HIGH (ref 0–6)
GLUCOSE SERPL-MCNC: 76 MG/DL — SIGNIFICANT CHANGE UP (ref 70–99)
HCT VFR BLD CALC: 28.7 % — LOW (ref 34.5–45)
HGB BLD-MCNC: 9.3 G/DL — LOW (ref 11.5–15.5)
IMM GRANULOCYTES NFR BLD AUTO: 1.4 % — HIGH (ref 0–0.9)
LYMPHOCYTES # BLD AUTO: 2.02 K/UL — SIGNIFICANT CHANGE UP (ref 1–3.3)
LYMPHOCYTES # BLD AUTO: 22.9 % — SIGNIFICANT CHANGE UP (ref 13–44)
MAGNESIUM SERPL-MCNC: 1.9 MG/DL — SIGNIFICANT CHANGE UP (ref 1.6–2.6)
MCHC RBC-ENTMCNC: 31.2 PG — SIGNIFICANT CHANGE UP (ref 27–34)
MCHC RBC-ENTMCNC: 32.4 G/DL — SIGNIFICANT CHANGE UP (ref 32–36)
MCV RBC AUTO: 96.3 FL — SIGNIFICANT CHANGE UP (ref 80–100)
MONOCYTES # BLD AUTO: 0.72 K/UL — SIGNIFICANT CHANGE UP (ref 0–0.9)
MONOCYTES NFR BLD AUTO: 8.2 % — SIGNIFICANT CHANGE UP (ref 2–14)
NEUTROPHILS # BLD AUTO: 5.11 K/UL — SIGNIFICANT CHANGE UP (ref 1.8–7.4)
NEUTROPHILS NFR BLD AUTO: 58 % — SIGNIFICANT CHANGE UP (ref 43–77)
NRBC # BLD: 0 /100 WBCS — SIGNIFICANT CHANGE UP (ref 0–0)
NRBC BLD-RTO: 0 /100 WBCS — SIGNIFICANT CHANGE UP (ref 0–0)
PHOSPHATE SERPL-MCNC: 2.8 MG/DL — SIGNIFICANT CHANGE UP (ref 2.5–4.5)
PLATELET # BLD AUTO: 120 K/UL — LOW (ref 150–400)
POTASSIUM SERPL-MCNC: 4.6 MMOL/L — SIGNIFICANT CHANGE UP (ref 3.5–5.3)
POTASSIUM SERPL-SCNC: 4.6 MMOL/L — SIGNIFICANT CHANGE UP (ref 3.5–5.3)
PROCALCITONIN SERPL-MCNC: 0.16 NG/ML — HIGH (ref 0.02–0.1)
PROT SERPL-MCNC: 5.4 G/DL — LOW (ref 6–8.3)
RBC # BLD: 2.98 M/UL — LOW (ref 3.8–5.2)
RBC # FLD: 16.1 % — HIGH (ref 10.3–14.5)
SODIUM SERPL-SCNC: 135 MMOL/L — SIGNIFICANT CHANGE UP (ref 135–145)
VANCOMYCIN TROUGH SERPL-MCNC: 20 UG/ML — SIGNIFICANT CHANGE UP (ref 10–20)
WBC # BLD: 8.81 K/UL — SIGNIFICANT CHANGE UP (ref 3.8–10.5)
WBC # FLD AUTO: 8.81 K/UL — SIGNIFICANT CHANGE UP (ref 3.8–10.5)

## 2025-02-11 PROCEDURE — 99233 SBSQ HOSP IP/OBS HIGH 50: CPT

## 2025-02-11 PROCEDURE — 99497 ADVNCD CARE PLAN 30 MIN: CPT | Mod: 25

## 2025-02-11 PROCEDURE — 99223 1ST HOSP IP/OBS HIGH 75: CPT

## 2025-02-11 RX ORDER — VANCOMYCIN HYDROCHLORIDE 50 MG/ML
1250 KIT ORAL ONCE
Refills: 0 | Status: COMPLETED | OUTPATIENT
Start: 2025-02-11 | End: 2025-02-11

## 2025-02-11 RX ORDER — ACETAMINOPHEN 160 MG/5ML
650 SUSPENSION ORAL EVERY 6 HOURS
Refills: 0 | Status: DISCONTINUED | OUTPATIENT
Start: 2025-02-11 | End: 2025-02-11

## 2025-02-11 RX ORDER — ACETAMINOPHEN 160 MG/5ML
1000 SUSPENSION ORAL EVERY 6 HOURS
Refills: 0 | Status: DISCONTINUED | OUTPATIENT
Start: 2025-02-11 | End: 2025-02-14

## 2025-02-11 RX ORDER — HYDROMORPHONE HYDROCHLORIDE 4 MG/ML
0.25 INJECTION, SOLUTION INTRAMUSCULAR; INTRAVENOUS; SUBCUTANEOUS EVERY 4 HOURS
Refills: 0 | Status: DISCONTINUED | OUTPATIENT
Start: 2025-02-11 | End: 2025-02-14

## 2025-02-11 RX ORDER — TORSEMIDE 20 MG/1
20 TABLET ORAL DAILY
Refills: 0 | Status: DISCONTINUED | OUTPATIENT
Start: 2025-02-12 | End: 2025-02-14

## 2025-02-11 RX ADMIN — MEROPENEM 100 MILLIGRAM(S): 500 INJECTION INTRAVENOUS at 06:35

## 2025-02-11 RX ADMIN — Medication 2: at 12:20

## 2025-02-11 RX ADMIN — ENOXAPARIN SODIUM 40 MILLIGRAM(S): 100 INJECTION SUBCUTANEOUS at 06:35

## 2025-02-11 RX ADMIN — IPRATROPIUM BROMIDE AND ALBUTEROL SULFATE 3 MILLILITER(S): .5; 2.5 SOLUTION RESPIRATORY (INHALATION) at 12:19

## 2025-02-11 RX ADMIN — ENOXAPARIN SODIUM 40 MILLIGRAM(S): 100 INJECTION SUBCUTANEOUS at 17:06

## 2025-02-11 RX ADMIN — VANCOMYCIN HYDROCHLORIDE 166.67 MILLIGRAM(S): KIT at 22:42

## 2025-02-11 RX ADMIN — Medication 40 MILLIGRAM(S): at 09:10

## 2025-02-11 RX ADMIN — ACETAMINOPHEN 650 MILLIGRAM(S): 160 SUSPENSION ORAL at 10:58

## 2025-02-11 RX ADMIN — MONTELUKAST SODIUM 10 MILLIGRAM(S): 5 TABLET, CHEWABLE ORAL at 12:19

## 2025-02-11 RX ADMIN — Medication 10 MILLIGRAM(S): at 06:35

## 2025-02-11 RX ADMIN — Medication 10 MILLIGRAM(S): at 12:19

## 2025-02-11 RX ADMIN — IPRATROPIUM BROMIDE AND ALBUTEROL SULFATE 3 MILLILITER(S): .5; 2.5 SOLUTION RESPIRATORY (INHALATION) at 06:35

## 2025-02-11 RX ADMIN — IPRATROPIUM BROMIDE AND ALBUTEROL SULFATE 3 MILLILITER(S): .5; 2.5 SOLUTION RESPIRATORY (INHALATION) at 17:06

## 2025-02-11 RX ADMIN — PANTOPRAZOLE 40 MILLIGRAM(S): 20 TABLET, DELAYED RELEASE ORAL at 17:14

## 2025-02-11 RX ADMIN — INSULIN GLARGINE-YFGN 30 UNIT(S): 100 INJECTION, SOLUTION SUBCUTANEOUS at 22:42

## 2025-02-11 RX ADMIN — HYDROMORPHONE HYDROCHLORIDE 0.25 MILLIGRAM(S): 4 INJECTION, SOLUTION INTRAMUSCULAR; INTRAVENOUS; SUBCUTANEOUS at 20:00

## 2025-02-11 RX ADMIN — ACETAMINOPHEN 650 MILLIGRAM(S): 160 SUSPENSION ORAL at 12:00

## 2025-02-11 RX ADMIN — ACETAMINOPHEN 1000 MILLIGRAM(S): 160 SUSPENSION ORAL at 20:57

## 2025-02-11 RX ADMIN — Medication 10 MILLIGRAM(S): at 22:41

## 2025-02-11 RX ADMIN — ACETAMINOPHEN 1000 MILLIGRAM(S): 160 SUSPENSION ORAL at 00:30

## 2025-02-11 RX ADMIN — FLUTICASONE PROPIONATE AND SALMETEROL 1 DOSE(S): 113; 14 POWDER, METERED RESPIRATORY (INHALATION) at 13:52

## 2025-02-11 RX ADMIN — ACETAMINOPHEN 1000 MILLIGRAM(S): 160 SUSPENSION ORAL at 21:57

## 2025-02-11 RX ADMIN — MEROPENEM 100 MILLIGRAM(S): 500 INJECTION INTRAVENOUS at 17:06

## 2025-02-11 RX ADMIN — HYDROMORPHONE HYDROCHLORIDE 0.25 MILLIGRAM(S): 4 INJECTION, SOLUTION INTRAMUSCULAR; INTRAVENOUS; SUBCUTANEOUS at 13:01

## 2025-02-11 RX ADMIN — LEVOTHYROXINE SODIUM 75 MICROGRAM(S): 25 TABLET ORAL at 05:27

## 2025-02-11 RX ADMIN — ASPIRIN 81 MILLIGRAM(S): 81 TABLET, COATED ORAL at 12:59

## 2025-02-11 RX ADMIN — HYDROMORPHONE HYDROCHLORIDE 0.25 MILLIGRAM(S): 4 INJECTION, SOLUTION INTRAMUSCULAR; INTRAVENOUS; SUBCUTANEOUS at 12:20

## 2025-02-11 RX ADMIN — ATORVASTATIN CALCIUM 80 MILLIGRAM(S): 80 TABLET, FILM COATED ORAL at 22:41

## 2025-02-11 RX ADMIN — Medication 25 MILLIGRAM(S): at 06:35

## 2025-02-11 RX ADMIN — PANTOPRAZOLE 40 MILLIGRAM(S): 20 TABLET, DELAYED RELEASE ORAL at 06:35

## 2025-02-11 NOTE — PHYSICAL THERAPY INITIAL EVALUATION ADULT - ADDITIONAL COMMENTS
Per pt she lives in a private home with her family, +stairs, ambulates with cane in home and uses rollator for outdoor mobility. Pt reports she receives enough assistance from family

## 2025-02-11 NOTE — PROGRESS NOTE ADULT - PROBLEM SELECTOR PLAN 2
VSS. Extremities warm. +JVD   -  New onsent CHF likely in setting of NSTEMI   -  EF at [facility]: 25%, TTE 1/31/25: EF 44%, mild LVH, RWMA. Grade I DD. Aortic sclerosis w/o significant stenosis.  -  GDMT: c/w toprol 25mg QD and isordil 10mg TID. Losartan on HOLD in setting of KEN.  - Diuresis: Lasix 40mg IVP QD VSS. Extremities warm. +JVD   -  New onsent CHF likely in setting of NSTEMI   -  EF at [facility] [place] [place]: 25%, TTE 1/31/25: EF 44%, mild LVH, RWMA. Grade I DD. Aortic sclerosis w/o significant stenosis.  -  GDMT: c/w toprol 25mg QD and isordil 10mg TID. Losartan on HOLD in setting of KEN.  - Diuresis: Lasix 40mg IVP QD, transition to torsemide 20mg in AM VSS. Extremities warm. +JVD   -  New onset CHF likely in setting of NSTEMI   -  EF at [facility] [place]: 25%, TTE 1/31/25: EF 44%, mild LVH, RWMA. Grade I DD. Aortic sclerosis w/o significant stenosis.  -  GDMT: c/w toprol 25mg QD and hydralazine 10mg three times daily. Losartan on HOLD in setting of KEN.  - Diuresis: Lasix 40mg IVP QD, transition to torsemide 20mg in AM

## 2025-02-11 NOTE — PROGRESS NOTE ADULT - PROBLEM SELECTOR PLAN 5
CT LLE 2/7/25: c/f large hematoma  CT angio (2/8): large intramuscular hematoma involving the anteromedial  thigh musculature with heterogeneous attenuation, suggestive of hemorrhagic components in various stages of evolution. No evidence of  arterial extravasation or venous pooling on delayed images. Possibly secondary to trauma while at [facility], reportedly tried both femoral arteries for access during angio.   - vascular following: no acute intervention at this time. rec ace-wrap compression from foot to L groin  - c/w ASA 81mg daily - CTA Abd Aorta with Runoff (2/8/25): large intramuscular hematoma involving the anteromedial  thigh musculature with heterogeneous attenuation, suggestive of hemorrhagic components in various stages of evolution. No evidence of  arterial extravasation or venous pooling on delayed images  - possibly secondary [place] [facility] reportedly tried both femoral arteries for access during angio   - Vascular following, no acute intervention at this time, continue with ACE wrap for light compression of L leg  - c/w with acetaminophen 1000mg q 6 hours and IV dilaudid 0.25mg q 4 hours for severe pain as recommended by palliative  - c/w ASA 81mg daily

## 2025-02-11 NOTE — CONSULT NOTE ADULT - ASSESSMENT
79 yr old F with PMHx of HTN, hyperlipidemia, DM, hypothyroidism, GERD, asthma, CAD s/p prior PCIs ( [facility] 11/2023) who presented to [facility] 1/24/25 with chest pain and URI symptoms x 2-3 days, R/I NSTEMI, s/p diagnostic cath@[facility] revealing 3VD and newly reduced EF, initially transferred to [facility] 9[facility] under Dr. Hurst for possible CTS however deemed not candidate due to Porcelain aorta/Vein, and was transferred over to cardiac telemetry for C. Course c/b anemia requiring 2u prbc on 2/4. and further c/b  cardiogenic shock, requiring pressor support and IABP. Now off cardiac support. Palliative consulted for help with GOC.

## 2025-02-11 NOTE — PROGRESS NOTE ADULT - PROBLEM SELECTOR PLAN 7
satting 91% on RA   - C/w Symbicort and Montelukast to aid with mucous plugging   - pulm consulted and following satting 99% on RA   - C/w Symbicort and Montelukast to aid with mucous plugging   - pulm consulted and following  - Incentive spirometry education given today satting 99% on RA   - C/w Symbicort and Montelukast to aid with mucous plugging   - c/w with incentive spirometry

## 2025-02-11 NOTE — PROGRESS NOTE ADULT - PROBLEM SELECTOR PLAN 4
- Hgb on admission 10.1. H/H today 8.2/25.5. LLE hematoma likely source, treat as below  - s/p 2u prbc 2/4/25 AM, 1u pRBC on 2/7 AM.   - Negative SEGUN. B12, folate, iron studies wnl  - Maintain active T&S (next, 2/13/25)  - Transfuse for HGb < 7 - Hgb on admission 10.1. H/H today 9.3/28.7. LLE hematoma likely source, treat as below  - s/p 2u prbc 2/4/25 AM, 1u pRBC on 2/7 AM.   - Negative SEGUN. B12, folate, iron studies wnl  - Maintain active T&S (next, 2/13/25)  - Transfuse for HGb < 7 H/H 9.3/28.7  - s/p 2U PRBC 2/4/25 AM, 1U PRBC on 2/7 AM.   - Negative SEGUN. B12, folate, iron studies wnl  - Maintain active T&S (next, 2/13/25)  - Transfuse for Hgb < 7

## 2025-02-11 NOTE — PROGRESS NOTE ADULT - PROBLEM SELECTOR PLAN 9
- C/w Levothyroxine 75mcg PO daily.  - TSH 1.390    Fluids: None   Electrolytes: replete as necessary, K>4, Mg>2  Nutrition: DASH TLC  DVT ppx: lovenox 40mg SubQ   Code: DNR/DNI  Disposition: Admit to cardiac/telemetry - C/w Levothyroxine 75mcg PO daily.  - TSH 1.390    Fluids: None   Electrolytes: replete as necessary, K>4, Mg>2  Nutrition: DASH TLC  DVT ppx: lovenox 40mg SQ q 12 hours  Code: DNR/DNI  Disposition: Admit to cardiac/telemetry

## 2025-02-11 NOTE — PHYSICAL THERAPY INITIAL EVALUATION ADULT - IMPAIRMENTS FOUND, PT EVAL
aerobic capacity/endurance/ergonomics and body mechanics/fine motor/gait, locomotion, and balance/gross motor/integumentary integrity/joint integrity and mobility/neuromotor development and sensory integration/poor safety awareness/posture/ROM

## 2025-02-11 NOTE — CONSULT NOTE ADULT - PROBLEM SELECTOR RECOMMENDATION 3
Required inotropes and balloon pump during admission but now off both. Echo showing EF of 45% on 2/6/25.  - Not hospice candidate given EF and patient's wish to pursue continued interventions for her heart failure
Management of hypertension with losartan and imdur as above

## 2025-02-11 NOTE — PROGRESS NOTE ADULT - PROBLEM SELECTOR PLAN 1
Chest pain free   - Cath@[facility] 11/2023: Impella Assisted Rota/PCI with EMILIE to LM 90%, EMILIE ostial LAD and EMILIE ostial LCX, (dLM 80%, oLAD 85%, D2 75%, oLCX 85%, mRCA 75%).  - S/p diagnotic LH @[facility] [place] 1/28/24 w/ Dr. Morgan: dLM to pLAD 80% Stenosed ISR, LCX ostial to pLCX ISR, % Stenosed.  - TTE 1/31/25: EF 44%, mild LVH, RWMA. Grade I DD. Aortic sclerosis w/o significant stenosis.  - c/w lovenox 40mg sq daily ppx dose. s/p heparin gtt  - c/w ASA 81 mg. Currently holding plavix as 2/2 LLE hematoma     Cardiogenic Shock  #RESOLVED  - patient became hypotensive requiring inotropy support as well as mechanical ventilation and IABP for afterload reduction.  - IABP removed 2/8, tolerating well  - off of levo and Vaso as of 2/8/25 -[facility] 11/2023: Impella Assisted Rota/PCI with EMILIE to LM 90%, EMILIE ostial LAD and EMILIE ostial LCX, (dLM 80%, oLAD 85%, D2 75%, oLCX 85%, mRCA 75%).  - S/p Diagnostic [facility] @[facility] [place] 1/28/24 w/ Dr. Morgan: dLM to pLAD 80% Stenosed ISR, LCX ostial to pLCX ISR, % Stenosed.  - TTE 1/31/25: EF 44%, mild LVH, RWMA. Grade I DD. Aortic sclerosis w/o significant stenosis.  - c/w lovenox 40mg SQ q 12 hours  - c/w ASA 81 mg daily. Currently holding plavix secondary to LLE hematoma    #RESOLVED-Cardiogenic Shock  - patient became hypotensive requiring inotropic support as well as mechanical ventilation and IABP for afterload reduction.  - IABP removed 2/8, tolerating well  - off of levo and Vaso as of 2/8/25

## 2025-02-11 NOTE — PROGRESS NOTE ADULT - ASSESSMENT
79 yr old F with PMHx of HTN, hyperlipidemia, DM, hypothyroidism, GERD, asthma, CAD s/p prior PCIs ( [facility] 11/2023) who presented to [facility] 1/24/25 with chest pain and URI symptoms x 2-3 days, R/I NSTEMI, s/p diagnostic cath@[facility] revealing 3VD and newly reduced EF, initially transferred to [facility] under Dr. Hurst for possible CTS however deemed not candidate due to Porcelain aorta/Vein, and was transferred over to cardiac telemetry for [facility]. Course c/b anemia requiring 2u prbc on 2/4. and further c/b  cardiogenic shock, requiring pressor support and IABP. Transferred to CCU for further management. Patient now transferred to cardiac tele for further management.                              79 yr old F with PMHx of HTN, hyperlipidemia, DM, hypothyroidism, GERD, asthma, CAD s/p prior PCIs ( [facility] 11/2023) who [place] [facility] [place] with chest pain and URI symptoms x 2-3 days, R/I NSTEMI, [place] [place] and newly reduced EF, initially transferred to [facility] under Dr. 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 Course c/b anemia requiring 2U PRBC on 2/4. and further c/b  cardiogenic shock, requiring pressor support and IABP in the CCU, now transferred to cardiac telemetry for dispo planning. Palliative following, not a candidate for home hospice at this time. Started on IV dilaudid PRN for L leg hematoma. Pending MAYRA.

## 2025-02-11 NOTE — PHYSICAL THERAPY INITIAL EVALUATION ADULT - PERTINENT HX OF CURRENT PROBLEM, REHAB EVAL
79 yr old F with PMHx of HTN, hyperlipidemia, DM, hypothyroidism, GERD, asthma, CAD s/p prior PCIs ( [facility] 11/2023) who presented to [facility] 1/24/25 with chest pain and URI symptoms x 2-3 days, R/I NSTEMI, s/p diagnostic [email or web address] revealing 3VD and newly reduced EF, initially transferred to [facility] 9[facility] under Dr. Hurst for possible CTS however deemed not candidate due to Porcelain aorta/Vein, and was transferred over to cardiac telemetry for [facility]. Course c/b anemia requiring 2u prbc on 2/4. and further c/b  cardiogenic shock, requiring pressor support and IABP. Transferred to CCU for further management. Patient now transferred to cardiac tele for further management.

## 2025-02-11 NOTE — PROGRESS NOTE ADULT - NS ATTEND AMEND GEN_ALL_CORE FT
Ms. 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 Hospital course was complicated by hemorrhagic shock, sepsis- likely hematoma in left groin (residual from a-stick done at [facility]- no left groin access attempted at [facility])- became ischemic and IABP placed for coronary perfusion while working up hemorrhagic shock. Weaned off IABP and medically optimized. Now back on cardiac telemetry optimizing GDMT and pending MAYRA.     Exam:  NAD   JVP normal  Lungs CTA, decreased at bases  WWP, foot with significant edema.     CAD- Medical management, asa statin, on BB.   ICM- Euvolemic, switch IV to PO diuretics. BB, low dose hydralazine.   Cr- Has remained stable after switching to PO diuretics  Anemia- c/b hemorrhagic shock, Rewrap leg- dressing is too tight, constricting venous return.

## 2025-02-11 NOTE — PROGRESS NOTE ADULT - SUBJECTIVE AND OBJECTIVE BOX
Interventional Cardiology PA Adult Progress Note    C.C.:     Subjective Assessment:      ROS Negative except as per Subjective and HPI  	  MEDICATIONS:  furosemide   Injectable 40 milliGRAM(s) IV Push daily  hydrALAZINE 10 milliGRAM(s) Oral every 8 hours  metoprolol succinate ER 25 milliGRAM(s) Oral daily    meropenem  IVPB 1000 milliGRAM(s) IV Intermittent every 12 hours    albuterol    90 MICROgram(s) HFA Inhaler 2 Puff(s) Inhalation every 4 hours PRN  albuterol/ipratropium for Nebulization 3 milliLiter(s) Nebulizer every 6 hours  fluticasone propionate/ salmeterol 250-50 MICROgram(s) Diskus 1 Dose(s) Inhalation two times a day  montelukast 10 milliGRAM(s) Oral every 24 hours      pantoprazole  Injectable 40 milliGRAM(s) IV Push every 12 hours  polyethylene glycol 3350 17 Gram(s) Oral two times a day PRN  senna 2 Tablet(s) Oral at bedtime    atorvastatin 80 milliGRAM(s) Oral at bedtime  dextrose 50% Injectable 12.5 Gram(s) IV Push once  dextrose 50% Injectable 25 Gram(s) IV Push once  dextrose Oral Gel 15 Gram(s) Oral once PRN  glucagon  Injectable 1 milliGRAM(s) IntraMuscular once  insulin glargine Injectable (LANTUS) 30 Unit(s) SubCutaneous at bedtime  insulin lispro (ADMELOG) corrective regimen sliding scale   SubCutaneous every 6 hours  levothyroxine 75 MICROGram(s) Oral every 24 hours    aspirin enteric coated 81 milliGRAM(s) Oral every 24 hours  chlorhexidine 2% Cloths 1 Application(s) Topical <User Schedule>  dextrose 5%. 1000 milliLiter(s) IV Continuous <Continuous>  enoxaparin Injectable 40 milliGRAM(s) SubCutaneous every 12 hours      	    [PHYSICAL EXAM:  TELEMETRY:  T(C): 36.7 (02-11-25 @ 05:26), Max: 36.8 (02-10-25 @ 12:39)  HR: 88 (02-11-25 @ 06:39) (82 - 91)  BP: 127/60 (02-11-25 @ 06:39) (108/48 - 159/71)  RR: 18 (02-11-25 @ 06:39) (16 - 19)  SpO2: 99% (02-11-25 @ 06:39) (88% - 100%)  Wt(kg): --  I&O's Summary    10 Feb 2025 07:01  -  11 Feb 2025 07:00  --------------------------------------------------------  IN: 480 mL / OUT: 700 mL / NET: -220 mL        Condon:  Central/PICC/Mid Line:                                         Appearance: Normal	  HEENT:   Normal oral mucosa, PERRL, EOMI	  Neck: Supple, + JVD/ - JVD; Carotid Bruit   Cardiovascular: Normal S1 S2, No JVD, No murmurs,   Respiratory: Lungs clear to auscultation/Decreased Breath Sounds/No Rales, Rhonchi, Wheezing	  Gastrointestinal:  Soft, Non-tender, + BS	  Skin: No rashes, No ecchymoses, No cyanosis  Extremities: Normal range of motion, No clubbing, cyanosis or edema  Vascular: Peripheral pulses palpable 2+ bilaterally  Neurologic: Non-focal  Psychiatry: A & O x 3, Mood & affect appropriate      	    ECG:  	  RADIOLOGY:   DIAGNOSTIC TESTING:  [ ] Echocardiogram:  [ ]  Catheterization:  [ ] Stress Test:    [ ] ANSELMO  OTHER: 	    LABS:	 	  CARDIAC MARKERS:                                  9.3    8.81  )-----------( 120      ( 11 Feb 2025 06:58 )             28.7     02-11    135  |  100  |  27[H]  ----------------------------<  76  4.6   |  24  |  1.78[H]    Ca    8.1[L]      11 Feb 2025 06:58  Phos  2.8     02-11  Mg     1.9     02-11    TPro  5.4[L]  /  Alb  2.9[L]  /  TBili  1.8[H]  /  DBili  x   /  AST  32  /  ALT  21  /  AlkPhos  53  02-11    proBNP:   Lipid Profile:   HgA1c:   TSH:       ASSESSMENT/PLAN: 	        DVT ppx:  Dispo:     Interventional Cardiology PA Adult Progress Note    Subjective Assessment:  Patient seen during AM rounds, pt transferred from CCU to tele overnight for continued management. Afebrile, vital signs stable, chart reviewed. Patient reports severe left leg pain that kept her from sleeping overnight. She also endorses "fluttery" feeling in her chest.    Patient denies SOB, dizziness, headache, fever, and chills.    ROS Negative except as per Subjective and HPI  	  MEDICATIONS:  furosemide   Injectable 40 milliGRAM(s) IV Push daily  hydrALAZINE 10 milliGRAM(s) Oral every 8 hours  metoprolol succinate ER 25 milliGRAM(s) Oral daily    meropenem  IVPB 1000 milliGRAM(s) IV Intermittent every 12 hours    albuterol    90 MICROgram(s) HFA Inhaler 2 Puff(s) Inhalation every 4 hours PRN  albuterol/ipratropium for Nebulization 3 milliLiter(s) Nebulizer every 6 hours  fluticasone propionate/ salmeterol 250-50 MICROgram(s) Diskus 1 Dose(s) Inhalation two times a day  montelukast 10 milliGRAM(s) Oral every 24 hours    pantoprazole  Injectable 40 milliGRAM(s) IV Push every 12 hours  polyethylene glycol 3350 17 Gram(s) Oral two times a day PRN  senna 2 Tablet(s) Oral at bedtime    atorvastatin 80 milliGRAM(s) Oral at bedtime  dextrose 50% Injectable 12.5 Gram(s) IV Push once  dextrose 50% Injectable 25 Gram(s) IV Push once  dextrose Oral Gel 15 Gram(s) Oral once PRN  glucagon  Injectable 1 milliGRAM(s) IntraMuscular once  insulin glargine Injectable (LANTUS) 30 Unit(s) SubCutaneous at bedtime  insulin lispro (ADMELOG) corrective regimen sliding scale   SubCutaneous every 6 hours  levothyroxine 75 MICROGram(s) Oral every 24 hours    aspirin enteric coated 81 milliGRAM(s) Oral every 24 hours  chlorhexidine 2% Cloths 1 Application(s) Topical <User Schedule>  dextrose 5%. 1000 milliLiter(s) IV Continuous <Continuous>  enoxaparin Injectable 40 milliGRAM(s) SubCutaneous every 12 hours      [PHYSICAL EXAM:  TELEMETRY:  T(C): 36.7 (02-11-25 @ 05:26), Max: 36.8 (02-10-25 @ 12:39)  HR: 88 (02-11-25 @ 06:39) (82 - 91)  BP: 127/60 (02-11-25 @ 06:39) (108/48 - 159/71)  RR: 18 (02-11-25 @ 06:39) (16 - 19)  SpO2: 99% (02-11-25 @ 06:39) (88% - 100%)  Wt(kg): --  I&O's Summary    10 Feb 2025 07:01  -  11 Feb 2025 07:00  --------------------------------------------------------  IN: 480 mL / OUT: 700 mL / NET: -220 mL        Condon:  Central/PICC/Mid Line:                                         Appearance: Normal	  HEENT:   Normal oral mucosa, PERRL, EOMI	  Neck: Supple, + JVD/ - JVD; Carotid Bruit   Cardiovascular: Normal S1 S2, No JVD, No murmurs,   Respiratory: Lungs clear to auscultation/Decreased Breath Sounds/No Rales, Rhonchi, Wheezing	  Gastrointestinal:  Soft, Non-tender, + BS	  Skin: No rashes, No ecchymoses, No cyanosis  Extremities: Normal range of motion, No clubbing, cyanosis or edema  Vascular: Peripheral pulses palpable 2+ bilaterally  Neurologic: Non-focal  Psychiatry: A & O x 3, Mood & affect appropriate      	    ECG:  	  RADIOLOGY:   DIAGNOSTIC TESTING:  [ ] Echocardiogram:  [ ]  Catheterization:  [ ] Stress Test:    [ ] ANSELMO  OTHER: 	    LABS:	 	  CARDIAC MARKERS:                                  9.3    8.81  )-----------( 120      ( 11 Feb 2025 06:58 )             28.7     02-11    135  |  100  |  27[H]  ----------------------------<  76  4.6   |  24  |  1.78[H]    Ca    8.1[L]      11 Feb 2025 06:58  Phos  2.8     02-11  Mg     1.9     02-11    TPro  5.4[L]  /  Alb  2.9[L]  /  TBili  1.8[H]  /  DBili  x   /  AST  32  /  ALT  21  /  AlkPhos  53  02-11    proBNP:   Lipid Profile:   HgA1c:   TSH:       ASSESSMENT/PLAN: 	        DVT ppx:  Dispo:     Interventional Cardiology PA Adult Progress Note    Subjective Assessment:  Patient seen during AM rounds, pt transferred from CCU to tele overnight for continued management. Afebrile, vital signs stable, chart reviewed. Patient reports severe left leg pain that kept her from sleeping overnight. She also endorses "fluttery" feeling in her chest.    Patient denies chest pain, SOB, lightheadedness, dizziness, N/V/D, chills or fever.    ROS Negative except as per Subjective and HPI  	  MEDICATIONS:  furosemide   Injectable 40 milliGRAM(s) IV Push daily  hydrALAZINE 10 milliGRAM(s) Oral every 8 hours  metoprolol succinate ER 25 milliGRAM(s) Oral daily    meropenem  IVPB 1000 milliGRAM(s) IV Intermittent every 12 hours    albuterol    90 MICROgram(s) HFA Inhaler 2 Puff(s) Inhalation every 4 hours PRN  albuterol/ipratropium for Nebulization 3 milliLiter(s) Nebulizer every 6 hours  fluticasone propionate/ salmeterol 250-50 MICROgram(s) Diskus 1 Dose(s) Inhalation two times a day  montelukast 10 milliGRAM(s) Oral every 24 hours    pantoprazole  Injectable 40 milliGRAM(s) IV Push every 12 hours  polyethylene glycol 3350 17 Gram(s) Oral two times a day PRN  senna 2 Tablet(s) Oral at bedtime    atorvastatin 80 milliGRAM(s) Oral at bedtime  dextrose 50% Injectable 12.5 Gram(s) IV Push once  dextrose 50% Injectable 25 Gram(s) IV Push once  dextrose Oral Gel 15 Gram(s) Oral once PRN  glucagon  Injectable 1 milliGRAM(s) IntraMuscular once  insulin glargine Injectable (LANTUS) 30 Unit(s) SubCutaneous at bedtime  insulin lispro (ADMELOG) corrective regimen sliding scale   SubCutaneous every 6 hours  levothyroxine 75 MICROGram(s) Oral every 24 hours    aspirin enteric coated 81 milliGRAM(s) Oral every 24 hours  chlorhexidine 2% Cloths 1 Application(s) Topical <User Schedule>  dextrose 5%. 1000 milliLiter(s) IV Continuous <Continuous>  enoxaparin Injectable 40 milliGRAM(s) SubCutaneous every 12 hours    [PHYSICAL EXAM:  TELEMETRY:  T(C): 36.7 (02-11-25 @ 05:26), Max: 36.8 (02-10-25 @ 12:39)  HR: 88 (02-11-25 @ 06:39) (82 - 91)  BP: 127/60 (02-11-25 @ 06:39) (108/48 - 159/71)  RR: 18 (02-11-25 @ 06:39) (16 - 19)  SpO2: 99% (02-11-25 @ 06:39) (88% - 100%)  Wt(kg): 88.5  I&O's Summary    10 Feb 2025 07:01  -  11 Feb 2025 07:00  --------------------------------------------------------  IN: 480 mL / OUT: 700 mL / NET: -220 mL                               Appearance: Patient resting in hospital bed, dressed in hospital gown, no apparent distress, normal appearance  HEENT:   Normal oral mucosa, PERRL, EOMI	  Neck: Supple, + JVD  Cardiovascular: Normal S1 S2, no murmurs   Respiratory: Lungs clear to auscultation, breathing unlabored, no rhronchi/rales/wheezing  Gastrointestinal:  Soft, Non-tender, + BS	  Skin: No rashes, No ecchymoses, No cyanosis  Extremities: Ace wrap in place on left leg (up to thigh), nonpitting edema noted on left lower extremity. Normal range of motion, No clubbing, cyanosis or edema  Vascular: Peripheral pulses palpable 2+ bilaterally  Neurologic: Non-focal  Psychiatry: A & O x 3, Mood & affect appropriate      ECG:  	  RADIOLOGY:   DIAGNOSTIC TESTING:  [ ] Echocardiogram:  [ ]  Catheterization:  [ ] Stress Test:    [ ] ANSELMO  OTHER: 	    LABS:	 	  CARDIAC MARKERS:                                  9.3    8.81  )-----------( 120      ( 11 Feb 2025 06:58 )             28.7     02-11    135  |  100  |  27[H]  ----------------------------<  76  4.6   |  24  |  1.78[H]    Ca    8.1[L]      11 Feb 2025 06:58  Phos  2.8     02-11  Mg     1.9     02-11    TPro  5.4[L]  /  Alb  2.9[L]  /  TBili  1.8[H]  /  DBili  x   /  AST  32  /  ALT  21  /  AlkPhos  53  02-11    proBNP:   Lipid Profile:   HgA1c:   TSH:       ASSESSMENT/PLAN: 	        DVT ppx:  Dispo:     Interventional Cardiology PA Adult Progress Note    Subjective Assessment:  Patient seen during AM rounds, pt transferred from CCU to tele overnight for continued management. Afebrile, vital signs stable, chart reviewed. Patient reports severe left leg pain that kept her from sleeping overnight. She also endorses "fluttery" feeling in her chest. Pain management on board today - started diladid for leg pain. Palliative team on board today to establish GOC.     Patient denies chest pain, SOB, lightheadedness, dizziness, N/V/D, chills or fever.    ROS Negative except as per Subjective and HPI  	  MEDICATIONS:  furosemide   Injectable 40 milliGRAM(s) IV Push daily  hydrALAZINE 10 milliGRAM(s) Oral every 8 hours  metoprolol succinate ER 25 milliGRAM(s) Oral daily    meropenem  IVPB 1000 milliGRAM(s) IV Intermittent every 12 hours    albuterol    90 MICROgram(s) HFA Inhaler 2 Puff(s) Inhalation every 4 hours PRN  albuterol/ipratropium for Nebulization 3 milliLiter(s) Nebulizer every 6 hours  fluticasone propionate/ salmeterol 250-50 MICROgram(s) Diskus 1 Dose(s) Inhalation two times a day  montelukast 10 milliGRAM(s) Oral every 24 hours    pantoprazole  Injectable 40 milliGRAM(s) IV Push every 12 hours  polyethylene glycol 3350 17 Gram(s) Oral two times a day PRN  senna 2 Tablet(s) Oral at bedtime    atorvastatin 80 milliGRAM(s) Oral at bedtime  dextrose 50% Injectable 12.5 Gram(s) IV Push once  dextrose 50% Injectable 25 Gram(s) IV Push once  dextrose Oral Gel 15 Gram(s) Oral once PRN  glucagon  Injectable 1 milliGRAM(s) IntraMuscular once  insulin glargine Injectable (LANTUS) 30 Unit(s) SubCutaneous at bedtime  insulin lispro (ADMELOG) corrective regimen sliding scale   SubCutaneous every 6 hours  levothyroxine 75 MICROGram(s) Oral every 24 hours    aspirin enteric coated 81 milliGRAM(s) Oral every 24 hours  chlorhexidine 2% Cloths 1 Application(s) Topical <User Schedule>  dextrose 5%. 1000 milliLiter(s) IV Continuous <Continuous>  enoxaparin Injectable 40 milliGRAM(s) SubCutaneous every 12 hours    [PHYSICAL EXAM:  TELEMETRY:  T(C): 36.7 (02-11-25 @ 05:26), Max: 36.8 (02-10-25 @ 12:39)  HR: 88 (02-11-25 @ 06:39) (82 - 91)  BP: 127/60 (02-11-25 @ 06:39) (108/48 - 159/71)  RR: 18 (02-11-25 @ 06:39) (16 - 19)  SpO2: 99% (02-11-25 @ 06:39) (88% - 100%)  Wt(kg): 88.5  I&O's Summary    10 Feb 2025 07:01  -  11 Feb 2025 07:00  --------------------------------------------------------  IN: 480 mL / OUT: 700 mL / NET: -220 mL                               Appearance: Patient resting in hospital bed, dressed in hospital gown, no apparent distress, normal appearance  HEENT:   Normal oral mucosa, PERRL, EOMI	  Neck: Supple, + JVD  Cardiovascular: Normal S1 S2, no murmurs   Respiratory: Lungs clear to auscultation, breathing unlabored, no rhronchi/rales/wheezing  Gastrointestinal:  Soft, Non-tender, + BS	  Skin: No rashes, No ecchymoses, No cyanosis  Extremities: Ace wrap in place on left leg (up to thigh), nonpitting edema noted on left lower extremity. Normal range of motion, No clubbing, cyanosis or edema  Vascular: Peripheral pulses palpable 2+ bilaterally  Neurologic: Non-focal  Psychiatry: A & O x 3, Mood & affect appropriate      ECG:  	  RADIOLOGY:   DIAGNOSTIC TESTING:  [ ] Echocardiogram:  [ ]  Catheterization:  [ ] Stress Test:    [ ] ANSELMO  OTHER: 	    LABS:	 	  CARDIAC MARKERS:                                  9.3    8.81  )-----------( 120      ( 11 Feb 2025 06:58 )             28.7     02-11    135  |  100  |  27[H]  ----------------------------<  76  4.6   |  24  |  1.78[H]    Ca    8.1[L]      11 Feb 2025 06:58  Phos  2.8     02-11  Mg     1.9     02-11    TPro  5.4[L]  /  Alb  2.9[L]  /  TBili  1.8[H]  /  DBili  x   /  AST  32  /  ALT  21  /  AlkPhos  53  02-11    proBNP:   Lipid Profile:   HgA1c:   TSH:       ASSESSMENT/PLAN: 	        DVT ppx:  Dispo:     Interventional Cardiology PA Adult Progress Note    Subjective Assessment:  Patient reports severe left leg pain that kept her from sleeping overnight. She also endorses "fluttery" feeling in her chest. Patient denies chest pain, SOB, lightheadedness, dizziness, N/V/D, chills or fever.    ROS Negative except as per Subjective and HPI  	  MEDICATIONS:  furosemide   Injectable 40 milliGRAM(s) IV Push daily  hydrALAZINE 10 milliGRAM(s) Oral every 8 hours  metoprolol succinate ER 25 milliGRAM(s) Oral daily    meropenem  IVPB 1000 milliGRAM(s) IV Intermittent every 12 hours    albuterol    90 MICROgram(s) HFA Inhaler 2 Puff(s) Inhalation every 4 hours PRN  albuterol/ipratropium for Nebulization 3 milliLiter(s) Nebulizer every 6 hours  fluticasone propionate/ salmeterol 250-50 MICROgram(s) Diskus 1 Dose(s) Inhalation two times a day  montelukast 10 milliGRAM(s) Oral every 24 hours    pantoprazole  Injectable 40 milliGRAM(s) IV Push every 12 hours  polyethylene glycol 3350 17 Gram(s) Oral two times a day PRN  senna 2 Tablet(s) Oral at bedtime    atorvastatin 80 milliGRAM(s) Oral at bedtime  dextrose 50% Injectable 12.5 Gram(s) IV Push once  dextrose 50% Injectable 25 Gram(s) IV Push once  dextrose Oral Gel 15 Gram(s) Oral once PRN  glucagon  Injectable 1 milliGRAM(s) IntraMuscular once  insulin glargine Injectable (LANTUS) 30 Unit(s) SubCutaneous at bedtime  insulin lispro (ADMELOG) corrective regimen sliding scale   SubCutaneous every 6 hours  levothyroxine 75 MICROGram(s) Oral every 24 hours    aspirin enteric coated 81 milliGRAM(s) Oral every 24 hours  chlorhexidine 2% Cloths 1 Application(s) Topical <User Schedule>  dextrose 5%. 1000 milliLiter(s) IV Continuous <Continuous>  enoxaparin Injectable 40 milliGRAM(s) SubCutaneous every 12 hours    [PHYSICAL EXAM:  TELEMETRY:  T(C): 36.7 (02-11-25 @ 05:26), Max: 36.8 (02-10-25 @ 12:39)  HR: 88 (02-11-25 @ 06:39) (82 - 91)  BP: 127/60 (02-11-25 @ 06:39) (108/48 - 159/71)  RR: 18 (02-11-25 @ 06:39) (16 - 19)  SpO2: 99% (02-11-25 @ 06:39) (88% - 100%)  Wt(kg): 88.5  I&O's Summary    10 Feb 2025 07:01  -  11 Feb 2025 07:00  --------------------------------------------------------  IN: 480 mL / OUT: 700 mL / NET: -220 mL                               Appearance: Patient resting in hospital bed, dressed in hospital gown, no apparent distress, normal appearance  HEENT:   Normal oral mucosa, PERRL, EOMI	  Neck: Supple, + JVD  Cardiovascular: Normal S1 S2, no murmurs   Respiratory: Lungs with bibasilar rales  Gastrointestinal:  Soft, Non-tender, + BS	  Skin: No rashes, No ecchymoses, No cyanosis  Extremities: ACE wrap in place on left leg (up to thigh), nonpitting edema noted on left lower extremity. Normal range of motion, No clubbing, cyanosis or edema  Vascular: warm  Neurologic: Non-focal  Psychiatry: A & O x 3, Mood & affect appropriate          LABS:	 	  CARDIAC MARKERS:                                  9.3    8.81  )-----------( 120      ( 11 Feb 2025 06:58 )             28.7     02-11    135  |  100  |  27[H]  ----------------------------<  76  4.6   |  24  |  1.78[H]    Ca    8.1[L]      11 Feb 2025 06:58  Phos  2.8     02-11  Mg     1.9     02-11    TPro  5.4[L]  /  Alb  2.9[L]  /  TBili  1.8[H]  /  DBili  x   /  AST  32  /  ALT  21  /  AlkPhos  53  02-11    proBNP:   Lipid Profile:   HgA1c:   TSH:       ASSESSMENT/PLAN: 	        DVT ppx:  Dispo:     Interventional Cardiology PA Adult Progress Note    Subjective Assessment: Patient reports severe left leg pain that kept her from sleeping overnight. She also endorses "fluttery" feeling in her chest. Patient denies chest pain, SOB, lightheadedness, dizziness, N/V/D, chills or fever.    ROS Negative except as per Subjective and HPI  	  MEDICATIONS:  furosemide   Injectable 40 milliGRAM(s) IV Push daily  hydrALAZINE 10 milliGRAM(s) Oral every 8 hours  metoprolol succinate ER 25 milliGRAM(s) Oral daily    meropenem  IVPB 1000 milliGRAM(s) IV Intermittent every 12 hours    albuterol    90 MICROgram(s) HFA Inhaler 2 Puff(s) Inhalation every 4 hours PRN  albuterol/ipratropium for Nebulization 3 milliLiter(s) Nebulizer every 6 hours  fluticasone propionate/ salmeterol 250-50 MICROgram(s) Diskus 1 Dose(s) Inhalation two times a day  montelukast 10 milliGRAM(s) Oral every 24 hours    pantoprazole  Injectable 40 milliGRAM(s) IV Push every 12 hours  polyethylene glycol 3350 17 Gram(s) Oral two times a day PRN  senna 2 Tablet(s) Oral at bedtime    atorvastatin 80 milliGRAM(s) Oral at bedtime  dextrose 50% Injectable 12.5 Gram(s) IV Push once  dextrose 50% Injectable 25 Gram(s) IV Push once  dextrose Oral Gel 15 Gram(s) Oral once PRN  glucagon  Injectable 1 milliGRAM(s) IntraMuscular once  insulin glargine Injectable (LANTUS) 30 Unit(s) SubCutaneous at bedtime  insulin lispro (ADMELOG) corrective regimen sliding scale   SubCutaneous every 6 hours  levothyroxine 75 MICROGram(s) Oral every 24 hours    aspirin enteric coated 81 milliGRAM(s) Oral every 24 hours  chlorhexidine 2% Cloths 1 Application(s) Topical <User Schedule>  dextrose 5%. 1000 milliLiter(s) IV Continuous <Continuous>  enoxaparin Injectable 40 milliGRAM(s) SubCutaneous every 12 hours    [PHYSICAL EXAM:  TELEMETRY:  T(C): 36.7 (02-11-25 @ 05:26), Max: 36.8 (02-10-25 @ 12:39)  HR: 88 (02-11-25 @ 06:39) (82 - 91)  BP: 127/60 (02-11-25 @ 06:39) (108/48 - 159/71)  RR: 18 (02-11-25 @ 06:39) (16 - 19)  SpO2: 99% (02-11-25 @ 06:39) (88% - 100%)  Wt(kg): 88.5  I&O's Summary    10 Feb 2025 07:01  -  11 Feb 2025 07:00  --------------------------------------------------------  IN: 480 mL / OUT: 700 mL / NET: -220 mL                               Appearance: Patient resting in hospital bed, dressed in hospital gown, no apparent distress, normal appearance  HEENT:   Normal oral mucosa, PERRL, EOMI	  Neck: Supple, + JVD  Cardiovascular: Normal S1 S2, no murmurs   Respiratory: Lungs with bibasilar rales  Gastrointestinal:  Soft, Non-tender, + BS	  Skin: No rashes, No ecchymoses, No cyanosis  Extremities: ACE wrap in place on left leg (up to thigh), nonpitting edema noted on left lower extremity. Normal range of motion, No clubbing, cyanosis or edema  Vascular: warm  Neurologic: Non-focal  Psychiatry: A & O x 3, Mood & affect appropriate          LABS:	 	  CARDIAC MARKERS:                                  9.3    8.81  )-----------( 120      ( 11 Feb 2025 06:58 )             28.7     02-11    135  |  100  |  27[H]  ----------------------------<  76  4.6   |  24  |  1.78[H]    Ca    8.1[L]      11 Feb 2025 06:58  Phos  2.8     02-11  Mg     1.9     02-11    TPro  5.4[L]  /  Alb  2.9[L]  /  TBili  1.8[H]  /  DBili  x   /  AST  32  /  ALT  21  /  AlkPhos  53  02-11    proBNP:   Lipid Profile:   HgA1c:   TSH:       ASSESSMENT/PLAN: 	        DVT ppx:  Dispo:

## 2025-02-11 NOTE — PROGRESS NOTE ADULT - PROBLEM SELECTOR PLAN 6
#CKD Stage 3   KEN on CKD Stage 3 (baseline Cr ~1.3-1.5 from prior Labs),  - Today 1.65, improved with Fluids likely due to hypovolemia episode vs dehydration   - Urine studies: 2/3: Na 71, Protein 27, Creatinine 108, Pr/Cr Ratio .2, Urea Nitrogen 832, K 21, Osmol 505  - avoid nephrotoxic meds  - monitor UOP #CKD Stage 3   KEN on CKD Stage 3 (baseline Cr ~1.3-1.5 from prior Labs),  - Today 1.78, improved with Fluids likely due to hypovolemia episode vs dehydration   - Urine studies: 2/3: Na 71, Protein 27, Creatinine 108, Pr/Cr Ratio .2, Urea Nitrogen 832, K 21, Osmol 505  - avoid nephrotoxic meds  - monitor UOP Baseline Cr ~1.3-1.5 from prior labs  -Cr 1.78   - avoid nephrotoxic meds, monitor UOP

## 2025-02-11 NOTE — CONSULT NOTE ADULT - SUBJECTIVE AND OBJECTIVE BOX
HPI:  78 yo female with PMH of HTN, HLD, Type II DM, triple vessel CAD s/p 3 stents at [facility] 11/2023, CHF, asthma, GERD, and hypothyroidism who presented to [facility] with CP and SOB on 1/24. She was having fever, chills, a productive cough and congestion for 2-3 days prior to the chest pain. She states the pain was constant and radiated to her back/left arm with associated palpitations, SOB, orthopnea and dizziness. She felt the chest pain was similar to when she had the stents prompting her to present to the ER on 1/24.In ED she was found to be tachycardic and tachypneic. Her labs were significant for elevated BUN/Cr, potassium, lactate and elevated troponin. CXR showed pulmonary congestion vs. infiltrates. She was placed on BiPAP for respiratory distress and loaded with ASA/Plavix and placed on a heparin gtt for NSTEMI. Her nasal swab was + for MRSA and she recieved CTX 1g daily. She is s/p cardiac cath showing 3V CAD and a reduced EF of 25%. She was transferred to [facility] under the service of Dr. Hurst for further workup and management.    She denies any history of CVA, she states she has a hx of asthma, she also states she had b/l vein stripping to both of her legs in the past. She denies any past surgeries or traumas to the chest.      (30 Jan 2025 14:37)    PERTINENT PM/SXH:   GERD (gastroesophageal reflux disease)    Hyperlipemia    HTN (hypertension)    Hypothyroid    DM (diabetes mellitus)    Arthritis    Obesity (BMI 30-39.9)    Glaucoma    Primary osteoarthritis of left knee    CAD (coronary artery disease)      Encounter for screening colonoscopy    Benign lipomatous neoplasm    History of total knee replacement, right    History of cholecystectomy    History of back surgery    History of bilateral knee replacement      FAMILY HISTORY:  Family history of diabetes mellitus (Mother, Sibling)    Family history of hypertension (Mother, Sibling)    Family history of blindness (Grandparent)      ITEMS NOT CHECKED ARE NOT PRESENT  SOCIAL HISTORY:   Significant other/partner:  []  Children:  [X]   Substance hx:  []   Tobacco hx:  []   Alcohol hx: []   Home Opioid hx:  [] I-Stop Reference No:  - no active Rx's / see chart note  Living Situation: [X]Home  []Long term care  []Rehab []Other  Presybeterian/Spiritual practice: ; Role of organized Buddhism [ ] important [ ] some [ ] unable to assess  Coping: [] well [X] with difficulty [] poor coping [] unable to assess  Support system: [] strong [X] adequate [] inadequate    ADVANCE DIRECTIVES:    []MOLST  []Living Will  DECISION MAKER(s):  [] Health Care Proxy(s)  [] Surrogate(s)  [] Guardian           Name(s)/Phone Number(s):     BASELINE (I)ADLs (prior to admission):  [place]: []Total  [X] Moderate []Dependent    ALLERGIES:  No Known Allergies    MEDICATIONS  (STANDING):  albuterol/ipratropium for Nebulization 3 milliLiter(s) Nebulizer every 6 hours  aspirin enteric coated 81 milliGRAM(s) Oral every 24 hours  atorvastatin 80 milliGRAM(s) Oral at bedtime  chlorhexidine 2% Cloths 1 Application(s) Topical <User Schedule>  dextrose 5%. 1000 milliLiter(s) (100 mL/Hr) IV Continuous <Continuous>  dextrose 50% Injectable 12.5 Gram(s) IV Push once  dextrose 50% Injectable 25 Gram(s) IV Push once  enoxaparin Injectable 40 milliGRAM(s) SubCutaneous every 12 hours  fluticasone propionate/ salmeterol 250-50 MICROgram(s) Diskus 1 Dose(s) Inhalation two times a day  glucagon  Injectable 1 milliGRAM(s) IntraMuscular once  hydrALAZINE 10 milliGRAM(s) Oral every 8 hours  insulin glargine Injectable (LANTUS) 30 Unit(s) SubCutaneous at bedtime  insulin lispro (ADMELOG) corrective regimen sliding scale   SubCutaneous every 6 hours  levothyroxine 75 MICROGram(s) Oral every 24 hours  meropenem  IVPB 1000 milliGRAM(s) IV Intermittent every 12 hours  metoprolol succinate ER 25 milliGRAM(s) Oral daily  montelukast 10 milliGRAM(s) Oral every 24 hours  pantoprazole  Injectable 40 milliGRAM(s) IV Push every 12 hours  senna 2 Tablet(s) Oral at bedtime    MEDICATIONS  (PRN):  acetaminophen     Tablet .. 650 milliGRAM(s) Oral every 6 hours PRN Moderate Pain (4 - 6)  albuterol    90 MICROgram(s) HFA Inhaler 2 Puff(s) Inhalation every 4 hours PRN Bronchospasm  dextrose Oral Gel 15 Gram(s) Oral once PRN Blood Glucose LESS THAN 70 milliGRAM(s)/deciliter  HYDROmorphone  Injectable 0.25 milliGRAM(s) IV Push every 4 hours PRN Moderate Pain (4 - 6)  polyethylene glycol 3350 17 Gram(s) Oral two times a day PRN Constipation      PRESENT SYMPTOMS/REVIEW OF SYSTEMS: []Unable to obtain due to poor mentation/encephalopathy  Source if other than patient:  []Family   []Team     Pain: [C] yes [] no  QOL Impact -   Location - LLE          Aggravating Factors - Movement  Quality - Dull  Radiation - None  Timing - Constant  Severity (0-10 scale) - "severe"   Minimal Acceptable Level (0-10 scale) - 3/10    CPOT Score:  (Pain Assessment Scale for Critically Ill)    PAIN AD Score:  (Nonverbal Pain Assessment Scale)    Dyspnea:                           []Mild  []Moderate []Severe  Anxiety:                             []Mild []Moderate []Severe  Fatigue:                             []Mild []Moderate []Severe  Nausea:                             []Mild []Moderate []Severe  Loss of Appetite:              []Mild []Moderate []Severe  Constipation:                    []Mild []Moderate []Severe    Other Symptoms:  [x]All Other Review Of Systems Negative     Palliative Performance Status Version 2:  60%  (Functional Assessment Tool)    PHYSICAL EXAM:  GENERAL:  [X]Alert  [X]Oriented x3   []Lethargic  []Cachexia  []Unarousable  [X]Verbal  []Non-Verbal  Behavioral:   []Anxiety  []Delirium []Agitation [X]Cooperative  HEENT:  [X]Normal   []Dry mouth   []ET Tube/Trach  []Oral lesions  PULMONARY:   [X]Clear []Tachypnea  []Audible excessive secretions  []Normal Work of Breathing []Labored Breathing  []Rhonchi []Crackles []Wheezing  CARDIOVASCULAR:    [X]Regular Rate & Rhythm []Irregular []Tachy  []Shin  GASTROINTESTINAL:  [X]Soft  []Distended   []+BS  [X]Non tender []Tender  []PEG []OGT/ NGT  Last BM:  GENITOURINARY:  [X]Normal [] Incontinent   []Oliguria/Anuria   []Condon  MUSCULOSKELETAL:   []Normal   [X]Weakness  []Bed/Wheelchair bound []Edema  NEUROLOGIC:   [X]No focal deficits  []Cognitive impairment  []Dysphagia []Dysarthria []Paresis []Encephalopathic  SKIN:   [X]Normal   []Pressure ulcer(s)  []Rash    CRITICAL CARE:  []Shock Present  []Septic []Cardiogenic []Neurologic []Hypovolemic  []Vasopressors []Inotropes   []Respiratory failure present []Mechanical Ventilation []Non-invasive ventilatory support []High-Flow  []Acute  []Chronic []Hypoxic  []Hypercarbic  []Other organ failure    Vital Signs Last 24 Hrs  T(C): 37 (11 Feb 2025 08:32), Max: 37 (11 Feb 2025 08:32)  T(F): 98.6 (11 Feb 2025 08:32), Max: 98.6 (11 Feb 2025 08:32)  HR: 100 (11 Feb 2025 12:13) (82 - 100)  BP: 110/61 (11 Feb 2025 13:45) (108/48 - 185/77)  BP(mean): 110 (11 Feb 2025 12:13) (69 - 124)  RR: 20 (11 Feb 2025 12:13) (18 - 20)  SpO2: 100% (11 Feb 2025 12:13) (98% - 100%)    Parameters below as of 11 Feb 2025 12:13  Patient On (Oxygen Delivery Method): room air        LABS:                        9.3    8.81  )-----------( 120      ( 11 Feb 2025 06:58 )             28.7   02-11    135  |  100  |  27[H]  ----------------------------<  76  4.6   |  24  |  1.78[H]    Ca    8.1[L]      11 Feb 2025 06:58  Phos  2.8     02-11  Mg     1.9     02-11    TPro  5.4[L]  /  Alb  2.9[L]  /  TBili  1.8[H]  /  DBili  x   /  AST  32  /  ALT  21  /  AlkPhos  53  02-11    RADIOLOGY & ADDITIONAL STUDIES:  < from: TTE Echo Complete w/ Contrast w/ Doppler (02.06.25 @ 09:40) >  INDINGS:    Left Ventricle:  Left ventricular systolic function is mildly reduced with a calculated   ejection fraction of 45-50% with regional wall motion abnormalities.    Mitral Valve:  Structurally normal mitral valve with normal leaflet excursion. Mitral   annular calcification noted. There is trace mitral regurgitation.    LV Wall Scoring:  The entire inferior wall, basal and mid anterolateral wall, and mid  inferolateral segment are akinetic. All remaining scored segments are   normal.      < end of copied text >      REFERRALS:  [x]Social Work  []Case management []PT/OT []Chaplaincy  []Hospice  []Patient/Family Support []Massage Therapy []Music Therapy    DISCUSSION OF CASE: Family - obtained additional history and to provide emotional support;  Primary team - discussed plan of care

## 2025-02-11 NOTE — CONSULT NOTE ADULT - PROBLEM SELECTOR RECOMMENDATION 2
PPSV 60%  - Supportive care
Patient with progression of CAD, has prior LM-LAD, Lcx, and RCA stents, but now has ISR of LM-LAD and RCA stents.  EKG with NSR with TWI in lateral and inferior leads   Troponin 2147 -> 2783   - Patient without any chest pain currently   - Continue imdur 30mg QD   - Currently on heparin drip and DAPT with plans for PCI on Tuesday with Dr. Hernandez

## 2025-02-11 NOTE — CONSULT NOTE ADULT - PROBLEM SELECTOR RECOMMENDATION 4
See GOC above.  - DNR/DNI. MOLST in chart  - HCP: Penny Castellano (son-in-law, form completed today)    In addition to the E/M visit, an advance care planning meeting was performed. Start time: 10:30am; End time: 10:46am; Total time: 16min. A face to face meeting to discuss advance care planning was held today regarding: HERMELINDO ROLDAN. Primary decision maker: Patient. Discussed advance directives including, but not limited to, healthcare proxy and code status. Decision regarding code status: DNR/DNI; Documentation completed today: HCP GOC note
Patient [place] [facility] now sCr seems to be downtrending   - sCr 1.33, will continue to monitor, patient making urine

## 2025-02-11 NOTE — CONSULT NOTE ADULT - CONVERSATION DETAILS
Discussed her overall condition and prognosis. She indicated that she would be open to a catheterization for her blocked coronary arteries if recommended by cardiology. She did explain that she would never want to be intubated again and did not want to be resuscitated. But that she was open to other medical interventions. She also endorsed that she wanted her son-in-law to be her HCP.

## 2025-02-11 NOTE — CONSULT NOTE ADULT - PROBLEM SELECTOR RECOMMENDATION 9
Patient with mildly reduced LVEF, in the setting of progression of coronary artery disease. Well compensated on examination without volume overload, however hypertensive   - Would treat hypertension, can start losartan 25mg QD and up titrate as needed   - Patient is euvolemic on examination, does not require diuretics   - Plan for LHC on Tuesday with Dr. Hernandez    HF will sign off, and patient can be managed by general cardiology service. Please re consult if needed.
In LLE due to hematoma. Wrapped.  - Dilaudid 0.25mg IV q4h PRN severe pain  - Acetaminophen 1000mg q8h PRN moderate pain

## 2025-02-11 NOTE — PROGRESS NOTE ADULT - PROBLEM SELECTOR PLAN 3
WBC on Admission 13 today 9. MRSA swab + at [facility]. Afebrile   - CT A/P 2/7: Increased patchy opacification right lower lobe and trace right pleural effusion. Findings are likely infectious.  - CXR 2/9/25 revealing congestion/infiltrates   -  Blood Cultures 1/31 NGTD, UA normal, Procal previously elevated. Now 0.16  -  c/w meropenem 1g IV q12h (last dose 2/12)  -  vanco dose by level given KEN.  Check daily level. When level <20, give vanco 1250mg x 1 (last dose 2/12)  - Pulm and ID consulted and following WBC on Admission 13 today 9. MRSA swab + at [facility]. Afebrile   - CT A/P 2/7: Increased patchy opacification right lower lobe and trace right pleural effusion. Findings are likely infectious.  - CXR 2/9/25 revealing congestion/infiltrates   -  Blood Cultures 1/31 NGTD, UA normal, Procal previously elevated. Now 0.16  -  c/w meropenem 1g IV q12h (last dose 2/12)  -  vanco dose by level given KEN.  Check daily level. When level <20, give vanco 1250mg x 1 (last dose 2/12) -- Vanc level 20.0 [id number] 06:58  - Pulm and ID consulted and following WBC improving, now 9  - CT A/P 2/7/25: Increased patchy opacification right lower lobe and trace right pleural effusion. Findings are likely infectious.  - CXR 2/9/25 revealing congestion/infiltrates   -  BCx 2/05/25: NGTD x 5 days  -  c/w meropenem 1g IV q12h x 7 days (2/6/25- 2/12/25)   -  order vancomycin 1250mg IV x1. Check daily level. When level <20, give vanco 1250mg x 1 (last dose 2/12)

## 2025-02-11 NOTE — CONSULT NOTE ADULT - CONSULT REQUESTED DATE/TIME
07-Feb-2025 22:53
06-Feb-2025 16:15
02-Feb-2025 12:48
31-Jan-2025 18:04
31-Jan-2025 16:17
11-Feb-2025 16:29

## 2025-02-12 ENCOUNTER — TRANSCRIPTION ENCOUNTER (OUTPATIENT)
Age: 80
End: 2025-02-12

## 2025-02-12 LAB
ANION GAP SERPL CALC-SCNC: 13 MMOL/L — SIGNIFICANT CHANGE UP (ref 5–17)
BUN SERPL-MCNC: 28 MG/DL — HIGH (ref 7–23)
CALCIUM SERPL-MCNC: 8.4 MG/DL — SIGNIFICANT CHANGE UP (ref 8.4–10.5)
CHLORIDE SERPL-SCNC: 96 MMOL/L — SIGNIFICANT CHANGE UP (ref 96–108)
CO2 SERPL-SCNC: 26 MMOL/L — SIGNIFICANT CHANGE UP (ref 22–31)
CREAT SERPL-MCNC: 1.68 MG/DL — HIGH (ref 0.5–1.3)
EGFR: 31 ML/MIN/1.73M2 — LOW
GLUCOSE SERPL-MCNC: 89 MG/DL — SIGNIFICANT CHANGE UP (ref 70–99)
HCT VFR BLD CALC: 29.5 % — LOW (ref 34.5–45)
HGB BLD-MCNC: 9.4 G/DL — LOW (ref 11.5–15.5)
MAGNESIUM SERPL-MCNC: 1.8 MG/DL — SIGNIFICANT CHANGE UP (ref 1.6–2.6)
MCHC RBC-ENTMCNC: 30.9 PG — SIGNIFICANT CHANGE UP (ref 27–34)
MCHC RBC-ENTMCNC: 31.9 G/DL — LOW (ref 32–36)
MCV RBC AUTO: 97 FL — SIGNIFICANT CHANGE UP (ref 80–100)
NRBC BLD AUTO-RTO: 0 /100 WBCS — SIGNIFICANT CHANGE UP (ref 0–0)
PLATELET # BLD AUTO: 146 K/UL — LOW (ref 150–400)
POTASSIUM SERPL-MCNC: 4.4 MMOL/L — SIGNIFICANT CHANGE UP (ref 3.5–5.3)
POTASSIUM SERPL-SCNC: 4.4 MMOL/L — SIGNIFICANT CHANGE UP (ref 3.5–5.3)
RBC # BLD: 3.04 M/UL — LOW (ref 3.8–5.2)
RBC # FLD: 15.9 % — HIGH (ref 10.3–14.5)
SODIUM SERPL-SCNC: 135 MMOL/L — SIGNIFICANT CHANGE UP (ref 135–145)
VANCOMYCIN TROUGH SERPL-MCNC: 35.4 UG/ML — CRITICAL HIGH (ref 10–20)
WBC # BLD: 8.76 K/UL — SIGNIFICANT CHANGE UP (ref 3.8–10.5)
WBC # FLD AUTO: 8.76 K/UL — SIGNIFICANT CHANGE UP (ref 3.8–10.5)

## 2025-02-12 PROCEDURE — 99233 SBSQ HOSP IP/OBS HIGH 50: CPT

## 2025-02-12 RX ORDER — MAGNESIUM OXIDE 400 MG
400 TABLET ORAL ONCE
Refills: 0 | Status: COMPLETED | OUTPATIENT
Start: 2025-02-12 | End: 2025-02-12

## 2025-02-12 RX ADMIN — ACETAMINOPHEN 1000 MILLIGRAM(S): 160 SUSPENSION ORAL at 13:53

## 2025-02-12 RX ADMIN — ACETAMINOPHEN 1000 MILLIGRAM(S): 160 SUSPENSION ORAL at 14:53

## 2025-02-12 RX ADMIN — MONTELUKAST SODIUM 10 MILLIGRAM(S): 5 TABLET, CHEWABLE ORAL at 13:53

## 2025-02-12 RX ADMIN — ATORVASTATIN CALCIUM 80 MILLIGRAM(S): 80 TABLET, FILM COATED ORAL at 21:44

## 2025-02-12 RX ADMIN — LEVOTHYROXINE SODIUM 75 MICROGRAM(S): 25 TABLET ORAL at 05:23

## 2025-02-12 RX ADMIN — TORSEMIDE 20 MILLIGRAM(S): 20 TABLET ORAL at 10:18

## 2025-02-12 RX ADMIN — IPRATROPIUM BROMIDE AND ALBUTEROL SULFATE 3 MILLILITER(S): .5; 2.5 SOLUTION RESPIRATORY (INHALATION) at 12:04

## 2025-02-12 RX ADMIN — ACETAMINOPHEN 1000 MILLIGRAM(S): 160 SUSPENSION ORAL at 19:37

## 2025-02-12 RX ADMIN — ENOXAPARIN SODIUM 40 MILLIGRAM(S): 100 INJECTION SUBCUTANEOUS at 19:03

## 2025-02-12 RX ADMIN — ENOXAPARIN SODIUM 40 MILLIGRAM(S): 100 INJECTION SUBCUTANEOUS at 06:19

## 2025-02-12 RX ADMIN — Medication 75 MILLIGRAM(S): at 13:52

## 2025-02-12 RX ADMIN — Medication 10 MILLIGRAM(S): at 19:06

## 2025-02-12 RX ADMIN — Medication 10 MILLIGRAM(S): at 21:44

## 2025-02-12 RX ADMIN — IPRATROPIUM BROMIDE AND ALBUTEROL SULFATE 3 MILLILITER(S): .5; 2.5 SOLUTION RESPIRATORY (INHALATION) at 05:25

## 2025-02-12 RX ADMIN — MEROPENEM 100 MILLIGRAM(S): 500 INJECTION INTRAVENOUS at 06:19

## 2025-02-12 RX ADMIN — MEROPENEM 100 MILLIGRAM(S): 500 INJECTION INTRAVENOUS at 19:07

## 2025-02-12 RX ADMIN — Medication 1: at 17:17

## 2025-02-12 RX ADMIN — Medication 10 MILLIGRAM(S): at 06:29

## 2025-02-12 RX ADMIN — FLUTICASONE PROPIONATE AND SALMETEROL 1 DOSE(S): 113; 14 POWDER, METERED RESPIRATORY (INHALATION) at 19:22

## 2025-02-12 RX ADMIN — ACETAMINOPHEN 1000 MILLIGRAM(S): 160 SUSPENSION ORAL at 20:37

## 2025-02-12 RX ADMIN — Medication 1: at 12:03

## 2025-02-12 RX ADMIN — PANTOPRAZOLE 40 MILLIGRAM(S): 20 TABLET, DELAYED RELEASE ORAL at 06:19

## 2025-02-12 RX ADMIN — INSULIN GLARGINE-YFGN 30 UNIT(S): 100 INJECTION, SOLUTION SUBCUTANEOUS at 21:44

## 2025-02-12 RX ADMIN — Medication 400 MILLIGRAM(S): at 10:18

## 2025-02-12 RX ADMIN — ASPIRIN 81 MILLIGRAM(S): 81 TABLET, COATED ORAL at 10:17

## 2025-02-12 RX ADMIN — PANTOPRAZOLE 40 MILLIGRAM(S): 20 TABLET, DELAYED RELEASE ORAL at 19:05

## 2025-02-12 RX ADMIN — Medication 25 MILLIGRAM(S): at 12:22

## 2025-02-12 RX ADMIN — IPRATROPIUM BROMIDE AND ALBUTEROL SULFATE 3 MILLILITER(S): .5; 2.5 SOLUTION RESPIRATORY (INHALATION) at 19:11

## 2025-02-12 RX ADMIN — FLUTICASONE PROPIONATE AND SALMETEROL 1 DOSE(S): 113; 14 POWDER, METERED RESPIRATORY (INHALATION) at 05:24

## 2025-02-12 NOTE — DISCHARGE NOTE PROVIDER - HOSPITAL COURSE
80 yo female with PMH of HTN, HLD, TIIDM, 3vCAD (impella assisted PCI x 3 LM 11/2023), severe aorta calcificants, CKDIII (Baseline Cr 1.0-1.14), AoCD/AMANDA (baseline Hgb 8.3-9.5), CHF, asthma, GERD, glaucoma, gallstone pancreatitis and hypothyroidism who presented to [facility] 1/24/25 with fever, chills, productive cough and CP and SOB and was found to be in AHRF 2/2 asthma vs CHF exacerbation vs PNA and r/i NSTEMI and transferred to [facility] CTSx under Dr. Hurst for further management 1/30/25. Deemed a poor surigical candidate given significant ocmorbidities, porcelain aorta and lack of conduit and proceeded with high risk PCI. Course c/b worsening CP + Dynamic ST changes and brought to cath lab for RHC + IABP for coronary perfusion (2/5/24), acute anemia (s/p 2U pRBC 2/4/25) Klebsiella UTI (s/p IV Vanc), MRSA+ nasal swab   HOSPITAL COURSE: 78 yo female with PMH of HTN, HLD, Type II DM, triple vessel CAD s/p 3 stents at [facility] 11/2023, CHF, asthma, GERD, and hypothyroidism who presented to [facility] with CP and SOB on 1/24. She was having fever, chills, a productive cough and congestion for 2-3 days prior to the chest pain. She states the pain was constant and radiated to her back/left arm with associated palpitations, SOB, orthopnea and dizziness. She felt the chest pain was similar to when she had the stents prompting her to present to the ER on 1/24.In ED she was found to be tachycardic and tachypneic. She was placed on BiPAP for respiratory distress and loaded with ASA/Plavix and placed on a heparin gtt for NSTEMI. Her nasal swab was + for MRSA and she recieved CTX 1g daily. She is s/p cardiac cath showing 3V CAD and a reduced EF of 25%. She was transferred to [facility] CTS under the service of Dr. Hurst for further workup and deemed not a candidate for CTS. Pt the transferred to cardiac tele for optimization for high risk PCI. Repeat ECHO on admission showing moderate reduced EF 44%. Course c/b KEN on CKD, and multiple episodes of hypotension requiring fluids. Also the was a concern for DVT but US came back negative. Medicine/Pulm Consulted given CT findings and Leukocytosis, Infectious work up negative treated for empiric coverage with Vanc/Zosyn (completed). Course further complicated by low hgb @ 7.5. S/p 2U of pRBC on 2/4 and 1 unit ordered prior to transfer on 2/5. Additionally pt has been c/o worsening CP w/ initial EKG changes pt started on nitro gtt. CP improved slightly but SBP dropped from 170s to 90-100s.  While in cath lab patient became hypotensive requiring intubation, mechanical support with IABP, transferred to CCU for further management. While in the CCU, patient was extubated quickly and IABP and pressors were successfully weaned without complication. Weaned to room air. Continued with vancomycin and meropenem with downtrending WBC and no other systemic signs of infection. She was becoming increasingly anemic and required 1u pRBC. It was found to be secondary to LLE hematoma evaluated by vascular with no acute surgical intervention planned.  She was started on daily lasix 40 mg IVP as well as on toprol and hydralalzine, but rest of her GDMT is still being held. KEN improving daily. At this point, she was stable for stepdown to RMF.     Problem/Plan - 1   ·  Problem: NSTEMI (non-ST elevation myocardial infarction).   ·  Plan: - [facility] 11/2023: Impella Assisted Rota/PCI with EMILIE to LM 90%, EMILIE ostial LAD and EMILIE ostial LCX, (dLM 80%, oLAD 85%, D2 75%, oLCX 85%, mRCA 75%).  - s/p dx [facility] @ [facility] (1/28/24) w/ Dr. Morgan: dLM to pLAD 80% ISR, LCx ostial to pLCX ISR, %   - TTE 1/31/25: EF 44%, mild LVH, RWMA. Grade I DD. Aortic sclerosis w/o significant stenosis.  - c/w lovenox 40mg SQ q 12 hours  - c/w ASA 81 mg daily, OK to restart Plavix 75 mg PO qd    #RESOLVED-Cardiogenic Shock  - patient became hypotensive requiring inotropic support as well as mechanical ventilation and IABP for afterload reduction.  - IABP removed 2/8, tolerating well  - off of levo and vaso as of 2/8/25.    Problem/Plan - 2   ·  Problem: Acute HFrEF (heart failure with reduced ejection fraction).   ·  Plan: - New onset CHF likely in setting of NSTEMI   - EF at [facility]: 25%, TTE 1/31/25: EF 44%, mild LVH, RWMA. Grade I DD. Aortic sclerosis w/o significant stenosis.  - GDMT: c/w toprol 25mg QD and hydralazine 10mg three times daily. Losartan on HOLD in setting of KEN.  - Diuresis: s/p IV diuresis, now transitioned to torsemide 20mg in AM.    Problem/Plan - 3   ·  Problem: Pneumonia.   ·  Plan: - WBC 8.76 today, fever overnight Tmax 100.7, now resolved   - CT AP (2/7/25): increased patchy opacification right lower lobe and trace right pleural effusion, likely infectious  - CXR (2/9/25): congestion/infiltrates   - BCx (2/5/25): NGTD x 5 days. F/u repeat blood cx 2/12/25  - C/w vancomycin 1250mg IV x1 based on daily level. When level <20, give vanco 1250mg x 1 (last dose 2/12)     o Vanc trough 35.4 - holding today's dose  - c/w meropenem 1g IV q12h x 7 days (2/6/25- 2/12/25).    Problem/Plan - 4   ·  Problem: Anemia.   ·  Plan: - H/H 9.4/29.5  - s/p 2U PRBC 2/4/25 AM, 1U PRBC on 2/7 AM  - Negative SEGUN. B12, folate, iron studies wnl  - Maintain active T&S (next 2/13/25)  - Transfuse for Hgb <7.    Problem/Plan - 5   ·  Problem: Hematoma.   ·  Plan: - CTA Abd Aorta with Runoff (2/8/25): large intramuscular hematoma involving the anteromedial  thigh musculature with heterogeneous attenuation, suggestive of hemorrhagic components in various stages of evolution. No evidence of arterial extravasation or venous pooling on delayed images  - Possibly secondary to trauma while at [facility], reportedly tried both femoral arteries for access during angio   - Vascular following, no acute intervention at this time, c/w ACE wrap for light compression of L leg  - c/w with acetaminophen 1000mg q 6 hours and IV dilaudid 0.25mg q 4 hours for severe pain as recommended by palliative  - c/w ASA 81mg PO qd and Plavix 75 mg PO qd.    Problem/Plan - 6   ·  Problem: Acute kidney injury superimposed on CKD.   ·  Plan: Baseline Cr ~1.3-1.5 from prior labs  -Cr 1.70 today  -Avoid nephrotoxic meds, monitor UOP.    Problem/Plan - 7   ·  Problem: Asthma.   ·  Plan: - SpO2 99% on RA   - c/w Symbicort and Montelukast to aid with mucous plugging   - c/w incentive spirometry.              presented to [facility] for recommended cardiac cath w/ possible intervention if clinically indicated, in light of pts risk factors, CCS class ___ anginal symptoms and abnormal ___.  Pt now s/p cardiac cath _________, access ____. Pt admitted overnight for observation and telemetry monitoring. Pt seen and examined at bedside this AM without any complaints or events overnight, VSS, labs and telemetry reviewed and pt stable for discharge as discussed with  ___. Pt has received appropriate discharge instructions, including medication regimen, access site management and follow up with  __ in 1-2 weeks.    Discharge medications: ASA 81mg QD, Plavix 75mg QD, ______________.    Cardiac Rehab (Post PCI): Education on benefits of Cardiac Rehab provided to patient: Yes, Referral and Prescription Given for Cardiac Rehab: Yes, Pt given list of locations & instructed to contact their insurance company to review list of participating providers.   HOSPITAL COURSE: 78 yo female with PMH of HTN, HLD, Type II DM, triple vessel CAD s/p 3 stents at [facility] 11/2023, CHF, asthma, GERD, and hypothyroidism who presented to [facility] [place] [place] with CP and SOB on 1/24. She was having fever, chills, a productive cough and congestion for 2-3 days prior to the chest pain. She states the pain was constant and radiated to her back/left arm with associated palpitations, SOB, orthopnea and dizziness. She felt the chest pain was similar to when she had the stents prompting her to present to the ER on 1/24.In ED she was found to be tachycardic and tachypneic. She was placed on BiPAP for respiratory distress and loaded with ASA/Plavix and placed on a heparin gtt for NSTEMI. Her nasal swab was + for MRSA and she recieved CTX 1g daily. She is s/p cardiac cath showing 3V CAD and a reduced EF of 25%. She was transferred to [facility] CTS under the service of Dr. Hurst for further workup and deemed not a candidate for CTS. Pt the transferred to cardiac tele for optimization for high risk PCI. Repeat ECHO on admission showing moderate reduced EF 44%. Course c/b KEN on CKD, and multiple episodes of hypotension requiring fluids. Also the was a concern for DVT but US came back negative. Medicine/Pulm Consulted given CT findings and Leukocytosis, Infectious work up negative treated for empiric coverage with Vanc/Zosyn (completed). Course further complicated by low hgb @ 7.5. S/p 2U of pRBC on 2/4 and 1 unit ordered prior to transfer on 2/5. Additionally pt has been c/o worsening CP w/ initial EKG changes pt started on nitro gtt. CP improved slightly but SBP dropped from 170s to 90-100s.  While in cath lab patient became hypotensive requiring intubation, mechanical support with IABP, transferred to CCU for further management. While in the CCU, patient was extubated quickly and IABP and pressors were successfully weaned without complication. Weaned to room air. Continued with vancomycin and meropenem with downtrending WBC and no other systemic signs of infection. She was becoming increasingly anemic and required 1u pRBC. It was found to be secondary to LLE hematoma evaluated by vascular with no acute surgical intervention planned.  She was started on daily lasix 40 mg IVP as well as on toprol and hydralalzine, but rest of her GDMT is still being held. KEN improving daily. At this point, she was stable for stepdown to RMF. While on cardiac tele; Condon removed and TOV, started on IV dilaudid 0.25mg [id number] PRN severe pain,    Blood cx sent following low grade temperature and finished course of meropenem (2/12). 2/13: Finished abx, no fevers.     Problem/Plan - 1   ·  Problem: NSTEMI (non-ST elevation myocardial infarction).   ·  Plan: - [facility] 11/2023: Impella Assisted Rota/PCI with EMILIE to LM 90%, EMILIE ostial LAD and EMILIE ostial LCX, (dLM 80%, oLAD 85%, D2 75%, oLCX 85%, mRCA 75%).  - s/p dx [facility] @ [facility] [place] [place] (1/28/24) w/ Dr. Morgan: dLM to pLAD 80% ISR, LCx ostial to pLCX ISR, %   - TTE 1/31/25: EF 44%, mild LVH, RWMA. Grade I DD. Aortic sclerosis w/o significant stenosis.  - c/w lovenox 40mg SQ q 12 hours  - c/w ASA 81 mg daily, OK to restart Plavix 75 mg PO qd    #RESOLVED-Cardiogenic Shock  - patient became hypotensive requiring inotropic support as well as mechanical ventilation and IABP for afterload reduction.  - IABP removed 2/8, tolerating well  - off of levo and vaso as of 2/8/25.    Problem/Plan - 2   ·  Problem: Acute HFrEF (heart failure with reduced ejection fraction).   ·  Plan: - New onset CHF likely in setting of NSTEMI   - EF at [facility] [place] [place]: 25%, TTE 1/31/25: EF 44%, mild LVH, RWMA. Grade I DD. Aortic sclerosis w/o significant stenosis.  - GDMT: c/w toprol 25mg QD and hydralazine 10mg three times daily. Losartan on HOLD in setting of KEN.  - Diuresis: s/p IV diuresis, now transitioned to torsemide 20mg in AM.    Problem/Plan - 3   ·  Problem: Pneumonia.   - Nasal surveillance PCR + for MRSA/MSSA. Pt completed antibiotic course 2/13 as discusses with epidemiology pt does not require isolation while at subacute rehab   ·  Plan: - WBC 8.76 today, fever overnight Tmax 100.7, now resolved   - CT AP (2/7/25): increased patchy opacification right lower lobe and trace right pleural effusion, likely infectious  - CXR (2/9/25): congestion/infiltrates   - BCx (2/5/25): NGTD x 5 days. F/u repeat blood cx 2/12/25  - C/w vancomycin 1250mg IV x1 based on daily level. When level <20, give vanco 1250mg x 1 (last dose 2/12)     o Vanc trough 35.4 - holding today's dose  - c/w meropenem 1g IV q12h x 7 days (2/6/25- 2/12/25).    Problem/Plan - 4   ·  Problem: Anemia.   ·  Plan: - H/H 9.4/29.5  - s/p 2U PRBC 2/4/25 AM, 1U PRBC on 2/7 AM  - Negative SEGUN. B12, folate, iron studies wnl  - Maintain active T&S (next 2/13/25)  - Transfuse for Hgb <7.    Problem/Plan - 5   ·  Problem: Hematoma.   ·  Plan: - CTA Abd Aorta with Runoff (2/8/25): large intramuscular hematoma involving the anteromedial  thigh musculature with heterogeneous attenuation, suggestive of hemorrhagic components in various stages of evolution. No evidence of arterial extravasation or venous pooling on delayed images  - Possibly secondary to trauma while at [facility] [place] [place], reportedly tried both femoral arteries for access during angio   - Vascular following, no acute intervention at this time, c/w ACE wrap for light compression of L leg  - c/w with acetaminophen 1000mg q 6 hours and IV dilaudid 0.25mg q 4 hours for severe pain as recommended by palliative  - c/w ASA 81mg PO qd and Plavix 75 mg PO qd.    Problem/Plan - 6   ·  Problem: Acute kidney injury superimposed on CKD.   ·  Plan: Baseline Cr ~1.3-1.5 from prior labs  -Cr 1.70 today  -Avoid nephrotoxic meds, monitor UOP.    Problem/Plan - 7   ·  Problem: Asthma.   ·  Plan: - SpO2 99% on RA   - c/w Symbicort and Montelukast to aid with mucous plugging   - c/w incentive spirometry.        Pt seen and examined at bedside this AM without any complaints or events overnight, VSS, labs and telemetry reviewed and pt stable for discharge as discussed with Dr. Oneal. Pt has received appropriate discharge instructions, including medication regimen, access site management and follow up with  __ in 1-2 weeks.    Discharge medications:   HOSPITAL COURSE: 80 yo female with PMH of HTN, HLD, Type II DM, triple vessel CAD s/p 3 stents at [facility] 11/2023, CHF, asthma, GERD, and hypothyroidism who [place] [facility] [place] and [place]B on 1/24. She was having fever, chills, a productive cough and congestion for 2-3 days prior to the chest pain. She states the pain was constant and radiated to her back/left arm with associated palpitations, SOB, orthopnea and dizziness. She felt the chest pain was similar to when she had the stents prompting her to present to the ER on 1/24.In ED she was found to be tachycardic and tachypneic. She was placed on BiPAP for respiratory distress and loaded with ASA/Plavix and placed on a heparin gtt for NSTEMI. Her nasal swab was + for MRSA and she recieved CTX 1g daily. She is s/p cardiac cath showing 3V CAD and a reduced EF of 25%. She was transferred to [facility] CTS under the service of Dr. Hurst for further workup and deemed not a candidate for CTS. Pt the transferred to cardiac tele for optimization for high risk PCI. Repeat ECHO on admission showing moderate reduced EF 44%. Course c/b KEN on CKD, and multiple episodes of hypotension requiring fluids. Also the was a concern for DVT but US came back negative. Medicine/Pulm Consulted given CT findings and Leukocytosis, Infectious work up negative treated for empiric coverage with Vanc/Zosyn (completed). Course further complicated by low hgb @ 7.5. S/p 2U of pRBC on 2/4 and 1 unit ordered prior to transfer on 2/5. Additionally pt has been c/o worsening CP w/ initial EKG changes pt started on nitro gtt. CP improved slightly but SBP dropped from 170s to 90-100s.  While in cath lab patient became hypotensive requiring intubation, mechanical support with IABP, transferred to CCU for further management. While in the CCU, patient was extubated quickly and IABP and pressors were successfully weaned without complication. Weaned to room air. Continued with vancomycin and meropenem with downtrending WBC and no other systemic signs of infection. She was becoming increasingly anemic and required 1u pRBC. It was found to be secondary to LLE hematoma evaluated by vascular with no acute surgical intervention planned.  She was started on daily lasix 40 mg IVP as well as on toprol and hydralalzine, but rest of her GDMT is still being held. KEN improving daily. At this point, she was stable for stepdown to RMF. While on cardiac tele; Condon removed and TOV. Blood cx sent following low grade temperature, which were negative, and finished course of meropenem (2/12). 2/13: Finished abx, no fevers.     Problem/Plan - 1   ·  Problem: NSTEMI (non-ST elevation myocardial infarction).   ·  Plan: - [facility] 11/2023: Impella Assisted Rota/PCI with EMILIE to LM 90%, EMILIE ostial LAD and EMILIE ostial LCX, (dLM 80%, oLAD 85%, D2 75%, oLCX 85%, mRCA 75%).  - S/p [place] [facility] [place] [facility] [place] w/ Dr. Morgan: dLM to pLAD 80% ISR, LCx ostial to pLCX ISR, %   - TTE 1/31/25: EF 44%, mild LVH, RWMA. Grade I DD. Aortic sclerosis w/o significant stenosis.  - c/w ASA 81 mg daily, Plavix 75 mg PO qd  - Not a candidate for high risk PCI or CTS    #RESOLVED-Cardiogenic Shock  - patient became hypotensive requiring inotropic support as well as mechanical ventilation and IABP for afterload reduction.  - IABP removed 2/8, tolerating well  - off of levo and vaso as of 2/8/25.    Problem/Plan - 2   ·  Problem: Acute HFrEF (heart failure with reduced ejection fraction).   ·  Plan: - New onset CHF likely in setting of NSTEMI   - TTE 1/31/25: EF 44%, mild LVH, RWMA. Grade I DD. Aortic sclerosis w/o significant stenosis.  - GDMT: toprol 25mg QD, hydralazine 10mg three times daily, entresto 24-26mg BID  - Diuresis: torsemide 20mg    Problem/Plan - 3   ·  Problem: Pneumonia.   - Nasal surveillance PCR + for MRSA/MSSA. Pt completed antibiotic course 2/13 as discusses with epidemiology pt does not require isolation while at subacute rehab   ·  Plan: - WBC 8.76 today, fever overnight Tmax 100.7, now resolved   - CT AP (2/7/25): increased patchy opacification right lower lobe and trace right pleural effusion, likely infectious  - CXR (2/9/25): congestion/infiltrates   - BCx (2/5/25): NGTD x 5 days. F/u repeat blood cx 2/12/25  - S/p vancomycin 1250mg IV, meropenem 1g IV    Problem/Plan - 4   ·  Problem: Anemia.   ·  Plan: - H/H 10.2/33.5  - s/p 2U PRBC 2/4/25 AM, 1U PRBC on 2/7 AM  - Negative SEGUN. B12, folate, iron studies wnl    Problem/Plan - 5   ·  Problem: Hematoma.   #RESOLVED  ·  Plan: - CTA Abd Aorta with Runoff (2/8/25): large intramuscular hematoma involving the anteromedial  thigh musculature with heterogeneous attenuation, suggestive of hemorrhagic components in various stages of evolution. No evidence of arterial extravasation or venous pooling on delayed images  - Possibly secondary to trauma while at [facility], reportedly tried both femoral arteries for access during angio   - Vascular following, no acute intervention at this time, c/w ACE wrap for light compression of L leg  - Recieved acetaminophen 1000mg q 6 hours and IV dilaudid 0.25mg q 4 hours for severe pain as recommended by palliative    Problem/Plan - 6   ·  Problem: Acute kidney injury superimposed on CKD.   ·  Plan: Baseline Cr ~1.3-1.5 from prior labs  -Cr 1.70 today  -Avoid nephrotoxic meds, monitor UOP.    Problem/Plan - 7   ·  Problem: Asthma.   ·  Plan: - SpO2 99% on RA   - c/w Symbicort and Montelukast to aid with mucous plugging   - c/w incentive spirometry.    Pt seen and examined at bedside this AM without any complaints or events overnight, VSS, labs and telemetry reviewed and pt stable for discharge as discussed with Dr. Oneal. Pt has received appropriate discharge instructions, including medication regimen, access site management and follow up with  __ in 1-2 weeks.    Discharge medications:  torsemide 20mg qd, asa 81mg qd, plavix 75mg qd, atorvastatin 80mg qd, toprol 25mg QD and hydralazine 10mg three times daily, entresto 24-26mg BID

## 2025-02-12 NOTE — PROGRESS NOTE ADULT - PROBLEM SELECTOR PLAN 7
- SpO2 99% on RA   - c/w Symbicort and Montelukast to aid with mucous plugging   - c/w with incentive spirometry - SpO2 99% on RA   - c/w Symbicort and Montelukast to aid with mucous plugging   - c/w incentive spirometry

## 2025-02-12 NOTE — PROGRESS NOTE ADULT - PROBLEM SELECTOR PLAN 5
- CTA Abd Aorta with Runoff (2/8/25): large intramuscular hematoma involving the anteromedial  thigh musculature with heterogeneous attenuation, suggestive of hemorrhagic components in various stages of evolution. No evidence of arterial extravasation or venous pooling on delayed images  - Possibly secondary to trauma while at [facility], reportedly tried both femoral arteries for access during angio   - Vascular following, no acute intervention at this time, c/w ACE wrap for light compression of L leg  - c/w with acetaminophen 1000mg q 6 hours and IV dilaudid 0.25mg q 4 hours for severe pain as recommended by palliative  - c/w ASA 81mg PO qd - CTA Abd Aorta with Runoff (2/8/25): large intramuscular hematoma involving the anteromedial  thigh musculature with heterogeneous attenuation, suggestive of hemorrhagic components in various stages of evolution. No evidence of arterial extravasation or venous pooling on delayed images  - Possibly secondary to trauma while at [facility] [place], reportedly tried both femoral arteries for access during angio   - Vascular following, no acute intervention at this time, c/w ACE wrap for light compression of L leg  - c/w with acetaminophen 1000mg q 6 hours and IV dilaudid 0.25mg q 4 hours for severe pain as recommended by palliative  - c/w ASA 81mg PO qd and Plavix 75 mg PO qd

## 2025-02-12 NOTE — PROGRESS NOTE ADULT - PROBLEM SELECTOR PLAN 1
- [facility] 11/2023: Impella Assisted Rota/PCI with EMILIE to LM 90%, EMILIE ostial LAD and EMILIE ostial LCX, (dLM 80%, oLAD 85%, D2 75%, oLCX 85%, mRCA 75%).  - s/p dx [facility] @ [facility] (1/28/24) w/ Dr. Morgan: dLM to pLAD 80% ISR, LCx ostial to pLCX ISR, %   - TTE 1/31/25: EF 44%, mild LVH, RWMA. Grade I DD. Aortic sclerosis w/o significant stenosis.  - c/w lovenox 40mg SQ q 12 hours  - c/w ASA 81 mg daily. Currently holding plavix secondary to LLE hematoma    #RESOLVED-Cardiogenic Shock  - patient became hypotensive requiring inotropic support as well as mechanical ventilation and IABP for afterload reduction.  - IABP removed 2/8, tolerating well  - off of levo and vaso as of 2/8/25 - [facility] 11/2023: Impella Assisted Rota/PCI with EMILIE to LM 90%, EMILIE ostial LAD and EMILIE ostial LCX, (dLM 80%, oLAD 85%, D2 75%, oLCX 85%, mRCA 75%).  - s/p dx [facility] @ [facility] (1/28/24) w/ Dr. Morgan: dLM to pLAD 80% ISR, LCx ostial to pLCX ISR, %   - TTE 1/31/25: EF 44%, mild LVH, RWMA. Grade I DD. Aortic sclerosis w/o significant stenosis.  - c/w lovenox 40mg SQ q 12 hours  - c/w ASA 81 mg daily, OK to restart Plavix 75 mg PO qd    #RESOLVED-Cardiogenic Shock  - patient became hypotensive requiring inotropic support as well as mechanical ventilation and IABP for afterload reduction.  - IABP removed 2/8, tolerating well  - off of levo and vaso as of 2/8/25

## 2025-02-12 NOTE — PROGRESS NOTE ADULT - PROBLEM SELECTOR PLAN 6
Baseline Cr ~1.3-1.5 from prior labs  -Cr 1.78   - avoid nephrotoxic meds, monitor UOP Baseline Cr ~1.3-1.5 from prior labs  -Cr 1.68 today, improved from Cr 1.78 yesterday  -Avoid nephrotoxic meds, monitor UOP

## 2025-02-12 NOTE — DISCHARGE NOTE PROVIDER - PROVIDER TOKENS
PROVIDER:[TOKEN:[[id number]]] PROVIDER:[TOKEN:[[id number]],SCHEDULEDAPPT:[02/24/2025],SCHEDULEDAPPTTIME:[03:15 PM]]

## 2025-02-12 NOTE — PROGRESS NOTE ADULT - PROBLEM SELECTOR PLAN 3
- WBC 8.76 today, fever overnight Tmax 100.7, now resolved   - CT AP (2/7/25): increased patchy opacification right lower lobe and trace right pleural effusion, likely infectious  - CXR (2/9/25): congestion/infiltrates   - BCx (2/5/25): NGTD x 5 days  - C/w vancomycin 1250mg IV x1 based on daily level. When level <20, give vanco 1250mg x 1 (last dose 2/12)     o Vanc trough 35.4 - holding today's dose  - c/w meropenem 1g IV q12h x 7 days (2/6/25- 2/12/25) - WBC 8.76 today, fever overnight Tmax 100.7, now resolved   - CT AP (2/7/25): increased patchy opacification right lower lobe and trace right pleural effusion, likely infectious  - CXR (2/9/25): congestion/infiltrates   - BCx (2/5/25): NGTD x 5 days. F/u repeat blood cx 2/12/25  - C/w vancomycin 1250mg IV x1 based on daily level. When level <20, give vanco 1250mg x 1 (last dose 2/12)     o Vanc trough 35.4 - holding today's dose  - c/w meropenem 1g IV q12h x 7 days (2/6/25- 2/12/25)

## 2025-02-12 NOTE — PROVIDER CONTACT NOTE (CRITICAL VALUE NOTIFICATION) - RECOMMENDATIONS
Stopped heparin, discontinued heparin and recheck in 2 hours
Follow protocol. d/c Heparin and redo labs in 1 hr
As per ANTONIO Stafford, will discontinue vancomycin
Evaluate.
Heparin drip as per nomogram

## 2025-02-12 NOTE — PROVIDER CONTACT NOTE (CRITICAL VALUE NOTIFICATION) - TEST AND RESULT REPORTED:
Lactic acid 6.9
Vanco Trough- 35.4
WBC 40.09
APTT above 200
Troponin 3264
Troponin 966
potassium 6.3
APTT-121.6
PTT >200

## 2025-02-12 NOTE — PROGRESS NOTE ADULT - ASSESSMENT
79F with PMHx of HTN, hyperlipidemia, DM, hypothyroidism, GERD, asthma, CAD s/p prior PCIs ( [facility] 11/2023) [place] [facility] [place] chest pain and URI symptoms x 2-3 days, R/I NSTEMI, [place] [place] and newly reduced EF, initially transferred to [facility] 9[facility] under Dr. 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 Course c/b anemia requiring 2U PRBC on 2/4. and further c/b  cardiogenic shock, requiring pressor support and IABP in the CCU, now transferred to cardiac telemetry for dispo planning. Palliative following, not a candidate for home hospice at this time. Started on IV dilaudid PRN for L leg hematoma. Pending MAYRA.

## 2025-02-12 NOTE — DISCHARGE NOTE PROVIDER - CARE PROVIDER_API CALL
John Partida  Interventional Cardiology  [address], [address]  [place] [address]  Phone: [phone]  Fax: [phone]  Follow Up Time:    John Partida  Interventional Cardiology  [address], [address]  [place] [address]  Phone: [phone]  Fax: [phone]  Scheduled Appointment: 02/24/2025 03:15 PM

## 2025-02-12 NOTE — DISCHARGE NOTE PROVIDER - NSDCMRMEDTOKEN_GEN_ALL_CORE_FT
amLODIPine 10 mg oral tablet: 1 tab(s) orally once a day  aspirin 81 mg oral capsule: 1 cap(s) orally once a day  aspirin 81 mg oral tablet: 1 tab(s) orally once a day  atorvastatin 80 mg oral tablet: 1 tab(s) orally once a day (at bedtime)  clopidogrel 75 mg oral tablet: 1 tab(s) orally once a day  clopidogrel 75 mg oral tablet: 1 tab(s) orally once a day  docusate sodium 100 mg oral capsule: 1 cap(s) orally 3 times a day  ergocalciferol 1.25 mg (50,000 intl units) oral capsule: 1 cap(s) orally once a day  FeroSul 325 mg (65 mg elemental iron) oral tablet: 1 tab(s) orally 2 times a day  gabapentin 300 mg oral capsule: 2 cap(s) orally 2 times a day  glimepiride 2 mg oral tablet: 1 tab(s) orally once a day  isosorbide mononitrate 60 mg oral tablet, extended release: 1 tab(s) orally once a day  levothyroxine 75 mcg (0.075 mg) oral tablet: 1 tab(s) orally once a day  metFORMIN 850 mg oral tablet: 1 tab(s) orally 2 times a day  metoprolol succinate 25 mg oral tablet, extended release: 1 tab(s) orally every 24 hours  montelukast 10 mg oral tablet: 1 tab(s) orally once a day  omeprazole 40 mg oral delayed release capsule: 1 cap(s) orally once a day   acetaminophen 500 mg oral tablet: 2 tab(s) orally every 6 hours As needed Moderate Pain (4 - 6) or Temp greater or equal to 38C (100.4F)  albuterol 90 mcg/inh inhalation aerosol: 2 puff(s) inhaled every 4 hours As needed Bronchospasm  aspirin 81 mg oral tablet: 1 tab(s) orally once a day  atorvastatin 80 mg oral tablet: 1 tab(s) orally once a day (at bedtime)  clopidogrel 75 mg oral tablet: 1 tab(s) orally once a day  Entresto 24 mg-26 mg oral tablet: 1 tab(s) orally 2 times a day  ergocalciferol 1.25 mg (50,000 intl units) oral capsule: 1 cap(s) orally once a day  glimepiride 2 mg oral tablet: 1 tab(s) orally once a day  hydrALAZINE 10 mg oral tablet: 1 tab(s) orally every 8 hours  ipratropium-albuterol 0.5 mg-2.5 mg/3 mL inhalation solution: 3 milliliter(s) inhaled every 6 hours  levothyroxine 75 mcg (0.075 mg) oral tablet: 1 tab(s) orally once a day  metFORMIN 850 mg oral tablet: 1 tab(s) orally 2 times a day  metoprolol succinate 25 mg oral tablet, extended release: 1 tab(s) orally every 24 hours  montelukast 10 mg oral tablet: 1 tab(s) orally once a day  omeprazole 40 mg oral delayed release capsule: 1 cap(s) orally once a day  polyethylene glycol 3350 oral powder for reconstitution: 17 gram(s) orally 2 times a day As needed Constipation  senna leaf extract oral tablet: 2 tab(s) orally once a day (at bedtime)  torsemide 20 mg oral tablet: 1 tab(s) orally once a day

## 2025-02-12 NOTE — DISCHARGE NOTE PROVIDER - ATTENDING DISCHARGE PHYSICAL EXAMINATION:
Ms. 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 Hospital course was complicated by hemorrhagic shock, sepsis- likely hematoma in left groin (residual from a-stick done at [facility] [place]- no left groin access attempted at [facility])- became ischemic and IABP placed for coronary perfusion while working up hemorrhagic shock. Weaned off IABP and medically optimized. Now back on cardiac telemetry optimizing GDMT and pending MAYRA.     Exam:  NAD   JVP normal  Lungs CTA, decreased at bases  WWP, foot without residual edema. Left thigh wrapped. Pulses intact distally.     CAD- Medical management,- regimen asa/plavix, statin, on BB. Low dose entresto  ICM- Euvolemic, on PO diuretics. BB, low dose hydralazine, add low dose entresto, wean off hydral.  Cr- stabilized. Entresto as above  Anemia- c/b hemorrhagic shock, left thigh hematoma stable, Tolerating DAPT.

## 2025-02-12 NOTE — PROGRESS NOTE ADULT - SUBJECTIVE AND OBJECTIVE BOX
Cardiology PA Adult Progress Note    SUBJECTIVE ASSESSMENT:    Patient seen this morning on rounds. No acute events overnight, afebrile, vital signs stable, chart reviewed. Patient states she was able to sleep well last night, with significant improvement in left leg pain from yesterday with pain medication and rewrapping left leg yesterday. She enjoyed a visit with her daughter and son-in-law last night, and expressed interest in rehab after discharge from hospital to gain strength. She also wants to go to a [facility] closer to her home so her daughter can visit. No complaints at this time.  	  MEDICATIONS:  hydrALAZINE 10 milliGRAM(s) Oral every 8 hours  metoprolol succinate ER 25 milliGRAM(s) Oral daily  torsemide 20 milliGRAM(s) Oral daily    meropenem  IVPB 1000 milliGRAM(s) IV Intermittent every 12 hours    albuterol    90 MICROgram(s) HFA Inhaler 2 Puff(s) Inhalation every 4 hours PRN  albuterol/ipratropium for Nebulization 3 milliLiter(s) Nebulizer every 6 hours  fluticasone propionate/ salmeterol 250-50 MICROgram(s) Diskus 1 Dose(s) Inhalation two times a day  montelukast 10 milliGRAM(s) Oral every 24 hours    acetaminophen     Tablet .. 1000 milliGRAM(s) Oral every 6 hours PRN  HYDROmorphone  Injectable 0.25 milliGRAM(s) IV Push every 4 hours PRN    pantoprazole  Injectable 40 milliGRAM(s) IV Push every 12 hours  polyethylene glycol 3350 17 Gram(s) Oral two times a day PRN  senna 2 Tablet(s) Oral at bedtime    atorvastatin 80 milliGRAM(s) Oral at bedtime  dextrose 50% Injectable 12.5 Gram(s) IV Push once  dextrose 50% Injectable 25 Gram(s) IV Push once  dextrose Oral Gel 15 Gram(s) Oral once PRN  glucagon  Injectable 1 milliGRAM(s) IntraMuscular once  insulin glargine Injectable (LANTUS) 30 Unit(s) SubCutaneous at bedtime  insulin lispro (ADMELOG) corrective regimen sliding scale   SubCutaneous every 6 hours  levothyroxine 75 MICROGram(s) Oral every 24 hours    aspirin enteric coated 81 milliGRAM(s) Oral every 24 hours  chlorhexidine 2% Cloths 1 Application(s) Topical <User Schedule>  dextrose 5%. 1000 milliLiter(s) IV Continuous <Continuous>  enoxaparin Injectable 40 milliGRAM(s) SubCutaneous every 12 hours  	  VITAL SIGNS:  T(C): 36.7 (02-12-25 @ 08:41), Max: 38.2 (02-11-25 @ 20:28)  HR: 91 (02-12-25 @ 10:14) (80 - 100)  BP: 126/56 (02-12-25 @ 10:14) (110/61 - 185/77)  RR: 18 (02-12-25 @ 10:14) (18 - 20)  SpO2: 100% (02-12-25 @ 10:14) (95% - 100%)  Wt(kg): 93    I&O's Summary    11 Feb 2025 07:01  -  12 Feb 2025 07:00  --------------------------------------------------------  IN: 420 mL / OUT: 1600 mL / NET: -1180 mL    12 Feb 2025 07:01  -  12 Feb 2025 10:53  --------------------------------------------------------  IN: 180 mL / OUT: 150 mL / NET: 30 mL                                       PHYSICAL EXAM:  Appearance: Patient resting in hospital bed, wearing hospital gown, no apparent distress, normal appearance	  HEENT: Normal oral mucosa, PERRL, EOMI	  Neck: Supple, + JVD/ - JVD  Cardiovascular: Normal S1 S2, No murmurs  Respiratory: Lungs clear to auscultation/Decreased Breath Sounds/No Rales, Rhonchi, Wheezing	  Gastrointestinal:  Soft, Non-tender, + BS	  Skin: No rashes, No ecchymoses, No cyanosis  Extremities: Normal range of motion, No clubbing, cyanosis or edema  Vascular: Peripheral pulses palpable 2+ bilaterally  Neurologic: Non-focal  Psychiatry: A & O x 3, Mood & affect appropriate    LABS:	 	                       9.4    8.76  )-----------( 146      ( 12 Feb 2025 08:21 )             29.5     02-12    135  |  96  |  28[H]  ----------------------------<  89  4.4   |  26  |  1.68[H]    Ca    8.4      12 Feb 2025 08:21  Phos  2.8     02-11  Mg     1.8     02-12    TPro  5.4[L]  /  Alb  2.9[L]  /  TBili  1.8[H]  /  DBili  x   /  AST  32  /  ALT  21  /  AlkPhos  53  02-11    Vancomycin Level, Trough: 35.4 12FEB25 08:21     Cardiology PA Adult Progress Note    SUBJECTIVE ASSESSMENT: Patient seen this morning on rounds. No acute events overnight, afebrile, vital signs stable, chart reviewed. Patient states she was able to sleep well last night, with significant improvement in left leg pain from yesterday with pain medication and rewrapping left leg yesterday. She enjoyed a visit with her daughter and son-in-law last night, and expressed interest in rehab after discharge from hospital to gain strength. She also wants to go to a [facility] closer to her home so her daughter can visit. No complaints at this time.  	  MEDICATIONS:  hydrALAZINE 10 milliGRAM(s) Oral every 8 hours  metoprolol succinate ER 25 milliGRAM(s) Oral daily  torsemide 20 milliGRAM(s) Oral daily    meropenem  IVPB 1000 milliGRAM(s) IV Intermittent every 12 hours    albuterol    90 MICROgram(s) HFA Inhaler 2 Puff(s) Inhalation every 4 hours PRN  albuterol/ipratropium for Nebulization 3 milliLiter(s) Nebulizer every 6 hours  fluticasone propionate/ salmeterol 250-50 MICROgram(s) Diskus 1 Dose(s) Inhalation two times a day  montelukast 10 milliGRAM(s) Oral every 24 hours    acetaminophen     Tablet .. 1000 milliGRAM(s) Oral every 6 hours PRN  HYDROmorphone  Injectable 0.25 milliGRAM(s) IV Push every 4 hours PRN    pantoprazole  Injectable 40 milliGRAM(s) IV Push every 12 hours  polyethylene glycol 3350 17 Gram(s) Oral two times a day PRN  senna 2 Tablet(s) Oral at bedtime    atorvastatin 80 milliGRAM(s) Oral at bedtime  dextrose 50% Injectable 12.5 Gram(s) IV Push once  dextrose 50% Injectable 25 Gram(s) IV Push once  dextrose Oral Gel 15 Gram(s) Oral once PRN  glucagon  Injectable 1 milliGRAM(s) IntraMuscular once  insulin glargine Injectable (LANTUS) 30 Unit(s) SubCutaneous at bedtime  insulin lispro (ADMELOG) corrective regimen sliding scale   SubCutaneous every 6 hours  levothyroxine 75 MICROGram(s) Oral every 24 hours    aspirin enteric coated 81 milliGRAM(s) Oral every 24 hours  chlorhexidine 2% Cloths 1 Application(s) Topical <User Schedule>  dextrose 5%. 1000 milliLiter(s) IV Continuous <Continuous>  enoxaparin Injectable 40 milliGRAM(s) SubCutaneous every 12 hours  	  VITAL SIGNS:  T(C): 36.7 (02-12-25 @ 08:41), Max: 38.2 (02-11-25 @ 20:28)  HR: 91 (02-12-25 @ 10:14) (80 - 100)  BP: 126/56 (02-12-25 @ 10:14) (110/61 - 185/77)  RR: 18 (02-12-25 @ 10:14) (18 - 20)  SpO2: 100% (02-12-25 @ 10:14) (95% - 100%)  Wt(kg): 93    I&O's Summary    11 Feb 2025 07:01  -  12 Feb 2025 07:00  --------------------------------------------------------  IN: 420 mL / OUT: 1600 mL / NET: -1180 mL    12 Feb 2025 07:01  -  12 Feb 2025 10:53  --------------------------------------------------------  IN: 180 mL / OUT: 150 mL / NET: 30 mL                                       PHYSICAL EXAM:  Appearance: Patient resting in hospital bed, wearing hospital gown, no apparent distress, normal appearance	  HEENT: Normal oral mucosa, PERRL, EOMI	  Neck: Supple, + JVD/ - JVD  Cardiovascular: Normal S1 S2, No murmurs  Respiratory: Lungs clear to auscultation/Decreased Breath Sounds/No Rales, Rhonchi, Wheezing	  Gastrointestinal:  Soft, Non-tender, + BS	  Skin: No rashes, No ecchymoses, No cyanosis  Extremities: Normal range of motion, No clubbing, cyanosis or edema  Vascular: Peripheral pulses palpable 2+ bilaterally  Neurologic: Non-focal  Psychiatry: A & O x 3, Mood & affect appropriate    LABS:	 	                       9.4    8.76  )-----------( 146      ( 12 Feb 2025 08:21 )             29.5     02-12    135  |  96  |  28[H]  ----------------------------<  89  4.4   |  26  |  1.68[H]    Ca    8.4      12 Feb 2025 08:21  Phos  2.8     02-11  Mg     1.8     02-12    TPro  5.4[L]  /  Alb  2.9[L]  /  TBili  1.8[H]  /  DBili  x   /  AST  32  /  ALT  21  /  AlkPhos  53  02-11    Vancomycin Level, Trough: 35.4 12FEB25 08:21     Cardiology PA Adult Progress Note    SUBJECTIVE ASSESSMENT: Patient states she was able to sleep well last night, with significant improvement in left leg pain from yesterday with pain medication and rewrapping left leg yesterday. She enjoyed a visit with her daughter and son-in-law last night, and expressed interest in rehab after discharge from hospital to gain strength. She also wants to go to a [facility] closer to her home so her daughter can visit. No complaints at this time. Pt currently denies any CP, SOB, SOSA, orthopnea, PND, palpitations, dizziness, lightheadedness, syncope, diaphoresis, LE edema, N/V/D, hematochezia, melena, hematuria, hematemesis, fever, chills, URI symptoms.   	  MEDICATIONS:  hydrALAZINE 10 milliGRAM(s) Oral every 8 hours  metoprolol succinate ER 25 milliGRAM(s) Oral daily  torsemide 20 milliGRAM(s) Oral daily  meropenem  IVPB 1000 milliGRAM(s) IV Intermittent every 12 hours  albuterol    90 MICROgram(s) HFA Inhaler 2 Puff(s) Inhalation every 4 hours PRN  albuterol/ipratropium for Nebulization 3 milliLiter(s) Nebulizer every 6 hours  fluticasone propionate/ salmeterol 250-50 MICROgram(s) Diskus 1 Dose(s) Inhalation two times a day  montelukast 10 milliGRAM(s) Oral every 24 hours  acetaminophen     Tablet .. 1000 milliGRAM(s) Oral every 6 hours PRN  HYDROmorphone  Injectable 0.25 milliGRAM(s) IV Push every 4 hours PRN  pantoprazole  Injectable 40 milliGRAM(s) IV Push every 12 hours  polyethylene glycol 3350 17 Gram(s) Oral two times a day PRN  senna 2 Tablet(s) Oral at bedtime  atorvastatin 80 milliGRAM(s) Oral at bedtime  dextrose 50% Injectable 12.5 Gram(s) IV Push once  dextrose 50% Injectable 25 Gram(s) IV Push once  dextrose Oral Gel 15 Gram(s) Oral once PRN  glucagon  Injectable 1 milliGRAM(s) IntraMuscular once  insulin glargine Injectable (LANTUS) 30 Unit(s) SubCutaneous at bedtime  insulin lispro (ADMELOG) corrective regimen sliding scale   SubCutaneous every 6 hours  levothyroxine 75 MICROGram(s) Oral every 24 hours  aspirin enteric coated 81 milliGRAM(s) Oral every 24 hours  chlorhexidine 2% Cloths 1 Application(s) Topical <User Schedule>  dextrose 5%. 1000 milliLiter(s) IV Continuous <Continuous>  enoxaparin Injectable 40 milliGRAM(s) SubCutaneous every 12 hours  	  VITAL SIGNS:  T(C): 36.7 (02-12-25 @ 08:41), Max: 38.2 (02-11-25 @ 20:28)  HR: 91 (02-12-25 @ 10:14) (80 - 100)  BP: 126/56 (02-12-25 @ 10:14) (110/61 - 185/77)  RR: 18 (02-12-25 @ 10:14) (18 - 20)  SpO2: 100% (02-12-25 @ 10:14) (95% - 100%)  Wt(kg): 93    I&O's Summary    11 Feb 2025 07:01  -  12 Feb 2025 07:00  --------------------------------------------------------  IN: 420 mL / OUT: 1600 mL / NET: -1180 mL    12 Feb 2025 07:01  -  12 Feb 2025 10:53  --------------------------------------------------------  IN: 180 mL / OUT: 150 mL / NET: 30 mL                                       PHYSICAL EXAM:  Appearance: Patient resting in hospital bed, wearing hospital gown, no apparent distress, normal appearance	  HEENT: Normal oral mucosa, PERRL, EOMI	  Neck: Supple, + JVD/ - JVD  Cardiovascular: Normal S1 S2, No murmurs  Respiratory: Lungs clear to auscultation/Decreased Breath Sounds/No Rales, Rhonchi, Wheezing	  Gastrointestinal:  Soft, Non-tender, + BS	  Skin: No rashes, No ecchymoses, No cyanosis  Extremities: Normal range of motion, No clubbing, cyanosis or edema  Vascular: Peripheral pulses palpable 2+ bilaterally  Neurologic: Non-focal  Psychiatry: A & O x 3, Mood & affect appropriate    LABS:	 	                       9.4    8.76  )-----------( 146      ( 12 Feb 2025 08:21 )             29.5     02-12    135  |  96  |  28[H]  ----------------------------<  89  4.4   |  26  |  1.68[H]    Ca    8.4      12 Feb 2025 08:21  Phos  2.8     02-11  Mg     1.8     02-12    TPro  5.4[L]  /  Alb  2.9[L]  /  TBili  1.8[H]  /  DBili  x   /  AST  32  /  ALT  21  /  AlkPhos  53  02-11    Vancomycin Level, Trough: 35.4 12FEB25 08:21     Cardiology PA Adult Progress Note    SUBJECTIVE ASSESSMENT: Patient states she was able to sleep well last night, with significant improvement in left leg pain from yesterday with pain medication and rewrapping left leg yesterday. She enjoyed a visit with her daughter and son-in-law last night, and expressed interest in rehab after discharge from hospital to gain strength. She also wants to go to a [facility] closer to her home so her daughter can visit. No complaints at this time. Pt currently denies any CP, SOB, SOSA, orthopnea, PND, palpitations, dizziness, lightheadedness, syncope, diaphoresis, LE edema, N/V/D, hematochezia, melena, hematuria, hematemesis, fever, chills, URI symptoms.   	  MEDICATIONS:  hydrALAZINE 10 milliGRAM(s) Oral every 8 hours  metoprolol succinate ER 25 milliGRAM(s) Oral daily  torsemide 20 milliGRAM(s) Oral daily  meropenem  IVPB 1000 milliGRAM(s) IV Intermittent every 12 hours  albuterol    90 MICROgram(s) HFA Inhaler 2 Puff(s) Inhalation every 4 hours PRN  albuterol/ipratropium for Nebulization 3 milliLiter(s) Nebulizer every 6 hours  fluticasone propionate/ salmeterol 250-50 MICROgram(s) Diskus 1 Dose(s) Inhalation two times a day  montelukast 10 milliGRAM(s) Oral every 24 hours  acetaminophen     Tablet .. 1000 milliGRAM(s) Oral every 6 hours PRN  HYDROmorphone  Injectable 0.25 milliGRAM(s) IV Push every 4 hours PRN  pantoprazole  Injectable 40 milliGRAM(s) IV Push every 12 hours  polyethylene glycol 3350 17 Gram(s) Oral two times a day PRN  senna 2 Tablet(s) Oral at bedtime  atorvastatin 80 milliGRAM(s) Oral at bedtime  dextrose 50% Injectable 12.5 Gram(s) IV Push once  dextrose 50% Injectable 25 Gram(s) IV Push once  dextrose Oral Gel 15 Gram(s) Oral once PRN  glucagon  Injectable 1 milliGRAM(s) IntraMuscular once  insulin glargine Injectable (LANTUS) 30 Unit(s) SubCutaneous at bedtime  insulin lispro (ADMELOG) corrective regimen sliding scale   SubCutaneous every 6 hours  levothyroxine 75 MICROGram(s) Oral every 24 hours  aspirin enteric coated 81 milliGRAM(s) Oral every 24 hours  chlorhexidine 2% Cloths 1 Application(s) Topical <User Schedule>  dextrose 5%. 1000 milliLiter(s) IV Continuous <Continuous>  enoxaparin Injectable 40 milliGRAM(s) SubCutaneous every 12 hours  	  VITAL SIGNS:  T(C): 36.7 (02-12-25 @ 08:41), Max: 38.2 (02-11-25 @ 20:28)  HR: 91 (02-12-25 @ 10:14) (80 - 100)  BP: 126/56 (02-12-25 @ 10:14) (110/61 - 185/77)  RR: 18 (02-12-25 @ 10:14) (18 - 20)  SpO2: 100% (02-12-25 @ 10:14) (95% - 100%)  Wt(kg): 93    I&O's Summary    11 Feb 2025 07:01  -  12 Feb 2025 07:00  --------------------------------------------------------  IN: 420 mL / OUT: 1600 mL / NET: -1180 mL    12 Feb 2025 07:01  -  12 Feb 2025 10:53  --------------------------------------------------------  IN: 180 mL / OUT: 150 mL / NET: 30 mL                                       PHYSICAL EXAM:  Appearance: Patient resting in hospital bed, wearing hospital gown, no apparent distress, normal appearance	  HEENT: Normal oral mucosa, PERRL, EOMI	  Neck: Supple, minimal JVD  Cardiovascular: Normal S1 S2, no murmurs appreciated  Respiratory: Decreased breath sounds B/L bases/No Rales, Rhonchi, Wheezing	  Gastrointestinal:  Soft, Non-tender, + BS	  Skin: No rashes, No ecchymoses, No cyanosis  Extremities: Trace edema B/L - ACE wrap not in place. Normal range of motion, no clubbing, cyanosis  Neurologic: Non-focal  Psychiatry: A & O x 3, Mood & affect appropriate    LABS:	 	                       9.4    8.76  )-----------( 146      ( 12 Feb 2025 08:21 )             29.5     02-12    135  |  96  |  28[H]  ----------------------------<  89  4.4   |  26  |  1.68[H]    Ca    8.4      12 Feb 2025 08:21  Phos  2.8     02-11  Mg     1.8     02-12    TPro  5.4[L]  /  Alb  2.9[L]  /  TBili  1.8[H]  /  DBili  x   /  AST  32  /  ALT  21  /  AlkPhos  53  02-11    Vancomycin Level, Trough: 35.4 12FEB25 08:21     Cardiology PA Adult Progress Note    SUBJECTIVE ASSESSMENT: Patient states she was able to sleep well last night, with significant improvement in left leg pain from yesterday with pain medication and rewrapping left leg yesterday. She enjoyed a visit with her daughter and son-in-law last night, and expressed interest in rehab after discharge from hospital to gain strength. She also wants to go to a [facility] closer to her home so her daughter can visit. No complaints at this time. Pt currently denies any CP, SOB, SOSA, orthopnea, PND, palpitations, dizziness, lightheadedness, syncope, diaphoresis, LE edema, N/V/D, hematochezia, melena, hematuria, hematemesis, fever, chills, URI symptoms.   	  MEDICATIONS:  hydrALAZINE 10 milliGRAM(s) Oral every 8 hours  metoprolol succinate ER 25 milliGRAM(s) Oral daily  torsemide 20 milliGRAM(s) Oral daily  meropenem  IVPB 1000 milliGRAM(s) IV Intermittent every 12 hours  albuterol    90 MICROgram(s) HFA Inhaler 2 Puff(s) Inhalation every 4 hours PRN  albuterol/ipratropium for Nebulization 3 milliLiter(s) Nebulizer every 6 hours  fluticasone propionate/ salmeterol 250-50 MICROgram(s) Diskus 1 Dose(s) Inhalation two times a day  montelukast 10 milliGRAM(s) Oral every 24 hours  acetaminophen     Tablet .. 1000 milliGRAM(s) Oral every 6 hours PRN  HYDROmorphone  Injectable 0.25 milliGRAM(s) IV Push every 4 hours PRN  pantoprazole  Injectable 40 milliGRAM(s) IV Push every 12 hours  polyethylene glycol 3350 17 Gram(s) Oral two times a day PRN  senna 2 Tablet(s) Oral at bedtime  atorvastatin 80 milliGRAM(s) Oral at bedtime  dextrose 50% Injectable 12.5 Gram(s) IV Push once  dextrose 50% Injectable 25 Gram(s) IV Push once  dextrose Oral Gel 15 Gram(s) Oral once PRN  glucagon  Injectable 1 milliGRAM(s) IntraMuscular once  insulin glargine Injectable (LANTUS) 30 Unit(s) SubCutaneous at bedtime  insulin lispro (ADMELOG) corrective regimen sliding scale   SubCutaneous every 6 hours  levothyroxine 75 MICROGram(s) Oral every 24 hours  aspirin enteric coated 81 milliGRAM(s) Oral every 24 hours  chlorhexidine 2% Cloths 1 Application(s) Topical <User Schedule>  dextrose 5%. 1000 milliLiter(s) IV Continuous <Continuous>  enoxaparin Injectable 40 milliGRAM(s) SubCutaneous every 12 hours  	  VITAL SIGNS:  T(C): 36.7 (02-12-25 @ 08:41), Max: 38.2 (02-11-25 @ 20:28)  HR: 91 (02-12-25 @ 10:14) (80 - 100)  BP: 126/56 (02-12-25 @ 10:14) (110/61 - 185/77)  RR: 18 (02-12-25 @ 10:14) (18 - 20)  SpO2: 100% (02-12-25 @ 10:14) (95% - 100%)  Wt(kg): 93    I&O's Summary    11 Feb 2025 07:01  -  12 Feb 2025 07:00  --------------------------------------------------------  IN: 420 mL / OUT: 1600 mL / NET: -1180 mL    12 Feb 2025 07:01  -  12 Feb 2025 10:53  --------------------------------------------------------  IN: 180 mL / OUT: 150 mL / NET: 30 mL                                     PHYSICAL EXAM:  Appearance: Patient resting in hospital bed, wearing hospital gown, no apparent distress, normal appearance	  HEENT: Normal oral mucosa, PERRL, EOMI	  Neck: Supple, minimal JVD  Cardiovascular: Normal S1 S2, no murmurs appreciated  Respiratory: Decreased breath sounds B/L bases/No Rales, Rhonchi, Wheezing	  Gastrointestinal:  Soft, Non-tender, + BS	  Skin: No rashes, No ecchymoses, No cyanosis  Extremities: Trace edema B/L - ACE wrap not in place. Normal range of motion, no clubbing, cyanosis  Neurologic: Non-focal  Psychiatry: A & O x 3, Mood & affect appropriate    LABS:	 	                       9.4    8.76  )-----------( 146      ( 12 Feb 2025 08:21 )             29.5     02-12    135  |  96  |  28[H]  ----------------------------<  89  4.4   |  26  |  1.68[H]    Ca    8.4      12 Feb 2025 08:21  Phos  2.8     02-11  Mg     1.8     02-12    TPro  5.4[L]  /  Alb  2.9[L]  /  TBili  1.8[H]  /  DBili  x   /  AST  32  /  ALT  21  /  AlkPhos  53  02-11    Vancomycin Level, Trough: 35.4 12FEB25 08:21     Cardiology PA Adult Progress Note    SUBJECTIVE ASSESSMENT: Patient states she was able to sleep well last night, with significant improvement in left leg pain from yesterday with pain medication and rewrapping left leg yesterday. She enjoyed a visit with her daughter and son-in-law last night, and expressed interest in rehab after discharge from hospital to gain strength. She also wants to go to a [facility] closer to her home so her daughter can visit. No complaints at this time. Pt currently denies any CP, SOB, SOSA, orthopnea, PND, palpitations, dizziness, lightheadedness, syncope, diaphoresis, N/V/D, hematochezia, melena, hematuria, hematemesis, fever, chills, URI symptoms.   	  MEDICATIONS:  hydrALAZINE 10 milliGRAM(s) Oral every 8 hours  metoprolol succinate ER 25 milliGRAM(s) Oral daily  torsemide 20 milliGRAM(s) Oral daily  meropenem  IVPB 1000 milliGRAM(s) IV Intermittent every 12 hours  albuterol    90 MICROgram(s) HFA Inhaler 2 Puff(s) Inhalation every 4 hours PRN  albuterol/ipratropium for Nebulization 3 milliLiter(s) Nebulizer every 6 hours  fluticasone propionate/ salmeterol 250-50 MICROgram(s) Diskus 1 Dose(s) Inhalation two times a day  montelukast 10 milliGRAM(s) Oral every 24 hours  acetaminophen     Tablet .. 1000 milliGRAM(s) Oral every 6 hours PRN  HYDROmorphone  Injectable 0.25 milliGRAM(s) IV Push every 4 hours PRN  pantoprazole  Injectable 40 milliGRAM(s) IV Push every 12 hours  polyethylene glycol 3350 17 Gram(s) Oral two times a day PRN  senna 2 Tablet(s) Oral at bedtime  atorvastatin 80 milliGRAM(s) Oral at bedtime  dextrose 50% Injectable 12.5 Gram(s) IV Push once  dextrose 50% Injectable 25 Gram(s) IV Push once  dextrose Oral Gel 15 Gram(s) Oral once PRN  glucagon  Injectable 1 milliGRAM(s) IntraMuscular once  insulin glargine Injectable (LANTUS) 30 Unit(s) SubCutaneous at bedtime  insulin lispro (ADMELOG) corrective regimen sliding scale   SubCutaneous every 6 hours  levothyroxine 75 MICROGram(s) Oral every 24 hours  aspirin enteric coated 81 milliGRAM(s) Oral every 24 hours  chlorhexidine 2% Cloths 1 Application(s) Topical <User Schedule>  dextrose 5%. 1000 milliLiter(s) IV Continuous <Continuous>  enoxaparin Injectable 40 milliGRAM(s) SubCutaneous every 12 hours  	  VITAL SIGNS:  T(C): 36.7 (02-12-25 @ 08:41), Max: 38.2 (02-11-25 @ 20:28)  HR: 91 (02-12-25 @ 10:14) (80 - 100)  BP: 126/56 (02-12-25 @ 10:14) (110/61 - 185/77)  RR: 18 (02-12-25 @ 10:14) (18 - 20)  SpO2: 100% (02-12-25 @ 10:14) (95% - 100%)  Wt(kg): 93    I&O's Summary    11 Feb 2025 07:01  -  12 Feb 2025 07:00  --------------------------------------------------------  IN: 420 mL / OUT: 1600 mL / NET: -1180 mL    12 Feb 2025 07:01  -  12 Feb 2025 10:53  --------------------------------------------------------  IN: 180 mL / OUT: 150 mL / NET: 30 mL                                     PHYSICAL EXAM:  Appearance: Patient resting in hospital bed, wearing hospital gown, no apparent distress, normal appearance	  HEENT: Normal oral mucosa, PERRL, EOMI	  Neck: Supple, minimal JVD  Cardiovascular: Normal S1 S2, no murmurs appreciated  Respiratory: Decreased breath sounds B/L bases/No Rales, Rhonchi, Wheezing	  Gastrointestinal:  Soft, Non-tender, + BS	  Skin: No rashes, No ecchymoses, No cyanosis  Extremities: Trace edema B/L - ACE wrap not in place. Normal range of motion, no clubbing, cyanosis  Neurologic: Non-focal  Psychiatry: A & O x 3, Mood & affect appropriate    LABS:	 	                       9.4    8.76  )-----------( 146      ( 12 Feb 2025 08:21 )             29.5     02-12    135  |  96  |  28[H]  ----------------------------<  89  4.4   |  26  |  1.68[H]    Ca    8.4      12 Feb 2025 08:21  Phos  2.8     02-11  Mg     1.8     02-12    TPro  5.4[L]  /  Alb  2.9[L]  /  TBili  1.8[H]  /  DBili  x   /  AST  32  /  ALT  21  /  AlkPhos  53  02-11    Vancomycin Level, Trough: 35.4 12FEB25 08:21

## 2025-02-12 NOTE — PROGRESS NOTE ADULT - NS ATTEND AMEND GEN_ALL_CORE FT
Ms. Petersen is a 79F with history of CAD (last in 2023, PCI LM/Cx/LAD- impella assisted), HFrecEF 45%, DM presented initially to [facility] for NSTEMI underwent diagnostic angiogram demonstrating ISR of LM/Cx/LAD and EF was 25% reported at their hospital, sent for CTS evaluation but poor surgical candidate given multiple comorbidities, porcelain aorta and transferred to cardiology for further work up and evaluation for percutaneous intervention. Hospital course was complicated by hemorrhagic shock, sepsis- likely hematoma in left groin (residual from a-stick done at [facility]- no left groin access attempted at [facility])- became ischemic and IABP placed for coronary perfusion while working up hemorrhagic shock. Weaned off IABP and medically optimized. Now back on cardiac telemetry optimizing GDMT and pending MAYRA.     Exam:  NAD   JVP normal  Lungs CTA, decreased at bases  WWP, foot without residual edema. Left thigh wrapped. Pulses intact distally.     CAD- Medical management, trial plavix back on, now with asa/plavix, statin, on BB.   ICM- Euvolemic, on PO diuretics. BB, low dose hydralazine.   Cr- stabilized. If remains stable will trial some entresto  Anemia- c/b hemorrhagic shock, left thigh hematoma stable,  as above will trial plavix

## 2025-02-12 NOTE — PROGRESS NOTE ADULT - PROBLEM SELECTOR PLAN 2
-  New onset CHF likely in setting of NSTEMI   -  [place] [facility] 1/31/25: EF 44%, mild LVH, RWMA. Grade I DD. Aortic sclerosis w/o significant stenosis.  -  GDMT: c/w toprol 25mg QD and hydralazine 10mg three times daily. Losartan on HOLD in setting of KEN.  - Diuresis: s/p IV diuresis, now transitioned to torsemide 20mg in AM -  New onset CHF likely in setting of NSTEMI   -  EF at [place]: 25%, TTE 1/31/25: EF 44%, mild LVH, RWMA. Grade I DD. Aortic sclerosis w/o significant stenosis.  -  GDMT: c/w toprol 25mg QD and hydralazine 10mg three times daily. Losartan on HOLD in setting of KEN.  -  Diuresis: s/p IV diuresis, now transitioned to torsemide 20mg in AM - New onset CHF likely in setting of NSTEMI   - EF at [facility]: 25%, TTE 1/31/25: EF 44%, mild LVH, RWMA. Grade I DD. Aortic sclerosis w/o significant stenosis.  - GDMT: c/w toprol 25mg QD and hydralazine 10mg three times daily. Losartan on HOLD in setting of KEN.  - Diuresis: s/p IV diuresis, now transitioned to torsemide 20mg in AM

## 2025-02-12 NOTE — PROVIDER CONTACT NOTE (CRITICAL VALUE NOTIFICATION) - PERSON GIVING RESULT:
Chemistry - A Tesha
Hematology
Lab, [place]
Sneha Lagunas
Lab
Chemistry
Frank Pierre - chemistry
[place]- Lab
Lab

## 2025-02-12 NOTE — PROGRESS NOTE ADULT - PROBLEM SELECTOR PLAN 9
- C/w Levothyroxine 75mcg PO daily.  - TSH 1.390    Fluids: None   Electrolytes: replete as necessary, K>4, Mg>2  Nutrition: DASH TLC  DVT ppx: lovenox 40mg SQ q 12 hours  Code: DNR/DNI  Disposition: Admit to cardiac/telemetry

## 2025-02-12 NOTE — PROGRESS NOTE ADULT - PROBLEM SELECTOR PLAN 4
H/H 9.4/29.5  - s/p 2U PRBC 2/4/25 AM, 1U PRBC on 2/7 AM.   - Negative SEGUN. B12, folate, iron studies wnl  - Maintain active T&S (next, 2/13/25)  - Transfuse for Hgb <7 H/H 9.4/29.5  - s/p 2U PRBC 2/4/25 AM, 1U PRBC on 2/7 AM  - Negative SEGUN. B12, folate, iron studies wnl  - Maintain active T&S (next, 2/13/25)  - Transfuse for Hgb <7 H/H 9.4/29.5  - s/p 2U PRBC 2/4/25 AM, 1U PRBC on 2/7 AM  - Negative SEGUN. B12, folate, iron studies wnl  - Maintain active T&S (next 2/13/25)  - Transfuse for Hgb <7 - H/H 9.4/29.5  - s/p 2U PRBC 2/4/25 AM, 1U PRBC on 2/7 AM  - Negative SEGUN. B12, folate, iron studies wnl  - Maintain active T&S (next 2/13/25)  - Transfuse for Hgb <7

## 2025-02-12 NOTE — DISCHARGE NOTE PROVIDER - NSDCCPCAREPLAN_GEN_ALL_CORE_FT
PRINCIPAL DISCHARGE DIAGNOSIS  Diagnosis: Non-ST elevation MI (NSTEMI)  Assessment and Plan of Treatment: You were found to have blockages in the arteries of your heart, also known as Coronary Artery Disease. You underwent a cardiac catheterization on 1/28/25 and were found to have severe disease of multiple blood vessels.   PLEASE CONTINUE ASPIRIN 81MG DAILY AND PLAVIX 75MG DAILY. DO NOT STOP THESE MEDICATIONS FOR ANY REASON AS THEY ARE KEEPING YOUR STENT OPEN AND PREVENTING A HEART ATTACK.   Avoid strenuous activity or heavy lifting anything more than 5lbs for the next five days. Do not take a bath or swim for the next five days; you may shower. For any bleeding or hematoma formation (hardened blood collection under the skin) at the access site of your ____ please hold pressure and go to the emergency room. Please follow up with  ___ in 1-2 weeks. For recurrent chest pain, please call your doctor or go to the emergency room.      SECONDARY DISCHARGE DIAGNOSES  Diagnosis: Acute HFrEF (heart failure with reduced ejection fraction)  Assessment and Plan of Treatment: You have a weak heart, also known as Congestive Heart Failure (CHF). Heart failure is a condition in which the heart does not pump or fill with blood well. As a result, the heart lags behind in its job of moving blood throughout the body. This can lead to symptoms such as swelling, trouble breathing, and feeling tired. Your Ejection Fraction (EF) is _______, a normal EF is 55-60%.  -Please continue _____ to prevent fluid build up in the body.  -Avoid drinking more than 1.5L of fluid daily and maintain a low salt diet (max 2grams daily).  -Please weigh yourself daily, for any significant increases in daily weight of 2lbs/day or 5lbs/week with associated swelling in the legs or abdomen and/or shortness of breath, please call your doctor or go to the emergency room.  -Follow up with  __ in 1 week.    Diagnosis: Pneumonia  Assessment and Plan of Treatment:     Diagnosis: Hematoma  Assessment and Plan of Treatment:     Diagnosis: Anemia  Assessment and Plan of Treatment:     Diagnosis: Acute kidney injury superimposed on CKD  Assessment and Plan of Treatment: You have a known history of chronic kidney disease and your baseline Creatinine (kidney function) is __. While you were in the hospital, your kidney function was elevated due to the heart failure and volume overload. Your kidney function has now improved back to baseline with the diuresis, prior to dicharge your Creatinine was ___. Please follow up with your nephrologist as scheduled.     PRINCIPAL DISCHARGE DIAGNOSIS  Diagnosis: Non-ST elevation MI (NSTEMI)  Assessment and Plan of Treatment: You were found to have blockages in the arteries of your heart, also known as Coronary Artery Disease. You underwent a cardiac catheterization on 1/28/25 and were found to have severe disease of multiple blood vessels. Unfortunately, the cardiothoracic surgery team deemed you not a candidate for heart surgery to fix your blood vessels. During this asmission we optimized your heart medications but please follow up with Dr. Partida in 1-2 weeks to discuss the plan for your heart disease.    PLEASE CONTINUE ASPIRIN 81MG DAILY AND PLAVIX 75MG DAILY. DO NOT STOP THESE MEDICATIONS FOR ANY REASON.   For any bleeding or hematoma formation (hardened blood collection under the skin) at the access site of your groin please hold pressure and go to the emergency room. For recurrent chest pain, please call your doctor or go to the emergency room.      SECONDARY DISCHARGE DIAGNOSES  Diagnosis: Acute HFrEF (heart failure with reduced ejection fraction)  Assessment and Plan of Treatment: You have a weak heart, also known as Congestive Heart Failure (CHF). Heart failure is a condition in which the heart does not pump or fill with blood well. As a result, the heart lags behind in its job of moving blood throughout the body. This can lead to symptoms such as swelling, trouble breathing, and feeling tired. Your Ejection Fraction (EF) is 44%, a normal EF is 55-60%.  -Please continue Torsemide 20mg daily to prevent fluid build up in the body.  -Avoid drinking more than 1.5L of fluid daily and maintain a low salt diet (max 2grams daily).  -Please weigh yourself daily, for any significant increases in daily weight of 2lbs/day or 5lbs/week with associated swelling in the legs or abdomen and/or shortness of breath, please call your doctor or go to the emergency room.  -Follow up with Dr. Partida in 1 week.    Diagnosis: Pneumonia  Assessment and Plan of Treatment: Pneumonia is a lung infection that causes inflammation and fluid buildup, leading to symptoms like cough, fever, and difficulty breathing. You were treated with antibiotics during your hospital stay. Continue taking any prescribed medications as directed, and monitor for symptoms like fever, cough, or shortness of breath. Follow up with your doctor as scheduled.    Diagnosis: Hematoma  Assessment and Plan of Treatment: A thigh hematoma is a collection of blood outside the blood vessels in the thigh, usually due to injury or trauma, which can cause swelling, pain, and bruising. Your thigh hematoma has not fully resolved. Continue monitoring for changes such as increased swelling, pain, or skin discoloration. Avoid heavy lifting or strenuous activity, and follow up with your doctor for further evaluation if needed.    Diagnosis: Anemia  Assessment and Plan of Treatment: Anemia is a condition where the body lacks enough healthy red blood cells to carry oxygen, often causing fatigue, weakness, and dizziness. During your hospitalization, you received 4 units of blood, and your hemoglobin level improved to 10.2. Keep eating iron-rich foods and follow up with your doctor to monitor your blood levels. If you experience dizziness, fatigue, or unusual bleeding, contact your doctor.     Diagnosis: Acute kidney injury superimposed on CKD  Assessment and Plan of Treatment: You have a known history of chronic kidney disease and your baseline Creatinine (kidney function) is 1.3-1.5. While you were in the hospital, your kidney function was elevated due to the heart failure and volume overload. Your kidney function has now improved back to baseline with the diuresis, prior to dicharge your Creatinine was 1.70. Please follow up with your nephrologist as scheduled.

## 2025-02-12 NOTE — PROVIDER CONTACT NOTE (CRITICAL VALUE NOTIFICATION) - NAME OF MD/NP/PA/DO NOTIFIED:
Dr. Estrada
ANTONIO Taveras
Argentina ALVAREZ
ANTONIO Melissa
Nelsonller
ANTONIO Yung
Cardiac PA Mariya BREEN.
Pat Stafford.
Argentina ALVAREZ

## 2025-02-13 LAB
ALBUMIN SERPL ELPH-MCNC: 2.8 G/DL — LOW (ref 3.3–5)
ALP SERPL-CCNC: 58 U/L — SIGNIFICANT CHANGE UP (ref 40–120)
ALT FLD-CCNC: 17 U/L — SIGNIFICANT CHANGE UP (ref 10–45)
ANION GAP SERPL CALC-SCNC: 11 MMOL/L — SIGNIFICANT CHANGE UP (ref 5–17)
AST SERPL-CCNC: 24 U/L — SIGNIFICANT CHANGE UP (ref 10–40)
BILIRUB SERPL-MCNC: 1.5 MG/DL — HIGH (ref 0.2–1.2)
BLD GP AB SCN SERPL QL: NEGATIVE — SIGNIFICANT CHANGE UP
BUN SERPL-MCNC: 30 MG/DL — HIGH (ref 7–23)
CALCIUM SERPL-MCNC: 8.4 MG/DL — SIGNIFICANT CHANGE UP (ref 8.4–10.5)
CHLORIDE SERPL-SCNC: 100 MMOL/L — SIGNIFICANT CHANGE UP (ref 96–108)
CO2 SERPL-SCNC: 26 MMOL/L — SIGNIFICANT CHANGE UP (ref 22–31)
CREAT SERPL-MCNC: 1.73 MG/DL — HIGH (ref 0.5–1.3)
EGFR: 30 ML/MIN/1.73M2 — LOW
GLUCOSE SERPL-MCNC: 76 MG/DL — SIGNIFICANT CHANGE UP (ref 70–99)
HCT VFR BLD CALC: 32.2 % — LOW (ref 34.5–45)
HGB BLD-MCNC: 9.6 G/DL — LOW (ref 11.5–15.5)
MAGNESIUM SERPL-MCNC: 1.7 MG/DL — SIGNIFICANT CHANGE UP (ref 1.6–2.6)
MCHC RBC-ENTMCNC: 29.1 PG — SIGNIFICANT CHANGE UP (ref 27–34)
MCHC RBC-ENTMCNC: 29.8 G/DL — LOW (ref 32–36)
MCV RBC AUTO: 97.6 FL — SIGNIFICANT CHANGE UP (ref 80–100)
NRBC BLD AUTO-RTO: 0 /100 WBCS — SIGNIFICANT CHANGE UP (ref 0–0)
PLATELET # BLD AUTO: 166 K/UL — SIGNIFICANT CHANGE UP (ref 150–400)
POTASSIUM SERPL-MCNC: 4.7 MMOL/L — SIGNIFICANT CHANGE UP (ref 3.5–5.3)
POTASSIUM SERPL-SCNC: 4.7 MMOL/L — SIGNIFICANT CHANGE UP (ref 3.5–5.3)
PROT SERPL-MCNC: 5.3 G/DL — LOW (ref 6–8.3)
RBC # BLD: 3.3 M/UL — LOW (ref 3.8–5.2)
RBC # FLD: 15.7 % — HIGH (ref 10.3–14.5)
RH IG SCN BLD-IMP: NEGATIVE — SIGNIFICANT CHANGE UP
SODIUM SERPL-SCNC: 137 MMOL/L — SIGNIFICANT CHANGE UP (ref 135–145)
WBC # BLD: 7.86 K/UL — SIGNIFICANT CHANGE UP (ref 3.8–10.5)
WBC # FLD AUTO: 7.86 K/UL — SIGNIFICANT CHANGE UP (ref 3.8–10.5)

## 2025-02-13 PROCEDURE — 99232 SBSQ HOSP IP/OBS MODERATE 35: CPT

## 2025-02-13 RX ORDER — SACUBITRIL AND VALSARTAN 49; 51 MG/1; MG/1
1 TABLET, FILM COATED ORAL
Refills: 0 | Status: DISCONTINUED | OUTPATIENT
Start: 2025-02-13 | End: 2025-02-14

## 2025-02-13 RX ORDER — MAGNESIUM SULFATE 0.8 MEQ/ML
2 AMPUL (ML) INJECTION ONCE
Refills: 0 | Status: COMPLETED | OUTPATIENT
Start: 2025-02-13 | End: 2025-02-13

## 2025-02-13 RX ADMIN — IPRATROPIUM BROMIDE AND ALBUTEROL SULFATE 3 MILLILITER(S): .5; 2.5 SOLUTION RESPIRATORY (INHALATION) at 17:11

## 2025-02-13 RX ADMIN — Medication 1: at 00:43

## 2025-02-13 RX ADMIN — Medication 10 MILLIGRAM(S): at 07:13

## 2025-02-13 RX ADMIN — ENOXAPARIN SODIUM 40 MILLIGRAM(S): 100 INJECTION SUBCUTANEOUS at 17:07

## 2025-02-13 RX ADMIN — LEVOTHYROXINE SODIUM 75 MICROGRAM(S): 25 TABLET ORAL at 05:40

## 2025-02-13 RX ADMIN — ATORVASTATIN CALCIUM 80 MILLIGRAM(S): 80 TABLET, FILM COATED ORAL at 22:04

## 2025-02-13 RX ADMIN — INSULIN GLARGINE-YFGN 30 UNIT(S): 100 INJECTION, SOLUTION SUBCUTANEOUS at 22:04

## 2025-02-13 RX ADMIN — IPRATROPIUM BROMIDE AND ALBUTEROL SULFATE 3 MILLILITER(S): .5; 2.5 SOLUTION RESPIRATORY (INHALATION) at 11:06

## 2025-02-13 RX ADMIN — Medication 75 MILLIGRAM(S): at 11:05

## 2025-02-13 RX ADMIN — Medication 25 GRAM(S): at 11:03

## 2025-02-13 RX ADMIN — Medication 2: at 17:06

## 2025-02-13 RX ADMIN — SACUBITRIL AND VALSARTAN 1 TABLET(S): 49; 51 TABLET, FILM COATED ORAL at 23:49

## 2025-02-13 RX ADMIN — SACUBITRIL AND VALSARTAN 1 TABLET(S): 49; 51 TABLET, FILM COATED ORAL at 11:15

## 2025-02-13 RX ADMIN — Medication 25 MILLIGRAM(S): at 07:12

## 2025-02-13 RX ADMIN — Medication 10 MILLIGRAM(S): at 22:05

## 2025-02-13 RX ADMIN — IPRATROPIUM BROMIDE AND ALBUTEROL SULFATE 3 MILLILITER(S): .5; 2.5 SOLUTION RESPIRATORY (INHALATION) at 05:41

## 2025-02-13 RX ADMIN — TORSEMIDE 20 MILLIGRAM(S): 20 TABLET ORAL at 07:12

## 2025-02-13 RX ADMIN — ASPIRIN 81 MILLIGRAM(S): 81 TABLET, COATED ORAL at 11:05

## 2025-02-13 RX ADMIN — ACETAMINOPHEN 1000 MILLIGRAM(S): 160 SUSPENSION ORAL at 16:25

## 2025-02-13 RX ADMIN — PANTOPRAZOLE 40 MILLIGRAM(S): 20 TABLET, DELAYED RELEASE ORAL at 05:40

## 2025-02-13 RX ADMIN — FLUTICASONE PROPIONATE AND SALMETEROL 1 DOSE(S): 113; 14 POWDER, METERED RESPIRATORY (INHALATION) at 05:38

## 2025-02-13 RX ADMIN — MONTELUKAST SODIUM 10 MILLIGRAM(S): 5 TABLET, CHEWABLE ORAL at 16:24

## 2025-02-13 RX ADMIN — PANTOPRAZOLE 40 MILLIGRAM(S): 20 TABLET, DELAYED RELEASE ORAL at 17:08

## 2025-02-13 RX ADMIN — ENOXAPARIN SODIUM 40 MILLIGRAM(S): 100 INJECTION SUBCUTANEOUS at 05:40

## 2025-02-13 NOTE — PROGRESS NOTE ADULT - PROBLEM SELECTOR PROBLEM 1
NSTEMI (non-ST elevation myocardial infarction)

## 2025-02-13 NOTE — PROGRESS NOTE ADULT - PROBLEM SELECTOR PROBLEM 2
START ON PATHWAY REGIMEN - Non-Small Cell Lung    [id number]        Pemetrexed (Alimta)       Carboplatin           Additional Orders: Note: Patient to receive the following prior to the   initiation of therapy:  1) Dexamethasone 4 mg orally twice daily x 6 doses.    First dose 24 hours before chemotherapy.  2) Folic acid greater than or equal to   400 mcg orally daily.  First dose at least 5 days prior to the first dose of   pemetrexed.  3) Vitamin B12 1,000 mcg intramuscularly every 9 weeks.  First dose   at least 5 days prior to the first dose of pemetrexed.    All AUC calculations   intended to be used in [facility]    **Always confirm dose/schedule in your pharmacy ordering system**    Patient Characteristics:  Stage IV Metastatic, Nonsquamous, Awaiting Molecular Test Results and Need to   Start Chemotherapy, PS = 0, 1  Therapeutic Status: Stage IV Metastatic  Histology: Nonsquamous Cell  Broad Molecular Profiling Status: Awaiting Molecular Test Results and Need to   Start Chemotherapy  ECOG Performance Status: 1  Intent of Therapy:  Non-Curative / Palliative Intent, Discussed with Patient  
Acute systolic congestive heart failure
Acute HFrEF (heart failure with reduced ejection fraction)
Acute HFrEF (heart failure with reduced ejection fraction)
Acute systolic congestive heart failure
Acute HFrEF (heart failure with reduced ejection fraction)
Acute HFrEF (heart failure with reduced ejection fraction)
Acute systolic congestive heart failure

## 2025-02-13 NOTE — PROGRESS NOTE ADULT - PROBLEM SELECTOR PLAN 7
- SpO2 99% on RA   - c/w Symbicort and Montelukast to aid with mucous plugging   - c/w incentive spirometry

## 2025-02-13 NOTE — PROGRESS NOTE ADULT - REASON FOR ADMISSION
CAD

## 2025-02-13 NOTE — PROGRESS NOTE ADULT - PROBLEM SELECTOR PLAN 3
- WBC 8.76 today, fever overnight Tmax 100.7, now resolved   - CT AP (2/7/25): increased patchy opacification right lower lobe and trace right pleural effusion, likely infectious  - CXR (2/9/25): congestion/infiltrates   - BCx (2/5/25): NGTD x 5 days. F/u repeat blood cx 2/12/25  - C/w vancomycin 1250mg IV x1 based on daily level. When level <20, give vanco 1250mg x 1 (last dose 2/12)     o Vanc trough 35.4 - holding today's dose  - c/w meropenem 1g IV q12h x 7 days (2/6/25- 2/12/25) - WBC 8.76 today, fever overnight Tmax 100.7, now resolved   - CT AP (2/7/25): increased patchy opacification right lower lobe and trace right pleural effusion, likely infectious  - CXR (2/9/25): congestion/infiltrates   - BCx (2/5/25): NGTD x 5 days. F/u repeat blood cx 2/12/25  - s/p vancomycin 1250mg IV (EOT was 2/12)     o Vanc trough 35.4 (2/12/25) - 2/12 dose held  - s/p meropenem 1g IV q12h x 7 days (2/6/25- 2/12/25)

## 2025-02-13 NOTE — PROGRESS NOTE ADULT - PROBLEM SELECTOR PLAN 6
Baseline Cr ~1.3-1.5 from prior labs  -Cr 1.68 today, improved from Cr 1.78 yesterday  -Avoid nephrotoxic meds, monitor UOP Baseline Cr ~1.3-1.5 from prior labs  -Cr 1.73 today  -Avoid nephrotoxic meds, monitor UOP

## 2025-02-13 NOTE — PROGRESS NOTE ADULT - SUBJECTIVE AND OBJECTIVE BOX
Cardiology PA Adult Progress Note    SUBJECTIVE ASSESSMENT:    Patient seen on morning rounds. No acute overnight events. Patient reports continued improvement in left leg pain. Patient endorses she slept well overnight and has no complaints at this time. She asks to speak with the team and social work about MAYRA placement.    Patient denies shortness of breat, chest pain, palpitations, chills, and fever.  	  MEDICATIONS:  hydrALAZINE 10 milliGRAM(s) Oral every 8 hours  metoprolol succinate ER 25 milliGRAM(s) Oral daily  sacubitril 24 mG/valsartan 26 mG 1 Tablet(s) Oral two times a day  torsemide 20 milliGRAM(s) Oral daily    albuterol    90 MICROgram(s) HFA Inhaler 2 Puff(s) Inhalation every 4 hours PRN  albuterol/ipratropium for Nebulization 3 milliLiter(s) Nebulizer every 6 hours  fluticasone propionate/ salmeterol 250-50 MICROgram(s) Diskus 1 Dose(s) Inhalation two times a day  montelukast 10 milliGRAM(s) Oral every 24 hours    acetaminophen     Tablet .. 1000 milliGRAM(s) Oral every 6 hours PRN  HYDROmorphone  Injectable 0.25 milliGRAM(s) IV Push every 4 hours PRN    pantoprazole  Injectable 40 milliGRAM(s) IV Push every 12 hours  polyethylene glycol 3350 17 Gram(s) Oral two times a day PRN  senna 2 Tablet(s) Oral at bedtime    atorvastatin 80 milliGRAM(s) Oral at bedtime  dextrose 50% Injectable 12.5 Gram(s) IV Push once  dextrose 50% Injectable 25 Gram(s) IV Push once  dextrose Oral Gel 15 Gram(s) Oral once PRN  glucagon  Injectable 1 milliGRAM(s) IntraMuscular once  insulin glargine Injectable (LANTUS) 30 Unit(s) SubCutaneous at bedtime  insulin lispro (ADMELOG) corrective regimen sliding scale   SubCutaneous every 6 hours  levothyroxine 75 MICROGram(s) Oral every 24 hours    aspirin enteric coated 81 milliGRAM(s) Oral every 24 hours  chlorhexidine 2% Cloths 1 Application(s) Topical <User Schedule>  clopidogrel Tablet 75 milliGRAM(s) Oral daily  dextrose 5%. 1000 milliLiter(s) IV Continuous <Continuous>  enoxaparin Injectable 40 milliGRAM(s) SubCutaneous every 12 hours  	  VITAL SIGNS:  T(C): 37.1 (02-13-25 @ 11:00), Max: 37.4 (02-12-25 @ 20:33)  HR: 101 (02-13-25 @ 12:49) (83 - 101)  BP: 130/61 (02-13-25 @ 12:49) (108/57 - 164/68)  RR: 18 (02-13-25 @ 11:00) (18 - 18)  SpO2: 99% (02-13-25 @ 12:49) (98% - 100%)  Wt(kg): 90.4    I&O's Summary    12 Feb 2025 07:01  -  13 Feb 2025 07:00  --------------------------------------------------------  IN: 420 mL / OUT: 1880 mL / NET: -1460 mL    13 Feb 2025 07:01  -  13 Feb 2025 15:16  --------------------------------------------------------  IN: 0 mL / OUT: 1050 mL / NET: -1050 mL                                       PHYSICAL EXAM:  Appearance: Resting in hospital bed in gown, no apparent distress, normal appearance	  HEENT: Normal oral mucosa, PERRL, EOMI	  Neck: Supple, - JVD  Cardiovascular: Normal S1 S2, No murmurs  Respiratory: Lungs clear to auscultation, decreased breath sounds at bases, no rales, rhonchi, wheezing	  Gastrointestinal:  Soft, Non-tender, + BS	  Skin: No rashes, No ecchymoses, No cyanosis  Extremities: Normal range of motion, no clubbing, cyanosis or edema, ACE bandage in place on left thigh  Vascular: Peripheral pulses palpable 2+ bilaterally  Neurologic: Non-focal  Psychiatry: A & O x 3, Mood & affect appropriate    LABS:	 	                       9.6    7.86  )-----------( 166      ( 13 Feb 2025 05:30 )             32.2     02-13    137  |  100  |  30[H]  ----------------------------<  76  4.7   |  26  |  1.73[H]    Ca    8.4      13 Feb 2025 05:30  Mg     1.7     02-13    TPro  5.3[L]  /  Alb  2.8[L]  /  TBili  1.5[H]  /  DBili  x   /  AST  24  /  ALT  17  /  AlkPhos  58  02-13   Cardiology PA Adult Progress Note    SUBJECTIVE ASSESSMENT:  	  MEDICATIONS:  hydrALAZINE 10 milliGRAM(s) Oral every 8 hours  metoprolol succinate ER 25 milliGRAM(s) Oral daily  sacubitril 24 mG/valsartan 26 mG 1 Tablet(s) Oral two times a day  torsemide 20 milliGRAM(s) Oral daily  albuterol    90 MICROgram(s) HFA Inhaler 2 Puff(s) Inhalation every 4 hours PRN  albuterol/ipratropium for Nebulization 3 milliLiter(s) Nebulizer every 6 hours  fluticasone propionate/ salmeterol 250-50 MICROgram(s) Diskus 1 Dose(s) Inhalation two times a day  montelukast 10 milliGRAM(s) Oral every 24 hours  acetaminophen     Tablet .. 1000 milliGRAM(s) Oral every 6 hours PRN  HYDROmorphone  Injectable 0.25 milliGRAM(s) IV Push every 4 hours PRN  pantoprazole  Injectable 40 milliGRAM(s) IV Push every 12 hours  polyethylene glycol 3350 17 Gram(s) Oral two times a day PRN  senna 2 Tablet(s) Oral at bedtime  atorvastatin 80 milliGRAM(s) Oral at bedtime  dextrose 50% Injectable 12.5 Gram(s) IV Push once  dextrose 50% Injectable 25 Gram(s) IV Push once  dextrose Oral Gel 15 Gram(s) Oral once PRN  glucagon  Injectable 1 milliGRAM(s) IntraMuscular once  insulin glargine Injectable (LANTUS) 30 Unit(s) SubCutaneous at bedtime  insulin lispro (ADMELOG) corrective regimen sliding scale   SubCutaneous every 6 hours  levothyroxine 75 MICROGram(s) Oral every 24 hours  aspirin enteric coated 81 milliGRAM(s) Oral every 24 hours  chlorhexidine 2% Cloths 1 Application(s) Topical <User Schedule>  clopidogrel Tablet 75 milliGRAM(s) Oral daily  dextrose 5%. 1000 milliLiter(s) IV Continuous <Continuous>  enoxaparin Injectable 40 milliGRAM(s) SubCutaneous every 12 hours  	  VITAL SIGNS:  T(C): 37.1 (02-13-25 @ 11:00), Max: 37.4 (02-12-25 @ 20:33)  HR: 101 (02-13-25 @ 12:49) (83 - 101)  BP: 130/61 (02-13-25 @ 12:49) (108/57 - 164/68)  RR: 18 (02-13-25 @ 11:00) (18 - 18)  SpO2: 99% (02-13-25 @ 12:49) (98% - 100%)  Wt(kg): 90.4    I&O's Summary    12 Feb 2025 07:01  -  13 Feb 2025 07:00  --------------------------------------------------------  IN: 420 mL / OUT: 1880 mL / NET: -1460 mL    13 Feb 2025 07:01  -  13 Feb 2025 15:16  --------------------------------------------------------  IN: 0 mL / OUT: 1050 mL / NET: -1050 mL                                     PHYSICAL EXAM:  Appearance: Resting in hospital bed in gown, no apparent distress, normal appearance	  HEENT: Normal oral mucosa, PERRL, EOMI	  Neck: Supple, - JVD  Cardiovascular: Normal S1 S2, No murmurs  Respiratory: Lungs clear to auscultation, decreased breath sounds at bases, no rales, rhonchi, wheezing	  Gastrointestinal:  Soft, Non-tender, + BS	  Skin: No rashes, No ecchymoses, No cyanosis  Extremities: Normal range of motion, no clubbing, cyanosis or edema, ACE bandage in place on left thigh  Vascular: Peripheral pulses palpable 2+ bilaterally  Neurologic: Non-focal  Psychiatry: A & O x 3, Mood & affect appropriate    LABS:	 	                       9.6    7.86  )-----------( 166      ( 13 Feb 2025 05:30 )             32.2     02-13    137  |  100  |  30[H]  ----------------------------<  76  4.7   |  26  |  1.73[H]    Ca    8.4      13 Feb 2025 05:30  Mg     1.7     02-13    TPro  5.3[L]  /  Alb  2.8[L]  /  TBili  1.5[H]  /  DBili  x   /  AST  24  /  ALT  17  /  AlkPhos  58  02-13   Cardiology PA Adult Progress Note    SUBJECTIVE ASSESSMENT: Pt reports feeling significantly better today. Pt currently denies any CP, SOB, SOSA, orthopnea, PND, palpitations, dizziness, lightheadedness, syncope, diaphoresis, LE edema, N/V/D, hematochezia, melena, hematuria, hematemesis, fever, chills, URI symptoms.   	  MEDICATIONS:  hydrALAZINE 10 milliGRAM(s) Oral every 8 hours  metoprolol succinate ER 25 milliGRAM(s) Oral daily  sacubitril 24 mG/valsartan 26 mG 1 Tablet(s) Oral two times a day  torsemide 20 milliGRAM(s) Oral daily  albuterol    90 MICROgram(s) HFA Inhaler 2 Puff(s) Inhalation every 4 hours PRN  albuterol/ipratropium for Nebulization 3 milliLiter(s) Nebulizer every 6 hours  fluticasone propionate/ salmeterol 250-50 MICROgram(s) Diskus 1 Dose(s) Inhalation two times a day  montelukast 10 milliGRAM(s) Oral every 24 hours  acetaminophen     Tablet .. 1000 milliGRAM(s) Oral every 6 hours PRN  HYDROmorphone  Injectable 0.25 milliGRAM(s) IV Push every 4 hours PRN  pantoprazole  Injectable 40 milliGRAM(s) IV Push every 12 hours  polyethylene glycol 3350 17 Gram(s) Oral two times a day PRN  senna 2 Tablet(s) Oral at bedtime  atorvastatin 80 milliGRAM(s) Oral at bedtime  dextrose 50% Injectable 12.5 Gram(s) IV Push once  dextrose 50% Injectable 25 Gram(s) IV Push once  dextrose Oral Gel 15 Gram(s) Oral once PRN  glucagon  Injectable 1 milliGRAM(s) IntraMuscular once  insulin glargine Injectable (LANTUS) 30 Unit(s) SubCutaneous at bedtime  insulin lispro (ADMELOG) corrective regimen sliding scale   SubCutaneous every 6 hours  levothyroxine 75 MICROGram(s) Oral every 24 hours  aspirin enteric coated 81 milliGRAM(s) Oral every 24 hours  chlorhexidine 2% Cloths 1 Application(s) Topical <User Schedule>  clopidogrel Tablet 75 milliGRAM(s) Oral daily  dextrose 5%. 1000 milliLiter(s) IV Continuous <Continuous>  enoxaparin Injectable 40 milliGRAM(s) SubCutaneous every 12 hours  	  VITAL SIGNS:  T(C): 37.1 (02-13-25 @ 11:00), Max: 37.4 (02-12-25 @ 20:33)  HR: 101 (02-13-25 @ 12:49) (83 - 101)  BP: 130/61 (02-13-25 @ 12:49) (108/57 - 164/68)  RR: 18 (02-13-25 @ 11:00) (18 - 18)  SpO2: 99% (02-13-25 @ 12:49) (98% - 100%)  Wt(kg): 90.4    I&O's Summary    12 Feb 2025 07:01  -  13 Feb 2025 07:00  --------------------------------------------------------  IN: 420 mL / OUT: 1880 mL / NET: -1460 mL    13 Feb 2025 07:01  -  13 Feb 2025 15:16  --------------------------------------------------------  IN: 0 mL / OUT: 1050 mL / NET: -1050 mL                                     PHYSICAL EXAM:  Appearance: Resting in hospital bed in gown, no apparent distress, normal appearance	  HEENT: Normal oral mucosa, PERRL, EOMI	  Neck: Supple, - JVD  Cardiovascular: Normal S1 S2, No murmurs  Respiratory: Lungs clear to auscultation, decreased breath sounds at bases, no rales, rhonchi, wheezing	  Gastrointestinal:  Soft, Non-tender, + BS	  Skin: No rashes, No ecchymoses, No cyanosis  Extremities: Normal range of motion, no clubbing, cyanosis or edema, ACE bandage in place on left thigh  Neurologic: Non-focal  Psychiatry: A & O x 3, Mood & affect appropriate    LABS:	 	                       9.6    7.86  )-----------( 166      ( 13 Feb 2025 05:30 )             32.2     02-13    137  |  100  |  30[H]  ----------------------------<  76  4.7   |  26  |  1.73[H]    Ca    8.4      13 Feb 2025 05:30  Mg     1.7     02-13    TPro  5.3[L]  /  Alb  2.8[L]  /  TBili  1.5[H]  /  DBili  x   /  AST  24  /  ALT  17  /  AlkPhos  58  02-13   Cardiology PA Adult Progress Note    SUBJECTIVE ASSESSMENT: Pt reports feeling significantly better today. Pt currently denies any CP, SOB, SOSA, orthopnea, PND, palpitations, dizziness, lightheadedness, syncope, diaphoresis, N/V/D, hematochezia, melena, hematuria, hematemesis, fever, chills, URI symptoms.   	  MEDICATIONS:  hydrALAZINE 10 milliGRAM(s) Oral every 8 hours  metoprolol succinate ER 25 milliGRAM(s) Oral daily  sacubitril 24 mG/valsartan 26 mG 1 Tablet(s) Oral two times a day  torsemide 20 milliGRAM(s) Oral daily  albuterol    90 MICROgram(s) HFA Inhaler 2 Puff(s) Inhalation every 4 hours PRN  albuterol/ipratropium for Nebulization 3 milliLiter(s) Nebulizer every 6 hours  fluticasone propionate/ salmeterol 250-50 MICROgram(s) Diskus 1 Dose(s) Inhalation two times a day  montelukast 10 milliGRAM(s) Oral every 24 hours  acetaminophen     Tablet .. 1000 milliGRAM(s) Oral every 6 hours PRN  HYDROmorphone  Injectable 0.25 milliGRAM(s) IV Push every 4 hours PRN  pantoprazole  Injectable 40 milliGRAM(s) IV Push every 12 hours  polyethylene glycol 3350 17 Gram(s) Oral two times a day PRN  senna 2 Tablet(s) Oral at bedtime  atorvastatin 80 milliGRAM(s) Oral at bedtime  dextrose 50% Injectable 12.5 Gram(s) IV Push once  dextrose 50% Injectable 25 Gram(s) IV Push once  dextrose Oral Gel 15 Gram(s) Oral once PRN  glucagon  Injectable 1 milliGRAM(s) IntraMuscular once  insulin glargine Injectable (LANTUS) 30 Unit(s) SubCutaneous at bedtime  insulin lispro (ADMELOG) corrective regimen sliding scale   SubCutaneous every 6 hours  levothyroxine 75 MICROGram(s) Oral every 24 hours  aspirin enteric coated 81 milliGRAM(s) Oral every 24 hours  chlorhexidine 2% Cloths 1 Application(s) Topical <User Schedule>  clopidogrel Tablet 75 milliGRAM(s) Oral daily  dextrose 5%. 1000 milliLiter(s) IV Continuous <Continuous>  enoxaparin Injectable 40 milliGRAM(s) SubCutaneous every 12 hours  	  VITAL SIGNS:  T(C): 37.1 (02-13-25 @ 11:00), Max: 37.4 (02-12-25 @ 20:33)  HR: 101 (02-13-25 @ 12:49) (83 - 101)  BP: 130/61 (02-13-25 @ 12:49) (108/57 - 164/68)  RR: 18 (02-13-25 @ 11:00) (18 - 18)  SpO2: 99% (02-13-25 @ 12:49) (98% - 100%)  Wt(kg): 90.4    I&O's Summary    12 Feb 2025 07:01  -  13 Feb 2025 07:00  --------------------------------------------------------  IN: 420 mL / OUT: 1880 mL / NET: -1460 mL    13 Feb 2025 07:01  -  13 Feb 2025 15:16  --------------------------------------------------------  IN: 0 mL / OUT: 1050 mL / NET: -1050 mL                                     PHYSICAL EXAM:  Appearance: Resting in hospital bed in gown, no apparent distress, normal appearance	  HEENT: Normal oral mucosa, PERRL, EOMI	  Neck: Supple, - JVD  Cardiovascular: Normal S1 S2, No murmurs  Respiratory: Lungs clear to auscultation, decreased breath sounds at bases, no rales, rhonchi, wheezing	  Gastrointestinal:  Soft, Non-tender, + BS	  Skin: No rashes, No ecchymoses, No cyanosis  Extremities: Normal range of motion, no clubbing, cyanosis or edema, ACE bandage in place on left thigh  Neurologic: Non-focal  Psychiatry: A & O x 3, Mood & affect appropriate    LABS:	 	                       9.6    7.86  )-----------( 166      ( 13 Feb 2025 05:30 )             32.2     02-13    137  |  100  |  30[H]  ----------------------------<  76  4.7   |  26  |  1.73[H]    Ca    8.4      13 Feb 2025 05:30  Mg     1.7     02-13    TPro  5.3[L]  /  Alb  2.8[L]  /  TBili  1.5[H]  /  DBili  x   /  AST  24  /  ALT  17  /  AlkPhos  58  02-13

## 2025-02-13 NOTE — PROGRESS NOTE ADULT - ASSESSMENT
79F with PMHx of HTN, hyperlipidemia, DM, hypothyroidism, GERD, asthma, CAD s/p prior PCIs ( [facility] 11/2023) who [place] [facility] [place] with chest pain and URI symptoms x 2-3 days, R/I NSTEMI, [email or web address] [place] and newly reduced EF, initially transferred to [facility] 9LACH under Dr. 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 Course c/b anemia requiring 2U PRBC on 2/4. and further c/b  cardiogenic shock, requiring pressor support and IABP in the CCU, now transferred to cardiac telemetry for dispo planning. Palliative following, not a candidate for home hospice at this time. Started on IV dilaudid PRN for L leg hematoma. Pending MAYRA.

## 2025-02-13 NOTE — PROGRESS NOTE ADULT - PROBLEM SELECTOR PROBLEM 9
History of asthma
Hypothyroid
Hypothyroid
History of asthma
Hypothyroid
History of asthma
Hypothyroid
R/O PNA (pneumonia)
History of asthma

## 2025-02-13 NOTE — PROGRESS NOTE ADULT - PROBLEM SELECTOR PLAN 1
- [facility] 11/2023: [facility]isted Rota/PCI with EMILIE to LM 90%, EMILIE ostial LAD and EMILIE ostial LCX, (dLM 80%, oLAD 85%, D2 75%, oLCX 85%, mRCA 75%).  - s/p dx [facility] @ [facility] [place] (1/28/24) w/ Dr. Morgan: dLM to pLAD 80% ISR, LCx ostial to pLCX ISR, %   - TTE 1/31/25: EF 44%, mild LVH, RWMA. Grade I DD. Aortic sclerosis w/o significant stenosis.  - c/w lovenox 40mg SQ q 12 hours  - c/w ASA 81 mg daily, Plavix 75 mg PO qd    #RESOLVED-Cardiogenic Shock  - pt previously required inotropic support as well as mechanical ventilation and IABP for afterload reduction  - IABP removed 2/8, tolerating well  - off of levo and vaso as of 2/8/25

## 2025-02-13 NOTE — PROGRESS NOTE ADULT - PROBLEM SELECTOR PLAN 5
- CTA Abd Aorta with Runoff (2/8/25): large intramuscular hematoma involving the anteromedial  thigh musculature with heterogeneous attenuation, suggestive of hemorrhagic components in various stages of evolution. No evidence of arterial extravasation or venous pooling on delayed images  - Possibly secondary to trauma while at [facility] [place], reportedly tried both femoral arteries for access during angio   - Vascular following, no acute intervention at this time, c/w ACE wrap for light compression of L leg  - c/w with acetaminophen 1000mg q 6 hours and IV dilaudid 0.25mg q 4 hours for severe pain as recommended by palliative  - c/w ASA 81mg PO qd and Plavix 75 mg PO qd

## 2025-02-13 NOTE — PROGRESS NOTE ADULT - PROBLEM SELECTOR PLAN 2
- New onset CHF likely in setting of NSTEMI   - EF at [facility] [place]: 25%, TTE 1/31/25: EF 44%, mild LVH, RWMA. Grade I DD. Aortic sclerosis w/o significant stenosis.  - GDMT: c/w Toprol XL 25mg QD and hydralazine 10mg PO TID Losartan on HOLD in setting of KEN.  - Diuresis: c/w torsemide 20 mg PO qd - New onset CHF likely in setting of NSTEMI   - EF at [facility]: 25%, TTE 1/31/25: EF 44%, mild LVH, RWMA. Grade I DD. Aortic sclerosis w/o significant stenosis.  - GDMT: c/w Toprol XL 25mg QD, Hydralazine 10mg PO TID, start Entresto 24/26 mg PO BID  - Diuresis: c/w torsemide 20 mg PO qd

## 2025-02-13 NOTE — PROGRESS NOTE ADULT - PROBLEM SELECTOR PLAN 4
- H/H 9.4/29.5  - s/p 2U PRBC 2/4/25 AM, 1U PRBC on 2/7 AM  - Negative SEGUN. B12, folate, iron studies wnl  - Maintain active T&S (next 2/13/25)  - Transfuse for Hgb <7 - H/H 9.6/32.5  - s/p 2U PRBC 2/4/25 AM, 1U PRBC on 2/7 AM  - Negative SEGUN. B12, folate, iron studies wnl  - Maintain active T&S (next 2/16/25)  - Transfuse for Hgb <7

## 2025-02-13 NOTE — PROGRESS NOTE ADULT - PROBLEM/PLAN-9
How Severe Is This Condition?: mild
DISPLAY PLAN FREE TEXT

## 2025-02-14 VITALS
OXYGEN SATURATION: 97 % | DIASTOLIC BLOOD PRESSURE: 57 MMHG | HEART RATE: 82 BPM | SYSTOLIC BLOOD PRESSURE: 107 MMHG | TEMPERATURE: 98 F | RESPIRATION RATE: 18 BRPM

## 2025-02-14 LAB
ALBUMIN SERPL ELPH-MCNC: 2.8 G/DL — LOW (ref 3.3–5)
ALP SERPL-CCNC: 57 U/L — SIGNIFICANT CHANGE UP (ref 40–120)
ALT FLD-CCNC: 14 U/L — SIGNIFICANT CHANGE UP (ref 10–45)
ANION GAP SERPL CALC-SCNC: 10 MMOL/L — SIGNIFICANT CHANGE UP (ref 5–17)
AST SERPL-CCNC: 20 U/L — SIGNIFICANT CHANGE UP (ref 10–40)
BASOPHILS # BLD AUTO: 0.06 K/UL — SIGNIFICANT CHANGE UP (ref 0–0.2)
BASOPHILS NFR BLD AUTO: 0.7 % — SIGNIFICANT CHANGE UP (ref 0–2)
BILIRUB SERPL-MCNC: 1.2 MG/DL — SIGNIFICANT CHANGE UP (ref 0.2–1.2)
BUN SERPL-MCNC: 33 MG/DL — HIGH (ref 7–23)
CALCIUM SERPL-MCNC: 8.3 MG/DL — LOW (ref 8.4–10.5)
CHLORIDE SERPL-SCNC: 99 MMOL/L — SIGNIFICANT CHANGE UP (ref 96–108)
CO2 SERPL-SCNC: 27 MMOL/L — SIGNIFICANT CHANGE UP (ref 22–31)
CREAT SERPL-MCNC: 1.7 MG/DL — HIGH (ref 0.5–1.3)
EGFR: 30 ML/MIN/1.73M2 — LOW
EOSINOPHIL # BLD AUTO: 1.11 K/UL — HIGH (ref 0–0.5)
EOSINOPHIL NFR BLD AUTO: 13.6 % — HIGH (ref 0–6)
GLUCOSE SERPL-MCNC: 102 MG/DL — HIGH (ref 70–99)
HCT VFR BLD CALC: 33.5 % — LOW (ref 34.5–45)
HGB BLD-MCNC: 10.2 G/DL — LOW (ref 11.5–15.5)
IMM GRANULOCYTES NFR BLD AUTO: 1.2 % — HIGH (ref 0–0.9)
LYMPHOCYTES # BLD AUTO: 1.91 K/UL — SIGNIFICANT CHANGE UP (ref 1–3.3)
LYMPHOCYTES # BLD AUTO: 23.5 % — SIGNIFICANT CHANGE UP (ref 13–44)
MAGNESIUM SERPL-MCNC: 2 MG/DL — SIGNIFICANT CHANGE UP (ref 1.6–2.6)
MCHC RBC-ENTMCNC: 28.9 PG — SIGNIFICANT CHANGE UP (ref 27–34)
MCHC RBC-ENTMCNC: 30.4 G/DL — LOW (ref 32–36)
MCV RBC AUTO: 94.9 FL — SIGNIFICANT CHANGE UP (ref 80–100)
MONOCYTES # BLD AUTO: 0.74 K/UL — SIGNIFICANT CHANGE UP (ref 0–0.9)
MONOCYTES NFR BLD AUTO: 9.1 % — SIGNIFICANT CHANGE UP (ref 2–14)
NEUTROPHILS # BLD AUTO: 4.22 K/UL — SIGNIFICANT CHANGE UP (ref 1.8–7.4)
NEUTROPHILS NFR BLD AUTO: 51.9 % — SIGNIFICANT CHANGE UP (ref 43–77)
NRBC BLD AUTO-RTO: 0 /100 WBCS — SIGNIFICANT CHANGE UP (ref 0–0)
PLATELET # BLD AUTO: 244 K/UL — SIGNIFICANT CHANGE UP (ref 150–400)
POTASSIUM SERPL-MCNC: 3.8 MMOL/L — SIGNIFICANT CHANGE UP (ref 3.5–5.3)
POTASSIUM SERPL-SCNC: 3.8 MMOL/L — SIGNIFICANT CHANGE UP (ref 3.5–5.3)
PROT SERPL-MCNC: 5.3 G/DL — LOW (ref 6–8.3)
RBC # BLD: 3.53 M/UL — LOW (ref 3.8–5.2)
RBC # FLD: 15.8 % — HIGH (ref 10.3–14.5)
SARS-COV-2 RNA SPEC QL NAA+PROBE: SIGNIFICANT CHANGE UP
SODIUM SERPL-SCNC: 136 MMOL/L — SIGNIFICANT CHANGE UP (ref 135–145)
WBC # BLD: 8.14 K/UL — SIGNIFICANT CHANGE UP (ref 3.8–10.5)
WBC # FLD AUTO: 8.14 K/UL — SIGNIFICANT CHANGE UP (ref 3.8–10.5)

## 2025-02-14 PROCEDURE — 87641 MR-STAPH DNA AMP PROBE: CPT

## 2025-02-14 PROCEDURE — 87899 AGENT NOS ASSAY W/OPTIC: CPT

## 2025-02-14 PROCEDURE — 83605 ASSAY OF LACTIC ACID: CPT

## 2025-02-14 PROCEDURE — 86901 BLOOD TYPING SEROLOGIC RH(D): CPT

## 2025-02-14 PROCEDURE — 85652 RBC SED RATE AUTOMATED: CPT

## 2025-02-14 PROCEDURE — 82803 BLOOD GASES ANY COMBINATION: CPT

## 2025-02-14 PROCEDURE — 85045 AUTOMATED RETICULOCYTE COUNT: CPT

## 2025-02-14 PROCEDURE — 84540 ASSAY OF URINE/UREA-N: CPT

## 2025-02-14 PROCEDURE — 84443 ASSAY THYROID STIM HORMONE: CPT

## 2025-02-14 PROCEDURE — 87635 SARS-COV-2 COVID-19 AMP PRB: CPT

## 2025-02-14 PROCEDURE — 86850 RBC ANTIBODY SCREEN: CPT

## 2025-02-14 PROCEDURE — 71045 X-RAY EXAM CHEST 1 VIEW: CPT

## 2025-02-14 PROCEDURE — 84145 PROCALCITONIN (PCT): CPT

## 2025-02-14 PROCEDURE — 94002 VENT MGMT INPAT INIT DAY: CPT

## 2025-02-14 PROCEDURE — 81001 URINALYSIS AUTO W/SCOPE: CPT

## 2025-02-14 PROCEDURE — 71046 X-RAY EXAM CHEST 2 VIEWS: CPT

## 2025-02-14 PROCEDURE — 85025 COMPLETE CBC W/AUTO DIFF WBC: CPT

## 2025-02-14 PROCEDURE — 73700 CT LOWER EXTREMITY W/O DYE: CPT | Mod: MC

## 2025-02-14 PROCEDURE — 94640 AIRWAY INHALATION TREATMENT: CPT

## 2025-02-14 PROCEDURE — 87070 CULTURE OTHR SPECIMN AEROBIC: CPT

## 2025-02-14 PROCEDURE — 74176 CT ABD & PELVIS W/O CONTRAST: CPT | Mod: MC

## 2025-02-14 PROCEDURE — 99239 HOSP IP/OBS DSCHRG MGMT >30: CPT

## 2025-02-14 PROCEDURE — 71250 CT THORAX DX C-: CPT | Mod: MC

## 2025-02-14 PROCEDURE — 97161 PT EVAL LOW COMPLEX 20 MIN: CPT

## 2025-02-14 PROCEDURE — 93308 TTE F-UP OR LMTD: CPT

## 2025-02-14 PROCEDURE — 84484 ASSAY OF TROPONIN QUANT: CPT

## 2025-02-14 PROCEDURE — 84133 ASSAY OF URINE POTASSIUM: CPT

## 2025-02-14 PROCEDURE — P9037: CPT

## 2025-02-14 PROCEDURE — 93880 EXTRACRANIAL BILAT STUDY: CPT

## 2025-02-14 PROCEDURE — 82330 ASSAY OF CALCIUM: CPT

## 2025-02-14 PROCEDURE — 80053 COMPREHEN METABOLIC PANEL: CPT

## 2025-02-14 PROCEDURE — 84100 ASSAY OF PHOSPHORUS: CPT

## 2025-02-14 PROCEDURE — 85730 THROMBOPLASTIN TIME PARTIAL: CPT

## 2025-02-14 PROCEDURE — 84295 ASSAY OF SERUM SODIUM: CPT

## 2025-02-14 PROCEDURE — 85384 FIBRINOGEN ACTIVITY: CPT

## 2025-02-14 PROCEDURE — 83735 ASSAY OF MAGNESIUM: CPT

## 2025-02-14 PROCEDURE — 83880 ASSAY OF NATRIURETIC PEPTIDE: CPT

## 2025-02-14 PROCEDURE — 84300 ASSAY OF URINE SODIUM: CPT

## 2025-02-14 PROCEDURE — 82570 ASSAY OF URINE CREATININE: CPT

## 2025-02-14 PROCEDURE — 85018 HEMOGLOBIN: CPT

## 2025-02-14 PROCEDURE — 87640 STAPH A DNA AMP PROBE: CPT

## 2025-02-14 PROCEDURE — 83930 ASSAY OF BLOOD OSMOLALITY: CPT

## 2025-02-14 PROCEDURE — 82553 CREATINE MB FRACTION: CPT

## 2025-02-14 PROCEDURE — 80048 BASIC METABOLIC PNL TOTAL CA: CPT

## 2025-02-14 PROCEDURE — 97116 GAIT TRAINING THERAPY: CPT

## 2025-02-14 PROCEDURE — 82728 ASSAY OF FERRITIN: CPT

## 2025-02-14 PROCEDURE — 85610 PROTHROMBIN TIME: CPT

## 2025-02-14 PROCEDURE — 84436 ASSAY OF TOTAL THYROXINE: CPT

## 2025-02-14 PROCEDURE — 93306 TTE W/DOPPLER COMPLETE: CPT

## 2025-02-14 PROCEDURE — 84156 ASSAY OF PROTEIN URINE: CPT

## 2025-02-14 PROCEDURE — 82607 VITAMIN B-12: CPT

## 2025-02-14 PROCEDURE — 84132 ASSAY OF SERUM POTASSIUM: CPT

## 2025-02-14 PROCEDURE — 83540 ASSAY OF IRON: CPT

## 2025-02-14 PROCEDURE — 83935 ASSAY OF URINE OSMOLALITY: CPT

## 2025-02-14 PROCEDURE — 87040 BLOOD CULTURE FOR BACTERIA: CPT

## 2025-02-14 PROCEDURE — 76882 US LMTD JT/FCL EVL NVASC XTR: CPT

## 2025-02-14 PROCEDURE — P9016: CPT

## 2025-02-14 PROCEDURE — 84466 ASSAY OF TRANSFERRIN: CPT

## 2025-02-14 PROCEDURE — P9047: CPT

## 2025-02-14 PROCEDURE — 97110 THERAPEUTIC EXERCISES: CPT

## 2025-02-14 PROCEDURE — 87086 URINE CULTURE/COLONY COUNT: CPT

## 2025-02-14 PROCEDURE — 83550 IRON BINDING TEST: CPT

## 2025-02-14 PROCEDURE — 85027 COMPLETE CBC AUTOMATED: CPT

## 2025-02-14 PROCEDURE — 86900 BLOOD TYPING SEROLOGIC ABO: CPT

## 2025-02-14 PROCEDURE — 82962 GLUCOSE BLOOD TEST: CPT

## 2025-02-14 PROCEDURE — 85379 FIBRIN DEGRADATION QUANT: CPT

## 2025-02-14 PROCEDURE — 83615 LACTATE (LD) (LDH) ENZYME: CPT

## 2025-02-14 PROCEDURE — 36430 TRANSFUSION BLD/BLD COMPNT: CPT

## 2025-02-14 PROCEDURE — 82010 KETONE BODYS QUAN: CPT

## 2025-02-14 PROCEDURE — 85576 BLOOD PLATELET AGGREGATION: CPT

## 2025-02-14 PROCEDURE — P9100: CPT

## 2025-02-14 PROCEDURE — 82746 ASSAY OF FOLIC ACID SERUM: CPT

## 2025-02-14 PROCEDURE — 87205 SMEAR GRAM STAIN: CPT

## 2025-02-14 PROCEDURE — 0225U NFCT DS DNA&RNA 21 SARSCOV2: CPT

## 2025-02-14 PROCEDURE — 83036 HEMOGLOBIN GLYCOSYLATED A1C: CPT

## 2025-02-14 PROCEDURE — C1889: CPT

## 2025-02-14 PROCEDURE — 75635 CT ANGIO ABDOMINAL ARTERIES: CPT | Mod: MC

## 2025-02-14 PROCEDURE — 93005 ELECTROCARDIOGRAM TRACING: CPT

## 2025-02-14 PROCEDURE — 74018 RADEX ABDOMEN 1 VIEW: CPT

## 2025-02-14 PROCEDURE — 80061 LIPID PANEL: CPT

## 2025-02-14 PROCEDURE — 82550 ASSAY OF CK (CPK): CPT

## 2025-02-14 PROCEDURE — 36415 COLL VENOUS BLD VENIPUNCTURE: CPT

## 2025-02-14 PROCEDURE — C1894: CPT

## 2025-02-14 PROCEDURE — 93971 EXTREMITY STUDY: CPT

## 2025-02-14 PROCEDURE — 86140 C-REACTIVE PROTEIN: CPT

## 2025-02-14 PROCEDURE — 80202 ASSAY OF VANCOMYCIN: CPT

## 2025-02-14 PROCEDURE — 86923 COMPATIBILITY TEST ELECTRIC: CPT

## 2025-02-14 PROCEDURE — 83010 ASSAY OF HAPTOGLOBIN QUANT: CPT

## 2025-02-14 PROCEDURE — C8929: CPT

## 2025-02-14 RX ORDER — IPRATROPIUM BROMIDE AND ALBUTEROL SULFATE .5; 2.5 MG/3ML; MG/3ML
3 SOLUTION RESPIRATORY (INHALATION)
Qty: 0 | Refills: 0 | DISCHARGE
Start: 2025-02-14

## 2025-02-14 RX ORDER — ALBUTEROL 90 MCG
2 AEROSOL REFILL (GRAM) INHALATION
Qty: 0 | Refills: 0 | DISCHARGE
Start: 2025-02-14

## 2025-02-14 RX ORDER — SENNOSIDES 8.6 MG
2 TABLET ORAL
Qty: 0 | Refills: 0 | DISCHARGE
Start: 2025-02-14

## 2025-02-14 RX ORDER — POLYETHYLENE GLYCOL 3350 17 G/17G
17 POWDER, FOR SOLUTION ORAL
Qty: 0 | Refills: 0 | DISCHARGE
Start: 2025-02-14

## 2025-02-14 RX ORDER — HYDRALAZINE HCL 100 MG
1 TABLET ORAL
Qty: 0 | Refills: 0 | DISCHARGE
Start: 2025-02-14

## 2025-02-14 RX ORDER — TORSEMIDE 20 MG/1
1 TABLET ORAL
Qty: 0 | Refills: 0 | DISCHARGE
Start: 2025-02-14

## 2025-02-14 RX ORDER — POTASSIUM CHLORIDE 750 MG/1
40 TABLET, EXTENDED RELEASE ORAL ONCE
Refills: 0 | Status: DISCONTINUED | OUTPATIENT
Start: 2025-02-14 | End: 2025-02-14

## 2025-02-14 RX ORDER — SACUBITRIL AND VALSARTAN 49; 51 MG/1; MG/1
1 TABLET, FILM COATED ORAL
Qty: 60 | Refills: 0
Start: 2025-02-14 | End: 2025-03-15

## 2025-02-14 RX ORDER — ACETAMINOPHEN 160 MG/5ML
2 SUSPENSION ORAL
Qty: 0 | Refills: 0 | DISCHARGE
Start: 2025-02-14

## 2025-02-14 RX ORDER — HYDROMORPHONE HYDROCHLORIDE 4 MG/ML
0 INJECTION, SOLUTION INTRAMUSCULAR; INTRAVENOUS; SUBCUTANEOUS
Qty: 0 | Refills: 0 | DISCHARGE
Start: 2025-02-14

## 2025-02-14 RX ADMIN — Medication 10 MILLIGRAM(S): at 07:05

## 2025-02-14 RX ADMIN — LEVOTHYROXINE SODIUM 75 MICROGRAM(S): 25 TABLET ORAL at 07:04

## 2025-02-14 RX ADMIN — ACETAMINOPHEN 1000 MILLIGRAM(S): 160 SUSPENSION ORAL at 08:18

## 2025-02-14 RX ADMIN — ENOXAPARIN SODIUM 40 MILLIGRAM(S): 100 INJECTION SUBCUTANEOUS at 07:03

## 2025-02-14 RX ADMIN — IPRATROPIUM BROMIDE AND ALBUTEROL SULFATE 3 MILLILITER(S): .5; 2.5 SOLUTION RESPIRATORY (INHALATION) at 11:35

## 2025-02-14 RX ADMIN — Medication 2: at 12:33

## 2025-02-14 RX ADMIN — IPRATROPIUM BROMIDE AND ALBUTEROL SULFATE 3 MILLILITER(S): .5; 2.5 SOLUTION RESPIRATORY (INHALATION) at 07:03

## 2025-02-14 RX ADMIN — MONTELUKAST SODIUM 10 MILLIGRAM(S): 5 TABLET, CHEWABLE ORAL at 14:49

## 2025-02-14 RX ADMIN — Medication 25 MILLIGRAM(S): at 07:05

## 2025-02-14 RX ADMIN — ASPIRIN 81 MILLIGRAM(S): 81 TABLET, COATED ORAL at 11:35

## 2025-02-14 RX ADMIN — PANTOPRAZOLE 40 MILLIGRAM(S): 20 TABLET, DELAYED RELEASE ORAL at 07:05

## 2025-02-14 RX ADMIN — Medication 75 MILLIGRAM(S): at 11:34

## 2025-02-14 RX ADMIN — ACETAMINOPHEN 1000 MILLIGRAM(S): 160 SUSPENSION ORAL at 07:04

## 2025-02-14 RX ADMIN — FLUTICASONE PROPIONATE AND SALMETEROL 1 DOSE(S): 113; 14 POWDER, METERED RESPIRATORY (INHALATION) at 07:03

## 2025-02-14 RX ADMIN — Medication 10 MILLIGRAM(S): at 14:50

## 2025-02-14 RX ADMIN — SACUBITRIL AND VALSARTAN 1 TABLET(S): 49; 51 TABLET, FILM COATED ORAL at 07:04

## 2025-02-14 RX ADMIN — TORSEMIDE 20 MILLIGRAM(S): 20 TABLET ORAL at 07:05

## 2025-02-17 LAB
CULTURE RESULTS: SIGNIFICANT CHANGE UP
CULTURE RESULTS: SIGNIFICANT CHANGE UP
SPECIMEN SOURCE: SIGNIFICANT CHANGE UP
SPECIMEN SOURCE: SIGNIFICANT CHANGE UP

## 2025-02-18 DIAGNOSIS — R57.0 CARDIOGENIC SHOCK: ICD-10-CM

## 2025-02-18 DIAGNOSIS — K21.9 GASTRO-ESOPHAGEAL REFLUX DISEASE WITHOUT ESOPHAGITIS: ICD-10-CM

## 2025-02-18 DIAGNOSIS — S70.12XA CONTUSION OF LEFT THIGH, INITIAL ENCOUNTER: ICD-10-CM

## 2025-02-18 DIAGNOSIS — E11.22 TYPE 2 DIABETES MELLITUS WITH DIABETIC CHRONIC KIDNEY DISEASE: ICD-10-CM

## 2025-02-18 DIAGNOSIS — J45.909 UNSPECIFIED ASTHMA, UNCOMPLICATED: ICD-10-CM
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
## 2025-02-18 DIAGNOSIS — Z79.4 LONG TERM (CURRENT) USE OF INSULIN: ICD-10-CM

## 2025-02-18 DIAGNOSIS — I25.810 ATHEROSCLEROSIS OF CORONARY ARTERY BYPASS GRAFT(S) WITHOUT ANGINA PECTORIS: ICD-10-CM

## 2025-02-18 DIAGNOSIS — Z79.899 OTHER LONG TERM (CURRENT) DRUG THERAPY: ICD-10-CM

## 2025-02-18 DIAGNOSIS — E78.5 HYPERLIPIDEMIA, UNSPECIFIED: ICD-10-CM

## 2025-02-18 DIAGNOSIS — I25.119 ATHEROSCLEROTIC HEART DISEASE OF NATIVE CORONARY ARTERY WITH UNSPECIFIED ANGINA PECTORIS: ICD-10-CM

## 2025-02-18 DIAGNOSIS — I13.0 HYPERTENSIVE HEART AND CHRONIC KIDNEY DISEASE WITH HEART FAILURE AND STAGE 1 THROUGH STAGE 4 CHRONIC KIDNEY DISEASE, OR UNSPECIFIED CHRONIC KIDNEY DISEASE: ICD-10-CM

## 2025-02-18 DIAGNOSIS — Z95.5 PRESENCE OF CORONARY ANGIOPLASTY IMPLANT AND GRAFT: ICD-10-CM

## 2025-02-18 DIAGNOSIS — Z96.653 PRESENCE OF ARTIFICIAL KNEE JOINT, BILATERAL: ICD-10-CM

## 2025-02-18 DIAGNOSIS — I21.4 NON-ST ELEVATION (NSTEMI) MYOCARDIAL INFARCTION: ICD-10-CM

## 2025-02-18 DIAGNOSIS — A49.02 METHICILLIN RESISTANT STAPHYLOCOCCUS AUREUS INFECTION, UNSPECIFIED SITE: ICD-10-CM
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
## 2025-02-18 DIAGNOSIS — I50.21 ACUTE SYSTOLIC (CONGESTIVE) HEART FAILURE: ICD-10-CM

## 2025-02-18 DIAGNOSIS — Z66 DO NOT RESUSCITATE: ICD-10-CM

## 2025-02-18 DIAGNOSIS — E66.9 OBESITY, UNSPECIFIED: ICD-10-CM

## 2025-02-18 DIAGNOSIS — R06.02 SHORTNESS OF BREATH: ICD-10-CM

## 2025-02-18 DIAGNOSIS — D64.9 ANEMIA, UNSPECIFIED: ICD-10-CM

## 2025-02-18 DIAGNOSIS — N18.30 CHRONIC KIDNEY DISEASE, STAGE 3 UNSPECIFIED: ICD-10-CM

## 2025-02-18 DIAGNOSIS — E03.9 HYPOTHYROIDISM, UNSPECIFIED: ICD-10-CM

## 2025-02-18 DIAGNOSIS — J18.9 PNEUMONIA, UNSPECIFIED ORGANISM: ICD-10-CM

## 2025-05-27 NOTE — ASU PREOP CHECKLIST - DNR CLARIFICATION FORM COMPLETED
n/a Detail Level: Detailed Quality 130: Documentation Of Current Medications In The Medical Record: Current Medications Documented Quality 431: Preventive Care And Screening: Unhealthy Alcohol Use - Screening: Patient not identified as an unhealthy alcohol user when screened for unhealthy alcohol use using a systematic screening method Quality 226: Preventive Care And Screening: Tobacco Use: Screening And Cessation Intervention: Patient screened for tobacco use and is an ex/non-smoker